# Patient Record
Sex: MALE | Race: WHITE | NOT HISPANIC OR LATINO | Employment: UNEMPLOYED | URBAN - METROPOLITAN AREA
[De-identification: names, ages, dates, MRNs, and addresses within clinical notes are randomized per-mention and may not be internally consistent; named-entity substitution may affect disease eponyms.]

---

## 2017-01-03 ENCOUNTER — DOCUMENTATION ONLY (OUTPATIENT)
Dept: FAMILY MEDICINE | Facility: CLINIC | Age: 52
End: 2017-01-03

## 2017-01-03 NOTE — PROGRESS NOTES
Pre-Visit Chart Review  For Appointment Scheduled on 1-4-17    Health Maintenance Due   Topic Date Due    TETANUS VACCINE  10/06/1982    Influenza Vaccine  08/01/2016

## 2017-01-05 ENCOUNTER — HOSPITAL ENCOUNTER (OUTPATIENT)
Dept: RADIOLOGY | Facility: HOSPITAL | Age: 52
Discharge: HOME | End: 2017-01-05
Attending: ORTHOPAEDIC SURGERY
Payer: MEDICAID

## 2017-01-05 DIAGNOSIS — M79.641 RIGHT HAND PAIN: ICD-10-CM

## 2017-01-05 DIAGNOSIS — M79.641 RIGHT HAND PAIN: Primary | ICD-10-CM

## 2017-01-05 PROCEDURE — 73130 X-RAY EXAM OF HAND: CPT | Mod: TC,RT

## 2017-01-05 PROCEDURE — 73130 X-RAY EXAM OF HAND: CPT | Mod: 26,RT,, | Performed by: RADIOLOGY

## 2017-01-06 ENCOUNTER — DOCUMENTATION ONLY (OUTPATIENT)
Dept: FAMILY MEDICINE | Facility: CLINIC | Age: 52
End: 2017-01-06

## 2017-01-06 NOTE — PROGRESS NOTES
Pre-Visit Chart Review  For Appointment Scheduled on 01/09/2017    Health Maintenance Due   Topic Date Due    TETANUS VACCINE  10/06/1982    Influenza Vaccine  08/01/2016

## 2017-01-10 ENCOUNTER — DOCUMENTATION ONLY (OUTPATIENT)
Dept: FAMILY MEDICINE | Facility: CLINIC | Age: 52
End: 2017-01-10

## 2017-01-10 NOTE — PROGRESS NOTES
Pre-Visit Chart Review  For Appointment Scheduled on 01/11/2017    Health Maintenance Due   Topic Date Due    TETANUS VACCINE  10/06/1982    Influenza Vaccine  08/01/2016

## 2017-01-11 ENCOUNTER — LAB VISIT (OUTPATIENT)
Dept: LAB | Facility: HOSPITAL | Age: 52
End: 2017-01-11
Attending: FAMILY MEDICINE
Payer: MEDICAID

## 2017-01-11 ENCOUNTER — OFFICE VISIT (OUTPATIENT)
Dept: FAMILY MEDICINE | Facility: CLINIC | Age: 52
End: 2017-01-11
Payer: MEDICAID

## 2017-01-11 ENCOUNTER — HOSPITAL ENCOUNTER (OUTPATIENT)
Dept: RADIOLOGY | Facility: HOSPITAL | Age: 52
Discharge: HOME | End: 2017-01-11
Attending: FAMILY MEDICINE
Payer: MEDICAID

## 2017-01-11 ENCOUNTER — TELEPHONE (OUTPATIENT)
Dept: FAMILY MEDICINE | Facility: CLINIC | Age: 52
End: 2017-01-11

## 2017-01-11 VITALS
TEMPERATURE: 98 F | WEIGHT: 212.06 LBS | BODY MASS INDEX: 33.28 KG/M2 | SYSTOLIC BLOOD PRESSURE: 150 MMHG | DIASTOLIC BLOOD PRESSURE: 96 MMHG | HEART RATE: 74 BPM | HEIGHT: 67 IN

## 2017-01-11 DIAGNOSIS — M10.9 GOUT, ARTHRITIS: Primary | ICD-10-CM

## 2017-01-11 DIAGNOSIS — M54.50 BILATERAL LOW BACK PAIN WITHOUT SCIATICA, UNSPECIFIED CHRONICITY: ICD-10-CM

## 2017-01-11 DIAGNOSIS — M25.649 STIFFNESS OF HAND JOINT, UNSPECIFIED LATERALITY: ICD-10-CM

## 2017-01-11 DIAGNOSIS — M10.9 GOUT, ARTHRITIS: ICD-10-CM

## 2017-01-11 DIAGNOSIS — I10 ESSENTIAL HYPERTENSION: ICD-10-CM

## 2017-01-11 DIAGNOSIS — R41.840 LACK OF CONCENTRATION: ICD-10-CM

## 2017-01-11 LAB
ALBUMIN SERPL BCP-MCNC: 4.3 G/DL
ALP SERPL-CCNC: 108 U/L
ALT SERPL W/O P-5'-P-CCNC: 23 U/L
ANION GAP SERPL CALC-SCNC: 7 MMOL/L
AST SERPL-CCNC: 21 U/L
BILIRUB SERPL-MCNC: 1.3 MG/DL
BUN SERPL-MCNC: 15 MG/DL
CALCIUM SERPL-MCNC: 9.5 MG/DL
CHLORIDE SERPL-SCNC: 104 MMOL/L
CO2 SERPL-SCNC: 28 MMOL/L
CREAT SERPL-MCNC: 1.1 MG/DL
CRP SERPL-MCNC: 4.6 MG/L
EST. GFR  (AFRICAN AMERICAN): >60 ML/MIN/1.73 M^2
EST. GFR  (NON AFRICAN AMERICAN): >60 ML/MIN/1.73 M^2
GLUCOSE SERPL-MCNC: 85 MG/DL
POTASSIUM SERPL-SCNC: 4.1 MMOL/L
PROT SERPL-MCNC: 8.1 G/DL
RHEUMATOID FACT SERPL-ACNC: <10 IU/ML
SODIUM SERPL-SCNC: 139 MMOL/L
URATE SERPL-MCNC: 10.1 MG/DL

## 2017-01-11 PROCEDURE — 77077 JOINT SURVEY SINGLE VIEW: CPT | Mod: 26,,, | Performed by: RADIOLOGY

## 2017-01-11 PROCEDURE — 99999 PR PBB SHADOW E&M-EST. PATIENT-LVL IV: CPT | Mod: PBBFAC,,, | Performed by: PHYSICIAN ASSISTANT

## 2017-01-11 PROCEDURE — 99213 OFFICE O/P EST LOW 20 MIN: CPT | Mod: S$PBB,,, | Performed by: PHYSICIAN ASSISTANT

## 2017-01-11 PROCEDURE — 36415 COLL VENOUS BLD VENIPUNCTURE: CPT | Mod: PO

## 2017-01-11 PROCEDURE — 84550 ASSAY OF BLOOD/URIC ACID: CPT

## 2017-01-11 PROCEDURE — 77077 JOINT SURVEY SINGLE VIEW: CPT | Mod: TC

## 2017-01-11 PROCEDURE — 99214 OFFICE O/P EST MOD 30 MIN: CPT | Mod: PBBFAC,PO | Performed by: PHYSICIAN ASSISTANT

## 2017-01-11 PROCEDURE — 86431 RHEUMATOID FACTOR QUANT: CPT

## 2017-01-11 PROCEDURE — 86140 C-REACTIVE PROTEIN: CPT

## 2017-01-11 PROCEDURE — 80053 COMPREHEN METABOLIC PANEL: CPT

## 2017-01-11 RX ORDER — IBUPROFEN 800 MG/1
800 TABLET ORAL 3 TIMES DAILY PRN
Qty: 90 TABLET | Refills: 0 | Status: SHIPPED | OUTPATIENT
Start: 2017-01-11 | End: 2017-09-08 | Stop reason: SDUPTHER

## 2017-01-11 RX ORDER — AMLODIPINE BESYLATE 5 MG/1
5 TABLET ORAL DAILY
COMMUNITY
End: 2017-06-02

## 2017-01-11 RX ORDER — TRIAMCINOLONE ACETONIDE 40 MG/ML
60 INJECTION, SUSPENSION INTRA-ARTICULAR; INTRAMUSCULAR ONCE
Status: DISCONTINUED | OUTPATIENT
Start: 2017-01-11 | End: 2017-02-27

## 2017-01-11 NOTE — PATIENT INSTRUCTIONS
Weight Management: Getting Started  Healthy bodies come in all shapes and sizes. Not all bodies are made to be thin. For some people, a healthy weight is higher than the average weight listed on weight charts. Your healthcare provider can help you decide on a healthy weight for you.    Reasons to lose weight  Losing weight can help with some health problems, such as high blood pressure, heart disease, diabetes, sleep apnea, and arthritis. You may also feel more energy.  Set your long-term goal  Your goal doesn't even have to be a specific weight. You may decide on a fitness goal (such as being able to walk 10 miles a week), or a health goal (such as lowering your blood pressure). Choose a goal that is measurable and reasonable, so you know when you've reached it. A goal of reaching a BMI of less than 25 is not always reasonable (or possible).   Make an action plan  Habits dont change overnight. Setting your goals too high can leave you feeling discouraged if you cant reach them. Be realistic. Choose one or two small changes you can make now. Set an action plan for how you are going to make these changes. When you can stick to this plan, keep making a few more small changes. Taking small steps will help you stay on the path to success.  Track your progress  Write down your goals. Then, keep a daily record of your progress. Write down what you eat and how active you are. This record lets you look back on how much youve done. It may also help when youre feeling frustrated. Reward yourself for success. Even if you dont reach every goal, give yourself credit for what you do get done.  Get support  Encouragement from others can help make losing weight easier. Ask your family members and friends for support. They may even want to join you. Also look to your healthcare provider, registered dietitian, and  for help. Your local hospital can give you more information about nutrition, exercise, and  weight loss.  © 5817-6995 [employer]. [address] [place] [address], [place] [address]. All rights reserved. This information is not intended as a substitute for professional medical care. Always follow your healthcare professional's instructions.        Weight Management: Getting Started  Healthy bodies come in all shapes and sizes. Not all bodies are made to be thin. For some people, a healthy weight is higher than the average weight listed on weight charts. Your healthcare provider can help you decide on a healthy weight for you.    Reasons to lose weight  Losing weight can help with some health problems, such as high blood pressure, heart disease, diabetes, sleep apnea, and arthritis. You may also feel more energy.  Set your long-term goal  Your goal doesn't even have to be a specific weight. You may decide on a fitness goal (such as being able to walk 10 miles a week), or a health goal (such as lowering your blood pressure). Choose a goal that is measurable and reasonable, so you know when you've reached it. A goal of reaching a BMI of less than 25 is not always reasonable (or possible).   Make an action plan  Habits dont change overnight. Setting your goals too high can leave you feeling discouraged if you cant reach them. Be realistic. Choose one or two small changes you can make now. Set an action plan for how you are going to make these changes. When you can stick to this plan, keep making a few more small changes. Taking small steps will help you stay on the path to success.  Track your progress  Write down your goals. Then, keep a daily record of your progress. Write down what you eat and how active you are. This record lets you look back on how much youve done. It may also help when youre feeling frustrated. Reward yourself for success. Even if you dont reach every goal, give yourself credit for what you do get done.  Get support  Encouragement from others can help make losing weight easier.  Ask your family members and friends for support. They may even want to join you. Also look to your healthcare provider, registered dietitian, and  for help. Your local hospital can give you more information about nutrition, exercise, and weight loss.  © 3509-2740 [employer]. [address] [place] [address], [place] [address]. All rights reserved. This information is not intended as a substitute for professional medical care. Always follow your healthcare professional's instructions.        Walking for Fitness  Fitness walking has something for everyone, even people who are already fit. Walking is one of the safest ways to condition your body aerobically. It can boost energy, help you lose weight, and reduce stress.    Physical benefits  · Walking strengthens your heart and lungs, and tones your muscles.  · When walking, your feet land with less impact than in other sports. This reduces chances of muscle, bone, and joint injury.  · Regular walking improves your cholesterol levels and lowers your risk of heart disease. And it helps you control your blood sugar if you have diabetes.  · Walking is a weight-bearing activity, which helps maintain bone density. This can help prevent osteoporosis.  Personal rewards  · Taking walks can help you relax and manage stress. And fitness walking may make you feel better about yourself.  · Walking can help you sleep better at night and make you less likely to be depressed.  · Regular walking may help maintain your memory as you get older.  · Walking is a great way to spend extra time with friends and family members. Be sure to invite your dog along!  Q&A about fitness walking  Q: Will walking keep me fit?  A: Yes. Regular walking at the right pace gives you all the benefits of other aerobic activities, such as jogging and swimming.  Q: Will walking help me lose weight and keep it off?  A: Yes. Per mile, walking can burn as many calories as jogging. Your  health care provider can help work walking into your weight-loss plan.  Q: Is walking safe for my health?  A: Yes. Walking is safe if you have high blood pressure, diabetes, heart disease, or other conditions. Talk to your health care provider before you start.  © 9149-0035 [employer]. [address] [place] [address], [place] [address]. All rights reserved. This information is not intended as a substitute for professional medical care. Always follow your healthcare professional's instructions.

## 2017-01-11 NOTE — TELEPHONE ENCOUNTER
----- Message from LEEANNA Ospina sent at 1/11/2017  2:17 PM CST -----  Findings in the hand xrays suggest rheumatoid arthritis  I want him to see the rheumatologist as discussed

## 2017-01-11 NOTE — MR AVS SNAPSHOT
[facility]  [address]  [place] [address]  Phone: [phone]  Fax: [phone]                  Jarod Pierce   2017 10:40 AM   Office Visit    Description:  Male : 1965   Provider:  LEEANNA Ospina   Department:  [place] - Family Medicine           Reason for Visit     Gout     Hypertension           Diagnoses this Visit        Comments    Gout, arthritis    -  Primary     Stiffness of hand joint, unspecified laterality         Bilateral low back pain without sciatica, unspecified chronicity         Essential hypertension         BMI 33.0-33.9,adult         Lack of concentration                To Do List           Future Appointments        Provider Department Dept Phone    2017 12:15 PM [facility] XR3 [facility] [phone]    2017 2:15 PM [facility] [phone]    2017 4:20 PM Idris Fuller MD [facility] [phone]    2017 2:40 PM Yvan Patel MD [facility] [phone]    2017 10:00 AM Isaura Dickinson MD [place] - Rheumatology [phone]      Goals (5 Years of Data)     None      Follow-Up and Disposition     Return for Follow-Up with PCP, do lab & xray today .       These Medications        Disp Refills Start End    ibuprofen (ADVIL,MOTRIN) 800 MG tablet 90 tablet 0 2017     Take 1 tablet (800 mg total) by mouth 3 (three) times daily as needed for Pain. - Oral    Pharmacy: [facility] [place] - [address] [facility] Ph #: [phone]         [facility]     [facility] -  Assistance  Registered nurses in the [facility] provide clinical advisement, health education, appointment booking, and other advisory services.  Call for this free service at [phone].             Medications           Message regarding Medications     Verify the changes and/or additions to your medication  "regime listed below are the same as discussed with your clinician today.  If any of these changes or additions are incorrect, please notify your healthcare provider.        START taking these NEW medications        Refills    ibuprofen (ADVIL,MOTRIN) 800 MG tablet 0    Sig: Take 1 tablet (800 mg total) by mouth 3 (three) times daily as needed for Pain.    Class: Normal    Route: Oral      These medications were administered today        Dose Freq    triamcinolone acetonide injection 60 mg 60 mg Once    Sig: Inject 1.5 mLs (60 mg total) into the muscle once.    Class: Normal    Route: Intramuscular      STOP taking these medications     indomethacin (INDOCIN) 50 MG capsule Take 1 capsule (50 mg total) by mouth 3 (three) times daily with meals.    atenolol (TENORMIN) 50 MG tablet Take 1 tablet (50 mg total) by mouth once daily.    colchicine 0.6 mg tablet Take 1 tablet (0.6 mg total) by mouth once daily.    allopurinol (ZYLOPRIM) 100 MG tablet Take 1 tablet (100 mg total) by mouth 3 (three) times daily.           Verify that the below list of medications is an accurate representation of the medications you are currently taking.  If none reported, the list may be blank. If incorrect, please contact your healthcare provider. Carry this list with you in case of emergency.           Current Medications     amlodipine (NORVASC) 5 MG tablet Take 5 mg by mouth once daily.    ibuprofen (ADVIL,MOTRIN) 800 MG tablet Take 1 tablet (800 mg total) by mouth 3 (three) times daily as needed for Pain.           Clinical Reference Information           Vital Signs - Last Recorded  Most recent update: 1/11/2017 10:49 AM by Shreya Bañuelos MA    BP Pulse Temp Ht Wt BMI    (!) 150/96 (BP Location: Left arm, Patient Position: Sitting, BP Method: Automatic) 74 98.2 °F (36.8 °C) (Oral) 5' 7" (1.702 m) 96.2 kg (212 lb 1.3 oz) 33.22 kg/m2      Blood Pressure          Most Recent Value    BP  (!)  150/96      Allergies as of 1/11/2017     " Allopurinol Analogues    Codeine    Phenytoin Sodium Extended      Immunizations Administered on Date of Encounter - 1/11/2017     None      Orders Placed During Today's Visit      Normal Orders This Visit    Ambulatory referral to Psychiatry     Ambulatory referral to Rheumatology     Future Labs/Procedures Expected by Expires    C-reactive protein  1/11/2017 3/12/2018    Comprehensive metabolic panel  1/11/2017 3/12/2018    Rheumatoid factor  1/11/2017 3/12/2018    Uric acid  1/11/2017 3/12/2018    X-ray Arthritis Survey  1/11/2017 1/11/2018      Instructions      Weight Management: Getting Started  Healthy bodies come in all shapes and sizes. Not all bodies are made to be thin. For some people, a healthy weight is higher than the average weight listed on weight charts. Your healthcare provider can help you decide on a healthy weight for you.    Reasons to lose weight  Losing weight can help with some health problems, such as high blood pressure, heart disease, diabetes, sleep apnea, and arthritis. You may also feel more energy.  Set your long-term goal  Your goal doesn't even have to be a specific weight. You may decide on a fitness goal (such as being able to walk 10 miles a week), or a health goal (such as lowering your blood pressure). Choose a goal that is measurable and reasonable, so you know when you've reached it. A goal of reaching a BMI of less than 25 is not always reasonable (or possible).   Make an action plan  Habits dont change overnight. Setting your goals too high can leave you feeling discouraged if you cant reach them. Be realistic. Choose one or two small changes you can make now. Set an action plan for how you are going to make these changes. When you can stick to this plan, keep making a few more small changes. Taking small steps will help you stay on the path to success.  Track your progress  Write down your goals. Then, keep a daily record of your progress. Write down what you eat and  how active you are. This record lets you look back on how much youve done. It may also help when youre feeling frustrated. Reward yourself for success. Even if you dont reach every goal, give yourself credit for what you do get done.  Get support  Encouragement from others can help make losing weight easier. Ask your family members and friends for support. They may even want to join you. Also look to your healthcare provider, registered dietitian, and  for help. Your local hospital can give you more information about nutrition, exercise, and weight loss.  © 0843-3196 [employer]. [address] [place] [address], [place] [address]. All rights reserved. This information is not intended as a substitute for professional medical care. Always follow your healthcare professional's instructions.        Weight Management: Getting Started  Healthy bodies come in all shapes and sizes. Not all bodies are made to be thin. For some people, a healthy weight is higher than the average weight listed on weight charts. Your healthcare provider can help you decide on a healthy weight for you.    Reasons to lose weight  Losing weight can help with some health problems, such as high blood pressure, heart disease, diabetes, sleep apnea, and arthritis. You may also feel more energy.  Set your long-term goal  Your goal doesn't even have to be a specific weight. You may decide on a fitness goal (such as being able to walk 10 miles a week), or a health goal (such as lowering your blood pressure). Choose a goal that is measurable and reasonable, so you know when you've reached it. A goal of reaching a BMI of less than 25 is not always reasonable (or possible).   Make an action plan  Habits dont change overnight. Setting your goals too high can leave you feeling discouraged if you cant reach them. Be realistic. Choose one or two small changes you can make now. Set an action plan for how you are going to make these  changes. When you can stick to this plan, keep making a few more small changes. Taking small steps will help you stay on the path to success.  Track your progress  Write down your goals. Then, keep a daily record of your progress. Write down what you eat and how active you are. This record lets you look back on how much youve done. It may also help when youre feeling frustrated. Reward yourself for success. Even if you dont reach every goal, give yourself credit for what you do get done.  Get support  Encouragement from others can help make losing weight easier. Ask your family members and friends for support. They may even want to join you. Also look to your healthcare provider, registered dietitian, and  for help. Your local hospital can give you more information about nutrition, exercise, and weight loss.  © 0790-2196 [employer]. [address] [place] [address], [place] [address]. All rights reserved. This information is not intended as a substitute for professional medical care. Always follow your healthcare professional's instructions.        Walking for Fitness  Fitness walking has something for everyone, even people who are already fit. Walking is one of the safest ways to condition your body aerobically. It can boost energy, help you lose weight, and reduce stress.    Physical benefits  · Walking strengthens your heart and lungs, and tones your muscles.  · When walking, your feet land with less impact than in other sports. This reduces chances of muscle, bone, and joint injury.  · Regular walking improves your cholesterol levels and lowers your risk of heart disease. And it helps you control your blood sugar if you have diabetes.  · Walking is a weight-bearing activity, which helps maintain bone density. This can help prevent osteoporosis.  Personal rewards  · Taking walks can help you relax and manage stress. And fitness walking may make you feel better about  yourself.  · Walking can help you sleep better at night and make you less likely to be depressed.  · Regular walking may help maintain your memory as you get older.  · Walking is a great way to spend extra time with friends and family members. Be sure to invite your dog along!  Q&A about fitness walking  Q: Will walking keep me fit?  A: Yes. Regular walking at the right pace gives you all the benefits of other aerobic activities, such as jogging and swimming.  Q: Will walking help me lose weight and keep it off?  A: Yes. Per mile, walking can burn as many calories as jogging. Your health care provider can help work walking into your weight-loss plan.  Q: Is walking safe for my health?  A: Yes. Walking is safe if you have high blood pressure, diabetes, heart disease, or other conditions. Talk to your health care provider before you start.  © 4615-0317 [employer]. [address] [place] [address], [place] [address]. All rights reserved. This information is not intended as a substitute for professional medical care. Always follow your healthcare professional's instructions.

## 2017-01-12 ENCOUNTER — DOCUMENTATION ONLY (OUTPATIENT)
Dept: FAMILY MEDICINE | Facility: CLINIC | Age: 52
End: 2017-01-12

## 2017-01-12 NOTE — TELEPHONE ENCOUNTER
----- Message from LEEANNA Ospina sent at 1/12/2017 10:37 AM CST -----  Please let patient know that his labs show elevated uric acid (gout)  The other labs are in acceptable range  I urge him to see rheumatology   We should also start a daily medication to lower the uric acid  Is he willing to take a daily medication?

## 2017-01-12 NOTE — PROGRESS NOTES
Subjective:       Patient ID: Jarod Pierce is a 51 y.o. male.    Chief Complaint: Gout (bilateral hand) and Hypertension    HPI Comments: Patient with hypertension and gout presents for follow up.  He reports that he has not taken his Norvasc in 2 days due to forgetting.  He complains of swelling, stiffness and pain in the hands.  He has several medications listed for gout that he uses off and on as he sees fit.  He is complains of bilateral lower back pain.  He is concerned about his kidneys.  He denies dysuria or changes in urination.  He in inquiring about being tested of ADHD.  He states that he was treated for this as a child.      Hypertension   This is a chronic problem. The problem is uncontrolled. Pertinent negatives include no anxiety, blurred vision, chest pain, headaches, malaise/fatigue, palpitations, peripheral edema or shortness of breath. Past treatments include calcium channel blockers. Compliance problems: forgets medication.      Review of Systems   Constitutional: Negative for appetite change, fatigue, malaise/fatigue and unexpected weight change.   Eyes: Negative for blurred vision and visual disturbance.   Respiratory: Negative for shortness of breath.    Cardiovascular: Negative for chest pain and palpitations.   Musculoskeletal: Positive for arthralgias and joint swelling.   Skin: Negative for color change.   Neurological: Negative for dizziness, weakness and headaches.       Objective:      Physical Exam   Constitutional: He appears well-developed and well-nourished. He is cooperative. He does not appear ill. No distress.   HENT:   Head: Normocephalic and atraumatic.   Cardiovascular: Normal rate, regular rhythm and normal heart sounds.    Pulmonary/Chest: Effort normal and breath sounds normal.   Musculoskeletal:        Left wrist: He exhibits normal range of motion and no tenderness.        Right forearm: He exhibits swelling. He exhibits no tenderness.        Right hand: He exhibits  decreased range of motion, tenderness and swelling. Normal sensation noted. Normal strength noted.        Left hand: He exhibits normal range of motion and no tenderness. Normal sensation noted. Normal strength noted.   Neurological: He is alert.   Skin: Skin is warm and dry. No erythema.   Psychiatric: His mood appears anxious. His speech is rapid and/or pressured and tangential. He is hyperactive. Cognition and memory are normal. He expresses impulsivity. He is inattentive.   Vitals reviewed.      Assessment:       1. Gout, arthritis    2. Stiffness of hand joint, unspecified laterality    3. Bilateral low back pain without sciatica, unspecified chronicity    4. Essential hypertension    5. BMI 33.0-33.9,adult    6. Lack of concentration        Plan:       Jarod was seen today for gout and hypertension.    Diagnoses and all orders for this visit:    Gout, arthritis  -     Comprehensive metabolic panel; Future  -     Uric acid; Future  -     X-ray Arthritis Survey; Future  -     Ambulatory referral to Rheumatology    Stiffness of hand joint, unspecified laterality  -     Comprehensive metabolic panel; Future  -     C-reactive protein; Future  -     Rheumatoid factor; Future  -     X-ray Arthritis Survey; Future  -     Ambulatory referral to Rheumatology    Bilateral low back pain without sciatica, unspecified chronicity  -     X-ray Arthritis Survey; Future  -     Ambulatory referral to Rheumatology    Essential hypertension  Patient admits to not taking his medication   i encourage compliance   BP log and nurse visit in 1-2 weeks   BMI 33.0-33.9,adult    Lack of concentration  -     Ambulatory referral to Psychiatry    Other orders  -     triamcinolone acetonide injection 60 mg; Inject 1.5 mLs (60 mg total) into the muscle once.  -     ibuprofen (ADVIL,MOTRIN) 800 MG tablet; Take 1 tablet (800 mg total) by mouth 3 (three) times daily as needed for Pain.    Patient readiness: acceptance and barriers:readiness,  social stressors and environmental    During the course of the visit the patient was educated and counseled about the following:     Hypertension:   Medication: encouraged compliance .  Obesity:   General weight loss/lifestyle modification strategies discussed (elicit support from others; identify saboteurs; non-food rewards, etc).    Goals: Hypertension: Reduce Blood Pressure and Obesity: Reduce calorie intake and BMI    Did patient meet goals/outcomes: No    The following self management tools provided: blood pressure log    Patient Instructions (the written plan) was given to the patient/family.     Time spent with patient: 45 minutes

## 2017-01-12 NOTE — PROGRESS NOTES
Pre-Visit Chart Review  For Appointment Scheduled on 01/13/2017    Health Maintenance Due   Topic Date Due    TETANUS VACCINE  10[birth year]06/1982    Influenza Vaccine  08/01/2016

## 2017-01-13 RX ORDER — COLCHICINE 0.6 MG/1
0.6 TABLET ORAL DAILY
Qty: 30 TABLET | Refills: 2 | Status: SHIPPED | OUTPATIENT
Start: 2017-01-13 | End: 2017-09-15 | Stop reason: ALTCHOICE

## 2017-01-13 NOTE — TELEPHONE ENCOUNTER
----- Message from Stephany Connor sent at 1/13/2017  2:23 PM CST -----  Contact: self [phone]  Please call him with the results of the xrays.  Thank you!

## 2017-01-13 NOTE — TELEPHONE ENCOUNTER
Patient willing to try gout medication, but asks for lowest dose that would be effective due to gastro issues. Already scheduled with Rheumatology. Mailed confirmation of this appointment and  appointment.

## 2017-01-30 ENCOUNTER — DOCUMENTATION ONLY (OUTPATIENT)
Dept: FAMILY MEDICINE | Facility: CLINIC | Age: 52
End: 2017-01-30

## 2017-01-30 NOTE — PROGRESS NOTES
Pre-Visit Chart Review  For Appointment Scheduled on 2-1-17    Health Maintenance Due   Topic Date Due    TETANUS VACCINE  10/06/1982    Influenza Vaccine  08/01/2016

## 2017-02-21 ENCOUNTER — DOCUMENTATION ONLY (OUTPATIENT)
Dept: FAMILY MEDICINE | Facility: CLINIC | Age: 52
End: 2017-02-21

## 2017-02-21 NOTE — PROGRESS NOTES
Pre-Visit Chart Review  For Appointment Scheduled on 2/22/2017.    Health Maintenance Due   Topic Date Due    TETANUS VACCINE  10/06/1982    Colonoscopy  10/06/2014    Influenza Vaccine  08/01/2016

## 2017-02-27 ENCOUNTER — INITIAL CONSULT (OUTPATIENT)
Dept: RHEUMATOLOGY | Facility: CLINIC | Age: 52
End: 2017-02-27
Payer: MEDICAID

## 2017-02-27 VITALS
HEART RATE: 85 BPM | BODY MASS INDEX: 33.74 KG/M2 | HEIGHT: 67 IN | SYSTOLIC BLOOD PRESSURE: 145 MMHG | WEIGHT: 214.94 LBS | DIASTOLIC BLOOD PRESSURE: 92 MMHG

## 2017-02-27 DIAGNOSIS — M15.9 PRIMARY OSTEOARTHRITIS INVOLVING MULTIPLE JOINTS: ICD-10-CM

## 2017-02-27 DIAGNOSIS — M10.031 ACUTE IDIOPATHIC GOUT OF RIGHT WRIST: Primary | ICD-10-CM

## 2017-02-27 DIAGNOSIS — E80.6 HYPERBILIRUBINEMIA: ICD-10-CM

## 2017-02-27 PROCEDURE — 99999 PR PBB SHADOW E&M-EST. PATIENT-LVL III: CPT | Mod: PBBFAC,,, | Performed by: INTERNAL MEDICINE

## 2017-02-27 PROCEDURE — 99205 OFFICE O/P NEW HI 60 MIN: CPT | Mod: S$PBB,,, | Performed by: INTERNAL MEDICINE

## 2017-02-27 PROCEDURE — 96372 THER/PROPH/DIAG INJ SC/IM: CPT | Mod: PBBFAC,PO

## 2017-02-27 PROCEDURE — 99213 OFFICE O/P EST LOW 20 MIN: CPT | Mod: PBBFAC,PO,25 | Performed by: INTERNAL MEDICINE

## 2017-02-27 RX ORDER — HYDROCODONE BITARTRATE AND ACETAMINOPHEN 5; 325 MG/1; MG/1
TABLET ORAL
COMMUNITY
Start: 2017-02-06 | End: 2017-09-15 | Stop reason: ALTCHOICE

## 2017-02-27 RX ORDER — METHYLPREDNISOLONE ACETATE 40 MG/ML
40 INJECTION, SUSPENSION INTRA-ARTICULAR; INTRALESIONAL; INTRAMUSCULAR; SOFT TISSUE
Status: COMPLETED | OUTPATIENT
Start: 2017-02-27 | End: 2017-02-27

## 2017-02-27 RX ORDER — PREDNISONE 20 MG/1
30 TABLET ORAL DAILY
Qty: 6 TABLET | Refills: 2 | Status: SHIPPED | OUTPATIENT
Start: 2017-02-27 | End: 2017-03-09

## 2017-02-27 RX ORDER — GENTAMICIN SULFATE 3 MG/G
OINTMENT OPHTHALMIC
COMMUNITY
Start: 2017-02-06 | End: 2017-04-12

## 2017-02-27 RX ADMIN — METHYLPREDNISOLONE ACETATE 40 MG: 40 INJECTION, SUSPENSION INTRALESIONAL; INTRAMUSCULAR; INTRASYNOVIAL; SOFT TISSUE at 10:02

## 2017-02-27 ASSESSMENT — ROUTINE ASSESSMENT OF PATIENT INDEX DATA (RAPID3)
PSYCHOLOGICAL DISTRESS SCORE: 5.5
FATIGUE SCORE: 0
MDHAQ FUNCTION SCORE: 1
PATIENT GLOBAL ASSESSMENT SCORE: 6.5
TOTAL RAPID3 SCORE: 5.61
AM STIFFNESS SCORE: 0, NO
PAIN SCORE: 7

## 2017-02-27 NOTE — LETTER
March 1, 2017      Yvan Patel MD  [address]  [place] [address]           [place] [facility]  [address]  [place] [address]  Phone: [phone]  Fax: [phone]          Patient: Jarod Pierce   MR Number: [id number]   YOB: 1965   Date of Visit: 2/27/2017       Dear Dr. Yvan Patel:    Thank you for referring Jarod Pierce to me for evaluation. Attached you will find relevant portions of my assessment and plan of care.    If you have questions, please do not hesitate to call me. I look forward to following Jarod Pierce along with you.    Sincerely,    Isaura Dickinson MD    Enclosure  CC:  No Recipients    If you would like to receive this communication electronically, please contact [email or web address] or [phone] to request more information on [employer] access.    For providers and/or their staff who would like to refer a patient to [facility], please contact us through our one-stop-shop provider referral line, [facility], at [phone].    If you feel you have received this communication in error or would no longer like to receive these types of communications, please e-mail [email or web address]

## 2017-02-27 NOTE — PROGRESS NOTES
Administered 40mg of 80/ml of Depo Medrol and 1cc 1% Licocaine to left upper outer quadrant of the gluteus junior. Patient tolerated well. No acute reaction noted. Instructed patient to report S/S. Patient verbalized understanding.

## 2017-02-27 NOTE — PROGRESS NOTES
"Subjective:       Patient ID: Jarod Pierce is a 51 y.o. male.    Chief Complaint: Gout     HPI 50 yo male with HTN is seen on consultation for joint pain. He reports that he was diagnosed in 2004. Last arthrocentesis 1 yr ago  By Dr Lopez- revealed MSU crystals per patient. Usually gets 2-3 flare up in a year. Usually involves feet/podagra, knees, recently right wrist. Each episode lasts for 1 month- usually resolves with steroids- he has been to [facility] multiple times. Took Colchicine intermittently. Last took colchicine 2 months ago. Also tool allopurinol intermittently- caused stomach problems. Risk factors include -positive FH- father had gout, shelled fish, carbonated drinks, denies alcohol use, no h/o kidney stones.   Currently, pain is located right wrist, 6/10, aching, worse with activity, better with rest. Accompanied by swelling. Early morning stiffness lasts for 30 minutes.   He  denies fever, weight loss, dry eyes or mouth, photosensitivity, rash, ulcer, raynaud's phenomenon, alopecia, dysphagia, diarrhea or blood in the stools    Review of Systems   Constitutional: Negative for fatigue and fever.   HENT: Negative for ear discharge and ear pain.    Eyes: Negative for pain and redness.   Respiratory: Negative for cough and shortness of breath.    Cardiovascular: Negative for chest pain and palpitations.   Gastrointestinal: Negative for abdominal distention and abdominal pain.   Genitourinary: Negative for genital sores and hematuria.   Musculoskeletal: Positive for arthralgias.   Neurological: Negative for tremors and seizures.   Psychiatric/Behavioral: Negative for agitation and hallucinations.         Objective:   BP (!) 145/92  Pulse 85  Ht 5' 7" (1.702 m)  Wt 97.5 kg (214 lb 15.2 oz)  BMI 33.67 kg/m2     Physical Exam   Constitutional: He is oriented to person, place, and time and well-developed, well-nourished, and in no distress.   HENT:   Head: Normocephalic and atraumatic.   Eyes: " Conjunctivae and EOM are normal. Pupils are equal, round, and reactive to light.   Neck: Normal range of motion. Neck supple. No thyromegaly present.   Cardiovascular: Normal rate and regular rhythm.    Pulmonary/Chest: Effort normal and breath sounds normal.   Abdominal: Soft. Bowel sounds are normal. There is no hepatomegaly.   Neurological: He is alert and oriented to person, place, and time. He has normal sensation.   Skin: Skin is warm and dry.     Psychiatric: Memory and affect normal.   Musculoskeletal: He exhibits no edema.   Neck with intact range of motion in all directions   Bilateral shoulders with intact ROM   Left elbow tophi  Dorsum of right hand and wrist is swollen, warm, tender to palpate  Bilateral hips with external rotation 25° and  internal rotation 15°   Bilateral knee crepitus  Both ankles and feet nontender, without synovitis             Lab Results   Component Value Date    WBC 12.85 (H) 01/09/2015    HGB 16.1 01/09/2015    HCT 46.3 01/09/2015    MCV 94 01/09/2015     01/09/2015     CMP  Sodium   Date Value Ref Range Status   01/11/2017 139 136 - 145 mmol/L Final     Potassium   Date Value Ref Range Status   01/11/2017 4.1 3.5 - 5.1 mmol/L Final     Chloride   Date Value Ref Range Status   01/11/2017 104 95 - 110 mmol/L Final     CO2   Date Value Ref Range Status   01/11/2017 28 23 - 29 mmol/L Final     Glucose   Date Value Ref Range Status   01/11/2017 85 70 - 110 mg/dL Final     BUN, Bld   Date Value Ref Range Status   01/11/2017 15 6 - 20 mg/dL Final     Creatinine   Date Value Ref Range Status   01/11/2017 1.1 0.5 - 1.4 mg/dL Final     Calcium   Date Value Ref Range Status   01/11/2017 9.5 8.7 - 10.5 mg/dL Final     Total Protein   Date Value Ref Range Status   01/11/2017 8.1 6.0 - 8.4 g/dL Final     Albumin   Date Value Ref Range Status   01/11/2017 4.3 3.5 - 5.2 g/dL Final     Total Bilirubin   Date Value Ref Range Status   01/11/2017 1.3 (H) 0.1 - 1.0 mg/dL Final     Comment:      For infants and newborns, interpretation of results should be based  on gestational age, weight and in agreement with clinical  observations.  Premature Infant recommended reference ranges:  Up to 24 hours.............<8.0 mg/dL  Up to 48 hours............<12.0 mg/dL  3-5 days..................<15.0 mg/dL  6-29 days.................<15.0 mg/dL       Alkaline Phosphatase   Date Value Ref Range Status   01/11/2017 108 55 - 135 U/L Final     AST   Date Value Ref Range Status   01/11/2017 21 10 - 40 U/L Final     ALT   Date Value Ref Range Status   01/11/2017 23 10 - 44 U/L Final     Anion Gap   Date Value Ref Range Status   01/11/2017 7 (L) 8 - 16 mmol/L Final     eGFR if    Date Value Ref Range Status   01/11/2017 >60.0 >60 mL/min/1.73 m^2 Final     eGFR if non    Date Value Ref Range Status   01/11/2017 >60.0 >60 mL/min/1.73 m^2 Final     Comment:     Calculation used to obtain the estimated glomerular filtration  rate (eGFR) is the CKD-EPI equation. Since race is unknown   in our information system, the eGFR values for   -American and Non--American patients are given   for each creatinine result.       Lab Results   Component Value Date    SEDRATE 20 (H) 03/12/2014       Lab Results   Component Value Date    CRP 4.6 01/11/2017     Lab Results   Component Value Date    URICACID 10.1 (H) 01/11/2017     Radiology: I personally reviewed imaging  X-rays reveals erosion of proximal phalanx of the right middle finger consistent with gout   Assessment:   Acute gout flare-tophaceous gout   Hyperuricemia  Hyperbilirubinemia-we'll continue to monitor  Allopurinol ALLERGY  Long-term use of steroids  Generalized osteoarthritis  Plan:   Monitor LFTs   Give Depo-Medrol 40 mg IM ×1  Start prednisone 30 mg a day from 2/28/17 for  4 days  Start colchicine 0.6 mg a day   Discussed side effects of colchicine   Plan is to start on uloric, discussed side effects  Patient educated about  gout in length, handout provided  Discussed life style modification including dietary changes for gout  Return to clinic in 2 weeks    -Patient seen face to face for 60 minutes and greater than 50% spent in counseling regarding gout and chart review

## 2017-03-06 ENCOUNTER — TELEPHONE (OUTPATIENT)
Dept: FAMILY MEDICINE | Facility: CLINIC | Age: 52
End: 2017-03-06

## 2017-03-06 ENCOUNTER — DOCUMENTATION ONLY (OUTPATIENT)
Dept: RHEUMATOLOGY | Facility: CLINIC | Age: 52
End: 2017-03-06

## 2017-03-06 NOTE — TELEPHONE ENCOUNTER
I would like to see what Rheumatology recommends since colchicine is not covered   Dr Dickinson please advise   Patients pharmacy states rx colchicine is not covered by new insurance

## 2017-03-06 NOTE — TELEPHONE ENCOUNTER
Please let pharmacy and patient know that No replacement for colchicine  is needed at this time (per his rheumatologist)   Follow up with Dr Dickinson as scheduled

## 2017-03-06 NOTE — PROGRESS NOTES
Reviewed outside records from   On 1/14/15, cyanosis or fluid examination was negative for mono sodium urate crystals  Uric acid was 7.9  On 1/19/15 uric acid was 7.5

## 2017-03-15 ENCOUNTER — OFFICE VISIT (OUTPATIENT)
Dept: RHEUMATOLOGY | Facility: CLINIC | Age: 52
End: 2017-03-15
Payer: MEDICAID

## 2017-03-15 VITALS
SYSTOLIC BLOOD PRESSURE: 156 MMHG | HEART RATE: 76 BPM | DIASTOLIC BLOOD PRESSURE: 93 MMHG | HEIGHT: 67 IN | WEIGHT: 215.63 LBS | BODY MASS INDEX: 33.84 KG/M2

## 2017-03-15 DIAGNOSIS — M10.9 GOUT, ARTHRITIS: Primary | ICD-10-CM

## 2017-03-15 DIAGNOSIS — E80.6 HYPERBILIRUBINEMIA: ICD-10-CM

## 2017-03-15 DIAGNOSIS — M1A.0311 IDIOPATHIC CHRONIC GOUT OF RIGHT WRIST WITH TOPHUS: ICD-10-CM

## 2017-03-15 PROCEDURE — 99214 OFFICE O/P EST MOD 30 MIN: CPT | Mod: S$PBB,,, | Performed by: INTERNAL MEDICINE

## 2017-03-15 PROCEDURE — 99213 OFFICE O/P EST LOW 20 MIN: CPT | Mod: PBBFAC,PO | Performed by: INTERNAL MEDICINE

## 2017-03-15 PROCEDURE — 99999 PR PBB SHADOW E&M-EST. PATIENT-LVL III: CPT | Mod: PBBFAC,,, | Performed by: INTERNAL MEDICINE

## 2017-03-15 RX ORDER — FEBUXOSTAT 40 MG/1
40 TABLET, FILM COATED ORAL DAILY
Qty: 30 TABLET | Refills: 1 | Status: SHIPPED | OUTPATIENT
Start: 2017-03-15 | End: 2017-09-15 | Stop reason: SDUPTHER

## 2017-03-15 RX ORDER — PREDNISONE 5 MG/1
5 TABLET ORAL DAILY
Qty: 30 TABLET | Refills: 1 | Status: SHIPPED | OUTPATIENT
Start: 2017-03-15 | End: 2017-03-25

## 2017-03-15 ASSESSMENT — ROUTINE ASSESSMENT OF PATIENT INDEX DATA (RAPID3)
PATIENT GLOBAL ASSESSMENT SCORE: 6
PSYCHOLOGICAL DISTRESS SCORE: 3.3
MDHAQ FUNCTION SCORE: .3
AM STIFFNESS SCORE: 1, YES
PAIN SCORE: 6
FATIGUE SCORE: 6
TOTAL RAPID3 SCORE: 4.33

## 2017-03-15 NOTE — PROGRESS NOTES
"Subjective:       Patient ID: Jarod Pierce is a 51 y.o. male.    Chief Complaint: Chronic Gout     HPI 50 yo male with HTN is here for follow-up for chronic tophaceous gout.  Gout was diagnosed in 2004. Usually gets 2-3 flare up in a year. Usually involves feet/podagra, knees, recently right wrist. Each episode lasts for 1 month- usually resolves with steroids- he has been to [facility] multiple times. . Also took allopurinol intermittently- caused stomach problems. Risk factors include -positive FH- father had gout, shelled fish, carbonated drinks, denies alcohol use, no h/o kidney stones.   During last visit, he was in acute gout flare which resolved with prednisone.  Colchicine 0.6 mg a day was prescribed for prophylaxis and he could not afford it.   Still has pain in  right wrist, 6/10, aching, worse with activity, better with rest.  No swelling today Early morning stiffness lasts for 30 minutes.   He  denies fever, weight loss, dry eyes or mouth, photosensitivity, rash, ulcer, raynaud's phenomenon, alopecia, dysphagia, diarrhea or blood in the stools    Review of Systems   Constitutional: Negative for fatigue and fever.   HENT: Negative for ear discharge and ear pain.    Eyes: Negative for pain and redness.   Respiratory: Negative for cough and shortness of breath.    Cardiovascular: Negative for chest pain and palpitations.   Gastrointestinal: Negative for abdominal distention and abdominal pain.   Genitourinary: Negative for genital sores and hematuria.   Musculoskeletal: Positive for arthralgias.         Objective:   BP (!) 156/93  Pulse 76  Ht 5' 7" (1.702 m)  Wt 97.8 kg (215 lb 9.8 oz)  BMI 33.77 kg/m2     Physical Exam   Constitutional: He is oriented to person, place, and time and well-developed, well-nourished, and in no distress.   HENT:   Head: Normocephalic and atraumatic.   Eyes: Conjunctivae and EOM are normal. Pupils are equal, round, and reactive to light.   Neck: Normal range of motion. Neck " supple. No thyromegaly present.   Cardiovascular: Normal rate and regular rhythm.    Pulmonary/Chest: Effort normal and breath sounds normal.   Abdominal: Soft. Bowel sounds are normal.   Neurological: He is alert and oriented to person, place, and time.   Skin: Skin is warm and dry.     Psychiatric: Memory and affect normal.   Musculoskeletal: He exhibits no edema.     Left elbow tophi  Dorsum of right hand and wrist with improvement in swelling but  to palpate             Lab Results   Component Value Date    WBC 12.85 (H) 01/09/2015    HGB 16.1 01/09/2015    HCT 46.3 01/09/2015    MCV 94 01/09/2015     01/09/2015     CMP  Sodium   Date Value Ref Range Status   01/11/2017 139 136 - 145 mmol/L Final     Potassium   Date Value Ref Range Status   01/11/2017 4.1 3.5 - 5.1 mmol/L Final     Chloride   Date Value Ref Range Status   01/11/2017 104 95 - 110 mmol/L Final     CO2   Date Value Ref Range Status   01/11/2017 28 23 - 29 mmol/L Final     Glucose   Date Value Ref Range Status   01/11/2017 85 70 - 110 mg/dL Final     BUN, Bld   Date Value Ref Range Status   01/11/2017 15 6 - 20 mg/dL Final     Creatinine   Date Value Ref Range Status   01/11/2017 1.1 0.5 - 1.4 mg/dL Final     Calcium   Date Value Ref Range Status   01/11/2017 9.5 8.7 - 10.5 mg/dL Final     Total Protein   Date Value Ref Range Status   01/11/2017 8.1 6.0 - 8.4 g/dL Final     Albumin   Date Value Ref Range Status   01/11/2017 4.3 3.5 - 5.2 g/dL Final     Total Bilirubin   Date Value Ref Range Status   01/11/2017 1.3 (H) 0.1 - 1.0 mg/dL Final     Comment:     For infants and newborns, interpretation of results should be based  on gestational age, weight and in agreement with clinical  observations.  Premature Infant recommended reference ranges:  Up to 24 hours.............<8.0 mg/dL  Up to 48 hours............<12.0 mg/dL  3-5 days..................<15.0 mg/dL  6-29 days.................<15.0 mg/dL       Alkaline Phosphatase   Date  Value Ref Range Status   01/11/2017 108 55 - 135 U/L Final     AST   Date Value Ref Range Status   01/11/2017 21 10 - 40 U/L Final     ALT   Date Value Ref Range Status   01/11/2017 23 10 - 44 U/L Final     Anion Gap   Date Value Ref Range Status   01/11/2017 7 (L) 8 - 16 mmol/L Final     eGFR if    Date Value Ref Range Status   01/11/2017 >60.0 >60 mL/min/1.73 m^2 Final     eGFR if non    Date Value Ref Range Status   01/11/2017 >60.0 >60 mL/min/1.73 m^2 Final     Comment:     Calculation used to obtain the estimated glomerular filtration  rate (eGFR) is the CKD-EPI equation. Since race is unknown   in our information system, the eGFR values for   -American and Non--American patients are given   for each creatinine result.       Lab Results   Component Value Date    SEDRATE 20 (H) 03/12/2014       Lab Results   Component Value Date    CRP 4.6 01/11/2017     Lab Results   Component Value Date    URICACID 10.1 (H) 01/11/2017     Reviewed outside records from   On 1/14/15, cyanosis or fluid examination was negative for mono sodium urate crystals  Uric acid was 7.9  On 1/19/15 uric acid was 7.5  X-rays reveals erosion of proximal phalanx of the right middle finger consistent with gout   Assessment:   Chronic gout with tophi  Hyperuricemia  Hyperbilirubinemia-we'll continue to monitor  Allopurinol caused abdominal pain  Long-term use of steroids  Generalized osteoarthritis  Plan:   Start your Uloric 40 mg a day, discussed side effects  We will monitor LFTs while on your Uloric  Take prednisone 5 mg a day for prophylaxis as he cannot afford colchicine   Discussed life style modification including dietary changes for gout  Return to clinic in 1 month with CBC CMP and uric acid

## 2017-03-15 NOTE — MR AVS SNAPSHOT
[facility]  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 - Oral    Pharmacy: [facility] [address] [place], [place] [address] [facility] Ph #: [phone]       predniSONE (DELTASONE) 5 MG tablet 30 tablet 1 3/15/2017 3/25/2017    Take 1 tablet (5 mg total) by mouth once daily. - Oral    Pharmacy: [facility] [address] [place], [place] [address] [facility] Ph #: [phone]         [facility]     [facility] Nurse Care Line -  Assistance  Registered nurses in the [facility] Center provide clinical advisement, health education, appointment booking, and other advisory services.  Call for this free service at [phone].             Medications           Message regarding Medications     Verify the changes and/or additions to your medication regime listed below are the same as discussed with your clinician today.  If any of these changes or additions are incorrect, please notify your healthcare  "provider.        START taking these NEW medications        Refills    febuxostat (ULORIC) 40 mg Tab 1    Sig: Take 1 tablet (40 mg total) by mouth once daily.    Class: Normal    Route: Oral    predniSONE (DELTASONE) 5 MG tablet 1    Sig: Take 1 tablet (5 mg total) by mouth once daily.    Class: Normal    Route: Oral           Verify that the below list of medications is an accurate representation of the medications you are currently taking.  If none reported, the list may be blank. If incorrect, please contact your healthcare provider. Carry this list with you in case of emergency.           Current Medications     amlodipine (NORVASC) 5 MG tablet Take 5 mg by mouth once daily.    colchicine 0.6 mg tablet Take 1 tablet (0.6 mg total) by mouth once daily.    GENTAK 0.3 % (3 mg/gram) ophthalmic ointment     hydrocodone-acetaminophen 5-325mg (NORCO) 5-325 mg per tablet     ibuprofen (ADVIL,MOTRIN) 800 MG tablet Take 1 tablet (800 mg total) by mouth 3 (three) times daily as needed for Pain.    febuxostat (ULORIC) 40 mg Tab Take 1 tablet (40 mg total) by mouth once daily.    predniSONE (DELTASONE) 5 MG tablet Take 1 tablet (5 mg total) by mouth once daily.           Clinical Reference Information           Your Vitals Were     BP Pulse Height Weight BMI    156/93 76 5' 7" (1.702 m) 97.8 kg (215 lb 9.8 oz) 33.77 kg/m2      Blood Pressure          Most Recent Value    BP  (!)  156/93      Allergies as of 3/15/2017     Allopurinol Analogues    Codeine    Indocin [Indomethacin Sodium]    Phenytoin Sodium Extended      Immunizations Administered on Date of Encounter - 3/15/2017     None      Orders Placed During Today's Visit     Future Labs/Procedures Expected by Expires    CBC auto differential  3/15/2017 5/14/2018    Comprehensive metabolic panel  3/15/2017 5/14/2018    Uric acid  3/15/2017 5/14/2018      Language Assistance Services     ATTENTION: Language assistance services are available, free of charge. Please call " [phone].      ATENCIÓN: Si habla español, tiene a zhong disposición servicios gratuitos de asistencia lingüística. Llame al [phone].     CHÚ Ý: N?u b?n nói Ti?ng Vi?t, có các d?ch v? h? tr? ngôn ng? mi?n phí dành cho b?n. G?i s? [phone].         [facility] complies with applicable Federal civil rights laws and does not discriminate on the basis of race, color, national origin, age, disability, or sex.

## 2017-03-15 NOTE — PROGRESS NOTES
03/15/17 0815   OTHER   MDHAQ Score 0.3   Psychologic Score 3.3   Pain Score 6   Morning Stiffness Score Yes   Number of minutes or hours until limber about 45 minutes after hot shower   Fatigue Score 6   Global Health Score 6   RAPID3 Score 4.33

## 2017-03-23 ENCOUNTER — TELEPHONE (OUTPATIENT)
Dept: RHEUMATOLOGY | Facility: CLINIC | Age: 52
End: 2017-03-23

## 2017-03-23 NOTE — TELEPHONE ENCOUNTER
----- Message from Chanda Buchanan RT sent at 3/23/2017  9:43 AM CDT -----  Contact: pt    pt , requesting a call back to discuss his gout flaring up again, he is in pain, thanks.

## 2017-03-28 ENCOUNTER — TELEPHONE (OUTPATIENT)
Dept: RHEUMATOLOGY | Facility: CLINIC | Age: 52
End: 2017-03-28

## 2017-03-28 NOTE — TELEPHONE ENCOUNTER
Called patient.  He reports that he discontinued his prednisone.  Consequently, had gout attack for which she took ibuprofen and the attack resolved.  No complaints today.  Continues to be on your Uloric 40 mg a day

## 2017-03-28 NOTE — TELEPHONE ENCOUNTER
----- Message from Fuentes Koch sent at 3/23/2017  2:47 PM CDT -----  Contact: pt  Pt is returning call, call placed to pod no answer  Call Back#[phone]  Thanks

## 2017-03-30 ENCOUNTER — DOCUMENTATION ONLY (OUTPATIENT)
Dept: FAMILY MEDICINE | Facility: CLINIC | Age: 52
End: 2017-03-30

## 2017-03-30 NOTE — PROGRESS NOTES
Pre-Visit Chart Review  For Appointment Scheduled on 4/7/17.    Health Maintenance Due   Topic Date Due    TETANUS VACCINE  10/06/1982    Colonoscopy  10/06/2014    Influenza Vaccine  08/01/2016

## 2017-04-12 ENCOUNTER — OFFICE VISIT (OUTPATIENT)
Dept: FAMILY MEDICINE | Facility: CLINIC | Age: 52
End: 2017-04-12
Payer: MEDICAID

## 2017-04-12 ENCOUNTER — LAB VISIT (OUTPATIENT)
Dept: LAB | Facility: HOSPITAL | Age: 52
End: 2017-04-12
Attending: INTERNAL MEDICINE
Payer: MEDICAID

## 2017-04-12 VITALS
BODY MASS INDEX: 33.57 KG/M2 | SYSTOLIC BLOOD PRESSURE: 152 MMHG | WEIGHT: 213.88 LBS | TEMPERATURE: 98 F | DIASTOLIC BLOOD PRESSURE: 86 MMHG | OXYGEN SATURATION: 96 % | RESPIRATION RATE: 18 BRPM | HEART RATE: 78 BPM | HEIGHT: 67 IN

## 2017-04-12 DIAGNOSIS — R05.3 PERSISTENT COUGH: ICD-10-CM

## 2017-04-12 DIAGNOSIS — M1A.0311 IDIOPATHIC CHRONIC GOUT OF RIGHT WRIST WITH TOPHUS: ICD-10-CM

## 2017-04-12 DIAGNOSIS — J01.40 SUBACUTE PANSINUSITIS: Primary | ICD-10-CM

## 2017-04-12 LAB
ALBUMIN SERPL BCP-MCNC: 3.9 G/DL
ALP SERPL-CCNC: 103 U/L
ALT SERPL W/O P-5'-P-CCNC: 17 U/L
ANION GAP SERPL CALC-SCNC: 11 MMOL/L
AST SERPL-CCNC: 15 U/L
BASOPHILS # BLD AUTO: 0.02 K/UL
BASOPHILS NFR BLD: 0.3 %
BILIRUB SERPL-MCNC: 1.2 MG/DL
BUN SERPL-MCNC: 12 MG/DL
CALCIUM SERPL-MCNC: 9.2 MG/DL
CHLORIDE SERPL-SCNC: 104 MMOL/L
CO2 SERPL-SCNC: 25 MMOL/L
CREAT SERPL-MCNC: 1.1 MG/DL
DIFFERENTIAL METHOD: ABNORMAL
EOSINOPHIL # BLD AUTO: 0.1 K/UL
EOSINOPHIL NFR BLD: 1.2 %
ERYTHROCYTE [DISTWIDTH] IN BLOOD BY AUTOMATED COUNT: 13.3 %
EST. GFR  (AFRICAN AMERICAN): >60 ML/MIN/1.73 M^2
EST. GFR  (NON AFRICAN AMERICAN): >60 ML/MIN/1.73 M^2
GLUCOSE SERPL-MCNC: 96 MG/DL
HCT VFR BLD AUTO: 42.8 %
HGB BLD-MCNC: 15.5 G/DL
LYMPHOCYTES # BLD AUTO: 1.5 K/UL
LYMPHOCYTES NFR BLD: 20.2 %
MCH RBC QN AUTO: 32.7 PG
MCHC RBC AUTO-ENTMCNC: 36.2 %
MCV RBC AUTO: 90 FL
MONOCYTES # BLD AUTO: 0.7 K/UL
MONOCYTES NFR BLD: 9.8 %
NEUTROPHILS # BLD AUTO: 5 K/UL
NEUTROPHILS NFR BLD: 67.7 %
PLATELET # BLD AUTO: 191 K/UL
PMV BLD AUTO: 9.4 FL
POTASSIUM SERPL-SCNC: 3.6 MMOL/L
PROT SERPL-MCNC: 7.4 G/DL
RBC # BLD AUTO: 4.74 M/UL
SODIUM SERPL-SCNC: 140 MMOL/L
URATE SERPL-MCNC: 8.5 MG/DL
WBC # BLD AUTO: 7.44 K/UL

## 2017-04-12 PROCEDURE — 99999 PR PBB SHADOW E&M-EST. PATIENT-LVL III: CPT | Mod: PBBFAC,,, | Performed by: FAMILY MEDICINE

## 2017-04-12 PROCEDURE — 80053 COMPREHEN METABOLIC PANEL: CPT

## 2017-04-12 PROCEDURE — 36415 COLL VENOUS BLD VENIPUNCTURE: CPT | Mod: PO

## 2017-04-12 PROCEDURE — 99214 OFFICE O/P EST MOD 30 MIN: CPT | Mod: S$PBB,,, | Performed by: FAMILY MEDICINE

## 2017-04-12 PROCEDURE — 85025 COMPLETE CBC W/AUTO DIFF WBC: CPT

## 2017-04-12 PROCEDURE — 84550 ASSAY OF BLOOD/URIC ACID: CPT

## 2017-04-12 RX ORDER — AMOXICILLIN AND CLAVULANATE POTASSIUM 875; 125 MG/1; MG/1
1 TABLET, FILM COATED ORAL 2 TIMES DAILY
COMMUNITY
Start: 2017-04-05 | End: 2017-04-28

## 2017-04-12 RX ORDER — FLUTICASONE PROPIONATE 50 MCG
2 SPRAY, SUSPENSION (ML) NASAL DAILY
Qty: 1 BOTTLE | Refills: 3 | Status: SHIPPED | OUTPATIENT
Start: 2017-04-12 | End: 2017-06-02

## 2017-04-12 RX ORDER — ONDANSETRON 4 MG/1
TABLET, FILM COATED ORAL
COMMUNITY
Start: 2017-04-05 | End: 2017-10-19

## 2017-04-12 RX ORDER — DOXYCYCLINE 100 MG/1
100 CAPSULE ORAL 2 TIMES DAILY
Qty: 20 CAPSULE | Refills: 0 | Status: SHIPPED | OUTPATIENT
Start: 2017-04-12 | End: 2017-04-28

## 2017-04-12 RX ORDER — ALBUTEROL SULFATE 90 UG/1
2 AEROSOL, METERED RESPIRATORY (INHALATION) 4 TIMES DAILY
Qty: 1 INHALER | Refills: 1 | Status: SHIPPED | OUTPATIENT
Start: 2017-04-12 | End: 2017-06-02 | Stop reason: SDUPTHER

## 2017-04-12 NOTE — MR AVS SNAPSHOT
[facility]  [address]  [place] [address]  Phone: [phone]  Fax: [phone]                  Jarod Pierce   2017 1:00 PM   Office Visit    Description:  Male : 1965   Provider:  Idris Varghese MD   Department:  [facility] Medicine           Reason for Visit     URI           Diagnoses this Visit        Comments    Subacute pansinusitis    -  Primary     Persistent cough                To Do List           Future Appointments        Provider Department Dept Phone    2017 1:45 PM LAB, [facility] [facility] - Lab [phone]    2017 8:30 AM Isaura Dickinson MD [facility] Rheumatology [phone]    2017 3:00 PM LEEANNA Ospina [facility] Medicine [phone]    2017 2:00 PM Kumar Manriquez MD [facility] - Cardiology [phone]      Goals (5 Years of Data)     None       These Medications        Disp Refills Start End    albuterol 90 mcg/actuation inhaler 1 Inhaler 1 2017     Inhale 2 puffs into the lungs 4 (four) times daily. - Inhalation    Pharmacy: [facility] [place] - [id number] [address] #: [phone]       doxycycline (MONODOX) 100 MG capsule 20 capsule 0 2017     Take 1 capsule (100 mg total) by mouth 2 (two) times daily. - Oral    Pharmacy: [facility] [place] - [id number] [address] #: [phone]       fluticasone (FLONASE) 50 mcg/actuation nasal spray 1 Bottle 3 2017     2 sprays by Each Nare route once daily. - Each Nare    Pharmacy: [facility] [place] - [id number] [address] #: [phone]         [facility]     [facility] Nurse Care Line - 24/ Assistance  Unless otherwise directed by your provider, please contact [facility], our nurse care line that is available for / assistance.     Registered nurses in the [facility] provide: appointment scheduling, clinical advisement, health education, and  other advisory services.  Call: [phone] (toll free)               Medications           Message regarding Medications     Verify the changes and/or additions to your medication regime listed below are the same as discussed with your clinician today.  If any of these changes or additions are incorrect, please notify your healthcare provider.        START taking these NEW medications        Refills    albuterol 90 mcg/actuation inhaler 1    Sig: Inhale 2 puffs into the lungs 4 (four) times daily.    Class: Normal    Route: Inhalation    doxycycline (MONODOX) 100 MG capsule 0    Sig: Take 1 capsule (100 mg total) by mouth 2 (two) times daily.    Class: Normal    Route: Oral    fluticasone (FLONASE) 50 mcg/actuation nasal spray 3    Si sprays by Each Nare route once daily.    Class: Normal    Route: Each Nare      STOP taking these medications     GENTAK 0.3 % (3 mg/gram) ophthalmic ointment            Verify that the below list of medications is an accurate representation of the medications you are currently taking.  If none reported, the list may be blank. If incorrect, please contact your healthcare provider. Carry this list with you in case of emergency.           Current Medications     amlodipine (NORVASC) 5 MG tablet Take 5 mg by mouth once daily.    amoxicillin-clavulanate 875-125mg (AUGMENTIN) 875-125 mg per tablet Take 1 tablet by mouth 2 (two) times daily.    colchicine 0.6 mg tablet Take 1 tablet (0.6 mg total) by mouth once daily.    febuxostat (ULORIC) 40 mg Tab Take 1 tablet (40 mg total) by mouth once daily.    albuterol 90 mcg/actuation inhaler Inhale 2 puffs into the lungs 4 (four) times daily.    doxycycline (MONODOX) 100 MG capsule Take 1 capsule (100 mg total) by mouth 2 (two) times daily.    fluticasone (FLONASE) 50 mcg/actuation nasal spray 2 sprays by Each Nare route once daily.    hydrocodone-acetaminophen 5-325mg (NORCO) 5-325 mg per tablet     ibuprofen (ADVIL,MOTRIN) 800 MG tablet  "Take 1 tablet (800 mg total) by mouth 3 (three) times daily as needed for Pain.    ondansetron (ZOFRAN) 4 MG tablet            Clinical Reference Information           Your Vitals Were     BP Pulse Temp Resp Height Weight    152/86 (BP Location: Right arm, Patient Position: Sitting, BP Method: Manual) 78 98.3 °F (36.8 °C) (Oral) 18 5' 7" (1.702 m) 97 kg (213 lb 13.5 oz)    SpO2 BMI             96% 33.49 kg/m2         Blood Pressure          Most Recent Value    BP  (!)  152/86      Allergies as of 4/12/2017     Allopurinol Analogues    Codeine    Indocin [Indomethacin Sodium]    Phenytoin Sodium Extended      Immunizations Administered on Date of Encounter - 4/12/2017     None      Language Assistance Services     ATTENTION: Language assistance services are available, free of charge. Please call [phone].      ATENCIÓN: Si pema rodriguez, tiene a zhong disposición servicios gratuitos de asistencia lingüística. Llame al [phone].     IRWIN Ý: N?u b?n nói Ti?ng Vi?t, có các d?ch v? h? tr? ngôn ng? mi?n phí dành cho b?n. G?i s? [phone].         [facility] complies with applicable Federal civil rights laws and does not discriminate on the basis of race, color, national origin, age, disability, or sex.        "

## 2017-04-12 NOTE — PROGRESS NOTES
Subjective:       Patient ID: Jarod Pierce is a 51 y.o. male.    Chief Complaint: URI (X 2 weeks, head/chest congestion, headache, cough with yellow and green sputum, sob, wheezing, chest tightness)    URI    Associated symptoms include coughing and wheezing. Pertinent negatives include no abdominal pain, chest pain, nausea or rash.   Cough   This is a new problem. The current episode started 1 to 4 weeks ago. The problem has been unchanged. The cough is productive of purulent sputum. Associated symptoms include shortness of breath and wheezing. Pertinent negatives include no chest pain, fever or rash. Treatments tried: Augmentin x 5 days. The treatment provided no relief.     Review of Systems   Constitutional: Negative for fever.   Respiratory: Positive for cough, shortness of breath and wheezing.    Cardiovascular: Negative for chest pain.   Gastrointestinal: Negative for abdominal pain and nausea.   Skin: Negative for rash.   Neurological: Negative for numbness.   All other systems reviewed and are negative.      Objective:      Physical Exam   Constitutional: He appears well-developed. No distress.   HENT:   Mouth/Throat: Oropharynx is clear and moist.   Hoarse   Neck: Neck supple.   Cardiovascular: Normal rate and regular rhythm.    No murmur heard.  Pulmonary/Chest: Effort normal and breath sounds normal. He has no wheezes. He has no rales.   Lymphadenopathy:     He has no cervical adenopathy.   Skin: Skin is warm and dry.       Assessment:       1. Subacute pansinusitis    2. Persistent cough        Plan:         Jarod was seen today for uri.    Diagnoses and all orders for this visit:    Subacute pansinusitis  -     doxycycline (MONODOX) 100 MG capsule; Take 1 capsule (100 mg total) by mouth 2 (two) times daily.    Persistent cough  -     albuterol 90 mcg/actuation inhaler; Inhale 2 puffs into the lungs 4 (four) times daily.    Other orders  -     fluticasone (FLONASE) 50 mcg/actuation nasal spray; 2  sprays by Each Nare route once daily.

## 2017-04-19 ENCOUNTER — TELEPHONE (OUTPATIENT)
Dept: RHEUMATOLOGY | Facility: CLINIC | Age: 52
End: 2017-04-19

## 2017-04-19 NOTE — TELEPHONE ENCOUNTER
I left a message for the patient to give the office a call in regards to his appointment that was scheduled for today 4/19/17 at 8:30 am.

## 2017-04-20 ENCOUNTER — DOCUMENTATION ONLY (OUTPATIENT)
Dept: FAMILY MEDICINE | Facility: CLINIC | Age: 52
End: 2017-04-20

## 2017-04-20 NOTE — PROGRESS NOTES
Pre-Visit Chart Review  For Appointment Scheduled on 04/21/2017    Health Maintenance Due   Topic Date Due    TETANUS VACCINE  10/06/1982    Colonoscopy  10/06/2014    Influenza Vaccine  08/01/2016

## 2017-04-21 ENCOUNTER — TELEPHONE (OUTPATIENT)
Dept: CARDIOLOGY | Facility: CLINIC | Age: 52
End: 2017-04-21

## 2017-04-21 NOTE — TELEPHONE ENCOUNTER
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  Please call [phone].  Thank you

## 2017-04-28 ENCOUNTER — OFFICE VISIT (OUTPATIENT)
Dept: CARDIOLOGY | Facility: CLINIC | Age: 52
End: 2017-04-28
Payer: MEDICAID

## 2017-04-28 VITALS
SYSTOLIC BLOOD PRESSURE: 155 MMHG | HEART RATE: 80 BPM | DIASTOLIC BLOOD PRESSURE: 98 MMHG | HEIGHT: 67 IN | OXYGEN SATURATION: 97 % | BODY MASS INDEX: 33.32 KG/M2 | RESPIRATION RATE: 18 BRPM | WEIGHT: 212.31 LBS

## 2017-04-28 DIAGNOSIS — I51.7 LVH (LEFT VENTRICULAR HYPERTROPHY): ICD-10-CM

## 2017-04-28 DIAGNOSIS — Z91.89 CARDIOVASCULAR RISK FACTOR: ICD-10-CM

## 2017-04-28 DIAGNOSIS — I44.7 LEFT BUNDLE BRANCH BLOCK: ICD-10-CM

## 2017-04-28 DIAGNOSIS — I10 ESSENTIAL HYPERTENSION: Primary | ICD-10-CM

## 2017-04-28 DIAGNOSIS — E66.09 OBESITY DUE TO EXCESS CALORIES, UNSPECIFIED OBESITY SEVERITY: ICD-10-CM

## 2017-04-28 DIAGNOSIS — J30.89 PERENNIAL SINUSITIS: ICD-10-CM

## 2017-04-28 DIAGNOSIS — R94.39 ABNORMAL CARDIOVASCULAR STRESS TEST: ICD-10-CM

## 2017-04-28 DIAGNOSIS — I42.8 NONISCHEMIC CARDIOMYOPATHY: ICD-10-CM

## 2017-04-28 DIAGNOSIS — E65 ABDOMINAL OBESITY: ICD-10-CM

## 2017-04-28 PROCEDURE — 99999 PR PBB SHADOW E&M-EST. PATIENT-LVL III: CPT | Mod: PBBFAC,,, | Performed by: INTERNAL MEDICINE

## 2017-04-28 PROCEDURE — 99213 OFFICE O/P EST LOW 20 MIN: CPT | Mod: PBBFAC,PO | Performed by: INTERNAL MEDICINE

## 2017-04-28 PROCEDURE — 99215 OFFICE O/P EST HI 40 MIN: CPT | Mod: S$PBB,,, | Performed by: INTERNAL MEDICINE

## 2017-04-28 RX ORDER — LOSARTAN POTASSIUM 50 MG/1
50 TABLET ORAL DAILY
Qty: 30 TABLET | Refills: 11 | Status: SHIPPED | OUTPATIENT
Start: 2017-04-28 | End: 2017-06-02

## 2017-04-28 RX ORDER — LORATADINE 10 MG/1
10 TABLET ORAL DAILY
Qty: 30 TABLET | Refills: 3 | Status: SHIPPED | OUTPATIENT
Start: 2017-04-28 | End: 2017-09-15 | Stop reason: SDUPTHER

## 2017-04-28 NOTE — PROGRESS NOTES
"Subjective:    Patient ID:  Jarod Pierce is a 51 y.o. male who presents for evaluation of Hypertension  For cardiomyopathy, fatigue, medication problem  PCP: Dr. Fuller. Not seen for some time  Seen by: LEEANNA Beth  Lives alone, no pet, sister, Soheila, in area  Full time as  now part time , long weekend shift work    Difficult historian with rambling thought processes  Last seen in 7/2014    HPI Comments: White male with recent noted cardiomyopathy with LBBB, here to establish cardiac care. History of stress testing in the past 2-3 years ago with Dr. Fuller, results negative. Noted some fatigue over the past year and a half, progressive. Difficult to do regular work at time. Only able to work for an hour and half recently over the summer. Hot temperature is more difficult. Also occasion lightheaded spell with bending over. After recent evaluation was placed on metoprolol tartrate at 50 mg BID and felt real dizzy and feel like "looking out of a box". Tried cutting dose in half and still feels poorly. Recent EKG on 8/28 showed sinus arrhythmia with LBBB, rate of 83.   Echo done, 8/28/2013 - CONCLUSIONS     1 - Concentric remodeling. Wall thickness is increased, with the septum and the posterior wall each measuring 1.1 cm across.    2 - Moderately depressed left ventricular systolic function (EF 30-35%).     3 - Diastolic dysfunction.     4 - Normal right ventricular systolic function .    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 Recent blood work reviewed - Uric acid 8.7, normal RF, ESR, CBC, BMP, and A1C. Lipid showed LDL of 157, [place] Cardiovascular Risk Score is 10%, which puts him in the intermediate risk category.    since visit of 9/13, no new problem. Feel better off metoprolol except for slight HA. Mild SOB, no CP.  Work up: Lexiscan 9/18/2013.  Nuclear Quantitative Functional Analysis: " "  LVEF: 31 % (normal is 47 - 59)  LVED Volume: 152 ml (normal is 91 - 155)  LVES Volume: 105 ml (normal is 40 - 78)    Impression: ABNORMAL MYOCARDIAL PERFUSION  1. There is evidence for moderate myocardial ischemia in the inferoseptal wall of the left ventricle with underlying injury present.   2. There is abnormal wall motion at rest showing moderate global hypokinesis of the left ventricle. severe hypokinesis of the septal wall of the left ventricle.   3. There is resting LV dysfunction with a reduced ejection fraction of 31 %. (normal is 47 - 59)  4. The ventricular volumes are normal at rest and stress.   5. The extracardiac distribution of radioactivity is normal.   6. [facility] SSS =3 = SDS, suggest that 3.8% of myocardium may be ischemic.    EKG shows LBBB with QRS duration of 156 msec. Discussed possibilities of AICD with CRT     Since visit of 9/26, had problem with increase dose of Lisinopril causing dizziness, lower back to 20 mg daily with improvement but then noticed sinus problem with recurrence of the dizziness. Also stopped the spironolactone/HCT due to "dryness" with cramps in the leg. Blood work showed normal BNP of 25, and BMP. Now also with gout flair up in the right arm. Last Uric acid was elevated, see above, could not tolerate Allopurinol due to stomach upset. Weight creeping upward. Sits alot at work.    Since visit of 10/24/2013. 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 Evaluated at Occupational Medicine clinic in [place] and told of need for cardiac clearance. Recent symptoms includes GARNETT. Anticipated work will involve tow ropes, making and breaking tows, and ratchets, have to lift 10 -20 lbs for maximum 10 feet. Recent ECG LBBB, rate 74,  msec. Never had coronary angiogram for CM. Have continued on 40 mg of Lisinopril without further problem.     Since visit of 7/2014, underwent [facility]  HEMODYNAMIC RESULTS:    LVEDP (Pre): " 9 mmHg  LVEDP (Post): 11 mmHg  Ejection Fraction: 40%  Global LV Function: mildly depressed    C. ANGIOGRAPHIC RESULTS:    DIAGNOSTIC:       Patient has a right dominant coronary artery.        The coronary vessels have luminal irregularities.        - Left Main Coronary Artery:             The LM has luminal irregularities. There is ALLYN 3 flow.       - Left Anterior Descending Artery:             The LAD has luminal irregularities. There is ALLYN 3 flow.       - Left Circumflex Artery:             The LCX has luminal irregularities. There is ALLYN 3 flow.       - Right Coronary Artery:             The RCA has luminal irregularities. There is ALLYN 3 flow.       - Aortic Root:             The Aortic Root has luminal irregularities. There is ALLYN 3 flow.      D. SUMMARY:    1. Non-obstructive CAD.  2. - Improve LV function from non-invasive evaluation.    E. RECOMMENDATIONS:    1. Maximize medical management.  2. Follow up with Dr. Kumar Manriquez.  3. Clear for new employment without restriction.    BP at home today 147/95 to 121/94, acute gouty attack yesterday. Indocin causes HTN and upset stomach. Urin acid in 3/2014 was 9.4. Discuss options, will refer back to Dr. Fuller. Consideration for referral to . Also discuss possible ICD with CRT Rx for the CM with LBBB, QRS duration 150 msec. Complain of some fluttering with dizziness. Cough potato recently, just got lazy. Suggest consideration for Cardiac Rehab, but history of hospitalization for CHF.     In 4/2017, returned for check up now have insurance. Had hospitalization for gout and taken off Lisinopril and changed to Norvasc due to coughing, 4-5 months ago. Here with concern of BP, no home check and still with occasional CP, associated with lack of sleep. Long term problem. Angio results as above. ECG shows chronic LBBB. BP elevated in office.    Holter 8/2014 - PREDOMINANT RHYTHM  1. Sinus rhythm with heart rates varying between 49 and 131 bpm with an  average of 90 bpm.     VENTRICULAR ARRHYTHMIAS  1. There were very rare PVCs recorded totalling 2 and averaging less than 1 per hour.     2. There were no episodes of ventricular tachycardia.    SUPRA VENTRICULAR ARRHYTHMIAS  1. There were very rare PACs recorded totalling 8 and averaging less than 1 per hour.     2. There were no episodes of sustained supraventricular tachycardia.    SINUS NODE FUNCTION  1. There was no evidence of high grade SA ru block.     AV CONDUCTION  1. There was no evidence of high grade AV block.     DIARY  1. The diary was not returned    MISCELLANEOUS  1. This was a tape of adequate length (23 hrs).       Review of Systems   Constitution: Positive for weakness, malaise/fatigue and weight up and down with loss of appetite during gouty attacks. Negative for diaphoresis, fever, night sweats and weight gain.   HENT: Negative for nosebleeds. Have congestion, ear discharge with ringing, headaches , sore throat, pain on swallowing  Eyes: Negative for visual disturbance, have left eye pain.   Cardiovascular: recurrent chest pain, mild dyspnea on exertion and rare near-syncope. Negative for claudication, cyanosis, irregular heartbeat, leg swelling, orthopnea, palpitations and paroxysmal nocturnal dyspnia.   Respiratory: Positive for cough, shortness of breath and no further sleep disturbances due to breathing. Negative for snoring and wheezing. 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 Negative for polydipsia and polyuria.   Hematologic/Lymphatic: Does not bruise/bleed easily.   Skin: Negative for nail changes, color change, flushing, poor wound healing and suspicious lesions.   Musculoskeletal: Positive for arthritis, back pain, falls, gout, joint pain, joint swelling, muscle cramps, muscle weakness, myalgias, neck pain and stiffness.   Gastrointestinal: occasional for nausea and heartburn, Negative for hematemesis,  "hematochezia and melena.   Genitourinary:        Recent UTI after sex.   Neurological: Positive for difficulty with concentration, excessive daytime sleepiness, dizziness, focal weakness, light-headedness and vertigo. Negative for disturbances in coordination, loss of balance and numbness.   Psychiatric/Behavioral: Positive for memory loss. Negative for depression and substance abuse. The patient has insomnia and is nervous/anxious.         Objective:    Physical Exam   Constitutional: He is oriented to person, place, and time. He appears well-developed and well-nourished.   HENT:   Head: Normocephalic.   Eyes: Conjunctivae normal and EOM are normal. Pupils are equal, round, and reactive to light.   Neck: Normal range of motion. Neck supple. No JVD present. No thyromegaly present.   Cardiovascular: Normal rate, regular rhythm and intact distal pulses.  Exam reveals no gallop and no friction rub.    Murmur heard.  High-pitched blowing decrescendo early diastolic murmur is present  at the upper right sternal border  Pulmonary/Chest: Effort normal and breath sounds normal. He has no rales. He exhibits no tenderness.   Abdominal: Soft. Bowel sounds are normal. There is no tenderness.        Waist 42" up to 42.5", hip 42"   Musculoskeletal: Normal range of motion. He exhibits trace pitting edema around the sock line.   Lymphadenopathy:     He has no cervical adenopathy.   Neurological: He is alert and oriented to person, place, and time.   Skin: Skin is warm and dry. No rash noted.         Assessment:       1. Essential hypertension    2. LVH (left ventricular hypertrophy)    3. Nonischemic cardiomyopathy, , EF 30% to 35%    4. Left bundle branch block,  msec    5. Cardiovascular risk factor, FCVRS 10%, 2013    6. Abdominal obesity    7. Obesity due to excess calories, unspecified obesity severity    8. Abnormal cardiovascular stress test    9. Perennial sinusitis         Plan:     Jarod was seen today for " follow-up.    Diagnoses and associated orders for this visit:    Essential hypertension  -     EKG 12-lead  -     losartan (COZAAR) 50 MG tablet; Take 1 tablet (50 mg total) by mouth once daily.  Dispense: 30 tablet; Refill: 11  -     2D echo with color flow doppler; Future    LVH (left ventricular hypertrophy)  -     losartan (COZAAR) 50 MG tablet; Take 1 tablet (50 mg total) by mouth once daily.  Dispense: 30 tablet; Refill: 11  -     2D echo with color flow doppler; Future    Nonischemic cardiomyopathy, , EF 30% to 35%  -     losartan (COZAAR) 50 MG tablet; Take 1 tablet (50 mg total) by mouth once daily.  Dispense: 30 tablet; Refill: 11  -     2D echo with color flow doppler; Future    Left bundle branch block,  msec    Cardiovascular risk factor, FCVRS 10%, 2013    Abdominal obesity    Obesity due to excess calories, unspecified obesity severity    Abnormal cardiovascular stress test    Perennial sinusitis  -     loratadine (CLARITIN) 10 mg tablet; Take 1 tablet (10 mg total) by mouth once daily.  Dispense: 30 tablet; Refill: 3      Gout, arthritis    GARNETT (dyspnea on exertion) - improved    Fluttering heart - improved    Dizziness    - Instruction for Mediterranean diet and heart healthy exercise given.  - Weigh twice weekly, try to lose 1-2 lbs per week  - Check home blood pressure, 2 days weekly, do 2 readings within 5 minutes in AM and PM, keep log for review. Can do at drug stores for free  - Will follow up in 4 weeks to check efficacy    Patient Active Problem List   Diagnosis    Arthritis    Allergic rhinitis due to allergen    Left bundle branch block,  msec    Gout, arthritis    Nonischemic cardiomyopathy, , EF 30% to 35%    HBP (high blood pressure), 2000    Fatigue    Back pain    ROSITA (generalized anxiety disorder)    Obesity, BMI 34.1 increased to 35.2 back down to 33.2    Abdominal obesity    LVH (left ventricular hypertrophy)    Dizziness    Cardiovascular risk factor,  FCVRS 10%, 2013    Dyslipidemia, goal LDL below 130    Medication intolerance    Abnormal cardiovascular stress test    Perennial sinusitis    Sedentary lifestyle    Fluttering heart    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 The above assessment and plan have been discussed at length. Labs and procedure over the last 6 months reviewed. Problem List updated. Asked to bring in all active medications / pills bottles with next visit.

## 2017-04-28 NOTE — MR AVS SNAPSHOT
[facility] Cardiology  [address], [address]  [facility] [address]  Phone: [phone]                  Jarod Pierce   2017 2:00 PM   Office Visit    Description:  Male : 1965   Provider:  Kumar Manriquez MD   Department:  [facility] - Cardiology           Reason for Visit     Hypertension           Diagnoses this Visit        Comments    Essential hypertension    -  Primary     LVH (left ventricular hypertrophy)         Nonischemic cardiomyopathy         Left bundle branch block         Cardiovascular risk factor         Abdominal obesity         Obesity due to excess calories, unspecified obesity severity         Abnormal cardiovascular stress test         Perennial sinusitis                To Do List           Future Appointments        Provider Department Dept Phone    2017 3:15 PM ECHO, DONELL [facility] Cardiology [phone]    2017 1:40 PM Kumar Manriquez MD [facility] [phone]      Goals (5 Years of Data)     None      Follow-Up and Disposition     Return in about 4 weeks (around 2017) for results.    Follow-up and Disposition History       These Medications        Disp Refills Start End    losartan (COZAAR) 50 MG tablet 30 tablet 11 2017    Take 1 tablet (50 mg total) by mouth once daily. - Oral    Pharmacy: [facility] [address] [place], [place] [id number] [facility] Ph #: [phone]       loratadine (CLARITIN) 10 mg tablet 30 tablet 3 2017    Take 1 tablet (10 mg total) by mouth once daily. - Oral    Pharmacy: [facility] [address] [place] [id number] [facility] Ph #: [phone]         [facility]     [facility] -  Assistance  Unless otherwise directed by your provider, please contact [facility], our nurse care line that is available for  assistance.     Registered nurses in the [facility] provide: appointment scheduling, clinical advisement,  health education, and other advisory services.  Call: [phone] (toll free)               Medications           Message regarding Medications     Verify the changes and/or additions to your medication regime listed below are the same as discussed with your clinician today.  If any of these changes or additions are incorrect, please notify your healthcare provider.        START taking these NEW medications        Refills    losartan (COZAAR) 50 MG tablet 11    Sig: Take 1 tablet (50 mg total) by mouth once daily.    Class: Normal    Route: Oral    loratadine (CLARITIN) 10 mg tablet 3    Sig: Take 1 tablet (10 mg total) by mouth once daily.    Class: Print    Route: Oral      STOP taking these medications     doxycycline (MONODOX) 100 MG capsule Take 1 capsule (100 mg total) by mouth 2 (two) times daily.    amoxicillin-clavulanate 875-125mg (AUGMENTIN) 875-125 mg per tablet Take 1 tablet by mouth 2 (two) times daily.           Verify that the below list of medications is an accurate representation of the medications you are currently taking.  If none reported, the list may be blank. If incorrect, please contact your healthcare provider. Carry this list with you in case of emergency.           Current Medications     albuterol 90 mcg/actuation inhaler Inhale 2 puffs into the lungs 4 (four) times daily.    amlodipine (NORVASC) 5 MG tablet Take 5 mg by mouth once daily.    colchicine 0.6 mg tablet Take 1 tablet (0.6 mg total) by mouth once daily.    fluticasone (FLONASE) 50 mcg/actuation nasal spray 2 sprays by Each Nare route once daily.    ibuprofen (ADVIL,MOTRIN) 800 MG tablet Take 1 tablet (800 mg total) by mouth 3 (three) times daily as needed for Pain.    ondansetron (ZOFRAN) 4 MG tablet as needed.     febuxostat (ULORIC) 40 mg Tab Take 1 tablet (40 mg total) by mouth once daily.    hydrocodone-acetaminophen 5-325mg (NORCO) 5-325 mg per tablet     loratadine (CLARITIN) 10 mg tablet Take 1 tablet (10 mg total)  "by mouth once daily.    losartan (COZAAR) 50 MG tablet Take 1 tablet (50 mg total) by mouth once daily.           Clinical Reference Information           Your Vitals Were     BP Pulse Resp Height Weight SpO2    155/98 (BP Location: Right arm, Patient Position: Sitting) 80 18 5' 7" (1.702 m) 96.3 kg (212 lb 4.9 oz) 97%    BMI                33.25 kg/m2          Blood Pressure          Most Recent Value    BP  (!)  155/98      Allergies as of 4/28/2017     Allopurinol Analogues    Codeine    Indocin [Indomethacin Sodium]    Lisinopril    Phenytoin Sodium Extended      Immunizations Administered on Date of Encounter - 4/28/2017     None      Orders Placed During Today's Visit      Normal Orders This Visit    EKG 12-lead     Future Labs/Procedures Expected by Expires    2D echo with color flow doppler  As directed 4/28/2018      Instructions    Recommended Mediterranean dietEating Heart-Healthy Food: Using the DASH Plan  Eating for your heart doesnt have to be hard or boring. You just need to know how to make healthier choices. The DASH eating plan has been developed to help you do just that. DASH stands for Dietary Approaches to Stop Hypertension. It is a plan that has been proven to be healthier for your heart and to lower your risk for high blood pressure. It can also help lower your risk for cancer, heart disease, osteoporosis, and diabetes.  Choosing from Each Food Group  Choose foods from each of the food groups below each day. Try to get the recommended number of servings for each food group. The serving numbers are based on a diet of 2,000 calories a day. Talk to your doctor if youre unsure about your calorie needs.  Grains   Servings: 7-8 a day  A serving is:  · 1 slice bread  · 1 ounce dry cereal  · half a cup cooked rice or pasta  Best choices: Whole grains and any grains high in fiber.  Vegetables   Servings: 4-5 a day  A serving is:  · 1 cup raw leafy vegetable  · Half a cup cooked " vegetable  · Three-quarter cup vegetable juice  Best choices: Fresh or frozen vegetable prepared without too much added salt or fat.    Fruits   Servings: 4-5 a day  A serving is:  · Three-quarter cup fruit juice  · 1 medium fruit  · One-quarter cup dried fruit  · One-half cup fresh, frozen, or canned fruit  Best choices: A variety of fresh fruits of different colors. Whole fruits are a much better choice than fruit juices.  Low-fat or Fat Free Dairy   Servings: 2-3 a day  A serving is:  · 8 ounces milk  · 1 cup yogurt  · One and a half ounces cheese  Best choices: Skim or 1% milk, low-fat or fat free yogurt or buttermilk, and low-fat cheeses.       Meat, Poultry, Fish   Servings: 2 or fewer a day  A serving is:  · 3 ounces cooked meat, poultry, or fish  Best choices: Lean meats and fish. Trim away visible fat. Broil, roast, or boil instead of frying. Remove skin from poultry before eating.  Nuts, Seeds, Beans   Servings: 4-5 a week  A serving is:  · One third cup nuts (or one and a half ounces)  · 2 tablespoons sunflower seeds  · Half a cup cooked beans  Best choices: Dry roasted nuts with no salt added, lentils, kidney beans, garbanzo beans, and whole chao beans.    Fats and Oils   Servings: 2 a day  A serving is:  · 1 teaspoon vegetable oil  · 1 teaspoon soft margarine  · 1 tablespoon low-fat mayonnaise  · 1 teaspoon regular mayonnaise  · 2 tablespoons light salad dressing  · 1 tablespoon regular salad dressing  Best choices: Monounsaturated and polyunsaturated fats such as olive, canola, or safflower oil.  Sweets   Servings: 5 a week or fewer  A serving is:  · 1 tablespoon sugar, maple syrup, or honey  · 1 tablespoon jam or jelly  · 1 half-ounce jelly beans (about 15)  · 8 ounces lemonade  Best choices: Dried fruit can be a satisfying sweet. Choose low-fat sweets when possible. And watch your serving sizes!       Losing Weight (Cardiovascular)  Excess weight is a major risk factor for heart disease. Losing  weight may help keep your arteries open so that your heart can get the oxygen-rich blood it needs. Weight loss can also help lower your blood pressure and reduce your risk for diabetes. All in all, losing weight makes you healthier.          Exercise with a friend. When activity is fun, you're more likely to stick with it.        Calories and Weight Loss  Calories are the fuel your body burns for energy. You get the calories you need from the food you eat. For healthy weight loss, women should eat at least 1,200 calories a day, men at least 1,500.    When you eat more calories than you need, your body stores the extra calories as fat. One pound of fat equals 3,500 calories.    To lose weight, try to burn 500 calories a day more than you eat. To do this, eat 250 calories less each day. Add activity to burn the other 250 calories. Walking 21/2 miles burns about 250 calories.    Eat a variety of healthy foods. Its the best way to make calories count.     Tips for losing weight:  Drink 8 to 10 glasses of water a day.    Dont skip meals. Instead, eat smaller portions.       Brisk Activity Is Best  Brisk activity gets your heart pumping faster. It makes your heart healthier. Its also the best way to burn calories. In fact, your body may keep burning calories for hours after you stop a brisk activity.    Begin by walking 10 minutes most days.    Add more time and speed to your walk. Build up as you feel able.    Try to walk briskly at least 30 minutes most days. If needed, you can break this into 2 shorter sessions.     Check off the ideas below that you could try to make your day more active:    Take the stairs instead of the elevator.    Park your car farther away and walk.    Ride a bike to work or to the store.    Walk laps around the mall.       Language Assistance Services     ATTENTION: Language assistance services are available, free of charge. Please call [phone].      ATENCIÓN: Paxton mcadams  español, tiene a zhong disposición servicios gratuitos de asistencia lingüística. Juan Pablo al [phone].     IRWIN Ý: N?u b?n nói Ti?ng Vi?t, có các d?ch v? h? tr? ngôn ng? mi?n phí dành cho b?n. G?i s? [phone].         [facility] complies with applicable Federal civil rights laws and does not discriminate on the basis of race, color, national origin, age, disability, or sex.

## 2017-04-28 NOTE — PATIENT INSTRUCTIONS
Recommended Mediterranean dietEating Heart-Healthy Food: Using the DASH Plan  Eating for your heart doesnt have to be hard or boring. You just need to know how to make healthier choices. The DASH eating plan has been developed to help you do just that. DASH stands for Dietary Approaches to Stop Hypertension. It is a plan that has been proven to be healthier for your heart and to lower your risk for high blood pressure. It can also help lower your risk for cancer, heart disease, osteoporosis, and diabetes.  Choosing from Each Food Group  Choose foods from each of the food groups below each day. Try to get the recommended number of servings for each food group. The serving numbers are based on a diet of 2,000 calories a day. Talk to your doctor if youre unsure about your calorie needs.  Grains   Servings: 7-8 a day  A serving is:  · 1 slice bread  · 1 ounce dry cereal  · half a cup cooked rice or pasta  Best choices: Whole grains and any grains high in fiber.  Vegetables   Servings: 4-5 a day  A serving is:  · 1 cup raw leafy vegetable  · Half a cup cooked vegetable  · Three-quarter cup vegetable juice  Best choices: Fresh or frozen vegetable prepared without too much added salt or fat.    Fruits   Servings: 4-5 a day  A serving is:  · Three-quarter cup fruit juice  · 1 medium fruit  · One-quarter cup dried fruit  · One-half cup fresh, frozen, or canned fruit  Best choices: A variety of fresh fruits of different colors. Whole fruits are a much better choice than fruit juices.  Low-fat or Fat Free Dairy   Servings: 2-3 a day  A serving is:  · 8 ounces milk  · 1 cup yogurt  · One and a half ounces cheese  Best choices: Skim or 1% milk, low-fat or fat free yogurt or buttermilk, and low-fat cheeses.       Meat, Poultry, Fish   Servings: 2 or fewer a day  A serving is:  · 3 ounces cooked meat, poultry, or fish  Best choices: Lean meats and fish. Trim away visible fat. Broil, roast, or boil instead of frying. Remove skin  from poultry before eating.  Nuts, Seeds, Beans   Servings: 4-5 a week  A serving is:  · One third cup nuts (or one and a half ounces)  · 2 tablespoons sunflower seeds  · Half a cup cooked beans  Best choices: Dry roasted nuts with no salt added, lentils, kidney beans, garbanzo beans, and whole chao beans.    Fats and Oils   Servings: 2 a day  A serving is:  · 1 teaspoon vegetable oil  · 1 teaspoon soft margarine  · 1 tablespoon low-fat mayonnaise  · 1 teaspoon regular mayonnaise  · 2 tablespoons light salad dressing  · 1 tablespoon regular salad dressing  Best choices: Monounsaturated and polyunsaturated fats such as olive, canola, or safflower oil.  Sweets   Servings: 5 a week or fewer  A serving is:  · 1 tablespoon sugar, maple syrup, or honey  · 1 tablespoon jam or jelly  · 1 half-ounce jelly beans (about 15)  · 8 ounces lemonade  Best choices: Dried fruit can be a satisfying sweet. Choose low-fat sweets when possible. And watch your serving sizes!       Losing Weight (Cardiovascular)  Excess weight is a major risk factor for heart disease. Losing weight may help keep your arteries open so that your heart can get the oxygen-rich blood it needs. Weight loss can also help lower your blood pressure and reduce your risk for diabetes. All in all, losing weight makes you healthier.          Exercise with a friend. When activity is fun, you're more likely to stick with it.        Calories and Weight Loss  Calories are the fuel your body burns for energy. You get the calories you need from the food you eat. For healthy weight loss, women should eat at least 1,200 calories a day, men at least 1,500.    When you eat more calories than you need, your body stores the extra calories as fat. One pound of fat equals 3,500 calories.    To lose weight, try to burn 500 calories a day more than you eat. To do this, eat 250 calories less each day. Add activity to burn the other 250 calories. Walking 21/2 miles burns about 250  calories.    Eat a variety of healthy foods. Its the best way to make calories count.     Tips for losing weight:  Drink 8 to 10 glasses of water a day.    Dont skip meals. Instead, eat smaller portions.       Brisk Activity Is Best  Brisk activity gets your heart pumping faster. It makes your heart healthier. Its also the best way to burn calories. In fact, your body may keep burning calories for hours after you stop a brisk activity.    Begin by walking 10 minutes most days.    Add more time and speed to your walk. Build up as you feel able.    Try to walk briskly at least 30 minutes most days. If needed, you can break this into 2 shorter sessions.     Check off the ideas below that you could try to make your day more active:    Take the stairs instead of the elevator.    Park your car farther away and walk.    Ride a bike to work or to the store.    Walk laps around the mall.

## 2017-05-10 ENCOUNTER — CLINICAL SUPPORT (OUTPATIENT)
Dept: CARDIOLOGY | Facility: CLINIC | Age: 52
End: 2017-05-10
Payer: MEDICAID

## 2017-05-10 DIAGNOSIS — I42.8 NONISCHEMIC CARDIOMYOPATHY: ICD-10-CM

## 2017-05-10 DIAGNOSIS — I51.7 LVH (LEFT VENTRICULAR HYPERTROPHY): ICD-10-CM

## 2017-05-10 DIAGNOSIS — I10 ESSENTIAL HYPERTENSION: ICD-10-CM

## 2017-05-10 LAB — RETIRED EF AND QEF - SEE NOTES: 59 (ref 55–65)

## 2017-05-10 PROCEDURE — 93306 TTE W/DOPPLER COMPLETE: CPT | Mod: PBBFAC,PO | Performed by: INTERNAL MEDICINE

## 2017-05-30 ENCOUNTER — TELEPHONE (OUTPATIENT)
Dept: FAMILY MEDICINE | Facility: CLINIC | Age: 52
End: 2017-05-30

## 2017-05-30 NOTE — TELEPHONE ENCOUNTER
Patient was scheduled for 5/31/17 with Dr Varghese. Patient states he has ringing in his ear he experienced dizziness.  He states he thinks it's from his blood pressure due to patient stopped his medication.  appt made 6/1/17

## 2017-06-01 ENCOUNTER — DOCUMENTATION ONLY (OUTPATIENT)
Dept: FAMILY MEDICINE | Facility: CLINIC | Age: 52
End: 2017-06-01

## 2017-06-01 NOTE — PROGRESS NOTES
Pre-Visit Chart Review  For Appointment Scheduled on 06/02/2017    Health Maintenance Due   Topic Date Due    TETANUS VACCINE  10/06/1982    Colonoscopy  10/06/2014

## 2017-06-02 ENCOUNTER — OFFICE VISIT (OUTPATIENT)
Dept: FAMILY MEDICINE | Facility: CLINIC | Age: 52
End: 2017-06-02
Payer: MEDICAID

## 2017-06-02 VITALS
SYSTOLIC BLOOD PRESSURE: 138 MMHG | TEMPERATURE: 98 F | WEIGHT: 213.88 LBS | DIASTOLIC BLOOD PRESSURE: 90 MMHG | BODY MASS INDEX: 33.57 KG/M2 | HEIGHT: 67 IN | HEART RATE: 101 BPM

## 2017-06-02 DIAGNOSIS — I10 ESSENTIAL HYPERTENSION: ICD-10-CM

## 2017-06-02 DIAGNOSIS — J30.89 PERENNIAL SINUSITIS: ICD-10-CM

## 2017-06-02 DIAGNOSIS — R05.3 PERSISTENT COUGH: Primary | ICD-10-CM

## 2017-06-02 PROCEDURE — 99999 PR PBB SHADOW E&M-EST. PATIENT-LVL IV: CPT | Mod: PBBFAC,,, | Performed by: PHYSICIAN ASSISTANT

## 2017-06-02 PROCEDURE — 99213 OFFICE O/P EST LOW 20 MIN: CPT | Mod: S$PBB,,, | Performed by: PHYSICIAN ASSISTANT

## 2017-06-02 PROCEDURE — 99214 OFFICE O/P EST MOD 30 MIN: CPT | Mod: PBBFAC,PO | Performed by: PHYSICIAN ASSISTANT

## 2017-06-02 RX ORDER — ALBUTEROL SULFATE 90 UG/1
2 AEROSOL, METERED RESPIRATORY (INHALATION) 4 TIMES DAILY
Qty: 1 INHALER | Refills: 2 | Status: SHIPPED | OUTPATIENT
Start: 2017-06-02 | End: 2018-09-21 | Stop reason: SDUPTHER

## 2017-06-02 RX ORDER — LOSARTAN POTASSIUM 100 MG/1
100 TABLET ORAL DAILY
Qty: 90 TABLET | Refills: 3 | Status: SHIPPED | OUTPATIENT
Start: 2017-06-02 | End: 2017-10-19

## 2017-06-02 NOTE — PATIENT INSTRUCTIONS
Weight Management: Getting Started  Healthy bodies come in all shapes and sizes. Not all bodies are made to be thin. For some people, a healthy weight is higher than the average weight listed on weight charts. Your healthcare provider can help you decide on a healthy weight for you.    Reasons to lose weight  Losing weight can help with some health problems, such as high blood pressure, heart disease, diabetes, sleep apnea, and arthritis. You may also feel more energy.  Set your long-term goal  Your goal doesn't even have to be a specific weight. You may decide on a fitness goal (such as being able to walk 10 miles a week), or a health goal (such as lowering your blood pressure). Choose a goal that is measurable and reasonable, so you know when you've reached it. A goal of reaching a BMI of less than 25 is not always reasonable (or possible).   Make an action plan  Habits dont change overnight. Setting your goals too high can leave you feeling discouraged if you cant reach them. Be realistic. Choose one or two small changes you can make now. Set an action plan for how you are going to make these changes. When you can stick to this plan, keep making a few more small changes. Taking small steps will help you stay on the path to success.  Track your progress  Write down your goals. Then, keep a daily record of your progress. Write down what you eat and how active you are. This record lets you look back on how much youve done. It may also help when youre feeling frustrated. Reward yourself for success. Even if you dont reach every goal, give yourself credit for what you do get done.  Get support  Encouragement from others can help make losing weight easier. Ask your family members and friends for support. They may even want to join you. Also look to your healthcare provider, registered dietitian, and  for help. Your local hospital can give you more information about nutrition, exercise, and  weight loss.  Date Last Reviewed: 1/31/2016 © 2000-2016 [employer]. [address] [place] [address], [place] [address]. All rights reserved. This information is not intended as a substitute for professional medical care. Always follow your healthcare professional's instructions.        Walking for Fitness  Fitness walking has something for everyone, even people who are already fit. Walking is one of the safest ways to condition your body aerobically. It can boost energy, help you lose weight, and reduce stress.    Physical benefits  · Walking strengthens your heart and lungs, and tones your muscles.  · When walking, your feet land with less impact than in other sports. This reduces chances of muscle, bone, and joint injury.  · Regular walking improves your cholesterol levels and lowers your risk of heart disease. And it helps you control your blood sugar if you have diabetes.  · Walking is a weight-bearing activity, which helps maintain bone density. This can help prevent osteoporosis.  Personal rewards  · Taking walks can help you relax and manage stress. And fitness walking may make you feel better about yourself.  · Walking can help you sleep better at night and make you less likely to be depressed.  · Regular walking may help maintain your memory as you get older.  · Walking is a great way to spend extra time with friends and family members. Be sure to invite your dog along!  Q&A about fitness walking  Q: Will walking keep me fit?  A: Yes. Regular walking at the right pace gives you all the benefits of other aerobic activities, such as jogging and swimming.  Q: Will walking help me lose weight and keep it off?  A: Yes. Per mile, walking can burn as many calories as jogging. Your health care provider can help work walking into your weight-loss plan.  Q: Is walking safe for my health?  A: Yes. Walking is safe if you have high blood pressure, diabetes, heart disease, or other conditions. Talk to your health  care provider before you start.  Date Last Reviewed: 5/9/2015  © 4462-9660 [employer]. [address] [place] [address], [place] [address]. All rights reserved. This information is not intended as a substitute for professional medical care. Always follow your healthcare professional's instructions.

## 2017-06-04 NOTE — PROGRESS NOTES
Subjective:       Patient ID: Jarod Pierce is a 51 y.o. male.    Chief Complaint: Sinusitis; Tinnitus; and Dizziness    Patient presents for evaluation of nasal congestion and chest congestion.  He is also complaining of ringing noise in his ears.  He has tried Flonase without relief.  He is also seen today for uncontrolled hypertension.  He reports compliance with his blood pressure medications.  He does not monitor his blood pressure at home.      Sinusitis   Associated symptoms include congestion, coughing and sinus pressure. Pertinent negatives include no ear pain, headaches or shortness of breath.   Ringing in Ears:    Associated symptoms: tinnitus and rhinorrhea.  No dizziness, no ear pain and no headaches.No dizziness.  Dizziness:    Associated symptoms: tinnitus.no ear pain, no headaches, no light-headedness, no palpitations and no chest pain.    Review of Systems   Constitutional: Negative for appetite change, fatigue and unexpected weight change.   HENT: Positive for congestion, rhinorrhea, sinus pressure and tinnitus. Negative for ear discharge and ear pain.    Eyes: Negative for visual disturbance.   Respiratory: Positive for cough. Negative for chest tightness, shortness of breath and wheezing.    Cardiovascular: Negative for chest pain, palpitations and leg swelling.   Endocrine: Negative for polydipsia and polyuria.   Neurological: Negative for dizziness, light-headedness and headaches.       Objective:      Physical Exam   Constitutional: He appears well-developed and well-nourished. He is cooperative. No distress.   Eyes: Conjunctivae and EOM are normal. No scleral icterus.   Neck: Carotid bruit is not present.   Cardiovascular: Normal rate, regular rhythm and normal heart sounds.    Pulmonary/Chest: Effort normal and breath sounds normal.   Abdominal: Soft. Bowel sounds are normal. There is no tenderness.   Musculoskeletal:        Right lower leg: He exhibits no edema.        Left lower leg:  He exhibits no edema.   Neurological: He is alert.   Vitals reviewed.      Assessment:       1. Persistent cough    2. Perennial sinusitis    3. Essential hypertension    4. BMI 33.0-33.9,adult        Plan:       Jarod was seen today for sinusitis, tinnitus and dizziness.    Diagnoses and all orders for this visit:    Persistent cough  -     albuterol 90 mcg/actuation inhaler; Inhale 2 puffs into the lungs 4 (four) times daily. Prn     Perennial sinusitis  Nasal saline spray   flonase  Essential hypertension    bmi 33  -     losartan (COZAAR) 100 MG tablet; Take 1 tablet (100 mg total) by mouth once daily.  Patient readiness: acceptance and barriers:readiness, social stressors and environmental    During the course of the visit the patient was educated and counseled about the following:     Hypertension:   Medication: increase to 100 mg cozaar.  Obesity:   Diet interventions: qualitative changes (increase low-fat,  high-fiber foods).    Goals: Hypertension: Reduce Blood Pressure and Obesity: Reduce calorie intake and BMI    Did patient meet goals/outcomes: No    The following self management tools provided: declined    Patient Instructions (the written plan) was given to the patient/family.     Time spent with patient: 30 minutes

## 2017-08-23 ENCOUNTER — TELEPHONE (OUTPATIENT)
Dept: FAMILY MEDICINE | Facility: CLINIC | Age: 52
End: 2017-08-23

## 2017-08-23 NOTE — TELEPHONE ENCOUNTER
----- Message from Heena Rodriguez sent at 8/23/2017  8:47 AM CDT -----  Contact: patient  Patient calling in regards to scheduling an appt by Friday, 8/25/17. He has gout in his ankles, very painful. No avail appt. Please advise.  Call back   Thanks!

## 2017-08-29 ENCOUNTER — TELEPHONE (OUTPATIENT)
Dept: FAMILY MEDICINE | Facility: CLINIC | Age: 52
End: 2017-08-29

## 2017-09-07 ENCOUNTER — DOCUMENTATION ONLY (OUTPATIENT)
Dept: FAMILY MEDICINE | Facility: CLINIC | Age: 52
End: 2017-09-07

## 2017-09-07 NOTE — PROGRESS NOTES
Pre-Visit Chart Review  For Appointment Scheduled on 9/8/17    Health Maintenance Due   Topic Date Due    TETANUS VACCINE  10/06/1982    Colonoscopy  10/06/2014    Influenza Vaccine  08/01/2017

## 2017-09-08 ENCOUNTER — HOSPITAL ENCOUNTER (OUTPATIENT)
Dept: RADIOLOGY | Facility: CLINIC | Age: 52
Discharge: HOME | End: 2017-09-08
Attending: NURSE PRACTITIONER
Payer: MEDICAID

## 2017-09-08 ENCOUNTER — OFFICE VISIT (OUTPATIENT)
Dept: FAMILY MEDICINE | Facility: CLINIC | Age: 52
End: 2017-09-08
Payer: MEDICAID

## 2017-09-08 VITALS
DIASTOLIC BLOOD PRESSURE: 90 MMHG | WEIGHT: 209.69 LBS | TEMPERATURE: 98 F | SYSTOLIC BLOOD PRESSURE: 148 MMHG | BODY MASS INDEX: 32.91 KG/M2 | HEART RATE: 91 BPM | HEIGHT: 67 IN

## 2017-09-08 DIAGNOSIS — H93.12 TINNITUS, LEFT: ICD-10-CM

## 2017-09-08 DIAGNOSIS — I10 ESSENTIAL HYPERTENSION: ICD-10-CM

## 2017-09-08 DIAGNOSIS — L03.115 CELLULITIS OF RIGHT LOWER EXTREMITY: Primary | ICD-10-CM

## 2017-09-08 DIAGNOSIS — L03.115 CELLULITIS OF RIGHT LOWER EXTREMITY: ICD-10-CM

## 2017-09-08 PROCEDURE — 73630 X-RAY EXAM OF FOOT: CPT | Mod: 26,RT,S$GLB, | Performed by: RADIOLOGY

## 2017-09-08 PROCEDURE — 73630 X-RAY EXAM OF FOOT: CPT | Mod: TC,PO,RT

## 2017-09-08 PROCEDURE — 99214 OFFICE O/P EST MOD 30 MIN: CPT | Mod: PBBFAC,25,PO | Performed by: NURSE PRACTITIONER

## 2017-09-08 PROCEDURE — 3008F BODY MASS INDEX DOCD: CPT | Mod: ,,, | Performed by: NURSE PRACTITIONER

## 2017-09-08 PROCEDURE — 99214 OFFICE O/P EST MOD 30 MIN: CPT | Mod: S$PBB,,, | Performed by: NURSE PRACTITIONER

## 2017-09-08 PROCEDURE — 99999 PR PBB SHADOW E&M-EST. PATIENT-LVL IV: CPT | Mod: PBBFAC,,, | Performed by: NURSE PRACTITIONER

## 2017-09-08 PROCEDURE — 3078F DIAST BP <80 MM HG: CPT | Mod: ,,, | Performed by: NURSE PRACTITIONER

## 2017-09-08 PROCEDURE — 3077F SYST BP >= 140 MM HG: CPT | Mod: ,,, | Performed by: NURSE PRACTITIONER

## 2017-09-08 RX ORDER — IBUPROFEN 800 MG/1
800 TABLET ORAL 3 TIMES DAILY PRN
Qty: 90 TABLET | Refills: 0 | Status: SHIPPED | OUTPATIENT
Start: 2017-09-08 | End: 2017-09-15 | Stop reason: ALTCHOICE

## 2017-09-08 RX ORDER — CLINDAMYCIN HYDROCHLORIDE 300 MG/1
300 CAPSULE ORAL EVERY 6 HOURS
Qty: 28 CAPSULE | Refills: 0 | Status: SHIPPED | OUTPATIENT
Start: 2017-09-08 | End: 2017-09-15 | Stop reason: ALTCHOICE

## 2017-09-08 NOTE — PROGRESS NOTES
Subjective:       Patient ID: Jarod Pierce is a 51 y.o. male.    Chief Complaint: Foot Pain (bilateral) and Hypertension    Mr. Pierce presents to the clinic today for ringing in the left ear and right foot pain.  The tinnitus has been present for months.  He has not seen ENT.  He has right foot pain which began 2-4 weeks ago.  The pain is located on the plantar aspect of the right 3rd-5th toes.  Patient was seen at [facility] ED five days ago and was diagnosed with tinea pedis and cellulitis of the right foot. He is taking Bactrim DS and clotrimazole topically.  He does have history of gout.  He also has hypertension which is uncontrolled.  He does not check blood pressure at home.        Review of Systems   Constitutional: Negative for chills and fever.   HENT: Negative for congestion, ear pain and sinus pressure.    Respiratory: Negative for cough, shortness of breath and wheezing.    Cardiovascular: Negative for chest pain, palpitations and leg swelling.   Gastrointestinal: Negative for abdominal pain, constipation and diarrhea.   Musculoskeletal:        Right foot pain       Objective:      Physical Exam   Constitutional: He is oriented to person, place, and time. He appears well-developed and well-nourished. No distress.   HENT:   Head: Normocephalic and atraumatic.   Right Ear: External ear normal.   Left Ear: External ear normal.   Mouth/Throat: Oropharynx is clear and moist. No oropharyngeal exudate.   Eyes: Pupils are equal, round, and reactive to light. Right eye exhibits no discharge. Left eye exhibits no discharge.   Neck: Neck supple. No thyromegaly present.   Cardiovascular: Normal rate and regular rhythm.  Exam reveals no gallop and no friction rub.    No murmur heard.  Pulmonary/Chest: Effort normal and breath sounds normal. No respiratory distress. He has no wheezes. He has no rales.   Lymphadenopathy:     He has no cervical adenopathy.   Neurological: He is alert and oriented to person, place, and  time. Coordination normal.   Skin: Skin is warm and dry. There is erythema.        Redness, warmth to dorsal aspect of foot with mild swelling.  No purulence.     Psychiatric: He has a normal mood and affect. His behavior is normal. Thought content normal.   Vitals reviewed.          Current Outpatient Prescriptions:     albuterol 90 mcg/actuation inhaler, Inhale 2 puffs into the lungs 4 (four) times daily., Disp: 1 Inhaler, Rfl: 2    colchicine 0.6 mg tablet, Take 1 tablet (0.6 mg total) by mouth once daily., Disp: 30 tablet, Rfl: 2    ibuprofen (ADVIL,MOTRIN) 800 MG tablet, Take 1 tablet (800 mg total) by mouth 3 (three) times daily as needed for Pain., Disp: 90 tablet, Rfl: 0    clindamycin (CLEOCIN) 300 MG capsule, Take 1 capsule (300 mg total) by mouth every 6 (six) hours., Disp: 28 capsule, Rfl: 0    febuxostat (ULORIC) 40 mg Tab, Take 1 tablet (40 mg total) by mouth once daily., Disp: 30 tablet, Rfl: 1    hydrocodone-acetaminophen 5-325mg (NORCO) 5-325 mg per tablet, , Disp: , Rfl:     loratadine (CLARITIN) 10 mg tablet, Take 1 tablet (10 mg total) by mouth once daily., Disp: 30 tablet, Rfl: 3    losartan (COZAAR) 100 MG tablet, Take 1 tablet (100 mg total) by mouth once daily., Disp: 90 tablet, Rfl: 3    ondansetron (ZOFRAN) 4 MG tablet, as needed. , Disp: , Rfl:   Assessment:       1. Cellulitis of right lower extremity    2. Tinnitus, left    3. Essential hypertension        Plan:       Cellulitis of right lower extremity  RTC one week.  -     X-Ray Foot Complete Right; Future; Expected date: 09/08/2017  -     CBC auto differential; Future; Expected date: 09/08/2017  -     clindamycin (CLEOCIN) 300 MG capsule; Take 1 capsule (300 mg total) by mouth every 6 (six) hours.  Dispense: 28 capsule; Refill: 0  -     Hemoglobin A1c; Future; Expected date: 09/08/2017  -     Basic metabolic panel; Future; Expected date: 09/08/2017    Tinnitus, left  -     Ambulatory referral to ENT    Other orders  -      ibuprofen (ADVIL,MOTRIN) 800 MG tablet; Take 1 tablet (800 mg total) by mouth 3 (three) times daily as needed for Pain.  Dispense: 90 tablet; Refill: 0    Essential hypertension  Elevated today.  Patient in pain due to right foot cellulitis therefore will not adjust medication today.    Follow up one week.    Patient readiness: acceptance and barriers:none    During the course of the visit the patient was educated and counseled about the following:     Hypertension:   Medication: no change.    Goals: Hypertension: Reduce Blood Pressure    Did patient meet goals/outcomes: No    The following self management tools provided: declined    Patient Instructions (the written plan) was given to the patient/family.     Time spent with patient: 15 minutes

## 2017-09-11 DIAGNOSIS — D64.9 ANEMIA, UNSPECIFIED TYPE: Primary | ICD-10-CM

## 2017-09-11 PROBLEM — I10 ESSENTIAL HYPERTENSION: Status: ACTIVE | Noted: 2017-09-11

## 2017-09-14 ENCOUNTER — DOCUMENTATION ONLY (OUTPATIENT)
Dept: FAMILY MEDICINE | Facility: CLINIC | Age: 52
End: 2017-09-14

## 2017-09-14 NOTE — PROGRESS NOTES
Pre-Visit Chart Review  For Appointment Scheduled on 9/15/2017    Health Maintenance Due   Topic Date Due    TETANUS VACCINE  10/06/1982    Colonoscopy  10/06/2014    Influenza Vaccine  08/01/2017

## 2017-09-15 ENCOUNTER — LAB VISIT (OUTPATIENT)
Dept: LAB | Facility: HOSPITAL | Age: 52
End: 2017-09-15
Attending: NURSE PRACTITIONER
Payer: MEDICAID

## 2017-09-15 ENCOUNTER — OFFICE VISIT (OUTPATIENT)
Dept: FAMILY MEDICINE | Facility: CLINIC | Age: 52
End: 2017-09-15
Payer: MEDICAID

## 2017-09-15 VITALS
SYSTOLIC BLOOD PRESSURE: 172 MMHG | TEMPERATURE: 98 F | HEIGHT: 67 IN | DIASTOLIC BLOOD PRESSURE: 100 MMHG | HEART RATE: 74 BPM | BODY MASS INDEX: 33.12 KG/M2 | WEIGHT: 211 LBS

## 2017-09-15 DIAGNOSIS — M10.9 GOUT, ARTHRITIS: Primary | ICD-10-CM

## 2017-09-15 DIAGNOSIS — J30.89 PERENNIAL SINUSITIS: ICD-10-CM

## 2017-09-15 DIAGNOSIS — D64.9 ANEMIA, UNSPECIFIED TYPE: ICD-10-CM

## 2017-09-15 DIAGNOSIS — R05.9 COUGH: ICD-10-CM

## 2017-09-15 DIAGNOSIS — I10 HYPERTENSION, POOR CONTROL: ICD-10-CM

## 2017-09-15 DIAGNOSIS — K29.50 CHRONIC GASTRITIS WITHOUT BLEEDING, UNSPECIFIED GASTRITIS TYPE: ICD-10-CM

## 2017-09-15 LAB
BASOPHILS # BLD AUTO: 0.02 K/UL
BASOPHILS NFR BLD: 0.2 %
DIFFERENTIAL METHOD: ABNORMAL
EOSINOPHIL # BLD AUTO: 0 K/UL
EOSINOPHIL NFR BLD: 0.2 %
ERYTHROCYTE [DISTWIDTH] IN BLOOD BY AUTOMATED COUNT: 13 %
FERRITIN SERPL-MCNC: 364 NG/ML
FOLATE SERPL-MCNC: 5.5 NG/ML
HCT VFR BLD AUTO: 35.9 %
HGB BLD-MCNC: 12.4 G/DL
IRON SERPL-MCNC: 69 UG/DL
LYMPHOCYTES # BLD AUTO: 2.2 K/UL
LYMPHOCYTES NFR BLD: 27.2 %
MCH RBC QN AUTO: 30.8 PG
MCHC RBC AUTO-ENTMCNC: 34.5 G/DL
MCV RBC AUTO: 89 FL
MONOCYTES # BLD AUTO: 0.7 K/UL
MONOCYTES NFR BLD: 8.5 %
NEUTROPHILS # BLD AUTO: 5.1 K/UL
NEUTROPHILS NFR BLD: 63.5 %
PLATELET # BLD AUTO: 277 K/UL
PMV BLD AUTO: 9.3 FL
RBC # BLD AUTO: 4.03 M/UL
SATURATED IRON: 22 %
TOTAL IRON BINDING CAPACITY: 318 UG/DL
TRANSFERRIN SERPL-MCNC: 215 MG/DL
VIT B12 SERPL-MCNC: 211 PG/ML
WBC # BLD AUTO: 8.1 K/UL

## 2017-09-15 PROCEDURE — 36415 COLL VENOUS BLD VENIPUNCTURE: CPT | Mod: PO

## 2017-09-15 PROCEDURE — 99999 PR PBB SHADOW E&M-EST. PATIENT-LVL IV: CPT | Mod: PBBFAC,,, | Performed by: NURSE PRACTITIONER

## 2017-09-15 PROCEDURE — 3077F SYST BP >= 140 MM HG: CPT | Mod: ,,, | Performed by: NURSE PRACTITIONER

## 2017-09-15 PROCEDURE — 82728 ASSAY OF FERRITIN: CPT

## 2017-09-15 PROCEDURE — 83540 ASSAY OF IRON: CPT

## 2017-09-15 PROCEDURE — 3080F DIAST BP >= 90 MM HG: CPT | Mod: ,,, | Performed by: NURSE PRACTITIONER

## 2017-09-15 PROCEDURE — 85025 COMPLETE CBC W/AUTO DIFF WBC: CPT

## 2017-09-15 PROCEDURE — 99214 OFFICE O/P EST MOD 30 MIN: CPT | Mod: PBBFAC,PO | Performed by: NURSE PRACTITIONER

## 2017-09-15 PROCEDURE — 3008F BODY MASS INDEX DOCD: CPT | Mod: ,,, | Performed by: NURSE PRACTITIONER

## 2017-09-15 PROCEDURE — 82607 VITAMIN B-12: CPT

## 2017-09-15 PROCEDURE — 99213 OFFICE O/P EST LOW 20 MIN: CPT | Mod: S$PBB,,, | Performed by: NURSE PRACTITIONER

## 2017-09-15 PROCEDURE — 82746 ASSAY OF FOLIC ACID SERUM: CPT

## 2017-09-15 RX ORDER — NEBULIZER AND COMPRESSOR
1 EACH MISCELLANEOUS DAILY
Qty: 1 EACH | Refills: 0 | Status: SHIPPED | OUTPATIENT
Start: 2017-09-15 | End: 2017-10-19

## 2017-09-15 RX ORDER — LORATADINE 10 MG/1
10 TABLET ORAL DAILY
Qty: 30 TABLET | Refills: 3 | Status: SHIPPED | OUTPATIENT
Start: 2017-09-15 | End: 2017-11-28 | Stop reason: SDUPTHER

## 2017-09-15 RX ORDER — FEBUXOSTAT 40 MG/1
40 TABLET, FILM COATED ORAL DAILY
Qty: 30 TABLET | Refills: 2 | Status: SHIPPED | OUTPATIENT
Start: 2017-09-15 | End: 2017-10-19

## 2017-09-15 NOTE — PROGRESS NOTES
Subjective:       Patient ID: Jarod Pierce is a 51 y.o. male.    Chief Complaint: Follow-up (Cellulitis )    HPI   Chief Complaint  Chief Complaint   Patient presents with    Follow-up     Cellulitis        HPI  Jarod Pierce is a 51 y.o. male with medical diagnoses as listed in the medical history and problem list that presents for follow up of visit from visit last week for cellulitis to bilateral feet, has not yet finished his antibiotics, ordered clindamycin that was prescribed QID for 7 days.  Patient also states went to [facility] after being seen in the office and was prescribed a steroid or given an injection per patient.  States feet are looking and feeling a lot better.  However patient is asking for refill of norco, denied.  Patient complaining of dry cough also and of some sinus congestion and is not taking claritin. Patient has hypertension and stopped taking his blood pressure medication about a week ago, blood pressure is elevated today.  BMI 33.05 with 2 pound weight gain since last visit. Established patient with last clinic appointment 9/8/2017.        PAST MEDICAL HISTORY:  Past Medical History:   Diagnosis Date    Allergic rhinitis due to allergen     Arthritis     Cardiomyopathy     Gout     Hypertension     Left bundle branch block        PAST SURGICAL HISTORY:  Past Surgical History:   Procedure Laterality Date    WISDOM TOOTH EXTRACTION         SOCIAL HISTORY:  Social History     Social History    Marital status: Single     Spouse name: N/A    Number of children: N/A    Years of education: N/A     Occupational History     [employer]     Social History Main Topics    Smoking status: Never Smoker    Smokeless tobacco: Never Used    Alcohol use No    Drug use: No    Sexual activity: Not on file     Other Topics Concern    Not on file     Social History Narrative    No narrative on file       FAMILY HISTORY:  Family History   Problem Relation Age of Onset     Heart disease Father     Hypertension Father     Gout Father     Cancer Sister      lung    Heart attack Neg Hx        ALLERGIES AND MEDICATIONS: updated and reviewed.  Review of patient's allergies indicates:   Allergen Reactions    Allopurinol analogues      Upset stomach    Codeine      Other reaction(s): Hives    Indocin [indomethacin sodium]      headache    Lisinopril      Dry coughing    Phenytoin sodium extended      Other reaction(s): leg cramps     Current Outpatient Prescriptions   Medication Sig Dispense Refill    albuterol 90 mcg/actuation inhaler Inhale 2 puffs into the lungs 4 (four) times daily. 1 Inhaler 2    febuxostat (ULORIC) 40 mg Tab Take 1 tablet (40 mg total) by mouth once daily. To prevent gout 30 tablet 2    loratadine (CLARITIN) 10 mg tablet Take 1 tablet (10 mg total) by mouth once daily. For sinus allergies 30 tablet 3    ondansetron (ZOFRAN) 4 MG tablet as needed.       BLOOD PRESSURE CUFF Misc 1 each by Misc.(Non-Drug; Combo Route) route once daily. 1 each 0    losartan (COZAAR) 100 MG tablet Take 1 tablet (100 mg total) by mouth once daily. 90 tablet 3    ranitidine (ZANTAC) 150 MG tablet Take 1 tablet (150 mg total) by mouth 2 (two) times daily. 60 tablet 2     No current facility-administered medications for this visit.            Review of Systems   Constitutional: Negative for activity change, appetite change, chills, fatigue, fever and unexpected weight change.   HENT: Positive for congestion and postnasal drip. Negative for ear pain, hearing loss, rhinorrhea, sore throat and trouble swallowing.    Eyes: Negative for visual disturbance.   Respiratory: Positive for cough. Negative for shortness of breath and wheezing.    Cardiovascular: Positive for leg swelling. Negative for chest pain and palpitations.        Mild edema to feet   Gastrointestinal: Positive for abdominal pain. Negative for constipation, diarrhea, nausea and vomiting.        Complaining of upset  "stomach due to taking ibuprofen for pain in feet   Genitourinary: Negative for difficulty urinating, dysuria and urgency.   Musculoskeletal: Positive for arthralgias. Negative for myalgias.        Pain to bilateral feet, mostly localized to proximal joints of great toes   Skin: Negative for rash and wound.        Complains of dry skin, itching to legs.  States had a bed bug infestation recently. No rash at this time.   Neurological: Negative for dizziness, numbness and headaches.   Hematological: Negative for adenopathy.   Psychiatric/Behavioral: Negative for dysphoric mood, sleep disturbance and suicidal ideas. The patient is not nervous/anxious.        Objective:       Vitals:    09/15/17 1347 09/15/17 1350   BP: (!) 164/93 (!) 172/100   BP Location: Right arm Left arm   Patient Position: Sitting Sitting   BP Method: Small (Automatic) Small (Manual)   Pulse: 74    Temp: 97.6 °F (36.4 °C)    TempSrc: Oral    Weight: 95.7 kg (211 lb)    Height: 5' 7" (1.702 m)      Physical Exam   Constitutional: He is oriented to person, place, and time. He appears well-developed. No distress.   HENT:   Head: Normocephalic and atraumatic.   Right Ear: Tympanic membrane, external ear and ear canal normal.   Left Ear: Tympanic membrane, external ear and ear canal normal.   Nose: Mucosal edema present. Right sinus exhibits no maxillary sinus tenderness and no frontal sinus tenderness. Left sinus exhibits no maxillary sinus tenderness and no frontal sinus tenderness.   Mouth/Throat: Oropharynx is clear and moist and mucous membranes are normal. No oropharyngeal exudate.   Eyes: Conjunctivae are normal. Right eye exhibits no discharge. Left eye exhibits no discharge. Right conjunctiva is not injected. Left conjunctiva is not injected.   Neck: Normal range of motion. Neck supple. Normal carotid pulses present. Carotid bruit is not present.   Cardiovascular: Normal rate, regular rhythm, S1 normal, S2 normal, normal heart sounds and intact " distal pulses.  Exam reveals no gallop and no friction rub.    No murmur heard.  Pulses:       Carotid pulses are 2+ on the right side, and 2+ on the left side.       Radial pulses are 2+ on the right side, and 2+ on the left side.        Posterior tibial pulses are 2+ on the right side, and 2+ on the left side.   Pulmonary/Chest: Effort normal and breath sounds normal. No respiratory distress. He has no wheezes. He has no rales.   Abdominal: Soft. Bowel sounds are normal. He exhibits no abdominal bruit, no pulsatile midline mass and no mass. There is no hepatosplenomegaly. There is no tenderness.   Musculoskeletal: He exhibits edema and tenderness.        Right foot: There is tenderness and swelling.        Left foot: There is tenderness and swelling.        Feet:    Trace edema to bilateral feet pedal area, tender to palpation over proximal great toe joints bilaterally. No redness or warmth noted.    Lymphadenopathy:     He has no cervical adenopathy.   Neurological: He is alert and oriented to person, place, and time. He has normal strength.   Skin: Skin is warm, dry and intact. Capillary refill takes less than 2 seconds. No rash noted. He is not diaphoretic.   Psychiatric: Judgment and thought content normal. His mood appears not anxious. Cognition and memory are normal. He does not exhibit a depressed mood.   Bizarre affect.  Asked if he could hug provider. Provider declined.   Nursing note and vitals reviewed.      Assessment:       1. Gout, arthritis    2. Perennial sinusitis    3. Hypertension, poor control    4. Cough    5. Chronic gastritis without bleeding, unspecified gastritis type    6. BMI 33.0-33.9,adult        Plan:   Jarod was seen today for follow-up.  Discussed at length importance of taking medication as prescribed for hypertension and gout prevention.  Patient with poor focus, concerned with his agenda which included pain medication prescription.      Question cellulitis vs gout, no cellulitis  appreciated at this visit, resolved.    Diagnoses and all orders for this visit:    Gout, arthritis  -     febuxostat (ULORIC) 40 mg Tab; Take 1 tablet (40 mg total) by mouth once daily. To prevent gout    Perennial sinusitis  -     loratadine (CLARITIN) 10 mg tablet; Take 1 tablet (10 mg total) by mouth once daily. For sinus allergies    Hypertension, poor control  -     BLOOD PRESSURE CUFF Misc; 1 each by Misc.(Non-Drug; Combo Route) route once daily.    Cough  loratadine (CLARITIN) 10 mg tablet; Take 1 tablet (10 mg total) by mouth once daily. For sinus allergies    Chronic gastritis without bleeding, unspecified gastritis type  -     ranitidine (ZANTAC) 150 MG tablet; Take 1 tablet (150 mg total) by mouth 2 (two) times daily.    BMI 33.0-33.9,adult   Recommended weight loss with portion control and exercise.     Patient readiness: nonacceptance and barriers:readiness, social stressors, economic and household issues    During the course of the visit the patient was educated and counseled about the following:     Hypertension:   Medication: no change.  Obesity:   General weight loss/lifestyle modification strategies discussed (elicit support from others; identify saboteurs; non-food rewards, etc).    Goals: Hypertension: Reduce Blood Pressure and Obesity: Reduce calorie intake and BMI    Did patient meet goals/outcomes: No    The following self management tools provided: blood pressure log  excercise log    Patient Instructions (the written plan) was given to the patient/family.     Time spent with patient: 15 minutes

## 2017-09-15 NOTE — PATIENT INSTRUCTIONS
Patient instructed to restart losartan for blood pressure and take it daily.  Plan to order blood pressure meter for patient to use at home.  Goal blood pressure <140/<90.    Restart uloric 40 mg daily for gout prevention.       Restart claritin 10 mg every day for allergies.    Return visit in 4 weeks for follow up.

## 2017-09-18 ENCOUNTER — TELEPHONE (OUTPATIENT)
Dept: FAMILY MEDICINE | Facility: CLINIC | Age: 52
End: 2017-09-18

## 2017-09-18 NOTE — TELEPHONE ENCOUNTER
Spoke with patient, he voiced understanding about labs. But requested a refill on Indocin for pain (gout). He stated it was prescribed for him once before and it worked. Pharmacy verified. If any issue with this will relay message to patient. Thank you.

## 2017-09-18 NOTE — TELEPHONE ENCOUNTER
----- Message from LATHA Aparicio sent at 9/17/2017  5:17 PM CDT -----  There is still anemia but no iron or B vitamin deficiency.  Please return stool cards to look for blood in the stool.

## 2017-09-19 ENCOUNTER — TELEPHONE (OUTPATIENT)
Dept: FAMILY MEDICINE | Facility: CLINIC | Age: 52
End: 2017-09-19

## 2017-09-19 DIAGNOSIS — M10.9 GOUT OF MULTIPLE SITES, UNSPECIFIED CAUSE, UNSPECIFIED CHRONICITY: Primary | ICD-10-CM

## 2017-09-19 RX ORDER — INDOMETHACIN 25 MG/1
25 CAPSULE ORAL 2 TIMES DAILY WITH MEALS
Qty: 30 CAPSULE | Refills: 0 | Status: SHIPPED | OUTPATIENT
Start: 2017-09-19 | End: 2017-10-19

## 2017-09-19 NOTE — TELEPHONE ENCOUNTER
Indocin is listed on his allergy list as causing headache.  Is this correct information???    Thanks

## 2017-09-19 NOTE — TELEPHONE ENCOUNTER
----- Message from Angelica Child sent at 9/19/2017  1:42 PM CDT -----  Contact: self  Patient called asking for advice about his gout getting worst.  Please call patient at [phone]. Thanks!

## 2017-09-19 NOTE — TELEPHONE ENCOUNTER
Patient says his gout in his feet is no better - he tried the Uloric and was not helping- he went back to Indocin and is almost out of it.

## 2017-09-20 NOTE — TELEPHONE ENCOUNTER
Patient notified Indocin sent in. He says  told him about a cream for gout once and would like to get it.

## 2017-10-12 ENCOUNTER — TELEPHONE (OUTPATIENT)
Dept: RHEUMATOLOGY | Facility: CLINIC | Age: 52
End: 2017-10-12

## 2017-10-12 NOTE — TELEPHONE ENCOUNTER
----- Message from Delores Hough sent at 10/12/2017  2:55 PM CDT -----  Contact: self  Patient [phone] is calling to leave a message for the nurse to please call him back today/he had spoken with nurse earlier today    Patient has been to ER  several times and has been treated for colitis and cellulitis and gout. Recommended PCP or [facility]. Patient is going to follow up with someone before his next rheum visit. CG

## 2017-10-12 NOTE — TELEPHONE ENCOUNTER
Spoke with pt, states he is having a lot of pain.  Went to [facility] ER because he can't stand the pain anymore.  Says he was told his kidneys are good but he shouldn't take the indocin.  Given Uloric but it's not working.  Informed Doctor not back in clinic till end of November but will send message for advisement.

## 2017-10-13 NOTE — TELEPHONE ENCOUNTER
----- Message from RT Dave sent at 10/13/2017  9:55 AM CDT -----  Contact: pt    pt , requesting a call back soon to discuss a medication combination that seems to work well for him, thanks.    Patient says he is getting some relief from Allopurinol and prednisone. Patient is again encouraged to see PCP for oversight of his medications before seeing Dr. Dickinson in November. Patient voices understanding. CG

## 2017-10-19 ENCOUNTER — HOSPITAL ENCOUNTER (EMERGENCY)
Facility: HOSPITAL | Age: 52
Discharge: HOME | End: 2017-10-19
Attending: EMERGENCY MEDICINE
Payer: MEDICAID

## 2017-10-19 VITALS
SYSTOLIC BLOOD PRESSURE: 148 MMHG | WEIGHT: 211 LBS | HEART RATE: 72 BPM | TEMPERATURE: 98 F | RESPIRATION RATE: 16 BRPM | OXYGEN SATURATION: 100 % | BODY MASS INDEX: 33.12 KG/M2 | DIASTOLIC BLOOD PRESSURE: 82 MMHG | HEIGHT: 67 IN

## 2017-10-19 DIAGNOSIS — M1A.0790 CHRONIC GOUT OF FOOT, UNSPECIFIED CAUSE, UNSPECIFIED LATERALITY: Primary | ICD-10-CM

## 2017-10-19 DIAGNOSIS — R42 DIZZINESS: ICD-10-CM

## 2017-10-19 LAB
ALBUMIN SERPL BCP-MCNC: 3.3 G/DL
ALP SERPL-CCNC: 73 U/L
ALT SERPL W/O P-5'-P-CCNC: 22 U/L
ANION GAP SERPL CALC-SCNC: 10 MMOL/L
AST SERPL-CCNC: 16 U/L
BASOPHILS # BLD AUTO: 0.1 K/UL
BASOPHILS NFR BLD: 0.6 %
BILIRUB SERPL-MCNC: 0.7 MG/DL
BUN SERPL-MCNC: 16 MG/DL
CALCIUM SERPL-MCNC: 9.6 MG/DL
CHLORIDE SERPL-SCNC: 100 MMOL/L
CO2 SERPL-SCNC: 29 MMOL/L
CREAT SERPL-MCNC: 1 MG/DL
DIFFERENTIAL METHOD: ABNORMAL
EOSINOPHIL # BLD AUTO: 0.1 K/UL
EOSINOPHIL NFR BLD: 0.9 %
ERYTHROCYTE [DISTWIDTH] IN BLOOD BY AUTOMATED COUNT: 14.9 %
EST. GFR  (AFRICAN AMERICAN): >60 ML/MIN/1.73 M^2
EST. GFR  (NON AFRICAN AMERICAN): >60 ML/MIN/1.73 M^2
GLUCOSE SERPL-MCNC: 82 MG/DL
HCT VFR BLD AUTO: 40.3 %
HGB BLD-MCNC: 13.5 G/DL
LYMPHOCYTES # BLD AUTO: 1.6 K/UL
LYMPHOCYTES NFR BLD: 16.4 %
MCH RBC QN AUTO: 30.4 PG
MCHC RBC AUTO-ENTMCNC: 33.5 G/DL
MCV RBC AUTO: 90 FL
MONOCYTES # BLD AUTO: 1.2 K/UL
MONOCYTES NFR BLD: 11.8 %
NEUTROPHILS # BLD AUTO: 7 K/UL
NEUTROPHILS NFR BLD: 70.3 %
PLATELET # BLD AUTO: 318 K/UL
PMV BLD AUTO: 6.6 FL
POTASSIUM SERPL-SCNC: 3.9 MMOL/L
PROT SERPL-MCNC: 7.8 G/DL
RBC # BLD AUTO: 4.45 M/UL
SODIUM SERPL-SCNC: 139 MMOL/L
WBC # BLD AUTO: 9.9 K/UL

## 2017-10-19 PROCEDURE — 99284 EMERGENCY DEPT VISIT MOD MDM: CPT | Mod: 25

## 2017-10-19 PROCEDURE — 25000003 PHARM REV CODE 250: Performed by: EMERGENCY MEDICINE

## 2017-10-19 PROCEDURE — 80053 COMPREHEN METABOLIC PANEL: CPT

## 2017-10-19 PROCEDURE — 85025 COMPLETE CBC W/AUTO DIFF WBC: CPT

## 2017-10-19 PROCEDURE — 36415 COLL VENOUS BLD VENIPUNCTURE: CPT

## 2017-10-19 PROCEDURE — 96360 HYDRATION IV INFUSION INIT: CPT

## 2017-10-19 PROCEDURE — 93005 ELECTROCARDIOGRAM TRACING: CPT

## 2017-10-19 RX ORDER — PREDNISONE 5 MG/1
5 TABLET ORAL DAILY
COMMUNITY
End: 2017-10-20

## 2017-10-19 RX ORDER — HYDRALAZINE HYDROCHLORIDE 25 MG/1
25 TABLET, FILM COATED ORAL 2 TIMES DAILY
COMMUNITY
End: 2017-10-27 | Stop reason: DRUGHIGH

## 2017-10-19 RX ORDER — ALLOPURINOL 100 MG/1
100 TABLET ORAL DAILY
COMMUNITY
End: 2017-10-20 | Stop reason: SDUPTHER

## 2017-10-19 RX ORDER — METRONIDAZOLE 500 MG/1
500 TABLET ORAL EVERY 6 HOURS
COMMUNITY
Start: 2017-10-14 | End: 2017-10-24

## 2017-10-19 RX ORDER — HYDROCODONE BITARTRATE AND ACETAMINOPHEN 10; 325 MG/1; MG/1
1 TABLET ORAL
Status: COMPLETED | OUTPATIENT
Start: 2017-10-19 | End: 2017-10-19

## 2017-10-19 RX ORDER — CIPROFLOXACIN 500 MG/1
500 TABLET ORAL
COMMUNITY
Start: 2017-10-14 | End: 2017-10-24

## 2017-10-19 RX ADMIN — SODIUM CHLORIDE 1000 ML: 0.9 INJECTION, SOLUTION INTRAVENOUS at 05:10

## 2017-10-19 RX ADMIN — HYDROCODONE BITARTRATE AND ACETAMINOPHEN 1 TABLET: 10; 325 TABLET ORAL at 06:10

## 2017-10-19 NOTE — ED NOTES
Report given to Maria Teresa GAN. Pt walking with tech per ERP instructions. ERP also wants to speak with patient before he leaves.

## 2017-10-19 NOTE — ED PROVIDER NOTES
"Encounter Date: 10/19/2017    SCRIBE #1 NOTE: I, Saba Landis, am scribing for, and in the presence of, Dr. Brunner .       History     Chief Complaint   Patient presents with    Dizziness     "i think im dehydrated"       10/19/2017 5:25 PM     Chief complaint: Dizziness       Jarod Pierce is a 52 y.o. male with a hx of Gout, LDD, HTN, Arthritis, and Cardiomyopathy who presents to the ED with complaints of intermittent BLE pain and swelling for sometime. Pt was seen by rheumatologist a few months ago and was perscribed Allopurinol and steroids which relieved the foot pain. He states the rheumatologist dx the foot pains as a gout flare not arthritis. He has not taken any steroids in approximately a week. Pt reports a recent admit to [facility] for cellulitis in the foot. He notes dark urine today. Pt also reports neck spasms while in the ED. He denies fever.Pt is followed by PCP, Dr. Fuller and has an appointment tomorrow at 1:40 pm. Allergens include Allopurinol, Codeine, Lisinopril, and Phenytoin.         The history is provided by the patient.     Review of patient's allergies indicates:   Allergen Reactions    Allopurinol analogues      Upset stomach    Codeine      Other reaction(s): Hives    Lisinopril      Dry coughing    Phenytoin sodium extended      Other reaction(s): leg cramps     Past Medical History:   Diagnosis Date    Allergic rhinitis due to allergen     Arthritis     Cardiomyopathy     Gout     Hypertension     Left bundle branch block      Past Surgical History:   Procedure Laterality Date    WISDOM TOOTH EXTRACTION       Family History   Problem Relation Age of Onset    Heart disease Father     Hypertension Father     Gout Father     Cancer Sister      lung    Heart attack Neg Hx      Social History   Substance Use Topics    Smoking status: Never Smoker    Smokeless tobacco: Never Used    Alcohol use No     Review of Systems   Constitutional: Negative for fever. "   HENT: Negative for sore throat.    Respiratory: Negative for shortness of breath.    Cardiovascular: Negative for chest pain.   Gastrointestinal: Negative for nausea.   Genitourinary: Negative for dysuria.   Musculoskeletal: Positive for joint swelling and myalgias. Negative for back pain.   Skin: Negative for rash.   Neurological: Negative for weakness.   Hematological: Does not bruise/bleed easily.       Physical Exam     Initial Vitals [10/19/17 1541]   BP Pulse Resp Temp SpO2   130/81 (!) 124 16 97.9 °F (36.6 °C) 97 %      MAP       97.33         Physical Exam    Nursing note and vitals reviewed.  Constitutional: He appears well-developed and well-nourished. No distress.   HENT:   Head: Normocephalic and atraumatic.   Eyes: Conjunctivae and EOM are normal. Pupils are equal, round, and reactive to light.   Neck: Neck supple.   Cardiovascular: Normal rate, regular rhythm and normal heart sounds. Exam reveals no gallop and no friction rub.    No murmur heard.  Pulmonary/Chest: Breath sounds normal. No respiratory distress. He has no wheezes. He has no rhonchi. He has no rales.   Abdominal: Soft. Bowel sounds are normal. He exhibits no distension. There is no tenderness.   Musculoskeletal: Normal range of motion. He exhibits no edema or tenderness.   Typhus nodule to left posterior elbow. No erythema, swelling or warmth of the MTP.    Neurological: He is alert and oriented to person, place, and time.   Skin: Skin is warm and dry.   Psychiatric: He has a normal mood and affect.         ED Course   Procedures  Labs Reviewed   CBC W/ AUTO DIFFERENTIAL   COMPREHENSIVE METABOLIC PANEL                        Scribe Attestation:   Scribe #1: I performed the above scribed service and the documentation accurately describes the services I performed. I attest to the accuracy of the note.    I, Dr. Boo Brunner personally performed the services described in this documentation. All medical record entries made by the scribe  were at my direction and in my presence.  I have reviewed the chart and agree that the record reflects my personal performance and is accurate and complete. Boo Brunner MD.  8:49 PM 10/19/2017         ED Course      Clinical Impression:     1. Chronic gout of foot, unspecified cause, unspecified laterality    2. Dizziness                               Boo Brunner MD  10/19/17 2045

## 2017-10-20 ENCOUNTER — OFFICE VISIT (OUTPATIENT)
Dept: FAMILY MEDICINE | Facility: CLINIC | Age: 52
End: 2017-10-20
Payer: MEDICAID

## 2017-10-20 ENCOUNTER — LAB VISIT (OUTPATIENT)
Dept: LAB | Facility: HOSPITAL | Age: 52
End: 2017-10-20
Attending: NURSE PRACTITIONER
Payer: MEDICAID

## 2017-10-20 DIAGNOSIS — M10.9 GOUT, ARTHRITIS: ICD-10-CM

## 2017-10-20 DIAGNOSIS — Z76.5 DRUG-SEEKING BEHAVIOR: ICD-10-CM

## 2017-10-20 DIAGNOSIS — R11.0 NAUSEA: ICD-10-CM

## 2017-10-20 DIAGNOSIS — M10.9 GOUT, ARTHRITIS: Primary | ICD-10-CM

## 2017-10-20 DIAGNOSIS — I51.7 LVH (LEFT VENTRICULAR HYPERTROPHY): ICD-10-CM

## 2017-10-20 DIAGNOSIS — E66.09 CLASS 1 OBESITY DUE TO EXCESS CALORIES WITH SERIOUS COMORBIDITY AND BODY MASS INDEX (BMI) OF 31.0 TO 31.9 IN ADULT: ICD-10-CM

## 2017-10-20 DIAGNOSIS — I10 ESSENTIAL HYPERTENSION: ICD-10-CM

## 2017-10-20 LAB — URATE SERPL-MCNC: 5.8 MG/DL

## 2017-10-20 PROCEDURE — 99213 OFFICE O/P EST LOW 20 MIN: CPT | Mod: S$PBB,,, | Performed by: NURSE PRACTITIONER

## 2017-10-20 PROCEDURE — 99213 OFFICE O/P EST LOW 20 MIN: CPT | Mod: PBBFAC,PO | Performed by: NURSE PRACTITIONER

## 2017-10-20 PROCEDURE — 84550 ASSAY OF BLOOD/URIC ACID: CPT

## 2017-10-20 PROCEDURE — 36415 COLL VENOUS BLD VENIPUNCTURE: CPT | Mod: PO

## 2017-10-20 PROCEDURE — 99999 PR PBB SHADOW E&M-EST. PATIENT-LVL III: CPT | Mod: PBBFAC,,, | Performed by: NURSE PRACTITIONER

## 2017-10-20 RX ORDER — HYDROCODONE BITARTRATE AND ACETAMINOPHEN 5; 325 MG/1; MG/1
TABLET ORAL
COMMUNITY
Start: 2017-09-29 | End: 2017-10-27

## 2017-10-20 RX ORDER — ONDANSETRON 4 MG/1
4 TABLET, FILM COATED ORAL EVERY 8 HOURS PRN
Qty: 30 TABLET | Refills: 0 | Status: SHIPPED | OUTPATIENT
Start: 2017-10-20 | End: 2017-11-28 | Stop reason: ALTCHOICE

## 2017-10-20 RX ORDER — ALLOPURINOL 100 MG/1
100 TABLET ORAL 2 TIMES DAILY
Qty: 60 TABLET | Refills: 2 | Status: SHIPPED | OUTPATIENT
Start: 2017-10-20 | End: 2017-11-28

## 2017-10-20 RX ORDER — TRAMADOL HYDROCHLORIDE 50 MG/1
TABLET ORAL
COMMUNITY
Start: 2017-10-14 | End: 2017-11-28 | Stop reason: ALTCHOICE

## 2017-10-20 NOTE — PATIENT INSTRUCTIONS
Gout    Gout is an inflammation of a joint due to a build-up of gout crystals in the joint fluid. This occurs when there is an excess of uric acid (a normal waste product) in the body. Uric acid builds up in the body when the kidneys are unable to filter enough of it from the blood. This may occur with age. It is also associated with kidney disease. Gout occurs more often in people with obesity, diabetes, high blood pressure, or high levels of fats in the blood. It may run in families. Gout tends to come and go. A flare up of gout is called an attack. Drinking alcohol or eating certain foods (such as shellfish or foods with additives such as high-fructose corn syrup) may increase uric acid levels in the blood and cause a gout attack.  During a gout attack, the affected joint may become a hot, red, swollen and painful. If you have had one attack of gout, you are likely to have another. An attack of gout can be treated with medicine. If these attacks become frequent, a daily medicine may be prescribed to help the kidneys remove uric acid from the body.  Home care  During a gout attack:  · Rest painful joints. If gout affects the joints of your foot or leg, you may want to use crutches for the first few days to keep from bearing weight on the affected joint.  · When sitting or lying down, raise the painful joint to a level higher than your heart.  · Apply an ice pack (ice cubes in a plastic bag wrapped in a thin towel) over the injured area for 20 minutes every 1 to 2 hours the first day for pain relief. Continue this 3 to 4 times a day for swelling and pain.  · Avoid alcohol and foods listed below (see Preventing attacks) during a gout attack. Drink extra fluid to help flush the uric acid through your kidneys.  · If you were prescribed a medicine to treat gout, take it as your healthcare provider has instructed. Don't skip doses.  · Take anti-inflammatory medicine as directed.   · If pain medicines have been  prescribed, take them exactly as directed.    Preventing attacks  · Minimize or avoid alcohol use. Excess alcohol intake can cause a gout attack.  · Limit these foods and beverages:  ¨ Organ meats, such as kidneys and liver  ¨ Certain seafoods (anchovies, sardines, shrimp, scallops, herring, mackerel)  ¨ Wild game, meat extracts and meat gravies  ¨ Foods and beverages sweetened with high-fructose corn syrup, such as sodas  · Eat a healthy diet including low-fat and nonfat dairy, whole grains, and vegetables.  · If you are overweight, talk to your healthcare provider about a weight reduction plan. Avoid fasting or extreme low calorie diets (less than 900 calories per day). This will increase uric acid levels in the body.  · If you have diabetes or high blood pressure, work with your doctor to manage these conditions.  · Protect the joint from injury. Trauma can trigger a gout attack.  Follow-up care  Follow up with your healthcare provider, or as advised.   When to seek medical advice  Call your healthcare provider if you have any of the following:  · Fever over 100.4°F (38.ºC) with worsening joint pain  · Increasing redness around the joint  · Pain developing in another joint  · Repeated vomiting, abdominal pain, or blood in the vomit or stool (black or red color)  Date Last Reviewed: 3/1/2017  © 9677-8020 [employer]. [address] [place] [address], [place] [id number]. All rights reserved. This information is not intended as a substitute for professional medical care. Always follow your healthcare professional's instructions.        Gout    Gout is an inflammation of a joint due to a build-up of gout crystals in the joint fluid. This occurs when there is an excess of uric acid (a normal waste product) in the body. Uric acid builds up in the body when the kidneys are unable to filter enough of it from the blood. This may occur with age. It is also associated with kidney disease. Gout occurs more often in people  with obesity, diabetes, high blood pressure, or high levels of fats in the blood. It may run in families. Gout tends to come and go. A flare up of gout is called an attack. Drinking alcohol or eating certain foods (such as shellfish or foods with additives such as high-fructose corn syrup) may increase uric acid levels in the blood and cause a gout attack.  During a gout attack, the affected joint may become a hot, red, swollen and painful. If you have had one attack of gout, you are likely to have another. An attack of gout can be treated with medicine. If these attacks become frequent, a daily medicine may be prescribed to help the kidneys remove uric acid from the body.  Home care  During a gout attack:  · Rest painful joints. If gout affects the joints of your foot or leg, you may want to use crutches for the first few days to keep from bearing weight on the affected joint.  · When sitting or lying down, raise the painful joint to a level higher than your heart.  · Apply an ice pack (ice cubes in a plastic bag wrapped in a thin towel) over the injured area for 20 minutes every 1 to 2 hours the first day for pain relief. Continue this 3 to 4 times a day for swelling and pain.  · Avoid alcohol and foods listed below (see Preventing attacks) during a gout attack. Drink extra fluid to help flush the uric acid through your kidneys.  · If you were prescribed a medicine to treat gout, take it as your healthcare provider has instructed. Don't skip doses.  · Take anti-inflammatory medicine as directed.   · If pain medicines have been prescribed, take them exactly as directed.    Preventing attacks  · Minimize or avoid alcohol use. Excess alcohol intake can cause a gout attack.  · Limit these foods and beverages:  ¨ Organ meats, such as kidneys and liver  ¨ Certain seafoods (anchovies, sardines, shrimp, scallops, herring, mackerel)  ¨ Wild game, meat extracts and meat gravies  ¨ Foods and beverages sweetened with  high-fructose corn syrup, such as sodas  · Eat a healthy diet including low-fat and nonfat dairy, whole grains, and vegetables.  · If you are overweight, talk to your healthcare provider about a weight reduction plan. Avoid fasting or extreme low calorie diets (less than 900 calories per day). This will increase uric acid levels in the body.  · If you have diabetes or high blood pressure, work with your doctor to manage these conditions.  · Protect the joint from injury. Trauma can trigger a gout attack.  Follow-up care  Follow up with your healthcare provider, or as advised.   When to seek medical advice  Call your healthcare provider if you have any of the following:  · Fever over 100.4°F (38.ºC) with worsening joint pain  · Increasing redness around the joint  · Pain developing in another joint  · Repeated vomiting, abdominal pain, or blood in the vomit or stool (black or red color)  Date Last Reviewed: 3/1/2017  © 0975-1696 [employer]. [address] [place] [address]. All rights reserved. This information is not intended as a substitute for professional medical care. Always follow your healthcare professional's instructions.        Treating Gout Attacks     Raising the joint above the level of your heart can help reduce gout symptoms.     Gout is a disease that affects the joints. It is caused by excess uric acid in your blood stream that may lead to crystals forming in your joints. Left untreated, it can lead to painful foot and joint deformities and even kidney problems. But, by treating gout early, you can relieve pain and help prevent future problems. Gout can usually be treated with medication and proper diet. In severe cases, surgery may be needed.  Gout attacks are painful and often happen more than once. Taking medications may reduce pain and prevent attacks in the future. There are also some things you can do at home to relieve symptoms.  Medications for gout  Your healthcare  provider may prescribe a daily medication to reduce levels of uric acid. Reducing your uric acid levels may help prevent gout attacks. Allopurinol is one commonly used medication taken daily to reduce uric acid levels. Other medications can help relieve pain and swelling during an acute attack. Medicines such as NSAIDs (nonsteroidal anti-inflammatory medicines), steroids, and colchicine may be prescribed for intermittent use to relieve an acute gout attack. Be sure to take your medication as directed.  What you can do  Below are some things you can do at home to relieve gout symptoms. Your healthcare provider may have other tips.  · Rest the painful joint as much as you can.  · Raise the painful joint so it is at a level higher than your heart.  · Use ice for 10 minutes every 1-2 hours as possible.  How can I prevent gout?  With a little effort, you may be able to prevent gout attacks in the future. Here are some things you can do:  · Avoid foods high in purines  ¨ Certain meats (red meat, processed meat, turkey)  ¨ Organ meats (kidney, liver, sweetbread)  ¨ Shellfish (lobster, crab, shrimp, scallop, mussel)  ¨ Certain fish (anchovy, sardine, herring, mackerel)  · Take any medications prescribed by your healthcare provider.  · Lose weight if you need to.  · Reduce high fructose corn syrup in meals and drinks.  · Reduce or eliminate consumption of alcohol, particularly beer, but also red wine and spirits.  · Control blood pressure, diabetes, and cholesterol.  · Drink plenty of water to help flush uric acid from your body.  Date Last Reviewed: 2/1/2016 © 2000-2017 [employer]. [address] [place] [address], [place] [address]. All rights reserved. This information is not intended as a substitute for professional medical care. Always follow your healthcare professional's instructions.        Gout Diet  Gout is a painful condition caused by an excess of uric acid, a waste product made by the body. Uric acid forms  crystals that collect in the joints. The immune response to these crystals brings on symptoms of joint pain and swelling. This is called a gout attack. Often, medications and diet changes are combined to manage gout. Below are some guidelines for changing your diet to help you manage gout and prevent attacks. Your health care provider will help you determine the best eating plan for you.     Eating to manage gout  Weight loss for those who are overweight may help reduce gout attacks.  Eat less of these foods  Eating too many foods containing purines may raise the levels of uric acid in your body. This raises your risk for a gout attack. Try to limit these foods and drinks:  · Alcohol, such as beer and red wine. You may be told to avoid alcohol completely.  · Soft drinks that contain sugar or high fructose corn syrup  · Certain fish, including anchovies, sardines, fish eggs, and herring  · Shellfish  · Certain meats, such as red meat, hot dogs, luncheon meats, and turkey  · Organ meats, such as liver, kidneys, and sweetbreads  · Legumes, such as dried beans and peas  · Other high fat foods such as gravy, whole milk, and high fat cheeses  · Vegetables such as asparagus, cauliflower, spinach, and mushrooms used to be thought to contribute to an increased risk for a gout attack, but recent studies show that high purine vegetables don't increase the risk for a gout attack.  Eat more of these foods  Other foods may be helpful for people with gout. Add some of these foods to your diet:  · Cherries contain chemicals that may lower uric acid.  · Omega fatty acids. These are found in some fatty fish such as salmon, certain oils (flax, olive, or nut), and nuts themselves. Omega fatty acids may help prevent inflammation due to gout.  · Dairy products that are low-fat or fat-free, such as cheese and yogurt  · Complex carbohydrate foods, including whole grains, brown rice, oats, and beans  · Coffee, in moderation  · Water,  approximately 64 ounces per day  Follow-up care  Follow up with your healthcare provider as advised.  When to seek medical advice  Call your healthcare provider right away if any of these occur:  · Return of gout symptoms, usually at night:  · Severe pain, swelling, and heat in a joint, especially the base of the big toe  · Affected joint is hard to move  · Skin of the affected joint is purple or red  · Fever of 100.4°F (38°C) or higher  · Pain that doesn't get better even with prescribed medicine   Date Last Reviewed: 1/12/2016 © 2000-2017 [employer]. [address] [place] [id number]. All rights reserved. This information is not intended as a substitute for professional medical care. Always follow your healthcare professional's instructions.        Eating to Prevent Gout  Gout is a painful form of arthritis caused by an excess of uric acid. This is a waste product made by the body. It builds up in the body and forms crystals that collect in the joints, bringing on a gout attack. Alcohol and certain foods can trigger a gout attack. Below are some guidelines for changing your diet to help you manage gout. Your healthcare provider can work with you to determine the best eating plan for you. Know that diet is only one part of managing gout. Take your medicines as prescribed and follow the other guidelines your healthcare provider has given you.  Foods to limit  Eating too many foods containing purines may increase the levels of uric acid in your body and increase your risk for a gout attack. It may be best to limit these high-purine foods:  · Alcohol (beer, red wine). You may be told to avoid alcohol completely.  · Certain fish (anchovies, sardines, fish roes, herring, tuna, mussels, codfish, scallops, trout, and michael)  · Certain meats (red meat, processed meat, forde, turkey, wild game, and goose)  · Sauces and gravies made with meat  · Organ meats (such as liver, kidneys, sweetbreads, and  tripe)  · Legumes (such as dried beans, peas)  · Mushrooms, spinach, asparagus, and cauliflower  · Yeast and yeast extract supplements  Foods to try  Some foods may be helpful for people with gout. You may want to try adding some of the following foods to your diet:  · Dark berries: These include blueberries, blackberries, and cherries. These berries contain chemicals that may lower uric acid.  · Tofu: Tofu, which is made from soy, is a good source of protein. Studies have shown that it may be a better choice than meat for people with gout.  · Omega fatty acids: These acids are found in fatty fish (such as salmon), certain oils (such as flax, olive, or nut oils), or nuts. They may help prevent inflammation due to gout.  The following guidelines are recommended by the American Medical Association for people with gout. Your diet should be:  · High in fiber, whole grains, fruits, and vegetables.  · Low in protein (15% of calories should come from protein. Choose lean sources such as soy, lean meats, and poultry).  · Low in fat (no more than 30% of calories should come from fat, with only 10% coming from animal fat).   Date Last Reviewed: 6/17/2015  © 4296-1995 [employer]. [address] [place] [address]. All rights reserved. This information is not intended as a substitute for professional medical care. Always follow your healthcare professional's instructions.        What Is Gout?  Gout is a disease that affects the joints. Left untreated, it can lead to painful foot and joint deformities and even kidney problems. But, by treating gout early, you can relieve pain and help prevent future problems. Gout can usually be treated with medication and proper diet. In severe cases, surgery may be needed.  What causes gout?  Gout is caused by an excess of uric acid (a waste product made by the body). Uric acid is excreted by the kidneys. If the uric acid level in your blood rises too high, the uric acid  "may form crystals that collect in the joints, bringing on a gout attack. If you have many gout attacks, crystals may form large deposits called tophi. Tophi can damage joints and cause deformity.  Who is at risk for gout?  Men are more likely to have gout than women. But women can also be affected, mostly after menopause. Some health problems, such as obesity and high cholesterol, make gout more likely. And some medications, such as diuretics (water pills), can trigger a gout attack. People who drink a lot of alcohol are at high risk for gout. Certain foods can also trigger a gout attack.  Substances that may trigger a gout attack  To help prevent a gout attack, avoid these foods:  · Alcohol (particularly beer, but also red wine and spirits)  · Certain meats (red meat, processed meat, turkey)  · Organ meats (kidney, liver, sweetbread)  · Shellfish (lobster, crab, shrimp, scallop, mussel)  · Certain fish (anchovy, sardine, herring, mackerel)   Treatment  · Lifestyle changes, including weight loss, exercise, and quitting tobacco use  · Reducing consumption of the food groups above as well as high fructose corn syrup, found in many foods including sodas and energy drinks  · Changing non-essential medications that may contribute to gout (such as thiazide diuretics--"water pills")  · Medications to reduce the amount of uric acid in the blood, such as allopurinol or others  · Medications to treat acute gout attacks, including NSAIDs (nonsteroidal anti-inflammatory medicines), steroids, and colchicine  Date Last Reviewed: 2/1/2016  © 2640-9656 [employer]. [address] [place] [address], [place] [address]. All rights reserved. This information is not intended as a substitute for professional medical care. Always follow your healthcare professional's instructions.        "

## 2017-10-20 NOTE — PROGRESS NOTES
Subjective:       Patient ID: Jarod Pierce is a 52 y.o. male.    Chief Complaint: Follow-up (Gout )    HPI   Chief Complaint  Chief Complaint   Patient presents with    Follow-up     Gout        HPI  Jarod Pierce is a 52 y.o. male with medical diagnoses as listed in the medical history and problem list that presents for gout follow up.  Patient was in [facility] ER yesterday with c/o dizziness and feeling like he was dehydrated. CMP was normal on admission.  No elevated WBC.  Patient was given IV fluids and discharged. Also seems that patient was at [facility] on 10/14/17 and was prescribed cipro and flagyl for apparent GI infection.  Patient was also in [facility] overnight at the end of September with gout pain.  At some point he went to see his nephrologist who stopped his losartan in case it was leading to gout and started patient on hydralazine 25 mg BID. Blood pressure controlled today 133/81. Patient is talking in circles about multiple visits to ER and specialists and it is difficult to make sense of what he is saying or the course of treatment for his gout. Records requested from [facility] but not received.  queried with evidence of multiple prescriptions for norco and tramadol from ER and prescribers in [place] and [facility].   Established patient with last clinic appointment 9/15/2017.        PAST MEDICAL HISTORY:  Past Medical History:   Diagnosis Date    Allergic rhinitis due to allergen     Arthritis     Cardiomyopathy     Gout     Hypertension     Left bundle branch block        PAST SURGICAL HISTORY:  Past Surgical History:   Procedure Laterality Date    WISDOM TOOTH EXTRACTION         SOCIAL HISTORY:  Social History     Social History    Marital status: Single     Spouse name: N/A    Number of children: N/A    Years of education: N/A     Occupational History     [employer]     Social History Main Topics    Smoking status: Never Smoker    Smokeless tobacco: Never  Used    Alcohol use No    Drug use: No    Sexual activity: Not on file     Other Topics Concern    Not on file     Social History Narrative    No narrative on file       FAMILY HISTORY:  Family History   Problem Relation Age of Onset    Heart disease Father     Hypertension Father     Gout Father     Cancer Sister      lung    Heart attack Neg Hx        ALLERGIES AND MEDICATIONS: updated and reviewed.  Review of patient's allergies indicates:   Allergen Reactions    Allopurinol analogues      Upset stomach    Codeine      Other reaction(s): Hives    Lisinopril      Dry coughing    Phenytoin sodium extended      Other reaction(s): leg cramps     Current Outpatient Prescriptions   Medication Sig Dispense Refill    albuterol 90 mcg/actuation inhaler Inhale 2 puffs into the lungs 4 (four) times daily. 1 Inhaler 2    allopurinol (ZYLOPRIM) 100 MG tablet Take 1 tablet (100 mg total) by mouth 2 (two) times daily. To lower uric acid 60 tablet 2    ciprofloxacin HCl (CIPRO) 500 MG tablet Take 500 mg by mouth every 12 (twelve) hours.      hydrALAZINE (APRESOLINE) 25 MG tablet Take 25 mg by mouth 2 (two) times daily.       hydrocodone-acetaminophen 5-325mg (NORCO) 5-325 mg per tablet       loratadine (CLARITIN) 10 mg tablet Take 1 tablet (10 mg total) by mouth once daily. For sinus allergies (Patient taking differently: Take 10 mg by mouth daily as needed for Allergies. For sinus allergies) 30 tablet 3    metronidazole (FLAGYL) 500 MG tablet Take 500 mg by mouth every 6 (six) hours.       ranitidine (ZANTAC) 150 MG tablet Take 1 tablet (150 mg total) by mouth 2 (two) times daily. 60 tablet 2    tramadol (ULTRAM) 50 mg tablet       ondansetron (ZOFRAN) 4 MG tablet Take 1 tablet (4 mg total) by mouth every 8 (eight) hours as needed for Nausea. 30 tablet 0     No current facility-administered medications for this visit.      Review of Systems   Constitutional: Positive for appetite change. Negative for  "activity change, chills, fatigue, fever and unexpected weight change.        Patient with decreased appetite due to stomach bug with weight loss of 13 pounds since last visit.    HENT: Negative.    Eyes: Negative for visual disturbance.        Wears glasses all the time   Respiratory: Negative for cough and shortness of breath.    Cardiovascular: Negative for chest pain, palpitations and leg swelling.   Gastrointestinal: Negative for abdominal pain, constipation, diarrhea, nausea and vomiting.        Stomach improved with antibiotics flagyl and cipro   Musculoskeletal: Positive for arthralgias.        C/o pain to knees, ankles, neck, questionably related to gout, although his Uric acid levels have been slightly elevated       Objective:       Vitals:    10/20/17 1404 10/20/17 1407   BP: (!) 150/95 133/81   BP Location: Right arm Left arm   Patient Position: Sitting Sitting   BP Method: Large (Automatic) Large (Manual)   Pulse: 86    Temp: 98.5 °F (36.9 °C)    TempSrc: Oral    Weight: 89.8 kg (198 lb)    Height: 5' 7" (1.702 m)      Physical Exam   Constitutional: He is oriented to person, place, and time. He appears well-developed. No distress.   HENT:   Head: Normocephalic and atraumatic.   Neck: Normal carotid pulses present. Carotid bruit is not present.   Cardiovascular: Normal rate, regular rhythm, S1 normal, S2 normal, normal heart sounds and intact distal pulses.  Exam reveals no gallop and no friction rub.    No murmur heard.  Pulses:       Carotid pulses are 2+ on the right side, and 2+ on the left side.       Radial pulses are 2+ on the right side, and 2+ on the left side.        Dorsalis pedis pulses are 2+ on the right side, and 2+ on the left side.   No edema   Pulmonary/Chest: Effort normal and breath sounds normal. No respiratory distress. He has no decreased breath sounds. He has no wheezes. He has no rhonchi. He has no rales.   Musculoskeletal: Normal range of motion. He exhibits no edema or " deformity.        Right knee: He exhibits no swelling and no erythema.        Left knee: He exhibits no swelling and no erythema.        Right ankle: He exhibits no swelling.        Left ankle: He exhibits no swelling.        Right foot: There is no swelling.        Left foot: There is no swelling.   Bilateral knees and feet without edema or redness noted, no limited ROM noted despite patient complaints to contrary   Neurological: He is alert and oriented to person, place, and time. He has normal strength.   Skin: Skin is warm, dry and intact. Capillary refill takes less than 2 seconds. No ecchymosis noted. He is not diaphoretic. No erythema. No pallor.   Psychiatric: He has a normal mood and affect. His behavior is normal. His speech is rapid and/or pressured and tangential. He expresses impulsivity.   Patient conversation difficult to follow due to constant topic change, inability to accurately report recent history of hospital ER visits and treatment received.   Nursing note and vitals reviewed.      Assessment:       1. Gout, arthritis    2. Essential hypertension    3. Nausea    4. LVH (left ventricular hypertrophy)    5. Class 1 obesity due to excess calories with serious comorbidity and body mass index (BMI) of 31.0 to 31.9 in adult    6. Drug-seeking behavior        Plan:   Jarod was seen today for follow-up.  Advised that it is difficult to follow his medical care and treatment due to his frequent ER use in facilities outside the [facility] system. Also discussed importance of following treatment recommendations and taking allopurinol as prescribed to prevent gout flares and lower uric acid level.  Difficult to keep patient on topic.  Conversation fragmented and wandering.  Patient manipulative alternately complementing provider and requesting a variety of services such as [employer] Physical, Disability status, medication for pain.  Diagnoses and all orders for this visit:    Gout, arthritis  -      allopurinol (ZYLOPRIM) 100 MG tablet; Take 1 tablet (100 mg total) by mouth 2 (two) times daily. To lower uric acid  -     Uric acid; Future  - Keep appointment with Dr. Dickinson rheumatology in November 2017  - Educational handouts provided. Gout, Gout Treatment, Gout Diet    Essential hypertension   Controlled on current medication, patient states that nephrologist changed losartan to hydralazine for blood pressure due to chronic gout, not sure if this is the best decision related to cardiac history, Left ventricular hypertrophy, patient has appointment with Dr. Manriquez 12/19/17 and should discuss medication change with him.  Nausea  -     ondansetron (ZOFRAN) 4 MG tablet; Take 1 tablet (4 mg total) by mouth every 8 (eight) hours as needed for Nausea.    LVH (left ventricular hypertrophy  Stable  Echocardiogram results 5/2017    1 - Concentric hypertrophy.     2 - No wall motion abnormalities.     3 - Normal left ventricular systolic function (EF 55-60%).     4 - Indeterminate LV diastolic function.     5 - Normal right ventricular systolic function .     6 - Difficult windows, recommend contrast use for future studies.     7 - Suggest improved function from Echo in 8/2013.  Class 1 obesity due to excess calories with serious comorbidity and body mass index (BMI) of 31.0 to 31.9 in adult   Weight loss recommended with well balanced diet and portion controlled meals and increased activity. Patient has lost 13  pounds since last visit, however did have an apparent infectious gastroenteritis and was treated at visit to [facility], records requested  Drug-seeking behavior     shows multiple prescriptions for norco and tramadol from multiple prescribers.    Return if symptoms worsen or fail to improve, for Keep appointments with cardiology and rheumatology.     Patient readiness: acceptance and barriers:readiness, social stressors, household issues and occupational issues    During the course of the visit the patient was  educated and counseled about the following:     Hypertension:   Medication: no change.  Obesity:   General weight loss/lifestyle modification strategies discussed (elicit support from others; identify saboteurs; non-food rewards, etc).    Goals: Hypertension: Reduce Blood Pressure and Obesity: Reduce calorie intake and BMI    Did patient meet goals/outcomes: Yes    The following self management tools provided: declined    Patient Instructions (the written plan) was given to the patient/family.     Time spent with patient: 30 minutes

## 2017-10-21 VITALS
DIASTOLIC BLOOD PRESSURE: 81 MMHG | HEIGHT: 67 IN | WEIGHT: 198 LBS | TEMPERATURE: 99 F | HEART RATE: 86 BPM | BODY MASS INDEX: 31.08 KG/M2 | SYSTOLIC BLOOD PRESSURE: 133 MMHG

## 2017-10-26 ENCOUNTER — DOCUMENTATION ONLY (OUTPATIENT)
Dept: FAMILY MEDICINE | Facility: CLINIC | Age: 52
End: 2017-10-26

## 2017-10-26 NOTE — PROGRESS NOTES
Pre-Visit Chart Review  For Appointment Scheduled on 10/27/2017    Health Maintenance Due   Topic Date Due    TETANUS VACCINE  10/06/1982    Colonoscopy  10/06/2014    Influenza Vaccine  08/01/2017

## 2017-10-27 ENCOUNTER — OFFICE VISIT (OUTPATIENT)
Dept: FAMILY MEDICINE | Facility: CLINIC | Age: 52
End: 2017-10-27
Payer: MEDICAID

## 2017-10-27 ENCOUNTER — LAB VISIT (OUTPATIENT)
Dept: LAB | Facility: HOSPITAL | Age: 52
End: 2017-10-27
Attending: NURSE PRACTITIONER
Payer: MEDICAID

## 2017-10-27 DIAGNOSIS — M10.9 GOUT, ARTHRITIS: ICD-10-CM

## 2017-10-27 DIAGNOSIS — M25.60 JOINT STIFFNESS: ICD-10-CM

## 2017-10-27 DIAGNOSIS — Z00.00 ANNUAL PHYSICAL EXAM: Primary | ICD-10-CM

## 2017-10-27 DIAGNOSIS — K30 INDIGESTION: ICD-10-CM

## 2017-10-27 DIAGNOSIS — R93.429 ABNORMAL CT SCAN, KIDNEY: ICD-10-CM

## 2017-10-27 DIAGNOSIS — R31.9 HEMATURIA, UNSPECIFIED TYPE: ICD-10-CM

## 2017-10-27 DIAGNOSIS — J30.9 ALLERGIC SINUSITIS: ICD-10-CM

## 2017-10-27 DIAGNOSIS — I10 ESSENTIAL HYPERTENSION: ICD-10-CM

## 2017-10-27 LAB
BILIRUB UR QL STRIP: NEGATIVE
CLARITY UR REFRACT.AUTO: CLEAR
COLOR UR AUTO: YELLOW
CREAT UR-MCNC: 116 MG/DL
GLUCOSE UR QL STRIP: NEGATIVE
HGB UR QL STRIP: NEGATIVE
KETONES UR QL STRIP: NEGATIVE
LEUKOCYTE ESTERASE UR QL STRIP: NEGATIVE
MICROALBUMIN UR DL<=1MG/L-MCNC: 4 UG/ML
MICROALBUMIN/CREATININE RATIO: 3.4 UG/MG
MICROSCOPIC COMMENT: NORMAL
NITRITE UR QL STRIP: NEGATIVE
PH UR STRIP: 7 [PH] (ref 5–8)
PROT UR QL STRIP: NEGATIVE
RBC #/AREA URNS AUTO: 4 /HPF (ref 0–4)
SP GR UR STRIP: 1.01 (ref 1–1.03)
URN SPEC COLLECT METH UR: NORMAL
UROBILINOGEN UR STRIP-ACNC: NEGATIVE EU/DL
WBC #/AREA URNS AUTO: 0 /HPF (ref 0–5)

## 2017-10-27 PROCEDURE — 81001 URINALYSIS AUTO W/SCOPE: CPT

## 2017-10-27 PROCEDURE — 82570 ASSAY OF URINE CREATININE: CPT

## 2017-10-27 PROCEDURE — 99999 PR PBB SHADOW E&M-EST. PATIENT-LVL IV: CPT | Mod: PBBFAC,,, | Performed by: NURSE PRACTITIONER

## 2017-10-27 PROCEDURE — 99214 OFFICE O/P EST MOD 30 MIN: CPT | Mod: PBBFAC,PO | Performed by: NURSE PRACTITIONER

## 2017-10-27 PROCEDURE — 99396 PREV VISIT EST AGE 40-64: CPT | Mod: S$PBB,,, | Performed by: NURSE PRACTITIONER

## 2017-10-27 RX ORDER — METHOCARBAMOL 750 MG/1
750 TABLET, FILM COATED ORAL 3 TIMES DAILY
Qty: 90 TABLET | Refills: 1 | Status: SHIPPED | OUTPATIENT
Start: 2017-10-27 | End: 2017-11-28 | Stop reason: SINTOL

## 2017-10-27 RX ORDER — HYDRALAZINE HYDROCHLORIDE 50 MG/1
50 TABLET, FILM COATED ORAL EVERY 12 HOURS
Qty: 60 TABLET | Refills: 2 | Status: SHIPPED | OUTPATIENT
Start: 2017-10-27 | End: 2018-02-23

## 2017-10-27 NOTE — PROGRESS NOTES
Subjective:       Patient ID: Jarod Pierce is a 52 y.o. male.    Chief Complaint: Annual Exam    HPI   Chief Complaint  Chief Complaint   Patient presents with    Annual Exam       HPI  Jarod Pierce is a 52 y.o. male with medical diagnoses as listed in the medical history and problem list that presents for annual exam for [employer]. Advised patient that today blood pressure is elevated including with retake at end of visit and he is to 'stiff' to perform range of motion to joints required to pass the [employer] physical.  Also, when in hospital at [facility] had CT abdomen and pelvis that showed macrolobulated bilateral kidneys with symmetric perinephric stranding.  In addition did have hematuria.  This requires additional follow up that will be ordered today.  Patient continues to c/o wide spread pain feet, legs, right flank area.  History of gout taking allopurinol and Uric Acid level on 10/20/17 was 5.8.  Patient c/o tinnitus, buzzing in ears. States vision is ok with glasses.  Patient is sleepy today due to not sleeping last night.   Established patient with last clinic appointment 10/20/2017.        PAST MEDICAL HISTORY  Past Medical History:   Diagnosis Date    Allergic rhinitis due to allergen     Arthritis     Cardiomyopathy     Gout     Hypertension     Left bundle branch block        PAST SURGICAL HISTORY:  Past Surgical History:   Procedure Laterality Date    WISDOM TOOTH EXTRACTION         SOCIAL HISTORY:  Social History     Social History    Marital status: Single     Spouse name: N/A    Number of children: N/A    Years of education: N/A     Occupational History     [employer]     Social History Main Topics    Smoking status: Never Smoker    Smokeless tobacco: Never Used    Alcohol use No    Drug use: No    Sexual activity: Not on file     Other Topics Concern    Not on file     Social History Narrative    No narrative on file       FAMILY  HISTORY:  Family History   Problem Relation Age of Onset    Heart disease Father     Hypertension Father     Gout Father     Cancer Sister      lung    Heart attack Neg Hx        ALLERGIES AND MEDICATIONS: updated and reviewed.  Review of patient's allergies indicates:   Allergen Reactions    Codeine      Other reaction(s): Hives    Lisinopril      Dry coughing    Phenytoin sodium extended      Other reaction(s): leg cramps     Current Outpatient Prescriptions   Medication Sig Dispense Refill    albuterol 90 mcg/actuation inhaler Inhale 2 puffs into the lungs 4 (four) times daily. 1 Inhaler 2    allopurinol (ZYLOPRIM) 100 MG tablet Take 1 tablet (100 mg total) by mouth 2 (two) times daily. To lower uric acid 60 tablet 2    loratadine (CLARITIN) 10 mg tablet Take 1 tablet (10 mg total) by mouth once daily. For sinus allergies (Patient taking differently: Take 10 mg by mouth daily as needed for Allergies. For sinus allergies) 30 tablet 3    ondansetron (ZOFRAN) 4 MG tablet Take 1 tablet (4 mg total) by mouth every 8 (eight) hours as needed for Nausea. 30 tablet 0    ranitidine (ZANTAC) 150 MG tablet Take 1 tablet (150 mg total) by mouth 2 (two) times daily. 60 tablet 2    tramadol (ULTRAM) 50 mg tablet       hydrALAZINE (APRESOLINE) 50 MG tablet Take 1 tablet (50 mg total) by mouth every 12 (twelve) hours. 60 tablet 2    methocarbamol (ROBAXIN) 750 MG Tab Take 1 tablet (750 mg total) by mouth 3 (three) times daily. As needed for joint stiffness 90 tablet 1     No current facility-administered medications for this visit.            Review of Systems   Constitutional: Positive for fatigue. Negative for activity change, appetite change, chills, fever and unexpected weight change.   HENT: Negative for congestion, ear pain, nosebleeds, rhinorrhea and sore throat.    Eyes: Negative for visual disturbance.        Wears glasses   Respiratory: Negative for cough and shortness of breath.    Cardiovascular:  "Positive for leg swelling. Negative for chest pain and palpitations.        C/O edema to bilateral feet, intermittent   Gastrointestinal: Negative for abdominal pain, constipation, diarrhea, nausea and vomiting.   Genitourinary: Positive for flank pain. Negative for difficulty urinating, dysuria, frequency, hematuria and urgency.        Left flank pain   Musculoskeletal: Positive for arthralgias and myalgias.        C/O joint stiffness and pain multiple areas   Skin: Negative for pallor, rash and wound.   Neurological: Negative for dizziness, weakness and headaches.   Hematological: Negative for adenopathy.   Psychiatric/Behavioral: Negative for dysphoric mood and suicidal ideas. The patient is not nervous/anxious.        Objective:       Vitals:    10/27/17 0916 10/27/17 0921 10/28/17 1619   BP: (!) 156/92 (!) 152/98 (!) 150/90   BP Location: Left arm Right arm Right arm   Patient Position: Sitting Sitting Sitting   BP Method: Medium (Automatic) Medium (Manual) Medium (Manual)   Pulse: 81     Temp: 98.5 °F (36.9 °C)     TempSrc: Oral     Weight: 89.8 kg (198 lb)     Height: 5' 7" (1.702 m)       Physical Exam   Constitutional: He is oriented to person, place, and time. He appears well-developed. No distress.   Sleepy today   HENT:   Head: Normocephalic and atraumatic.   Right Ear: Hearing, tympanic membrane, external ear and ear canal normal.   Left Ear: Hearing, tympanic membrane, external ear and ear canal normal.   Nose: Nose normal. No mucosal edema. Right sinus exhibits no maxillary sinus tenderness and no frontal sinus tenderness. Left sinus exhibits no maxillary sinus tenderness and no frontal sinus tenderness.   Mouth/Throat: Oropharynx is clear and moist and mucous membranes are normal. Abnormal dentition. No oropharyngeal exudate.   10 foot whisper test intact bilaterally, 2 broken teeth   Eyes: Conjunctivae are normal. Pupils are equal, round, and reactive to light. Right eye exhibits no discharge. Left " eye exhibits no discharge.   Snellen vision test 20/20 bilateral with corrective lenses, peripheral vision normal, color vision normal.   Neck: Normal range of motion. Normal carotid pulses present. Carotid bruit is not present.   Cardiovascular: Normal rate, regular rhythm, S1 normal, S2 normal, intact distal pulses and normal pulses.  Exam reveals no gallop.    No murmur heard.  Pulses:       Carotid pulses are 2+ on the right side, and 2+ on the left side.       Radial pulses are 2+ on the right side, and 2+ on the left side.        Posterior tibial pulses are 2+ on the right side, and 2+ on the left side.   No edema   Pulmonary/Chest: Effort normal and breath sounds normal. No respiratory distress. He has no decreased breath sounds. He has no wheezes. He has no rhonchi. He has no rales.   Abdominal: Soft. Bowel sounds are normal. He exhibits no distension and no mass. There is no hepatosplenomegaly. There is no tenderness. No hernia.   Obese, positive CVA tenderness on left side only   Musculoskeletal:   Abnormal ROM, c/o stiffness, unable to squat due to leg pain, limited ROM lumbar spine due to stiffness flexion, extension and rotation.     Lymphadenopathy:     He has no cervical adenopathy.   Neurological: He is alert and oriented to person, place, and time. He has normal strength.   Skin: Skin is warm, dry and intact. Capillary refill takes less than 2 seconds. He is not diaphoretic. There is pallor.   Psychiatric: His speech is normal and behavior is normal. Judgment and thought content normal. Cognition and memory are normal.   Patient conversation constantly changes topics He is inattentive.   Nursing note and vitals reviewed.      Assessment:       1. Annual physical exam    2. Joint stiffness    3. Essential hypertension    4. Gout, arthritis    5. Allergic sinusitis    6. Abnormal CT scan, kidney    7. Hematuria, unspecified type    8. BMI 31.0-31.9,adult    9. Indigestion        Plan:   Jarod was seen  today for annual exam.  [employer] physical form to be completed with exception of musculoskeletal and blood pressure, patient will return in one week and see if conditions have improved enough to pass physical.  Diagnoses and all orders for this visit:    Annual physical exam    Joint stiffness  -     methocarbamol (ROBAXIN) 750 MG Tab; Take 1 tablet (750 mg total) by mouth 3 (three) times daily. As needed for joint stiffness  - Encouraged regular exercise, walking 20 minutes daily    Essential hypertension  -     hydrALAZINE (APRESOLINE) 50 MG tablet; Take 1 tablet (50 mg total) by mouth every 12 (twelve) hours, increased dose  - Follow up one week    Gout, arthritis   Stable, continue allopurinol, recent uric acid level 5.8  Allergic sinusitis   Continue OTC antihistamine as needed  Abnormal CT scan, kidney  -     Urinalysis; Future  -     Microalbumin/creatinine urine ratio; Future  - If urine positive for blood or protein, will need nephrology referral  - Spoke with Dr. Jacobs about patient and CT results.  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   Indigestion    Stable on zantac per patient    Return in about 1 week (around 11/3/2017) for follow up.     Patient readiness: acceptance and barriers:readiness    During the course of the visit the patient was educated and counseled about the following:     Hypertension:   Medication: increase to hydralazine 50 mg BID.  Obesity:   General weight loss/lifestyle modification strategies discussed (elicit support from others; identify saboteurs; non-food rewards, etc).  Informal exercise measures discussed, e.g. taking stairs instead of elevator.  Regular aerobic exercise program discussed.    Goals: Hypertension: Reduce Blood Pressure and  Obesity: Reduce calorie intake and BMI    Did patient meet goals/outcomes: No    The following self management tools provided: declined    Patient Instructions (the written plan) was given to the patient/family.     Time spent with patient: 45 minutes

## 2017-10-28 VITALS
BODY MASS INDEX: 31.08 KG/M2 | DIASTOLIC BLOOD PRESSURE: 90 MMHG | SYSTOLIC BLOOD PRESSURE: 150 MMHG | WEIGHT: 198 LBS | TEMPERATURE: 99 F | HEART RATE: 81 BPM | HEIGHT: 67 IN

## 2017-10-30 ENCOUNTER — TELEPHONE (OUTPATIENT)
Dept: FAMILY MEDICINE | Facility: CLINIC | Age: 52
End: 2017-10-30

## 2017-10-30 NOTE — TELEPHONE ENCOUNTER
Returned patient call and notified him of urine results.       ----- Message from Daxa Remy sent at 10/30/2017  9:13 AM CDT -----  Contact: self  Patient called back stating that he received a call back from office. Please contact him at [phone].    Thanks!

## 2017-10-31 ENCOUNTER — DOCUMENTATION ONLY (OUTPATIENT)
Dept: FAMILY MEDICINE | Facility: CLINIC | Age: 52
End: 2017-10-31

## 2017-10-31 NOTE — PROGRESS NOTES
Pre-Visit Chart Review  For Appointment Scheduled on 11/2/2017    Health Maintenance Due   Topic Date Due    TETANUS VACCINE  10/06/1982    Colonoscopy  10/06/2014    Influenza Vaccine  08/01/2017

## 2017-11-02 ENCOUNTER — TELEPHONE (OUTPATIENT)
Dept: CARDIOLOGY | Facility: CLINIC | Age: 52
End: 2017-11-02

## 2017-11-02 ENCOUNTER — TELEPHONE (OUTPATIENT)
Dept: RHEUMATOLOGY | Facility: CLINIC | Age: 52
End: 2017-11-02

## 2017-11-02 NOTE — TELEPHONE ENCOUNTER
Called patient. He received robaxin for joint stiffness. Not in acute gout attack. Will evaluate further during his upcoming visit.

## 2017-11-02 NOTE — TELEPHONE ENCOUNTER
----- Message from Heena Rodriguez sent at 11/2/2017  3:59 PM CDT -----  Contact: patient  Patient calling in regards to speaking with the Nurse about stiffness in legs and ankles.  Please advise.  Call back   Thanks!

## 2017-11-02 NOTE — TELEPHONE ENCOUNTER
----- Message from Liam Durham sent at 11/2/2017  8:21 AM CDT -----  Contact: self   Patient want to speak with a nurse regarding blood pressure and heart beating fast (not active) need some medical advice please call back at [phone]

## 2017-11-09 ENCOUNTER — TELEPHONE (OUTPATIENT)
Dept: CARDIOLOGY | Facility: CLINIC | Age: 52
End: 2017-11-09

## 2017-11-09 NOTE — TELEPHONE ENCOUNTER
----- Message from Trina Muñoz sent at 11/9/2017  8:00 AM CST -----  Contact: Patient   Patient trfederico broke down in [place], he is requesting to he requesting to reschedule as soon as possible, he will be home tomorrow, contact patient at [phone], first available is April 2018.    Thank you

## 2017-11-09 NOTE — TELEPHONE ENCOUNTER
Returned pt's phone call. Pt said he had to cancel his appointment due to being broke down on side of the road in [place] and won't be able to make his appointment today. Pt said he has an appointment on 12/19/2017 and that he will just keep that appointment. No further issues discussed.

## 2017-11-22 ENCOUNTER — TELEPHONE (OUTPATIENT)
Dept: FAMILY MEDICINE | Facility: CLINIC | Age: 52
End: 2017-11-22

## 2017-11-22 NOTE — TELEPHONE ENCOUNTER
----- Message from Delores Fuller sent at 11/22/2017  9:09 AM CST -----  Contact: Jarod  Stacey is calling and asking to be scheduled Friday with Ariadne to have follow up. Please call [phone]. Thanks!

## 2017-11-24 ENCOUNTER — TELEPHONE (OUTPATIENT)
Dept: FAMILY MEDICINE | Facility: CLINIC | Age: 52
End: 2017-11-24

## 2017-11-24 NOTE — TELEPHONE ENCOUNTER
----- Message from Angelica Child sent at 11/24/2017  7:18 AM CST -----  Contact: self  Patient needs same day appointment due to finish up physical for [employer]. Patient was told to call today. Please call patient at [phone]. Thanks!

## 2017-11-24 NOTE — TELEPHONE ENCOUNTER
Appointment scheduled for Tuesday 11/28/2017 for 10:40.     ----- Message from Angelica Child sent at 11/24/2017  7:18 AM CST -----  Contact: self  Patient needs same day appointment due to finish up physical for [employer]. Patient was told to call today. Please call patient at [phone]. Thanks!

## 2017-11-27 ENCOUNTER — DOCUMENTATION ONLY (OUTPATIENT)
Dept: FAMILY MEDICINE | Facility: CLINIC | Age: 52
End: 2017-11-27

## 2017-11-27 NOTE — PROGRESS NOTES
Pre-Visit Chart Review  For Appointment Scheduled on 11/28/2017    Health Maintenance Due   Topic Date Due    TETANUS VACCINE  10/06/1982    Colonoscopy  10/06/2014    Influenza Vaccine  08/01/2017

## 2017-11-28 ENCOUNTER — OFFICE VISIT (OUTPATIENT)
Dept: FAMILY MEDICINE | Facility: CLINIC | Age: 52
End: 2017-11-28
Payer: MEDICAID

## 2017-11-28 ENCOUNTER — OFFICE VISIT (OUTPATIENT)
Dept: RHEUMATOLOGY | Facility: CLINIC | Age: 52
End: 2017-11-28
Payer: MEDICAID

## 2017-11-28 ENCOUNTER — TELEPHONE (OUTPATIENT)
Dept: FAMILY MEDICINE | Facility: CLINIC | Age: 52
End: 2017-11-28

## 2017-11-28 VITALS
BODY MASS INDEX: 31.71 KG/M2 | SYSTOLIC BLOOD PRESSURE: 140 MMHG | TEMPERATURE: 101 F | WEIGHT: 202 LBS | DIASTOLIC BLOOD PRESSURE: 92 MMHG | HEIGHT: 67 IN | HEART RATE: 109 BPM

## 2017-11-28 VITALS
HEART RATE: 127 BPM | SYSTOLIC BLOOD PRESSURE: 129 MMHG | DIASTOLIC BLOOD PRESSURE: 82 MMHG | BODY MASS INDEX: 31.8 KG/M2 | WEIGHT: 203.06 LBS

## 2017-11-28 DIAGNOSIS — M10.9 GOUT, ARTHRITIS: ICD-10-CM

## 2017-11-28 DIAGNOSIS — I10 POORLY-CONTROLLED HYPERTENSION: ICD-10-CM

## 2017-11-28 DIAGNOSIS — J30.89 PERENNIAL SINUSITIS: ICD-10-CM

## 2017-11-28 DIAGNOSIS — J01.00 ACUTE NON-RECURRENT MAXILLARY SINUSITIS: Primary | ICD-10-CM

## 2017-11-28 DIAGNOSIS — K29.50 CHRONIC GASTRITIS WITHOUT BLEEDING, UNSPECIFIED GASTRITIS TYPE: ICD-10-CM

## 2017-11-28 DIAGNOSIS — R59.1 LYMPHADENOPATHY: ICD-10-CM

## 2017-11-28 DIAGNOSIS — M79.672 FOOT PAIN, BILATERAL: Primary | ICD-10-CM

## 2017-11-28 DIAGNOSIS — M79.671 FOOT PAIN, BILATERAL: Primary | ICD-10-CM

## 2017-11-28 DIAGNOSIS — R50.9 FEVER AND CHILLS: ICD-10-CM

## 2017-11-28 PROCEDURE — 99214 OFFICE O/P EST MOD 30 MIN: CPT | Mod: S$PBB,,, | Performed by: NURSE PRACTITIONER

## 2017-11-28 PROCEDURE — 99999 PR PBB SHADOW E&M-EST. PATIENT-LVL III: CPT | Mod: PBBFAC,,, | Performed by: INTERNAL MEDICINE

## 2017-11-28 PROCEDURE — 99213 OFFICE O/P EST LOW 20 MIN: CPT | Mod: PBBFAC,PO | Performed by: INTERNAL MEDICINE

## 2017-11-28 PROCEDURE — 99999 PR PBB SHADOW E&M-EST. PATIENT-LVL IV: CPT | Mod: PBBFAC,,, | Performed by: NURSE PRACTITIONER

## 2017-11-28 PROCEDURE — 99214 OFFICE O/P EST MOD 30 MIN: CPT | Mod: PBBFAC,27,PO | Performed by: NURSE PRACTITIONER

## 2017-11-28 PROCEDURE — 99214 OFFICE O/P EST MOD 30 MIN: CPT | Mod: S$PBB,,, | Performed by: INTERNAL MEDICINE

## 2017-11-28 RX ORDER — LORATADINE 10 MG/1
10 TABLET ORAL DAILY
Qty: 30 TABLET | Refills: 3 | Status: SHIPPED | OUTPATIENT
Start: 2017-11-28 | End: 2018-06-26

## 2017-11-28 RX ORDER — ALLOPURINOL 300 MG/1
300 TABLET ORAL DAILY
Qty: 30 TABLET | Refills: 2 | Status: SHIPPED | OUTPATIENT
Start: 2017-11-28 | End: 2018-02-27 | Stop reason: SDUPTHER

## 2017-11-28 RX ORDER — DOXYCYCLINE 100 MG/1
100 CAPSULE ORAL 2 TIMES DAILY
Qty: 14 CAPSULE | Refills: 0 | Status: SHIPPED | OUTPATIENT
Start: 2017-11-28 | End: 2018-02-23 | Stop reason: ALTCHOICE

## 2017-11-28 RX ORDER — DEXAMETHASONE SODIUM PHOSPHATE 4 MG/ML
8 INJECTION, SOLUTION INTRA-ARTICULAR; INTRALESIONAL; INTRAMUSCULAR; INTRAVENOUS; SOFT TISSUE
Status: COMPLETED | OUTPATIENT
Start: 2017-11-28 | End: 2017-11-28

## 2017-11-28 RX ORDER — CARVEDILOL 12.5 MG/1
12.5 TABLET ORAL 2 TIMES DAILY WITH MEALS
Qty: 60 TABLET | Refills: 11 | Status: SHIPPED | OUTPATIENT
Start: 2017-11-28 | End: 2018-02-23 | Stop reason: SDUPTHER

## 2017-11-28 RX ADMIN — DEXAMETHASONE SODIUM PHOSPHATE 8 MG: 4 INJECTION, SOLUTION INTRA-ARTICULAR; INTRALESIONAL; INTRAMUSCULAR; INTRAVENOUS; SOFT TISSUE at 12:11

## 2017-11-28 ASSESSMENT — ROUTINE ASSESSMENT OF PATIENT INDEX DATA (RAPID3)
PATIENT GLOBAL ASSESSMENT SCORE: 9.5
TOTAL RAPID3 SCORE: 7
MDHAQ FUNCTION SCORE: .6
PAIN SCORE: 9.5
PSYCHOLOGICAL DISTRESS SCORE: 2.2

## 2017-11-28 NOTE — TELEPHONE ENCOUNTER
Attempted to contact patient, he doesn't need a new PT referral , current referral is valid until Dec 7,2017       ----- Message from Heena Rodriguez sent at 11/28/2017  1:29 PM CST -----  Contact: patient  Patient calling to speak with the Nurse about requesting a new referral for Physical Therapy. Please advise.   Call back   Thanks!

## 2017-11-28 NOTE — PROGRESS NOTES
Subjective:       Patient ID: Jarod Pierce is a 52 y.o. male.    Chief Complaint: Chronic Gout     HPI 53 yo male with HTN is here for follow-up for chronic tophaceous gout.  Gout was diagnosed in 2004. Usually gets 2-3 flare up in a year. Usually involves feet/podagra, knees, recently right wrist. Each episode lasts for 1 month- usually resolves with steroids- he has been to [facility] multiple times. . Also took allopurinol intermittently- caused stomach problems. Risk factors include -positive FH- father had gout, shelled fish, carbonated drinks, denies alcohol use, no h/o kidney stones.    Currently on allopurinol 200 mg daily.  Uric acid is down to 5.8.  He complains of pain in bilateral feet and ankles.  Pain is 9.5 x 10 with activity, better with rest, aching type, aggravated with walking, accompanied by chronic ankle swelling  He  denies fever, weight loss, dry eyes or mouth, photosensitivity, rash, ulcer, raynaud's phenomenon, alopecia, dysphagia, diarrhea or blood in the stools    Review of Systems   Constitutional: Negative for fatigue and fever.   HENT: Negative for ear discharge and ear pain.    Eyes: Negative for pain and redness.   Respiratory: Negative for cough and shortness of breath.    Cardiovascular: Negative for chest pain and palpitations.   Gastrointestinal: Negative for abdominal distention and abdominal pain.   Genitourinary: Negative for genital sores and hematuria.   Musculoskeletal: Positive for arthralgias.         Objective:   /82 (BP Location: Right arm, Patient Position: Sitting, BP Method: Large (Automatic))   Pulse (!) 127   Wt 92.1 kg (203 lb 0.7 oz)   BMI 31.80 kg/m²      Physical Exam   Constitutional: He is oriented to person, place, and time and well-developed, well-nourished, and in no distress.   HENT:   Head: Normocephalic and atraumatic.   Eyes: Conjunctivae and EOM are normal. Pupils are equal, round, and reactive to light.   Neck: Normal range of motion. Neck  supple. No thyromegaly present.   Cardiovascular: Normal rate and regular rhythm.    Pulmonary/Chest: Effort normal and breath sounds normal.   Abdominal: Soft. Bowel sounds are normal.   Neurological: He is alert and oriented to person, place, and time.   Skin: Skin is warm and dry.     Psychiatric: Memory and affect normal.   Musculoskeletal: He exhibits no edema.     Left elbow tophi  Bilateral ankle enlargement  Dorsal surface of left mid  to palpate             Lab Results   Component Value Date    WBC 9.90 10/19/2017    HGB 13.5 (L) 10/19/2017    HCT 40.3 10/19/2017    MCV 90 10/19/2017     10/19/2017     CMP  Sodium   Date Value Ref Range Status   10/19/2017 139 136 - 145 mmol/L Final     Potassium   Date Value Ref Range Status   10/19/2017 3.9 3.5 - 5.1 mmol/L Final     Chloride   Date Value Ref Range Status   10/19/2017 100 95 - 110 mmol/L Final     CO2   Date Value Ref Range Status   10/19/2017 29 23 - 29 mmol/L Final     Glucose   Date Value Ref Range Status   10/19/2017 82 70 - 110 mg/dL Final     BUN, Bld   Date Value Ref Range Status   10/19/2017 16 6 - 20 mg/dL Final     Creatinine   Date Value Ref Range Status   10/19/2017 1.0 0.5 - 1.4 mg/dL Final     Calcium   Date Value Ref Range Status   10/19/2017 9.6 8.7 - 10.5 mg/dL Final     Total Protein   Date Value Ref Range Status   10/19/2017 7.8 6.0 - 8.4 g/dL Final     Albumin   Date Value Ref Range Status   10/19/2017 3.3 (L) 3.5 - 5.2 g/dL Final     Total Bilirubin   Date Value Ref Range Status   10/19/2017 0.7 0.1 - 1.0 mg/dL Final     Comment:     For infants and newborns, interpretation of results should be based  on gestational age, weight and in agreement with clinical  observations.  Premature Infant recommended reference ranges:  Up to 24 hours.............<8.0 mg/dL  Up to 48 hours............<12.0 mg/dL  3-5 days..................<15.0 mg/dL  6-29 days.................<15.0 mg/dL       Alkaline Phosphatase   Date Value Ref  Range Status   10/19/2017 73 55 - 135 U/L Final     AST   Date Value Ref Range Status   10/19/2017 16 10 - 40 U/L Final     ALT   Date Value Ref Range Status   10/19/2017 22 10 - 44 U/L Final     Anion Gap   Date Value Ref Range Status   10/19/2017 10 8 - 16 mmol/L Final     eGFR if    Date Value Ref Range Status   10/19/2017 >60 >60 mL/min/1.73 m^2 Final     eGFR if non    Date Value Ref Range Status   10/19/2017 >60 >60 mL/min/1.73 m^2 Final     Comment:     Calculation used to obtain the estimated glomerular filtration  rate (eGFR) is the CKD-EPI equation. Since race is unknown   in our information system, the eGFR values for   -American and Non--American patients are given   for each creatinine result.       Lab Results   Component Value Date    SEDRATE 20 (H) 03/12/2014       Lab Results   Component Value Date    CRP 4.6 01/11/2017     Lab Results   Component Value Date    URICACID 5.8 10/20/2017     Reviewed outside records from   On 1/14/15, cyanosis or fluid examination was negative for mono sodium urate crystals  Uric acid was 7.9  On 1/19/15 uric acid was 7.5  X-rays reveals erosion of proximal phalanx of the right middle finger consistent with gout   Assessment:   Chronic gout with tophi-uric acid 5.8. Goal <5  Bilateral foot pain-podiatry referral.    Generalized osteoarthritis  Plan:   Increase Allopurinol to 300mg a day   Goal uric acid <5  Start physical therapy  Podiatry referral  Return to clinic in 3 months  with CBC CMP and uric acid

## 2017-11-28 NOTE — PATIENT INSTRUCTIONS
Controlling High Blood Pressure  High blood pressure (hypertension) is often called the silent killer. This is because many people who have it dont know it. High blood pressure is defined as 140/90 mm Hg or higher. Know your blood pressure and remember to check it regularly. Doing so can save your life. Here are some things you can do to help control your blood pressure.    Choose heart-healthy foods  · Select low-salt, low-fat foods. Limit sodium intake to 2,400 mg per day or the amount suggested by your healthcare provider.  · Limit canned, dried, cured, packaged, and fast foods. These can contain a lot of salt.  · Eat 8 to 10 servings of fruits and vegetables every day.  · Choose lean meats, fish, or chicken.  · Eat whole-grain pasta, brown rice, and beans.  · Eat 2 to 3 servings of low-fat or fat-free dairy products.  · Ask your doctor about the DASH eating plan. This plan helps reduce blood pressure.  · When you go to a restaurant, ask that your meal be prepared with no added salt.  Maintain a healthy weight  · Ask your healthcare provider how many calories to eat a day. Then stick to that number.  · Ask your healthcare provider what weight range is healthiest for you. If you are overweight, a weight loss of only 3% to 5% of your body weight can help lower blood pressure. Generally, a good weight loss goal is to lose 10% of your body weight in a year.  · Limit snacks and sweets.  · Get regular exercise.  Get up and get active  · Choose activities you enjoy. Find ones you can do with friends or family. This includes bicycling, dancing, walking, and jogging.  · Park farther away from building entrances.  · Use stairs instead of the elevator.  · When you can, walk or bike instead of driving.  · [place] leaves, garden, or do household repairs.  · Be active at a moderate to vigorous level of physical activity for at least 40 minutes for a minimum of 3 to 4 days a week.   Manage stress  · Make time to relax and enjoy  life. Find time to laugh.  · Communicate your concerns with your loved ones and your healthcare provider.  · Visit with family and friends, and keep up with hobbies.  Limit alcohol and quit smoking  · Men should have no more than 2 drinks per day.  · Women should have no more than 1 drink per day.  · Talk with your healthcare provider about quitting smoking. Smoking significantly increases your risk for heart disease and stroke. Ask your healthcare provider about community smoking cessation programs and other options.  Medicines  If lifestyle changes arent enough, your healthcare provider may prescribe high blood pressure medicine. Take all medicines as prescribed. If you have any questions about your medicines, ask your healthcare provider before stopping or changing them.   Date Last Reviewed: 4/27/2016  © 3299-0332 [employer], [employer]. [address] [place] [address], [place] [address]. All rights reserved. This information is not intended as a substitute for professional medical care. Always follow your healthcare professional's instructions.

## 2017-11-28 NOTE — PROGRESS NOTES
Subjective:       Patient ID: Jarod Pierce is a 52 y.o. male.    Chief Complaint: Follow-up ([employer] )    HPI   Chief Complaint  Chief Complaint   Patient presents with    Follow-up     [employer]        HPI  Jarod Pierce is a 52 y.o. male with medical diagnoses as listed in the medical history and problem list that presents to have [employer] Exam form completed.  However, has history of hypertension and patient's blood pressure is elevated.  He did not keep his appointments with cardiology as directed.  He also has fever today 100.6 with URI symptoms.  Patient is unable to perform physical ROM exercises required to pass physical due to joint pain, bilateral foot pain and 'gout' symptoms.  Patient was scheduled to go to physical therapy and did not keep that appointment either. Patient is a very poor historian and gives multiple excuses for not completing recommended care.  Patient advised today that I am unable to complete his physical form and form returned to patient. 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 Dickinson today for gout follow up.  Patient has appointment with Dr. Manriquez with echo 12/19/17. Established patient with last clinic appointment 10/27/2017.        PAST MEDICAL HISTORY:  Past Medical History:   Diagnosis Date    Allergic rhinitis due to allergen     Arthritis     Cardiomyopathy     Gout     Hypertension     Left bundle branch block        PAST SURGICAL HISTORY:  Past Surgical History:   Procedure Laterality Date    WISDOM TOOTH EXTRACTION         SOCIAL HISTORY:  Social History     Social History    Marital status: Single     Spouse name: N/A    Number of children: N/A    Years of education: N/A     Occupational  History     [employer]     Social History Main Topics    Smoking status: Never Smoker    Smokeless tobacco: Never Used    Alcohol use No    Drug use: No    Sexual activity: Not on file     Other Topics Concern    Not on file     Social History Narrative    No narrative on file       FAMILY HISTORY:  Family History   Problem Relation Age of Onset    Heart disease Father     Hypertension Father     Gout Father     Cancer Sister      lung    Heart attack Neg Hx        ALLERGIES AND MEDICATIONS: updated and reviewed.  Review of patient's allergies indicates:   Allergen Reactions    Codeine      Other reaction(s): Hives    Lisinopril      Dry coughing    Phenytoin sodium extended      Other reaction(s): leg cramps     Current Outpatient Prescriptions   Medication Sig Dispense Refill    albuterol 90 mcg/actuation inhaler Inhale 2 puffs into the lungs 4 (four) times daily. 1 Inhaler 2    hydrALAZINE (APRESOLINE) 50 MG tablet Take 1 tablet (50 mg total) by mouth every 12 (twelve) hours. 60 tablet 2    loratadine (CLARITIN) 10 mg tablet Take 1 tablet (10 mg total) by mouth once daily. For sinus allergies 30 tablet 3    ranitidine (ZANTAC) 150 MG tablet Take 1 tablet (150 mg total) by mouth 2 (two) times daily. 60 tablet 2    allopurinol (ZYLOPRIM) 300 MG tablet Take 1 tablet (300 mg total) by mouth once daily. 30 tablet 2    carvedilol (COREG) 12.5 MG tablet Take 1 tablet (12.5 mg total) by mouth 2 (two) times daily with meals. 60 tablet 11    doxycycline (MONODOX) 100 MG capsule Take 1 capsule (100 mg total) by mouth 2 (two) times daily. 14 capsule 0     No current facility-administered medications for this visit.        Review of Systems   Constitutional: Positive for chills and fever.        Chills with fever, temperature 100.6 in office   HENT: Positive for congestion, rhinorrhea, sinus pain, sinus pressure and sore throat.         Nasal congestion, itching ears, runny nose, sinus  "pain, sore throat   Eyes: Negative for visual disturbance.   Respiratory: Positive for cough. Negative for shortness of breath.    Cardiovascular: Positive for chest pain and palpitations.        Gets some chest pain off and on to mid-axillary area left side, some fluttering of heart that he relates to hydralazine   Gastrointestinal: Positive for nausea. Negative for abdominal pain, constipation, diarrhea and vomiting.        Slight nausea   Musculoskeletal: Positive for arthralgias.        C/o bilateral foot pain, stiffness, L>R.    Skin: Negative for rash and wound.       Objective:       Vitals:    11/28/17 1143 11/28/17 1147   BP: (!) 168/100 (!) 140/92   BP Location: Right arm Left arm   Patient Position: Sitting Sitting   BP Method: Large (Automatic)    Pulse: 109    Temp: (!) 100.6 °F (38.1 °C)    TempSrc: Oral    Weight: 91.6 kg (202 lb)    Height: 5' 7" (1.702 m)      Physical Exam   Constitutional: He appears well-developed. He appears ill.   HENT:   Head: Normocephalic and atraumatic.   Right Ear: External ear and ear canal normal. Tympanic membrane is bulging.   Left Ear: External ear and ear canal normal. Tympanic membrane is bulging.   Nose: Mucosal edema and rhinorrhea present. Right sinus exhibits maxillary sinus tenderness. Right sinus exhibits no frontal sinus tenderness. Left sinus exhibits maxillary sinus tenderness and frontal sinus tenderness.   Mouth/Throat: Mucous membranes are normal. Posterior oropharyngeal edema and posterior oropharyngeal erythema present.   Eyes: Conjunctivae and lids are normal. Right conjunctiva is not injected. Left conjunctiva is not injected.   Neck: Normal carotid pulses present. Carotid bruit is not present.   Cardiovascular: Regular rhythm, S1 normal, S2 normal, intact distal pulses and normal pulses.  Tachycardia present.  Exam reveals no gallop.    No murmur heard.  Pulses:       Carotid pulses are 2+ on the right side, and 2+ on the left side.       Radial " pulses are 2+ on the right side, and 2+ on the left side.   No edema noted   Pulmonary/Chest: Effort normal and breath sounds normal. No respiratory distress. He has no decreased breath sounds. He has no wheezes. He has no rhonchi. He has no rales.   Musculoskeletal:   Limping gait   Lymphadenopathy:        Head (right side): Submandibular and tonsillar adenopathy present.        Head (left side): Submandibular and tonsillar adenopathy present.   Skin: Skin is warm, dry and intact. Capillary refill takes less than 2 seconds. He is not diaphoretic.   Psychiatric: He has a normal mood and affect. His speech is normal and behavior is normal. Judgment and thought content normal. Cognition and memory are normal.       Assessment:       1. Acute non-recurrent maxillary sinusitis    2. Fever and chills    3. Chronic gastritis without bleeding, unspecified gastritis type    4. Perennial sinusitis    5. Poorly-controlled hypertension    6. Lymphadenopathy    7. Gout, arthritis    8. BMI 31.0-31.9,adult        Plan:   Jarod was seen today for follow-up.  He has multiple complaints today related to gout, bilateral foot pain, atypical chest pain that he believes is related to the hydralazine prescribed for his blood pressure. In addition he is acutely ill with maxillary sinusitis and fever.  Diagnoses and all orders for this visit:    Acute non-recurrent maxillary sinusitis  -     doxycycline (MONODOX) 100 MG capsule; Take 1 capsule (100 mg total) by mouth 2 (two) times daily.  -     dexamethasone injection 8 mg; Inject 2 mLs (8 mg total) into the muscle one time.    Fever and chills  -     doxycycline (MONODOX) 100 MG capsule; Take 1 capsule (100 mg total) by mouth 2 (two) times daily.  - Advised to take tylenol 500-650 mg q 6 hours prn fever >100.4    Chronic gastritis without bleeding, unspecified gastritis type  -     ranitidine (ZANTAC) 150 MG tablet; Take 1 tablet (150 mg total) by mouth 2 (two) times daily.  - Abdominal  symptoms controlled with current medication    Perennial sinusitis  -     loratadine (CLARITIN) 10 mg tablet; Take 1 tablet (10 mg total) by mouth once daily. For sinus allergies    Poorly-controlled hypertension  -     carvedilol (COREG) 12.5 MG tablet; Take 1 tablet (12.5 mg total) by mouth 2 (two) times daily with meals.  - Continue hydralazine as prescribed, coreg is in addition to hydralazine  - Educational handouts provided. Controlling your blood pressure  - Follow up 2 weeks nurse visit for blood pressure check  - 5/10/17 Echocardiogram results:     1 - Concentric hypertrophy.     2 - No wall motion abnormalities.     3 - Normal left ventricular systolic function (EF 55-60%).     4 - Indeterminate LV diastolic function.     5 - Normal right ventricular systolic function .     6 - Difficult windows, recommend contrast use for future studies.     7 - Suggest improved function from Echo in 8/2013.   - Patient to keep scheduled appointment with Dr. Manriquez 12/19/17 and will have repeat echocardiogram at that visit    Lymphadenopathy    doxycycline (MONODOX) 100 MG capsule; Take 1 capsule (100 mg total) by mouth 2 (two) times daily.    Gout, arthritis   Continue allopurinol as prescribed  ` Uric acid level 5.8 on 10/20/17   Keep appointment with Dr. Dickinson today for follow up   Encouraged to follow through with physical therapy, patient already referred and scheduled but cancelled appointment  BMI 31.0-31.9,adult   Weight loss recommended with well balanced diet and portion controlled meals and increased activity.    Patient gained 4 pounds since last visit    Return in about 2 weeks (around 12/12/2017), or if symptoms worsen or fail to improve, for nurse blood pressure check.    Patient readiness: acceptance and barriers:readiness and social stressors    During the course of the visit the patient was educated and counseled about the following:     Hypertension:   Medication: continue hydralazine 50 mg BID and begin  Coreg 12.5 mg BID.  Obesity:   General weight loss/lifestyle modification strategies discussed (elicit support from others; identify saboteurs; non-food rewards, etc).  Informal exercise measures discussed, e.g. taking stairs instead of elevator.  Regular aerobic exercise program discussed.    Goals: Hypertension: Reduce Blood Pressure and Obesity: Reduce calorie intake and BMI    Did patient meet goals/outcomes: No    The following self management tools provided: declined    Patient Instructions (the written plan) was given to the patient/family.     Time spent with patient: 30 minutes

## 2017-11-28 NOTE — TELEPHONE ENCOUNTER
----- Message from Elyse Albert sent at 11/28/2017  2:11 PM CST -----  Pt returned call // call [phone] (home)

## 2017-11-28 NOTE — PROGRESS NOTES
Subjective:       Patient ID: Jarod Pierce is a 52 y.o. male.    Chief Complaint: Chronic Gout     HPI 32 yo male with HTN is here for follow-up for chronic tophaceous gout.  Gout was diagnosed in 2004. Usually gets 2-3 flare up in a year. Usually involves feet/podagra, knees, recently right wrist. Each episode lasts for 1 month- usually resolves with steroids- he has been to [facility] multiple times. . Also took allopurinol intermittently- caused stomach problems. Risk factors include -positive FH- father had gout, shelled fish, carbonated drinks, denies alcohol use, no h/o kidney stones.    Currently on allopurinol 200 mg daily.  Uric acid is down to 5.8.  He complains of pain in bilateral feet and ankles.  Pain is 9.5 x 10 with activity, better with rest, aching type, aggravated with walking, accompanied by chronic ankle swelling  He  denies fever, weight loss, dry eyes or mouth, photosensitivity, rash, ulcer, raynaud's phenomenon, alopecia, dysphagia, diarrhea or blood in the stools    Review of Systems   Constitutional: Negative for fatigue and fever.   HENT: Negative for ear discharge and ear pain.    Eyes: Negative for pain and redness.   Respiratory: Negative for cough and shortness of breath.    Cardiovascular: Negative for chest pain and palpitations.   Gastrointestinal: Negative for abdominal distention and abdominal pain.   Genitourinary: Negative for genital sores and hematuria.   Musculoskeletal: Positive for arthralgias.         Objective:   /82 (BP Location: Right arm, Patient Position: Sitting, BP Method: Large (Automatic))   Pulse (!) 127   Wt 92.1 kg (203 lb 0.7 oz)   BMI 31.80 kg/m²      Physical Exam   Constitutional: He is oriented to person, place, and time and well-developed, well-nourished, and in no distress.   HENT:   Head: Normocephalic and atraumatic.   Eyes: Conjunctivae and EOM are normal. Pupils are equal, round, and reactive to light.   Neck: Normal range of motion. Neck  supple. No thyromegaly present.   Cardiovascular: Normal rate and regular rhythm.    Pulmonary/Chest: Effort normal and breath sounds normal.   Abdominal: Soft. Bowel sounds are normal.   Neurological: He is alert and oriented to person, place, and time.   Skin: Skin is warm and dry.     Psychiatric: Memory and affect normal.   Musculoskeletal: He exhibits no edema.     Left elbow tophi  B/L ankle enlargement  Dorsum of let mid  to palpate             Lab Results   Component Value Date    WBC 9.90 10/19/2017    HGB 13.5 (L) 10/19/2017    HCT 40.3 10/19/2017    MCV 90 10/19/2017     10/19/2017     CMP  Sodium   Date Value Ref Range Status   10/19/2017 139 136 - 145 mmol/L Final     Potassium   Date Value Ref Range Status   10/19/2017 3.9 3.5 - 5.1 mmol/L Final     Chloride   Date Value Ref Range Status   10/19/2017 100 95 - 110 mmol/L Final     CO2   Date Value Ref Range Status   10/19/2017 29 23 - 29 mmol/L Final     Glucose   Date Value Ref Range Status   10/19/2017 82 70 - 110 mg/dL Final     BUN, Bld   Date Value Ref Range Status   10/19/2017 16 6 - 20 mg/dL Final     Creatinine   Date Value Ref Range Status   10/19/2017 1.0 0.5 - 1.4 mg/dL Final     Calcium   Date Value Ref Range Status   10/19/2017 9.6 8.7 - 10.5 mg/dL Final     Total Protein   Date Value Ref Range Status   10/19/2017 7.8 6.0 - 8.4 g/dL Final     Albumin   Date Value Ref Range Status   10/19/2017 3.3 (L) 3.5 - 5.2 g/dL Final     Total Bilirubin   Date Value Ref Range Status   10/19/2017 0.7 0.1 - 1.0 mg/dL Final     Comment:     For infants and newborns, interpretation of results should be based  on gestational age, weight and in agreement with clinical  observations.  Premature Infant recommended reference ranges:  Up to 24 hours.............<8.0 mg/dL  Up to 48 hours............<12.0 mg/dL  3-5 days..................<15.0 mg/dL  6-29 days.................<15.0 mg/dL       Alkaline Phosphatase   Date Value Ref Range Status    10/19/2017 73 55 - 135 U/L Final     AST   Date Value Ref Range Status   10/19/2017 16 10 - 40 U/L Final     ALT   Date Value Ref Range Status   10/19/2017 22 10 - 44 U/L Final     Anion Gap   Date Value Ref Range Status   10/19/2017 10 8 - 16 mmol/L Final     eGFR if    Date Value Ref Range Status   10/19/2017 >60 >60 mL/min/1.73 m^2 Final     eGFR if non    Date Value Ref Range Status   10/19/2017 >60 >60 mL/min/1.73 m^2 Final     Comment:     Calculation used to obtain the estimated glomerular filtration  rate (eGFR) is the CKD-EPI equation. Since race is unknown   in our information system, the eGFR values for   -American and Non--American patients are given   for each creatinine result.       Lab Results   Component Value Date    SEDRATE 20 (H) 03/12/2014       Lab Results   Component Value Date    CRP 4.6 01/11/2017     Lab Results   Component Value Date    URICACID 5.8 10/20/2017     Outside records from   On 1/14/15, Synovial fluid examination was negative for mono sodium urate crystals  Uric acid was 7.9  On 1/19/15 uric acid was 7.5  X-rays reveals erosion of proximal phalanx of the right middle finger consistent with gout   Assessment:   Chronic gout with tophi  Generalized osteoarthritis  Plan:     Discussed life style modification including dietary changes for gout  Return to clinic in 1 month with CBC CMP and uric acid

## 2017-12-11 ENCOUNTER — CLINICAL SUPPORT (OUTPATIENT)
Dept: REHABILITATION | Facility: HOSPITAL | Age: 52
End: 2017-12-11
Attending: EMERGENCY MEDICINE
Payer: MEDICAID

## 2017-12-11 DIAGNOSIS — M1A.0790 CHRONIC GOUT OF FOOT, UNSPECIFIED CAUSE, UNSPECIFIED LATERALITY: Primary | ICD-10-CM

## 2017-12-11 DIAGNOSIS — M79.672 BILATERAL FOOT PAIN: ICD-10-CM

## 2017-12-11 DIAGNOSIS — M79.671 BILATERAL FOOT PAIN: ICD-10-CM

## 2017-12-11 PROCEDURE — 97161 PT EVAL LOW COMPLEX 20 MIN: CPT | Mod: PN

## 2017-12-11 NOTE — PLAN OF CARE
TIME RECORD    Date: 12/11/2017    Start Time:  1300  Stop Time:  1330    PROCEDURES:    TIMED  Procedure Time Min.    Start:  Stop:     Start:  Stop:     Start:  Stop:     Start:  Stop:          UNTIMED  Procedure Time Min.   eval Start:1300  Stop:1330     Start:  Stop:      Total Timed Minutes:  0  Total Timed Units:  0  Total Untimed Units:  1  Charges Billed/# of units:  1    OUTPATIENT PHYSICAL THERAPY   PATIENT EVALUATION  Onset Date: 10/19/17  Primary Diagnosis:   1. Chronic gout of foot, unspecified cause, unspecified laterality     2. Bilateral foot pain       Treatment Diagnosis: debility due to akbar. foot pain  Past Medical History:   Diagnosis Date    Allergic rhinitis due to allergen     Arthritis     Cardiomyopathy     Gout     Hypertension     Left bundle branch block      Precautions: hx of chronic gout  Prior Therapy: none  Medications: Jarod Pierce has a current medication list which includes the following prescription(s): albuterol, allopurinol, carvedilol, doxycycline, hydralazine, loratadine, and ranitidine.  Nutrition:  Normal  History of Present Illness: reports long hx of gout pain in akbar. feet and ankles (since before 2005); limited relief from gout meds and reports experiences negative side effects; seen by rheumatologist recently (no relief) and scheduled to see podiatrist on 01/04/18; also reports episode of cellutis; cannot wear shoes due to akbar. foot discomfort - wears boots that are untied  Prior Level of Function: Independent  Social History: disabled  Place of Residence (Steps/Adaptations): lives alone  Functional Deficits Leading to Referral/Nature of Injury: reports his akbar. foot pain limits his ability to amb. for long distances  Patient Therapy Goals: decrease pain    Subjective     Jarod Pierce states that his akbar. Foot pain limits his ability to mobilize.    Pain:  Location: akbar. feet  Description: Aching and Dull  Activities Which Increase Pain: Standing and  Walking  Activities Which Decrease Pain: rest  Pain Scale: 9/10 at best 9/10 now  10/10 at worst    Objective     Posture: guarded due to pain  Palpation: pain w/ palpation to affected areas  Sensation: intact  DTRs:  Range of Motion/Strength: MMT = grossly 4/5 throughout akbar. LE's; AROM = WFL throughout    Flexibility: limited due to pain  Gait: Without AD  Analysis: antalgic gait  Bed Mobility:Independent  Transfers: Independent  Special Tests: n/a  Other: n/a  Treatment: n/a    Assessment       Initial Assessment (Pertinent finding, problem list and factors affecting outcome): given the inflammatory nature of gout and the chronic high levels of pain patient is experiencing, any therapeutic intervention would likely increase his symptoms; will defer PT at this time, pending any recommendations from podiatrist (appt on 01/04/18)  Rehab Potiential: n/a    Short Term Goals (n/a Weeks):   Long Term Goals (n/a Weeks):     Plan     Certification Period: 12/11/17 to 12/11/17  Recommended Treatment Plan: 1 times per week for 1 weeks: Other eval only  Other Recommendations: n/a      Therapist: Tree Laureano, PT    I CERTIFY THE NEED FOR THESE SERVICES FURNISHED UNDER THIS PLAN OF TREATMENT AND WHILE UNDER MY CARE    Physician's comments: ________________________________________________________________________________________________________________________________________________      Physician's Name: ___________________________________

## 2017-12-19 ENCOUNTER — OFFICE VISIT (OUTPATIENT)
Dept: CARDIOLOGY | Facility: CLINIC | Age: 52
End: 2017-12-19
Payer: MEDICAID

## 2017-12-19 VITALS
SYSTOLIC BLOOD PRESSURE: 156 MMHG | HEIGHT: 67 IN | OXYGEN SATURATION: 97 % | HEART RATE: 73 BPM | WEIGHT: 207 LBS | DIASTOLIC BLOOD PRESSURE: 94 MMHG | BODY MASS INDEX: 32.49 KG/M2

## 2017-12-19 DIAGNOSIS — R07.9 CHEST PAIN, UNSPECIFIED TYPE: Primary | ICD-10-CM

## 2017-12-19 DIAGNOSIS — E78.5 DYSLIPIDEMIA, GOAL LDL BELOW 130: ICD-10-CM

## 2017-12-19 DIAGNOSIS — I10 ESSENTIAL HYPERTENSION: ICD-10-CM

## 2017-12-19 DIAGNOSIS — E65 ABDOMINAL OBESITY: ICD-10-CM

## 2017-12-19 DIAGNOSIS — R94.39 ABNORMAL CARDIOVASCULAR STRESS TEST: ICD-10-CM

## 2017-12-19 DIAGNOSIS — M10.9 GOUT, ARTHRITIS: ICD-10-CM

## 2017-12-19 DIAGNOSIS — Z91.89 CARDIOVASCULAR RISK FACTOR: ICD-10-CM

## 2017-12-19 DIAGNOSIS — I51.7 LVH (LEFT VENTRICULAR HYPERTROPHY): ICD-10-CM

## 2017-12-19 DIAGNOSIS — E66.09 CLASS 1 OBESITY DUE TO EXCESS CALORIES WITH SERIOUS COMORBIDITY AND BODY MASS INDEX (BMI) OF 32.0 TO 32.9 IN ADULT: ICD-10-CM

## 2017-12-19 PROCEDURE — 99215 OFFICE O/P EST HI 40 MIN: CPT | Mod: S$PBB,,, | Performed by: INTERNAL MEDICINE

## 2017-12-19 PROCEDURE — 93005 ELECTROCARDIOGRAM TRACING: CPT | Mod: PBBFAC,PO | Performed by: INTERNAL MEDICINE

## 2017-12-19 PROCEDURE — 99213 OFFICE O/P EST LOW 20 MIN: CPT | Mod: PBBFAC,PO | Performed by: INTERNAL MEDICINE

## 2017-12-19 PROCEDURE — 99999 PR PBB SHADOW E&M-EST. PATIENT-LVL III: CPT | Mod: PBBFAC,,, | Performed by: INTERNAL MEDICINE

## 2017-12-19 PROCEDURE — 93010 ELECTROCARDIOGRAM REPORT: CPT | Mod: S$PBB,,, | Performed by: INTERNAL MEDICINE

## 2017-12-19 RX ORDER — NAPROXEN SODIUM 220 MG/1
81 TABLET, FILM COATED ORAL DAILY
Qty: 50 TABLET | Refills: 3 | Status: SHIPPED | OUTPATIENT
Start: 2017-12-19 | End: 2018-01-10 | Stop reason: SDUPTHER

## 2017-12-19 RX ORDER — AMILORIDE HYDROCHLORIDE 5 MG/1
5 TABLET ORAL DAILY
Qty: 30 TABLET | Refills: 11 | Status: SHIPPED | OUTPATIENT
Start: 2017-12-19 | End: 2018-06-26

## 2017-12-19 NOTE — PROGRESS NOTES
"Subjective:    Patient ID:  Jarod Pierce is a 52 y.o. male who presents for evaluation of Follow-up  For cardiomyopathy, fatigue, gout problem  PCP: Dr. Fuller. Not seen for some time  Seen by: LEEANNA Beth  Rheumatologist: Dr. Contreras  Podiatrist: Dr. Sarkar  Lives alone, no pet, sister, Soheila, in area  Disabled as  due to gouty arthritis now part time , long weekend shift work    Difficult historian with rambling thought processes  Last seen in 4/2017    HPI Comments: White male with recent noted cardiomyopathy with LBBB, here to establish cardiac care. History of stress testing in the past 2-3 years ago with Dr. Fuller, results negative. Noted some fatigue over the past year and a half, progressive. Difficult to do regular work at time. Only able to work for an hour and half recently over the summer. Hot temperature is more difficult. Also occasion lightheaded spell with bending over. After recent evaluation was placed on metoprolol tartrate at 50 mg BID and felt real dizzy and feel like "looking out of a box". Tried cutting dose in half and still feels poorly. Recent EKG on 8/28 showed sinus arrhythmia with LBBB, rate of 83.   Echo done, 8/28/2013 - CONCLUSIONS     1 - Concentric remodeling. Wall thickness is increased, with the septum and the posterior wall each measuring 1.1 cm across.    2 - Moderately depressed left ventricular systolic function (EF 30-35%).     3 - Diastolic dysfunction.     4 - Normal right ventricular systolic function .    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 Recent blood work reviewed - Uric acid 8.7, normal RF, ESR, CBC, BMP, and A1C. Lipid showed LDL of 157, [place] Cardiovascular Risk Score is 10%, which puts him in the intermediate risk category.    since visit of 9/13, no new problem. Feel better off metoprolol except for slight HA. Mild SOB, no CP.  Work " "up: Lexiscan 9/18/2013.  Nuclear Quantitative Functional Analysis:   LVEF: 31 % (normal is 47 - 59)  LVED Volume: 152 ml (normal is 91 - 155)  LVES Volume: 105 ml (normal is 40 - 78)    Impression: ABNORMAL MYOCARDIAL PERFUSION  1. There is evidence for moderate myocardial ischemia in the inferoseptal wall of the left ventricle with underlying injury present.   2. There is abnormal wall motion at rest showing moderate global hypokinesis of the left ventricle. severe hypokinesis of the septal wall of the left ventricle.   3. There is resting LV dysfunction with a reduced ejection fraction of 31 %. (normal is 47 - 59)  4. The ventricular volumes are normal at rest and stress.   5. The extracardiac distribution of radioactivity is normal.   6. [facility] SSS =3 = SDS, suggest that 3.8% of myocardium may be ischemic.    EKG shows LBBB with QRS duration of 156 msec. Discussed possibilities of AICD with CRT     Since visit of 9/26, had problem with increase dose of Lisinopril causing dizziness, lower back to 20 mg daily with improvement but then noticed sinus problem with recurrence of the dizziness. Also stopped the spironolactone/HCT due to "dryness" with cramps in the leg. Blood work showed normal BNP of 25, and BMP. Now also with gout flair up in the right arm. Last Uric acid was elevated, see above, could not tolerate Allopurinol due to stomach upset. Weight creeping upward. Sits alot at work.    Since visit of 10/24/2013. 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 Evaluated at Occupational Medicine clinic in [place] and told of need for cardiac clearance. Recent symptoms includes GARNETT. Anticipated work will involve tow ropes, making and breaking tows, and ratchets, have to lift 10 -20 lbs for maximum 10 feet. Recent ECG LBBB, rate 74,  msec. Never had coronary angiogram for CM. Have continued on 40 mg of Lisinopril without further problem.     Since " visit of 7/2014, underwent [facility]  HEMODYNAMIC RESULTS:    LVEDP (Pre): 9 mmHg  LVEDP (Post): 11 mmHg  Ejection Fraction: 40%  Global LV Function: mildly depressed    C. ANGIOGRAPHIC RESULTS:    DIAGNOSTIC:       Patient has a right dominant coronary artery.        The coronary vessels have luminal irregularities.        - Left Main Coronary Artery:             The LM has luminal irregularities. There is ALLYN 3 flow.       - Left Anterior Descending Artery:             The LAD has luminal irregularities. There is ALLYN 3 flow.       - Left Circumflex Artery:             The LCX has luminal irregularities. There is ALLYN 3 flow.       - Right Coronary Artery:             The RCA has luminal irregularities. There is ALLYN 3 flow.       - Aortic Root:             The Aortic Root has luminal irregularities. There is ALLYN 3 flow.      D. SUMMARY:    1. Non-obstructive CAD.  2. - Improve LV function from non-invasive evaluation.    E. RECOMMENDATIONS:    1. Maximize medical management.  2. Follow up with Dr. Kumar Manriquez.  3. Clear for new employment without restriction.    BP at home today 147/95 to 121/94, acute gouty attack yesterday. Indocin causes HTN and upset stomach. Urin acid in 3/2014 was 9.4. Discuss options, will refer back to Dr. Fuller. Consideration for referral to . Also discuss possible ICD with CRT Rx for the CM with LBBB, QRS duration 150 msec. Complain of some fluttering with dizziness. Cough potato recently, just got lazy. Suggest consideration for Cardiac Rehab, but history of hospitalization for CHF.     In 4/2017, returned for check up now have insurance. Had hospitalization for gout and taken off Lisinopril and changed to Norvasc due to coughing, 4-5 months ago. Here with concern of BP, no home check and still with occasional CP, associated with lack of sleep. Long term problem. Angio results as above. ECG shows chronic LBBB. BP elevated in office.    Holter 8/2014 - PREDOMINANT RHYTHM  1. Sinus  "rhythm with heart rates varying between 49 and 131 bpm with an average of 90 bpm.     VENTRICULAR ARRHYTHMIAS  1. There were very rare PVCs recorded totalling 2 and averaging less than 1 per hour.     2. There were no episodes of ventricular tachycardia.    SUPRA VENTRICULAR ARRHYTHMIAS  1. There were very rare PACs recorded totalling 8 and averaging less than 1 per hour.     2. There were no episodes of sustained supraventricular tachycardia.    SINUS NODE FUNCTION  1. There was no evidence of high grade SA ru block.     AV CONDUCTION  1. There was no evidence of high grade AV block.     DIARY  1. The diary was not returned    MISCELLANEOUS  1. This was a tape of adequate length (23 hrs).     In 12/2017, lots of concern about the gouty arthritis, several medication intolerance; Colchicine caused diarrhea, Uloric not helpful, NSAID cause LEONIE. Only helpful Rx was steroid injection, now refer to Podiatrist to complete the Rx. Last uric acid in 10/2017 was at target 5.8, peak was 10.1. K+ 3.9. Cardiac wise doing well, some worry of occasional "indigestion". ECG shows NSR, LBBB.      Review of Systems   Constitution: Positive for weakness, malaise/fatigue and weight up and down with loss of appetite during gouty attacks. Negative for diaphoresis, fever, night sweats and weight gain.   HENT: Negative for nosebleeds. Have congestion, ear discharge with ringing, headaches , sore throat, pain on swallowing  Eyes: Negative for visual disturbance, have left eye pain.   Cardiovascular: recurrent chest pain, mild dyspnea on exertion and rare near-syncope. Negative for claudication, cyanosis, irregular heartbeat, leg swelling, orthopnea, palpitations and paroxysmal nocturnal dyspnia.   Respiratory: Positive for cough, wheezing, shortness of breath and no further sleep disturbances due to breathing. Negative for snoring. 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 Negative for polydipsia and polyuria.   Hematologic/Lymphatic: Does not bruise/bleed easily.   Skin: Negative for nail changes, color change, flushing, poor wound healing and suspicious lesions.   Musculoskeletal: Positive for arthritis, back pain, falls, gout, joint pain, joint swelling, muscle cramps, muscle weakness, myalgias, neck pain and stiffness.   Gastrointestinal: occasional for nausea and heartburn, Negative for hematemesis, hematochezia and melena.   Genitourinary:        Recent UTI after sex.   Neurological: Positive for difficulty with concentration, excessive daytime sleepiness, dizziness, focal weakness, light-headedness and vertigo. Negative for disturbances in coordination, loss of balance and numbness.   Psychiatric/Behavioral: Positive for memory loss. Negative for depression and substance abuse. The patient has insomnia and is nervous/anxious.         Objective:    Physical Exam   Constitutional: He is oriented to person, place, and time. He appears well-developed and well-nourished.   HENT:   Head: Normocephalic.   Eyes: Conjunctivae normal and EOM are normal. Pupils are equal, round, and reactive to light.   Neck: Normal range of motion. Neck supple. No JVD present. No thyromegaly present.   Cardiovascular: Normal rate, regular rhythm and intact distal pulses.  Exam reveals no gallop and no friction rub.    No Murmur heard.  Pulmonary/Chest: Effort normal and breath sounds normal. He has no rales. He exhibits no tenderness.   Abdominal: Soft. Bowel sounds are normal. There is no tenderness.        Waist 42" down to 41.5", hip 42"   Musculoskeletal: Normal range of motion. He exhibits trace pitting edema around the sock line.   Lymphadenopathy:     He has no cervical adenopathy.   Neurological: He is alert and oriented to person, place, and time.   Skin: Skin is warm and dry. No rash noted.         Assessment:       1. Chest pain, unspecified type    2. Gout, arthritis    3. Class " 1 obesity due to excess calories with serious comorbidity and body mass index (BMI) of 32.0 to 32.9 in adult    4. Abdominal obesity    5. LVH (left ventricular hypertrophy)    6. Cardiovascular risk factor, FCVRS 10%, 2013    7. Abnormal cardiovascular stress test    8. Essential hypertension    9. Dyslipidemia, baseline          Plan:     Jarod was seen today for follow-up.    Diagnoses and associated orders for this visit:    Chest pain, unspecified type  -     EKG 12-lead    Gout, arthritis  -     aMILoride (MIDAMOR) 5 MG Tab; Take 1 tablet (5 mg total) by mouth once daily.  Dispense: 30 tablet; Refill: 11    Class 1 obesity due to excess calories with serious comorbidity and body mass index (BMI) of 32.0 to 32.9 in adult    Abdominal obesity    LVH (left ventricular hypertrophy)  -     aMILoride (MIDAMOR) 5 MG Tab; Take 1 tablet (5 mg total) by mouth once daily.  Dispense: 30 tablet; Refill: 11    Cardiovascular risk factor, FCVRS 10%, 2013  -     Lipid panel; Future; Expected date: 12/19/2017  -     aspirin 81 MG Chew; Take 1 tablet (81 mg total) by mouth once daily.  Dispense: 50 tablet; Refill: 3    Abnormal cardiovascular stress test    Essential hypertension  -     aMILoride (MIDAMOR) 5 MG Tab; Take 1 tablet (5 mg total) by mouth once daily.  Dispense: 30 tablet; Refill: 11    Dyslipidemia, baseline   -     Lipid panel; Future; Expected date: 12/19/2017    GARNETT (dyspnea on exertion) - improved    Fluttering heart - improved    Dizziness - improved    - Instruction for Mediterranean diet and heart healthy exercise given.  - Weigh twice weekly, try to lose 1-2 lbs per week  - Highly recommend 30 minutes of exercise daily, can have Sunday off, with 2-3 sessions of muscle strengthening weekly. A  would be very helpful.  - Check home blood pressure, 2 days weekly, do 2 readings within 5 minutes in AM and PM, keep log for review. Can do at drug stores for free  - Recommend at least  annual cardiovascular evaluation in view of his significant risk factors.  - Phone review / encourage use of Rormixsteph    Patient Active Problem List   Diagnosis    Arthritis    Allergic rhinitis due to allergen    Left bundle branch block,  msec    Gout, arthritis    Fatigue    Back pain    ROSITA (generalized anxiety disorder)    Obesity, BMI 34.1 increased to 35.2 back down to 32.4    Abdominal obesity    LVH (left ventricular hypertrophy)    Dizziness    Cardiovascular risk factor, FCVRS 10%, 2013    Dyslipidemia, baseline     Medication intolerance    Abnormal cardiovascular stress test    Perennial sinusitis    Sedentary lifestyle    Fluttering heart    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 The above assessment and plan have been discussed at length. Labs and procedure over the last 6 months reviewed. Problem List updated. Asked to bring in all active medications / pills bottles with next visit.

## 2018-01-04 ENCOUNTER — OFFICE VISIT (OUTPATIENT)
Dept: PODIATRY | Facility: CLINIC | Age: 53
End: 2018-01-04
Payer: MEDICAID

## 2018-01-04 ENCOUNTER — TELEPHONE (OUTPATIENT)
Dept: PODIATRY | Facility: CLINIC | Age: 53
End: 2018-01-04

## 2018-01-04 VITALS — HEIGHT: 67 IN | BODY MASS INDEX: 32.7 KG/M2 | WEIGHT: 208.31 LBS

## 2018-01-04 DIAGNOSIS — M79.672 FOOT PAIN, BILATERAL: Primary | ICD-10-CM

## 2018-01-04 DIAGNOSIS — M19.079 ARTHRITIS OF FOOT: ICD-10-CM

## 2018-01-04 DIAGNOSIS — M21.42 PES PLANUS OF BOTH FEET: ICD-10-CM

## 2018-01-04 DIAGNOSIS — M21.41 PES PLANUS OF BOTH FEET: ICD-10-CM

## 2018-01-04 DIAGNOSIS — M79.671 FOOT PAIN, BILATERAL: Primary | ICD-10-CM

## 2018-01-04 PROCEDURE — 99203 OFFICE O/P NEW LOW 30 MIN: CPT | Mod: S$PBB,,, | Performed by: PODIATRIST

## 2018-01-04 PROCEDURE — 99212 OFFICE O/P EST SF 10 MIN: CPT | Mod: PBBFAC,PN | Performed by: PODIATRIST

## 2018-01-04 PROCEDURE — 99999 PR PBB SHADOW E&M-EST. PATIENT-LVL II: CPT | Mod: PBBFAC,,, | Performed by: PODIATRIST

## 2018-01-04 RX ORDER — DICLOFENAC SODIUM 10 MG/G
2 GEL TOPICAL 4 TIMES DAILY
Qty: 1 TUBE | Refills: 3 | Status: SHIPPED | OUTPATIENT
Start: 2018-01-04 | End: 2018-06-26 | Stop reason: SDUPTHER

## 2018-01-04 NOTE — LETTER
January 4, 2018      Isaura Dickinson MD  [address] [facility] [address]           [facility]  [address] [facility] [address] [place] [address]  Phone: [phone]          Patient: Jarod Pierce   MR Number: [id number]   YOB: 1965   Date of Visit: 1/4/2018       Dear Dr. Isaura Dickinson:    Thank you for referring Jarod Pierce to me for evaluation. Attached you will find relevant portions of my assessment and plan of care.    If you have questions, please do not hesitate to call me. I look forward to following Jarod Pierce along with you.    Sincerely,    Michael Sarkar, MAXWELL    Enclosure  CC:  No Recipients    If you would like to receive this communication electronically, please contact [email or web address] or [phone] to request more information on [email or web address] Link access.    For providers and/or their staff who would like to refer a patient to [facility], please contact us through our one-stop-shop provider referral line, [facility], at [phone].    If you feel you have received this communication in error or would no longer like to receive these types of communications, please e-mail [email or web address]

## 2018-01-04 NOTE — TELEPHONE ENCOUNTER
----- Message from Colleen Ramirez sent at 1/4/2018  4:00 PM CST -----  Contact: [phone]  Patient requesting a call from nurse, he was seen today, and a medication was to be called in and the pharmacy haven't received any prescription yet.      Patient will be using   [facility] [address] [place] [address] [address]  [address] [facility] [address]  Phone: [phone] Fax: [phone]    Please call patient at [phone]. Thanks!

## 2018-01-05 ENCOUNTER — TELEPHONE (OUTPATIENT)
Dept: PODIATRY | Facility: CLINIC | Age: 53
End: 2018-01-05

## 2018-01-05 ENCOUNTER — LAB VISIT (OUTPATIENT)
Dept: LAB | Facility: HOSPITAL | Age: 53
End: 2018-01-05
Attending: INTERNAL MEDICINE
Payer: MEDICAID

## 2018-01-05 DIAGNOSIS — Z91.89 CARDIOVASCULAR RISK FACTOR: ICD-10-CM

## 2018-01-05 DIAGNOSIS — E78.5 DYSLIPIDEMIA, GOAL LDL BELOW 130: ICD-10-CM

## 2018-01-05 LAB
CHOLEST SERPL-MCNC: 215 MG/DL
CHOLEST/HDLC SERPL: 5.5 {RATIO}
HDLC SERPL-MCNC: 39 MG/DL
HDLC SERPL: 18.1 %
LDLC SERPL CALC-MCNC: 147.6 MG/DL
NONHDLC SERPL-MCNC: 176 MG/DL
TRIGL SERPL-MCNC: 142 MG/DL

## 2018-01-05 PROCEDURE — 36415 COLL VENOUS BLD VENIPUNCTURE: CPT | Mod: PO

## 2018-01-05 PROCEDURE — 80061 LIPID PANEL: CPT

## 2018-01-05 NOTE — TELEPHONE ENCOUNTER
----- Message from Beth Elizabeth sent at 1/4/2018  4:37 PM CST -----  Contact: self   Placed call to pod, patient missed a call from your office. Please call back at [phone] (yeru)

## 2018-01-05 NOTE — TELEPHONE ENCOUNTER
----- Message from Delores Fuller sent at 1/5/2018  2:31 PM CST -----  Contact: Jarod Ibarra is calling regarding Rx diclofenac sodium 1 % Gel as insurance does not cover. Asking to please send new/another Rx to     [facility] [address] [place] [id number] [facility]  [address] [facility] [address]  Phone: [phone] Fax: [phone]    Any questions please call [phone]. Thanks!

## 2018-01-05 NOTE — TELEPHONE ENCOUNTER
----- Message from Marily Deng sent at 1/5/2018  8:27 AM CST -----  Contact: Patient  Patient is returning a call from yesterday.  He stated that a prescription was supposed to be ordered for some cream for his feet and he checked with his pharmacy and there was nothing sent in.  Please return patient's phone call to discuss.  Call Back#[phone]  Thanks
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
## 2018-01-08 NOTE — TELEPHONE ENCOUNTER
Attempted to call patient, left message that prescription was called in to [facility].

## 2018-01-09 ENCOUNTER — TELEPHONE (OUTPATIENT)
Dept: CARDIOLOGY | Facility: CLINIC | Age: 53
End: 2018-01-09

## 2018-01-09 NOTE — TELEPHONE ENCOUNTER
----- Message from Za Jesus sent at 1/9/2018  9:16 AM CST -----  Contact: self  Would like lab results. Please call back at [phone] (egbt)

## 2018-01-10 DIAGNOSIS — Z91.89 CARDIOVASCULAR RISK FACTOR: ICD-10-CM

## 2018-01-10 RX ORDER — ATORVASTATIN CALCIUM 40 MG/1
20 TABLET, FILM COATED ORAL DAILY
Qty: 90 TABLET | Refills: 3 | Status: SHIPPED | OUTPATIENT
Start: 2018-01-10 | End: 2018-06-26

## 2018-01-10 RX ORDER — NAPROXEN SODIUM 220 MG/1
81 TABLET, FILM COATED ORAL DAILY
Qty: 50 TABLET | Refills: 3 | Status: SHIPPED | OUTPATIENT
Start: 2018-01-10 | End: 2018-06-26

## 2018-01-10 NOTE — PROGRESS NOTES
Lipid panel reviewed, ASCVD 10-year event risk score is now 10%, recommendation is low dose ASA 81 mg chewable daily and consider use of statin. May also consider use of Calcium score to further define the risk level. Please notify patient.  Dr. Manriquez

## 2018-01-29 RX ORDER — IBUPROFEN 800 MG/1
TABLET ORAL
Qty: 90 TABLET | Refills: 0 | OUTPATIENT
Start: 2018-01-29

## 2018-02-16 ENCOUNTER — DOCUMENTATION ONLY (OUTPATIENT)
Dept: FAMILY MEDICINE | Facility: CLINIC | Age: 53
End: 2018-02-16

## 2018-02-16 NOTE — PROGRESS NOTES
Pre-Visit Chart Review  For Appointment Scheduled on 2-23-18    Health Maintenance Due   Topic Date Due    TETANUS VACCINE  10/06/1982    Colonoscopy  10/06/2014    Influenza Vaccine  08/01/2017

## 2018-02-20 DIAGNOSIS — Z12.11 COLON CANCER SCREENING: Primary | ICD-10-CM

## 2018-02-23 ENCOUNTER — LAB VISIT (OUTPATIENT)
Dept: LAB | Facility: HOSPITAL | Age: 53
End: 2018-02-23
Attending: FAMILY MEDICINE
Payer: MEDICAID

## 2018-02-23 ENCOUNTER — DOCUMENTATION ONLY (OUTPATIENT)
Dept: FAMILY MEDICINE | Facility: CLINIC | Age: 53
End: 2018-02-23

## 2018-02-23 ENCOUNTER — OFFICE VISIT (OUTPATIENT)
Dept: FAMILY MEDICINE | Facility: CLINIC | Age: 53
End: 2018-02-23
Attending: FAMILY MEDICINE
Payer: MEDICAID

## 2018-02-23 VITALS
WEIGHT: 209.44 LBS | TEMPERATURE: 98 F | BODY MASS INDEX: 32.87 KG/M2 | SYSTOLIC BLOOD PRESSURE: 160 MMHG | OXYGEN SATURATION: 96 % | RESPIRATION RATE: 12 BRPM | HEART RATE: 77 BPM | DIASTOLIC BLOOD PRESSURE: 102 MMHG | HEIGHT: 67 IN

## 2018-02-23 DIAGNOSIS — M10.9 GOUT, UNSPECIFIED CAUSE, UNSPECIFIED CHRONICITY, UNSPECIFIED SITE: ICD-10-CM

## 2018-02-23 DIAGNOSIS — M79.673 CHRONIC FOOT PAIN, UNSPECIFIED LATERALITY: ICD-10-CM

## 2018-02-23 DIAGNOSIS — G89.29 CHRONIC FOOT PAIN, UNSPECIFIED LATERALITY: ICD-10-CM

## 2018-02-23 DIAGNOSIS — I10 POORLY-CONTROLLED HYPERTENSION: Primary | ICD-10-CM

## 2018-02-23 DIAGNOSIS — I10 HYPERTENSION, ESSENTIAL: ICD-10-CM

## 2018-02-23 LAB
ANION GAP SERPL CALC-SCNC: 8 MMOL/L
BUN SERPL-MCNC: 17 MG/DL
CALCIUM SERPL-MCNC: 9.4 MG/DL
CHLORIDE SERPL-SCNC: 105 MMOL/L
CO2 SERPL-SCNC: 27 MMOL/L
CREAT SERPL-MCNC: 1.1 MG/DL
EST. GFR  (AFRICAN AMERICAN): >60 ML/MIN/1.73 M^2
EST. GFR  (NON AFRICAN AMERICAN): >60 ML/MIN/1.73 M^2
GLUCOSE SERPL-MCNC: 83 MG/DL
POTASSIUM SERPL-SCNC: 4.4 MMOL/L
SODIUM SERPL-SCNC: 140 MMOL/L
URATE SERPL-MCNC: 7.7 MG/DL

## 2018-02-23 PROCEDURE — 3008F BODY MASS INDEX DOCD: CPT | Mod: ,,, | Performed by: FAMILY MEDICINE

## 2018-02-23 PROCEDURE — 99999 PR PBB SHADOW E&M-EST. PATIENT-LVL IV: CPT | Mod: PBBFAC,,, | Performed by: FAMILY MEDICINE

## 2018-02-23 PROCEDURE — 99214 OFFICE O/P EST MOD 30 MIN: CPT | Mod: S$PBB,,, | Performed by: FAMILY MEDICINE

## 2018-02-23 PROCEDURE — 80048 BASIC METABOLIC PNL TOTAL CA: CPT

## 2018-02-23 PROCEDURE — 99214 OFFICE O/P EST MOD 30 MIN: CPT | Mod: PBBFAC,PO | Performed by: FAMILY MEDICINE

## 2018-02-23 PROCEDURE — 36415 COLL VENOUS BLD VENIPUNCTURE: CPT | Mod: PO

## 2018-02-23 PROCEDURE — 84550 ASSAY OF BLOOD/URIC ACID: CPT

## 2018-02-23 RX ORDER — CARVEDILOL 25 MG/1
25 TABLET ORAL 2 TIMES DAILY WITH MEALS
Qty: 60 TABLET | Refills: 5 | Status: SHIPPED | OUTPATIENT
Start: 2018-02-23 | End: 2018-06-08 | Stop reason: SDUPTHER

## 2018-02-23 RX ORDER — PREDNISONE 5 MG/1
TABLET ORAL
COMMUNITY
Start: 2018-01-04 | End: 2018-02-23 | Stop reason: ALTCHOICE

## 2018-02-23 RX ORDER — LIDOCAINE AND PRILOCAINE 25; 25 MG/G; MG/G
CREAM TOPICAL 3 TIMES DAILY PRN
COMMUNITY
Start: 2018-01-09 | End: 2018-06-26

## 2018-02-23 RX ORDER — METHOCARBAMOL 750 MG/1
TABLET, FILM COATED ORAL
COMMUNITY
Start: 2018-02-15 | End: 2018-06-26

## 2018-02-23 RX ORDER — VALSARTAN 320 MG/1
320 TABLET ORAL DAILY
Qty: 30 TABLET | Refills: 5 | Status: SHIPPED | OUTPATIENT
Start: 2018-02-23 | End: 2018-06-08 | Stop reason: SDUPTHER

## 2018-02-23 RX ORDER — ECONAZOLE NITRATE 10 MG/G
CREAM TOPICAL 2 TIMES DAILY
Qty: 15 G | Refills: 2 | Status: SHIPPED | OUTPATIENT
Start: 2018-02-23 | End: 2018-06-26

## 2018-02-23 NOTE — PATIENT INSTRUCTIONS
Weight Management: Getting Started  Healthy bodies come in all shapes and sizes. Not all bodies are made to be thin. For some people, a healthy weight is higher than the average weight listed on weight charts. Your healthcare provider can help you decide on a healthy weight for you.    Reasons to lose weight  Losing weight can help with some health problems, such as high blood pressure, heart disease, diabetes, sleep apnea, and arthritis. You may also feel more energy.  Set your long-term goal  Your goal doesn't even have to be a specific weight. You may decide on a fitness goal (such as being able to walk 10 miles a week), or a health goal (such as lowering your blood pressure). Choose a goal that is measurable and reasonable, so you know when you've reached it. A goal of reaching a BMI of less than 25 is not always reasonable (or possible).   Make an action plan  Habits dont change overnight. Setting your goals too high can leave you feeling discouraged if you cant reach them. Be realistic. Choose one or two small changes you can make now. Set an action plan for how you are going to make these changes. When you can stick to this plan, keep making a few more small changes. Taking small steps will help you stay on the path to success.  Track your progress  Write down your goals. Then, keep a daily record of your progress. Write down what you eat and how active you are. This record lets you look back on how much youve done. It may also help when youre feeling frustrated. Reward yourself for success. Even if you dont reach every goal, give yourself credit for what you do get done.  Get support  Encouragement from others can help make losing weight easier. Ask your family members and friends for support. They may even want to join you. Also look to your healthcare provider, registered dietitian, and  for help. Your local hospital can give you more information about nutrition, exercise, and  weight loss.  Date Last Reviewed: 1/31/2016  © 3343-6232 [employer]. [address] [place] [address], [place] [address]. All rights reserved. This information is not intended as a substitute for professional medical care. Always follow your healthcare professional's instructions.        Controlling High Blood Pressure  High blood pressure (hypertension) is often called the silent killer. This is because many people who have it dont know it. High blood pressure is defined as 140/90 mm Hg or higher. Know your blood pressure and remember to check it regularly. Doing so can save your life. Here are some things you can do to help control your blood pressure.    Choose heart-healthy foods  · Select low-salt, low-fat foods. Limit sodium intake to 2,400 mg per day or the amount suggested by your healthcare provider.  · Limit canned, dried, cured, packaged, and fast foods. These can contain a lot of salt.  · Eat 8 to 10 servings of fruits and vegetables every day.  · Choose lean meats, fish, or chicken.  · Eat whole-grain pasta, brown rice, and beans.  · Eat 2 to 3 servings of low-fat or fat-free dairy products.  · Ask your doctor about the DASH eating plan. This plan helps reduce blood pressure.  · When you go to a restaurant, ask that your meal be prepared with no added salt.  Maintain a healthy weight  · Ask your healthcare provider how many calories to eat a day. Then stick to that number.  · Ask your healthcare provider what weight range is healthiest for you. If you are overweight, a weight loss of only 3% to 5% of your body weight can help lower blood pressure. Generally, a good weight loss goal is to lose 10% of your body weight in a year.  · Limit snacks and sweets.  · Get regular exercise.  Get up and get active  · Choose activities you enjoy. Find ones you can do with friends or family. This includes bicycling, dancing, walking, and jogging.  · Park farther away from building entrances.  · Use stairs  instead of the elevator.  · When you can, walk or bike instead of driving.  · [place] leaves, garden, or do household repairs.  · Be active at a moderate to vigorous level of physical activity for at least 40 minutes for a minimum of 3 to 4 days a week.   Manage stress  · Make time to relax and enjoy life. Find time to laugh.  · Communicate your concerns with your loved ones and your healthcare provider.  · Visit with family and friends, and keep up with hobbies.  Limit alcohol and quit smoking  · Men should have no more than 2 drinks per day.  · Women should have no more than 1 drink per day.  · Talk with your healthcare provider about quitting smoking. Smoking significantly increases your risk for heart disease and stroke. Ask your healthcare provider about community smoking cessation programs and other options.  Medicines  If lifestyle changes arent enough, your healthcare provider may prescribe high blood pressure medicine. Take all medicines as prescribed. If you have any questions about your medicines, ask your healthcare provider before stopping or changing them.   Date Last Reviewed: 4/27/2016 © 2000-2017 [employer]. [address] [place] [address], [place] [address]. All rights reserved. This information is not intended as a substitute for professional medical care. Always follow your healthcare professional's instructions.
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
## 2018-02-27 ENCOUNTER — TELEPHONE (OUTPATIENT)
Dept: FAMILY MEDICINE | Facility: CLINIC | Age: 53
End: 2018-02-27

## 2018-02-27 RX ORDER — ALLOPURINOL 300 MG/1
300 TABLET ORAL 2 TIMES DAILY
Qty: 60 TABLET | Refills: 5 | Status: SHIPPED | OUTPATIENT
Start: 2018-02-27 | End: 2018-09-27

## 2018-02-27 NOTE — TELEPHONE ENCOUNTER
Patient was informed. He will take 2 of medication. He stated he is not missing any doses. Please send new rx to [facility] on [address].

## 2018-02-27 NOTE — TELEPHONE ENCOUNTER
----- Message from Idris Fuller MD sent at 2/26/2018  7:04 PM CST -----  His chemistry panel looks good with no problems but the uric acid is a little bit high.  He supposed to be taking allopurinol 300 mg daily.  If he is not missing it frequently, we need to increase it to 2 a day.    Results sent by email

## 2018-03-08 ENCOUNTER — DOCUMENTATION ONLY (OUTPATIENT)
Dept: FAMILY MEDICINE | Facility: CLINIC | Age: 53
End: 2018-03-08

## 2018-03-08 NOTE — PROGRESS NOTES
Pre-Visit Chart Review  For Appointment Scheduled on 3-23-18    Health Maintenance Due   Topic Date Due    Colonoscopy  10/06/2014    Influenza Vaccine  08/01/2017

## 2018-03-14 ENCOUNTER — TELEPHONE (OUTPATIENT)
Dept: FAMILY MEDICINE | Facility: CLINIC | Age: 53
End: 2018-03-14

## 2018-06-08 DIAGNOSIS — I10 HYPERTENSION, ESSENTIAL: ICD-10-CM

## 2018-06-08 RX ORDER — VALSARTAN 320 MG/1
320 TABLET ORAL DAILY
Qty: 30 TABLET | Refills: 0 | Status: SHIPPED | OUTPATIENT
Start: 2018-06-08 | End: 2018-06-26 | Stop reason: SDUPTHER

## 2018-06-08 RX ORDER — VALSARTAN 320 MG/1
320 TABLET ORAL DAILY
Qty: 30 TABLET | Refills: 5 | Status: CANCELLED | OUTPATIENT
Start: 2018-06-08 | End: 2019-06-08

## 2018-06-08 RX ORDER — CARVEDILOL 25 MG/1
25 TABLET ORAL 2 TIMES DAILY WITH MEALS
Qty: 60 TABLET | Refills: 0 | Status: SHIPPED | OUTPATIENT
Start: 2018-06-08 | End: 2018-06-26

## 2018-06-08 NOTE — TELEPHONE ENCOUNTER
Patient advised he is not on Losartan- he is supposed to be taking Valsartan and Carvedilol- patient at work and does not have bottles on him- is almost out-rescheduled appt to 6/22/18-patient advised he needs to keep appt for bp follow up.

## 2018-06-08 NOTE — TELEPHONE ENCOUNTER
----- Message from Inder Murry sent at 6/8/2018  2:26 PM CDT -----  Contact: Patient  Type:  RX Refill Request    Who Called:  patient  Refill or New Rx:  refill  RX Name and Strength:  Losartan and  valsartan (DIOVAN) 320 MG tablet.  How is the patient currently taking it? (ex. 1XDay):    Is this a 30 day or 90 day RX:    Preferred Pharmacy with phone number:  [facility]  Local or Mail Order:  local  Ordering Provider:  Dr.Taylor Jose Call Back Number:  [phone]  Additional Information:  Requesting a call back when script has been sent,need medication until visit on 06/22, patient couldn't make today's appointment

## 2018-06-26 ENCOUNTER — DOCUMENTATION ONLY (OUTPATIENT)
Dept: FAMILY MEDICINE | Facility: CLINIC | Age: 53
End: 2018-06-26

## 2018-06-26 ENCOUNTER — HOSPITAL ENCOUNTER (OUTPATIENT)
Dept: RADIOLOGY | Facility: CLINIC | Age: 53
Discharge: HOME | End: 2018-06-26
Attending: PHYSICIAN ASSISTANT
Payer: MEDICAID

## 2018-06-26 ENCOUNTER — TELEPHONE (OUTPATIENT)
Dept: FAMILY MEDICINE | Facility: CLINIC | Age: 53
End: 2018-06-26

## 2018-06-26 ENCOUNTER — OFFICE VISIT (OUTPATIENT)
Dept: FAMILY MEDICINE | Facility: CLINIC | Age: 53
End: 2018-06-26
Attending: FAMILY MEDICINE
Payer: MEDICAID

## 2018-06-26 VITALS
BODY MASS INDEX: 33.94 KG/M2 | SYSTOLIC BLOOD PRESSURE: 156 MMHG | DIASTOLIC BLOOD PRESSURE: 84 MMHG | WEIGHT: 216.25 LBS | HEART RATE: 92 BPM | TEMPERATURE: 98 F | HEIGHT: 67 IN

## 2018-06-26 DIAGNOSIS — I10 HYPERTENSION, ESSENTIAL: Primary | ICD-10-CM

## 2018-06-26 DIAGNOSIS — M54.2 NECK PAIN: ICD-10-CM

## 2018-06-26 DIAGNOSIS — M19.079 ARTHRITIS OF FOOT: ICD-10-CM

## 2018-06-26 DIAGNOSIS — F41.1 GAD (GENERALIZED ANXIETY DISORDER): ICD-10-CM

## 2018-06-26 DIAGNOSIS — E66.9 OBESITY (BMI 30.0-34.9): ICD-10-CM

## 2018-06-26 PROCEDURE — 99214 OFFICE O/P EST MOD 30 MIN: CPT | Mod: S$PBB,,, | Performed by: PHYSICIAN ASSISTANT

## 2018-06-26 PROCEDURE — 99213 OFFICE O/P EST LOW 20 MIN: CPT | Mod: PBBFAC,25,PO | Performed by: PHYSICIAN ASSISTANT

## 2018-06-26 PROCEDURE — 72040 X-RAY EXAM NECK SPINE 2-3 VW: CPT | Mod: TC,FY,PO

## 2018-06-26 PROCEDURE — 99999 PR PBB SHADOW E&M-EST. PATIENT-LVL III: CPT | Mod: PBBFAC,,, | Performed by: PHYSICIAN ASSISTANT

## 2018-06-26 PROCEDURE — 72040 X-RAY EXAM NECK SPINE 2-3 VW: CPT | Mod: 26,,, | Performed by: RADIOLOGY

## 2018-06-26 RX ORDER — DICLOFENAC SODIUM 10 MG/G
2 GEL TOPICAL 4 TIMES DAILY
Qty: 1 TUBE | Refills: 3 | Status: ON HOLD | OUTPATIENT
Start: 2018-06-26 | End: 2020-05-09 | Stop reason: HOSPADM

## 2018-06-26 RX ORDER — CARVEDILOL 25 MG/1
25 TABLET ORAL 2 TIMES DAILY WITH MEALS
Qty: 60 TABLET | Refills: 5 | Status: SHIPPED | OUTPATIENT
Start: 2018-06-26 | End: 2018-09-27 | Stop reason: ALTCHOICE

## 2018-06-26 RX ORDER — VALSARTAN 320 MG/1
320 TABLET ORAL DAILY
Qty: 30 TABLET | Refills: 5 | Status: SHIPPED | OUTPATIENT
Start: 2018-06-26 | End: 2018-09-27 | Stop reason: ALTCHOICE

## 2018-06-26 RX ORDER — CITALOPRAM 10 MG/1
10 TABLET ORAL DAILY
Qty: 30 TABLET | Refills: 0 | Status: SHIPPED | OUTPATIENT
Start: 2018-06-26 | End: 2018-09-27 | Stop reason: ALTCHOICE

## 2018-06-26 RX ORDER — CLOTRIMAZOLE 1 %
CREAM (GRAM) TOPICAL
COMMUNITY
Start: 2018-05-30 | End: 2018-06-26

## 2018-06-26 NOTE — TELEPHONE ENCOUNTER
----- Message from Onelia Dover sent at 6/26/2018  3:50 PM CDT -----  Contact: self  Type:  Patient Returning Call    Who Called: patient  Who Left Message for Patient:  nurse  Does the patient know what this is regarding?:no  Best Call Back Number:  [phone]  Additional Information:  Returning call

## 2018-06-26 NOTE — TELEPHONE ENCOUNTER
----- Message from LEEANNA Yuen sent at 6/26/2018  2:40 PM CDT -----  Please inform patient that xray does show mild-moderate arthritis in his neck. He can try some OTC anti-inflammatories like ibupofen or aleve along with food to see if this helps with his neck pain. However, if neck pain persists, he may need further evaluation.

## 2018-06-26 NOTE — PROGRESS NOTES
"  Subjective:       Patient ID: Jarod Pierce is a 52 y.o. male.    Chief Complaint: Anxiety and Cough    HPI   Patient is a 52 year old  male presenting to the clinic with a multitude of issues. Patient is very tangential and fleeting in conversations.    1) HTN- this is a chronic condition. He last saw Dr. Fuller 3/2018 for which he was supposed to be taking amiloride 5mg daily. Dr. Fuller added carvedilol 25mg BID & valsartan 320mg daily to his regimen. He reports he has only been taking valsartan. He felt as though his blood pressure medications were causing muscle spasms.  2) Anxiety- patient reports he feels like a "ball of stress." He admits to getting easily agitated, does not get along with everyone at work.  3) neck pain- reports stiffness, spasms of left side of neck; he reports being a  and has to look down a lot. He has pain radiating into hands especially at night time.   Review of Systems   Constitutional: Negative for activity change, appetite change, chills, fatigue and fever.   HENT: Negative for congestion, ear pain, postnasal drip, rhinorrhea and sinus pressure.    Respiratory: Negative for cough, shortness of breath and wheezing.    Cardiovascular: Negative for chest pain and palpitations.   Gastrointestinal: Negative for abdominal pain, constipation, diarrhea, nausea and vomiting.   Genitourinary: Negative for frequency, hematuria and urgency.   Musculoskeletal: Positive for myalgias, neck pain and neck stiffness. Negative for back pain and gait problem.   Skin: Negative for rash.   Neurological: Negative for syncope, weakness and headaches.   Psychiatric/Behavioral: Negative for agitation and confusion.       Objective:      Physical Exam   Constitutional: Vital signs are normal. He appears well-developed and well-nourished. No distress.   Neck: Muscular tenderness present. No spinous process tenderness present. No neck rigidity. No edema and no erythema present. "   Cardiovascular: Normal rate, regular rhythm, S1 normal, S2 normal and normal heart sounds.  Exam reveals no gallop.    No murmur heard.  Pulses:       Radial pulses are 2+ on the right side, and 2+ on the left side.   Pulmonary/Chest: Effort normal and breath sounds normal. No respiratory distress. He has no wheezes. He has no rhonchi.   Skin: Skin is warm and dry. He is not diaphoretic.   Appropriate skin turgor   Psychiatric: He has a normal mood and affect. His speech is normal and behavior is normal. Judgment and thought content normal. Cognition and memory are normal.       Assessment:       1. Hypertension, essential    2. ROSITA (generalized anxiety disorder)    3. Neck pain    4. Arthritis of foot    5. Obesity (BMI 30.0-34.9)        Plan:       Jarod was seen today for anxiety and cough.    Diagnoses and all orders for this visit:    Hypertension, essential  Restart carvediolol as directed  -     carvedilol (COREG) 25 MG tablet; Take 1 tablet (25 mg total) by mouth 2 (two) times daily with meals. For blood pressure  -     valsartan (DIOVAN) 320 MG tablet; Take 1 tablet (320 mg total) by mouth once daily. For blood pressure  1 month follow-up with PCP team- may need to add diuretic back on board as he has had some swelling in hands    ROSITA (generalized anxiety disorder)  Trial of celexa; discussed need to take this daily   -     citalopram (CELEXA) 10 MG tablet; Take 1 tablet (10 mg total) by mouth once daily.  I've explained to him that drugs of the SSRI class can have side effects such as weight gain, sexual dysfunction, insomnia, headache, nausea. These medications are generally effective at alleviating symptoms of anxiety and/or depression. Let me know if significant side effects do occur.    4 week f/u scheduled with PCP team    Neck pain  -     X-Ray Cervical Spine AP And Lateral; Future  Will f/u with neck xray and discuss further recommendations    Arthritis of foot  -     diclofenac sodium 1 % Gel;  Apply 2 g topically 4 (four) times daily. for 10 days    Obesity (BMI 30.0-34.9)    Patient readiness: acceptance and barriers:none    During the course of the visit the patient was educated and counseled about the following:     Hypertension:   Medication: no change.  Obesity:   Regular aerobic exercise program discussed.    Goals: Hypertension: Reduce Blood Pressure and Obesity: Reduce calorie intake and BMI    Did patient meet goals/outcomes: No    The following self management tools provided: declined    Patient Instructions (the written plan) was given to the patient/family.     Time spent with patient: 20 minutes    Barriers to medications present (no )    Adverse reactions to current medications (no)    Over the counter medications reviewed (Yes)

## 2018-06-26 NOTE — PROGRESS NOTES
Pre-Visit Chart Review  For Appointment Scheduled on 06/26/18    Health Maintenance Due   Topic Date Due    Colonoscopy  10/06/2014

## 2018-06-27 NOTE — TELEPHONE ENCOUNTER
Attempted to call patient x2.  Unable to leave message.   Left wireless customer you are calling is not available

## 2018-09-20 ENCOUNTER — TELEPHONE (OUTPATIENT)
Dept: FAMILY MEDICINE | Facility: CLINIC | Age: 53
End: 2018-09-20

## 2018-09-20 NOTE — TELEPHONE ENCOUNTER
----- Message from Rosalba Diaz sent at 9/20/2018  3:51 PM CDT -----  Contact: patient  Patient requesting to be seen tomorrow for sinus flare up. Please call [phone]

## 2018-09-21 DIAGNOSIS — R05.3 PERSISTENT COUGH: ICD-10-CM

## 2018-09-25 RX ORDER — ALBUTEROL SULFATE 90 UG/1
AEROSOL, METERED RESPIRATORY (INHALATION)
Qty: 1 EACH | Refills: 0 | Status: SHIPPED | OUTPATIENT
Start: 2018-09-25 | End: 2019-02-01 | Stop reason: SDUPTHER

## 2018-09-25 NOTE — TELEPHONE ENCOUNTER
I received refill request for albuterol inhaler   Does he continue to use this?    Recommend eval with PCP/team to determine if controlled medication would be indicated.  May need updated pft?   I will refill X 1 and please schedule for follow up with team

## 2018-09-27 ENCOUNTER — OFFICE VISIT (OUTPATIENT)
Dept: FAMILY MEDICINE | Facility: CLINIC | Age: 53
End: 2018-09-27
Payer: MEDICAID

## 2018-09-27 VITALS
TEMPERATURE: 98 F | WEIGHT: 218 LBS | HEART RATE: 73 BPM | BODY MASS INDEX: 34.21 KG/M2 | SYSTOLIC BLOOD PRESSURE: 170 MMHG | HEIGHT: 67 IN | DIASTOLIC BLOOD PRESSURE: 88 MMHG

## 2018-09-27 DIAGNOSIS — Z91.148 NON COMPLIANCE W MEDICATION REGIMEN: ICD-10-CM

## 2018-09-27 DIAGNOSIS — F41.1 GAD (GENERALIZED ANXIETY DISORDER): ICD-10-CM

## 2018-09-27 DIAGNOSIS — L30.9 DERMATITIS: ICD-10-CM

## 2018-09-27 DIAGNOSIS — M10.9 GOUT, ARTHRITIS: ICD-10-CM

## 2018-09-27 DIAGNOSIS — F32.89 OTHER DEPRESSION: ICD-10-CM

## 2018-09-27 DIAGNOSIS — Z12.11 COLON CANCER SCREENING: ICD-10-CM

## 2018-09-27 DIAGNOSIS — I10 UNCONTROLLED HYPERTENSION: Primary | ICD-10-CM

## 2018-09-27 PROCEDURE — 99214 OFFICE O/P EST MOD 30 MIN: CPT | Mod: S$PBB,,, | Performed by: NURSE PRACTITIONER

## 2018-09-27 PROCEDURE — 99999 PR PBB SHADOW E&M-EST. PATIENT-LVL IV: CPT | Mod: PBBFAC,,, | Performed by: NURSE PRACTITIONER

## 2018-09-27 PROCEDURE — 99214 OFFICE O/P EST MOD 30 MIN: CPT | Mod: PBBFAC,PO | Performed by: NURSE PRACTITIONER

## 2018-09-27 RX ORDER — CARVEDILOL 25 MG/1
25 TABLET ORAL 2 TIMES DAILY WITH MEALS
Qty: 60 TABLET | Refills: 3 | Status: SHIPPED | OUTPATIENT
Start: 2018-09-27 | End: 2019-02-01 | Stop reason: ALTCHOICE

## 2018-09-27 RX ORDER — CLOTRIMAZOLE 1 %
CREAM (GRAM) TOPICAL 2 TIMES DAILY
Refills: 0 | Status: CANCELLED
Start: 2018-09-27

## 2018-09-27 RX ORDER — CLOTRIMAZOLE AND BETAMETHASONE DIPROPIONATE 10; .64 MG/G; MG/G
CREAM TOPICAL 2 TIMES DAILY
Qty: 15 G | Refills: 0 | Status: SHIPPED | OUTPATIENT
Start: 2018-09-27 | End: 2018-11-17 | Stop reason: SDUPTHER

## 2018-09-27 RX ORDER — AMLODIPINE BESYLATE 10 MG/1
10 TABLET ORAL DAILY
COMMUNITY
Start: 2018-09-23 | End: 2018-09-27 | Stop reason: SDUPTHER

## 2018-09-27 RX ORDER — VALSARTAN 320 MG/1
320 TABLET ORAL DAILY
Qty: 30 TABLET | Refills: 3 | Status: SHIPPED | OUTPATIENT
Start: 2018-09-27 | End: 2019-02-01

## 2018-09-27 RX ORDER — AMLODIPINE BESYLATE 10 MG/1
10 TABLET ORAL DAILY
Qty: 30 TABLET | Refills: 3 | Status: SHIPPED | OUTPATIENT
Start: 2018-09-27 | End: 2019-02-01 | Stop reason: ALTCHOICE

## 2018-09-27 NOTE — PROGRESS NOTES
Subjective:       Patient ID: Jarod Pierce is a 52 y.o. male.    Chief Complaint: Hypertension    Chief Complaint  Chief Complaint   Patient presents with    Hypertension       HPI  Jarod Pierce is a 52 y.o. male with medical diagnoses as listed in the medical history and problem list that presents for problems with blood pressure. Patient is regularly treated for hypertension and gout and anxiety/depression. Patient stopped taking his Carvedilol and Valsartan about one and a half months ago as well as his Allopurinol. Patient reports the blood pressure medicine made him feel like he was burning all over. Patient was placed on Celexa for anxiety/depression and stopped taking it about a week after he started because he felt better. Patient was seen at [facility] on 9/22/18 for sinus infection and blood pressure was elevated at 168/107. Patient was then started on Amlodipine 10 mg daily by the Emergency Room Physician. Blood pressure is elevated today at 164/96 in right arm & 170/94 in left arm, recheck at end of visit 170/88.  BMI today is 34.14 and has gained 2 pounds since last visit.  Established patient with last clinic appointment 6/26/2018.        PAST MEDICAL HISTORY:  Past Medical History:   Diagnosis Date    Allergic rhinitis due to allergen     Arthritis     Cardiomyopathy     Gout     Hypertension     Left bundle branch block        PAST SURGICAL HISTORY:  Past Surgical History:   Procedure Laterality Date    WISDOM TOOTH EXTRACTION         SOCIAL HISTORY:  Social History     Socioeconomic History    Marital status: Single     Spouse name: Not on file    Number of children: Not on file    Years of education: Not on file    Highest education level: Not on file   Social Needs    Financial resource strain: Not on file    Food insecurity - worry: Not on file    Food insecurity - inability: Not on file    Transportation needs - medical: Not on file    Transportation  needs - non-medical: Not on file   Occupational History     Employer: [employer]   Tobacco Use    Smoking status: Never Smoker    Smokeless tobacco: Never Used   Substance and Sexual Activity    Alcohol use: No    Drug use: No    Sexual activity: Not on file   Other Topics Concern    Not on file   Social History Narrative    Not on file       FAMILY HISTORY:  Family History   Problem Relation Age of Onset    Heart disease Father     Hypertension Father     Gout Father     Cancer Sister         lung    Heart attack Neg Hx        ALLERGIES AND MEDICATIONS: updated and reviewed.  Review of patient's allergies indicates:   Allergen Reactions    Codeine      Other reaction(s): Hives    Lisinopril      Dry coughing    Phenytoin sodium extended      Other reaction(s): leg cramps     Current Outpatient Medications   Medication Sig Dispense Refill    amLODIPine (NORVASC) 10 MG tablet Take 1 tablet (10 mg total) by mouth once daily. 30 tablet 3    diclofenac sodium 1 % Gel Apply 2 g topically 4 (four) times daily. for 10 days 1 Tube 3    PROAIR HFA 90 mcg/actuation inhaler INHALE TWO PUFFS INTO THE LUNGS 4 TIMES DAILY 1 each 0    carvedilol (COREG) 25 MG tablet Take 1 tablet (25 mg total) by mouth 2 (two) times daily with meals. For blood pressure 60 tablet 3    clotrimazole-betamethasone 1-0.05% (LOTRISONE) cream Apply topically 2 (two) times daily. To affected areas 15 g 0    valsartan (DIOVAN) 320 MG tablet Take 1 tablet (320 mg total) by mouth once daily. For blood pressure 30 tablet 3     No current facility-administered medications for this visit.        I have reviewed the patient's medical history in detail and updated the computerized patient record.    Review of Systems   Constitutional: Negative for activity change, appetite change, chills, diaphoresis, fever and unexpected weight change.        Reports drives a lot but tries to stay active.    HENT: Positive for sinus pressure  and tinnitus. Negative for congestion, hearing loss, postnasal drip and sinus pain.         Ringing in left ears for the past couple of months. Went to ER on 9/22/18 and was told he had sinus infection.    Eyes: Negative for discharge, itching and visual disturbance.        Patient wears prescription glasses.    Respiratory: Positive for cough. Negative for chest tightness, shortness of breath and wheezing.         Occasional productive cough with white phlegm.    Cardiovascular: Positive for leg swelling. Negative for chest pain and palpitations.        Patient reports swelling to legs intermittently.    Genitourinary: Negative for decreased urine volume, difficulty urinating, dysuria, frequency, hematuria and urgency.        Nocturia. Patient reports getting up at least 2-3 times during the night to urinate.    Musculoskeletal: Negative for arthralgias, joint swelling and myalgias.   Skin: Positive for rash. Negative for wound.        Itchy rash on bilateral legs only around sock line.    Psychiatric/Behavioral: Positive for sleep disturbance. Negative for dysphoric mood. The patient is not nervous/anxious.         Patient goes to sleep easily but wakes up to go to the restroom but is easy to fall back asleep.        ROSITA-7 Anxiety Screening Tool    Over the last 2 weeks, how often have you been bothered by the following problems?   (Not at all = 0, Several Days = 1, More than half the days = 2, Nearly Every Day = 3)     1.  Feeling nervous, anxious, or on edge. 3   2.  Not being able to sleep or control worrying. 0   3.  Worrying too much about different things. 2   4.  Trouble relaxing. 1   5.  Being so restless that it is hard to sit still. 0   6.  Becoming easily annoyed or irritable. 3   7.  Feeling afraid as if something awful might happen. 3     Total score: 12      If you checked any problems, how difficult have they made it for you to do your work, take care of things at home, or get along with other  "people?    Not difficult at all  Somewhat difficult  Very Difficult  Extremely difficult    Scoring ROSITA-7  0-4: Minimal anxiety  5-9: Mild anxiety  10-14: Moderate anxiety  15-21: Severe anxiety    Patient Health Questionnaire Depression Scale (PHQ-9):    Over the last 2 weeks, how often have you been bothered by any of the following problems?  (Not at all: 0; several days: 1, more than half the days: 2, nearly every day: 3)    1. Little interest or pleasure in doing things: 1  2. Feeling down, depressed, or hopeless: 2  3. Trouble falling or staying asleep, or sleeping too much: 0  4. Feeling tired or having little energy: 3  5. Poor appetite or overeatin  6. Feeling bad about yourself, or that you are a failure, or have let yourself or your family down: 1  7. Trouble concentrating on things, such as reading the newspaper or watching television: 3  8. Moving or speaking so slowly that other people could have noticed. Or the opposite- being so fidgety or restless that you have been moving       around a lot more than usual: 1  9. Thoughts that you would be better off dead, or of hurting yourself in some way: 0    Total Score: 11    (Score >15 - major depression; Score >20 - severe major depression)    Objective:      Vitals:    18 1049 18 1052 18 1146   BP: (!) 164/96 (!) 170/94 (!) 170/88   BP Location: Right arm Left arm Right arm   Patient Position: Sitting Sitting Sitting   BP Method: Large (Automatic)  Large (Manual)   Pulse: 73     Temp: 98 °F (36.7 °C)     TempSrc: Oral     Weight: 98.9 kg (218 lb)     Height: 5' 7" (1.702 m)       Physical Exam   Constitutional: He is oriented to person, place, and time. He appears well-developed. No distress.   Blood pressure: R-164/96 & L-170/94. Manual Blood Pressure recheck 170/88 on right arm.    HENT:   Head: Normocephalic and atraumatic.   Right Ear: Hearing, tympanic membrane, external ear and ear canal normal.   Left Ear: Hearing, tympanic " membrane, external ear and ear canal normal.   Nose: Nose normal. No mucosal edema. Right sinus exhibits no maxillary sinus tenderness and no frontal sinus tenderness. Left sinus exhibits no maxillary sinus tenderness and no frontal sinus tenderness.   Mouth/Throat: Uvula is midline, oropharynx is clear and moist and mucous membranes are normal. No oropharyngeal exudate.   Eyes: Conjunctivae, EOM and lids are normal. Pupils are equal, round, and reactive to light. Right eye exhibits no discharge. Left eye exhibits no discharge. Right conjunctiva is not injected. Left conjunctiva is not injected.   Neck: Normal range of motion. Neck supple. Normal carotid pulses present. Carotid bruit is not present.   Cardiovascular: Normal rate, regular rhythm, S1 normal, S2 normal, normal heart sounds, intact distal pulses and normal pulses.   No murmur heard.  Pulses:       Carotid pulses are 2+ on the right side, and 2+ on the left side.       Radial pulses are 2+ on the right side, and 2+ on the left side.        Posterior tibial pulses are 2+ on the right side, and 2+ on the left side.   1+ pitting edema noted to bilateral anterior lower extremities. Non-pitting edema around sock line noted to BLE.   Pulmonary/Chest: Effort normal and breath sounds normal. No respiratory distress. He has no decreased breath sounds. He has no wheezes. He has no rhonchi. He has no rales.   Musculoskeletal: Normal range of motion. He exhibits no edema, tenderness or deformity.   Feet:   Right Foot:   Skin Integrity: Positive for dry skin. Negative for ulcer, blister, skin breakdown, erythema, warmth or callus.   Left Foot:   Skin Integrity: Positive for dry skin. Negative for ulcer, blister, skin breakdown, erythema, warmth or callus.   Lymphadenopathy:        Head (right side): No submental, no submandibular, no tonsillar, no preauricular, no posterior auricular and no occipital adenopathy present.        Head (left side): No submental, no  submandibular, no tonsillar, no preauricular, no posterior auricular and no occipital adenopathy present.     He has no cervical adenopathy.        Right: No supraclavicular adenopathy present.        Left: No supraclavicular adenopathy present.   Neurological: He is alert and oriented to person, place, and time. He has normal strength.   Skin: Skin is warm, dry and intact. Rash noted. He is not diaphoretic. No erythema. No pallor.   Rash noted to bilateral legs at sock line. Dry skin to bilateral feet.    Psychiatric: He has a normal mood and affect. His speech is normal and behavior is normal. Judgment and thought content normal. His mood appears not anxious. Cognition and memory are normal. He does not exhibit a depressed mood.   Nursing note and vitals reviewed.        Assessment:       1. Uncontrolled hypertension    2. Non compliance w medication regimen    3. ROSITA (generalized anxiety disorder)    4. Other depression    5. Gout, arthritis    6. Dermatitis legs    7. BMI 34.0-34.9,adult    8. Colon cancer screening          Plan:       Jarod was seen today for hypertension.    Diagnoses and all orders for this visit:  Written instructions provided to patient:     Start taking the blood pressure medication as prescribed.     In the morning take:  Valsartan (diovan) 320 mg 1 pill  Carvedilol (coreg)25 mg 1 pill  Amlodipine (norvasc) 10 mg 1 pill    In the evening take:  Carvedilol (coreg) 25 mg 1 pill    Uncontrolled hypertension   Blood pressure today is 164/96 in R arm and 170/94 in left arm. Recheck in office 170/88 manually in R arm.  -     amLODIPine (NORVASC) 10 MG tablet; Take 1 tablet (10 mg total) by mouth once daily.  -     valsartan (DIOVAN) 320 MG tablet; Take 1 tablet (320 mg total) by mouth once daily. For blood pressure  -     carvedilol (COREG) 25 MG tablet; Take 1 tablet (25 mg total) by mouth 2 (two) times daily with meals. For blood pressure   Counseling given on the importance of taking  prescribed medications.     Non compliance w medication regimen   Counseling given on the importance of taking prescribed medications.     ROSITA (generalized anxiety disorder)   ROSITA-7 score 12 today.   Patient reports a decrease in anxiety since last visit and does not feel like he needs any medicine at this time.    Counseling given on the importance of taking prescribed medications.     Other depression   PHQ-9 score 11 today.   Patient reports the depression has decreased since last visit and does not feel like he needs any medicine at this time.     Counseling given on the importance of taking prescribed medications.     Gout, arthritis   Patient stopped taking Allopurinol 600 mg by mouth daily due to gout problems subsiding.   Counseling given on the importance of taking prescribed medications.   .  Dermatitis legs  -     clotrimazole-betamethasone 1-0.05% (LOTRISONE) cream; Apply topically 2 (two) times daily. To affected areas    BMI 34.0-34.9,adult   BMI today is 34.14 and has gained 2 pounds since last visit on 6/26/18   Weight loss recommended with well balanced diet and portion controlled meals and increased activity.     Colon cancer screening  -     Ambulatory referral to Gastroenterology      Follow-up in about 3 months (around 12/27/2018) for follow up 40 mins, 1-2 week nurse blood pressure check.      Patient readiness: acceptance and barriers:readiness    During the course of the visit the patient was educated and counseled about the following:     Hypertension:   Medication: continue Amlodipine 10 mg by mouth once daily  and resume Valsartan 320 mg by mouth once daily and Coreg 25mg by mouth twice daily.  Dietary sodium restriction.  Regular aerobic exercise.  Check blood pressures daily and record.  Follow up: 2 weeks and as needed.  Obesity:   General weight loss/lifestyle modification strategies discussed (elicit support from others; identify saboteurs; non-food rewards, etc).  Informal exercise  measures discussed, e.g. taking stairs instead of elevator.  Regular aerobic exercise program discussed.    Goals: Hypertension: Reduce Blood Pressure and Obesity: Reduce calorie intake and BMI    Did patient meet goals/outcomes: No    The following self management tools provided: blood pressure log    Patient Instructions (the written plan) was given to the patient/family.     Time spent with patient: 45 minutes    Barriers to medications present (yes )    Adverse reactions to current medications (no)    Over the counter medications reviewed (Yes)

## 2018-09-27 NOTE — PATIENT INSTRUCTIONS
Start taking the blood pressure medication as prescribed.     In the morning take:  Valsartan (diovan) 320 mg 1 pill  Carvedilol (coreg)25 mg 1 pill  Amlodipine (norvasc) 10 mg 1 pill    In the evening take:  Carvedilol (coreg) 25 mg 1 pill

## 2018-09-28 PROBLEM — I10 UNCONTROLLED HYPERTENSION: Status: ACTIVE | Noted: 2018-09-28

## 2018-09-28 PROBLEM — F32.A DEPRESSION: Status: ACTIVE | Noted: 2018-09-28

## 2018-09-28 PROBLEM — Z91.148 NON COMPLIANCE W MEDICATION REGIMEN: Status: ACTIVE | Noted: 2018-09-28

## 2018-10-04 ENCOUNTER — TELEPHONE (OUTPATIENT)
Dept: CARDIOLOGY | Facility: CLINIC | Age: 53
End: 2018-10-04

## 2018-10-04 NOTE — TELEPHONE ENCOUNTER
Called and spoke with Mr. Pierce, He stated that he feels he may be on too many BP medications. He stated that he has been feeling tired, and like he is floating on a cloud. I asked if he had any BP readings  I could give Dr. Manriquez, and he stated that he has not been able to get a BP cuff. He stated that he was recently put on amlodipine 10 mg daily, and carvedilol 25 mg BID. He wanted Dr. Manriquez to see if maybe he can come off of one of these medications or possibly go back on the lisinopril. I advised him I would send a message to Dr. Manriquez, but he is off today and tomorrow, but as soon as I hear back from him I will give him a call. He verbalized understanding. No further issues noted.

## 2018-10-04 NOTE — TELEPHONE ENCOUNTER
"----- Message from Delores Hough sent at 10/4/2018  8:01 AM CDT -----  Contact: self  Patient [phone] is calling to speak with the Nurse concerning his blood pressure medications as he is taking 3 different blood pressure medications/he feels like "I am in a cloud"/please call patient to discuss  "

## 2018-10-05 ENCOUNTER — TELEPHONE (OUTPATIENT)
Dept: CARDIOLOGY | Facility: CLINIC | Age: 53
End: 2018-10-05

## 2018-10-05 NOTE — TELEPHONE ENCOUNTER
Relayed msg per Dr Manriquez re BP check and med adjustment. Pt needs to go to drug store or come in office to have bp checked before meds can be adjusted per Dr Manriquez. Pt stated he will try to come in Monday 10/8/18

## 2018-10-11 ENCOUNTER — TELEPHONE (OUTPATIENT)
Dept: CARDIOLOGY | Facility: CLINIC | Age: 53
End: 2018-10-11

## 2018-10-11 NOTE — TELEPHONE ENCOUNTER
"Pt came in to have BP checked per Dr Manriquez. He has concerns with his medication making him feel fatigued and " in a foggy state of mind". Wants to change medication.    Right arm  167/93    Left arm 160/95    P 69    Pt also stated he did not take his BP medicine this morning before coming in to office, he forgot his medicine in his other truck. PT wants to come in tomorrow morning to have BP checked again  "

## 2018-10-12 ENCOUNTER — TELEPHONE (OUTPATIENT)
Dept: CARDIOLOGY | Facility: CLINIC | Age: 53
End: 2018-10-12

## 2018-10-12 NOTE — TELEPHONE ENCOUNTER
Called to schedule follow up appt per Dr Manriquez. Scheduled 12/20, pt stated he would like to be placed on waiting list also.

## 2018-11-17 DIAGNOSIS — L30.9 DERMATITIS: ICD-10-CM

## 2018-11-19 RX ORDER — CLOTRIMAZOLE AND BETAMETHASONE DIPROPIONATE 10; .64 MG/G; MG/G
CREAM TOPICAL
Qty: 45 G | Refills: 1 | Status: SHIPPED | OUTPATIENT
Start: 2018-11-19 | End: 2020-01-13 | Stop reason: CLARIF

## 2018-11-19 NOTE — TELEPHONE ENCOUNTER
It is the pt requesting the medication for his  feet. 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 He said his feet are broke out really bad. Please advise RX is saying 3 tubes no refills please fix it for a 90 day supply if possible.

## 2018-11-29 ENCOUNTER — TELEPHONE (OUTPATIENT)
Dept: RHEUMATOLOGY | Facility: CLINIC | Age: 53
End: 2018-11-29

## 2018-11-29 ENCOUNTER — OFFICE VISIT (OUTPATIENT)
Dept: RHEUMATOLOGY | Facility: CLINIC | Age: 53
End: 2018-11-29
Payer: MEDICAID

## 2018-11-29 ENCOUNTER — LAB VISIT (OUTPATIENT)
Dept: LAB | Facility: HOSPITAL | Age: 53
End: 2018-11-29
Attending: INTERNAL MEDICINE
Payer: MEDICAID

## 2018-11-29 VITALS
HEIGHT: 67 IN | BODY MASS INDEX: 34.53 KG/M2 | DIASTOLIC BLOOD PRESSURE: 88 MMHG | HEART RATE: 70 BPM | SYSTOLIC BLOOD PRESSURE: 168 MMHG | WEIGHT: 220 LBS

## 2018-11-29 DIAGNOSIS — Z91.199 NONCOMPLIANCE: ICD-10-CM

## 2018-11-29 DIAGNOSIS — M15.9 PRIMARY OSTEOARTHRITIS INVOLVING MULTIPLE JOINTS: ICD-10-CM

## 2018-11-29 DIAGNOSIS — M1A.0311 IDIOPATHIC CHRONIC GOUT OF RIGHT WRIST WITH TOPHUS: ICD-10-CM

## 2018-11-29 DIAGNOSIS — M1A.0311 IDIOPATHIC CHRONIC GOUT OF RIGHT WRIST WITH TOPHUS: Primary | ICD-10-CM

## 2018-11-29 LAB
ALBUMIN SERPL BCP-MCNC: 4.1 G/DL
ALP SERPL-CCNC: 102 U/L
ALT SERPL W/O P-5'-P-CCNC: 21 U/L
ANION GAP SERPL CALC-SCNC: 13 MMOL/L
AST SERPL-CCNC: 23 U/L
BASOPHILS # BLD AUTO: 0 K/UL
BASOPHILS NFR BLD: 0.9 %
BILIRUB SERPL-MCNC: 1.3 MG/DL
BUN SERPL-MCNC: 11 MG/DL
CALCIUM SERPL-MCNC: 9.4 MG/DL
CHLORIDE SERPL-SCNC: 107 MMOL/L
CO2 SERPL-SCNC: 21 MMOL/L
CREAT SERPL-MCNC: 1.1 MG/DL
DIFFERENTIAL METHOD: ABNORMAL
EOSINOPHIL # BLD AUTO: 0.1 K/UL
EOSINOPHIL NFR BLD: 2.2 %
ERYTHROCYTE [DISTWIDTH] IN BLOOD BY AUTOMATED COUNT: 14.1 %
EST. GFR  (AFRICAN AMERICAN): >60 ML/MIN/1.73 M^2
EST. GFR  (NON AFRICAN AMERICAN): >60 ML/MIN/1.73 M^2
GLUCOSE SERPL-MCNC: 88 MG/DL
HCT VFR BLD AUTO: 43.2 %
HGB BLD-MCNC: 14.9 G/DL
LYMPHOCYTES # BLD AUTO: 1.5 K/UL
LYMPHOCYTES NFR BLD: 35.9 %
MCH RBC QN AUTO: 32.3 PG
MCHC RBC AUTO-ENTMCNC: 34.5 G/DL
MCV RBC AUTO: 93 FL
MONOCYTES # BLD AUTO: 0.3 K/UL
MONOCYTES NFR BLD: 8.1 %
NEUTROPHILS # BLD AUTO: 2.3 K/UL
NEUTROPHILS NFR BLD: 52.9 %
PLATELET # BLD AUTO: 219 K/UL
PMV BLD AUTO: 6.8 FL
POTASSIUM SERPL-SCNC: 3.9 MMOL/L
PROT SERPL-MCNC: 7.4 G/DL
RBC # BLD AUTO: 4.62 M/UL
SODIUM SERPL-SCNC: 141 MMOL/L
URATE SERPL-MCNC: 10.4 MG/DL
WBC # BLD AUTO: 4.3 K/UL

## 2018-11-29 PROCEDURE — 99214 OFFICE O/P EST MOD 30 MIN: CPT | Mod: S$PBB,,, | Performed by: INTERNAL MEDICINE

## 2018-11-29 PROCEDURE — 99999 PR PBB SHADOW E&M-EST. PATIENT-LVL III: CPT | Mod: PBBFAC,,, | Performed by: INTERNAL MEDICINE

## 2018-11-29 PROCEDURE — 36415 COLL VENOUS BLD VENIPUNCTURE: CPT

## 2018-11-29 PROCEDURE — 99213 OFFICE O/P EST LOW 20 MIN: CPT | Mod: PBBFAC,PO | Performed by: INTERNAL MEDICINE

## 2018-11-29 PROCEDURE — 80053 COMPREHEN METABOLIC PANEL: CPT

## 2018-11-29 PROCEDURE — 84550 ASSAY OF BLOOD/URIC ACID: CPT

## 2018-11-29 PROCEDURE — 85025 COMPLETE CBC W/AUTO DIFF WBC: CPT

## 2018-11-29 RX ORDER — ALLOPURINOL 100 MG/1
200 TABLET ORAL DAILY
Qty: 60 TABLET | Refills: 2 | Status: SHIPPED | OUTPATIENT
Start: 2018-11-29 | End: 2019-02-01

## 2018-11-29 RX ORDER — COLCHICINE 0.6 MG/1
0.6 TABLET ORAL DAILY
Qty: 30 TABLET | Refills: 2 | Status: SHIPPED | OUTPATIENT
Start: 2018-11-29 | End: 2019-02-01 | Stop reason: ALTCHOICE

## 2018-11-29 RX ORDER — LIDOCAINE AND PRILOCAINE 25; 25 MG/G; MG/G
CREAM TOPICAL
COMMUNITY
Start: 2018-11-26 | End: 2019-02-01 | Stop reason: ALTCHOICE

## 2018-11-29 ASSESSMENT — ROUTINE ASSESSMENT OF PATIENT INDEX DATA (RAPID3)
MDHAQ FUNCTION SCORE: .4
AM STIFFNESS SCORE: 1, YES
PATIENT GLOBAL ASSESSMENT SCORE: 5.5
WHEN YOU AWAKENED IN THE MORNING OVER THE LAST WEEK, PLEASE INDICATE THE AMOUNT OF TIME IT TAKES UNTIL YOU ARE AS LIMBER AS YOU WILL BE FOR THE DAY: 1
PSYCHOLOGICAL DISTRESS SCORE: 2.2
TOTAL RAPID3 SCORE: 4.28
FATIGUE SCORE: 2
PAIN SCORE: 6

## 2018-11-29 NOTE — PROGRESS NOTES
"Subjective:       Patient ID: Jarod Pierce is a 53 y.o. male.    Chief Complaint: Gout with tophi    HPI 54 yo male with HTN is here for follow-up fortophaceous gout.  Gout was diagnosed in 2004.  Took allopurinol intermittently- caused stomach problems. Risk factors include -positive FH- father had gout, shelled fish, carbonated drinks, denies alcohol use, no h/o kidney stones.   Last visit on 11/28/2017.  Last  flare up 1 year ago.   Discontinued allopurinol 9 months ago.  Consequently, he has developed worsening of tophi.  Also has hyperuricemia and uric acid level is 10.4  Today, pain score is 6 x 10 with activity, better with rest, aching type, aggravated with walking, accompanied by bilateral wrist swelling  He  denies fever, weight loss, dry eyes or mouth, photosensitivity, rash, ulcer, raynaud's phenomenon, alopecia, dysphagia, diarrhea or blood in the stools    Review of Systems   Constitutional: Negative for fatigue and fever.   HENT: Negative for ear discharge and ear pain.    Eyes: Negative for pain and redness.   Respiratory: Negative for cough and shortness of breath.    Cardiovascular: Negative for chest pain and palpitations.   Gastrointestinal: Negative for abdominal distention and abdominal pain.   Genitourinary: Negative for genital sores and hematuria.   Musculoskeletal: Positive for arthralgias and joint swelling.   Skin: Negative for color change and wound.   Neurological: Negative for tremors and seizures.   Psychiatric/Behavioral: Negative for agitation and hallucinations.         Objective:   BP (!) 168/88   Pulse 70   Ht 5' 7" (1.702 m)   Wt 99.8 kg (220 lb 0.3 oz)   BMI 34.46 kg/m²      Physical Exam   Constitutional: He is oriented to person, place, and time and well-developed, well-nourished, and in no distress.   HENT:   Head: Normocephalic and atraumatic.   Eyes: Conjunctivae and EOM are normal. Pupils are equal, round, and reactive to light.   Neck: Normal range of motion. " Neck supple. No thyromegaly present.   Cardiovascular: Normal rate and regular rhythm.  Exam reveals no friction rub.    Pulmonary/Chest: Effort normal and breath sounds normal.   Abdominal: Soft. Bowel sounds are normal.   Neurological: He is alert and oriented to person, place, and time.   Skin: Skin is warm and dry.     Psychiatric: Memory and affect normal.   Musculoskeletal: He exhibits no edema.     Left elbow tophi  Bilateral wrist swelling           Lab Results   Component Value Date    WBC 4.30 11/29/2018    HGB 14.9 11/29/2018    HCT 43.2 11/29/2018    MCV 93 11/29/2018     11/29/2018     CMP  Sodium   Date Value Ref Range Status   11/29/2018 141 136 - 145 mmol/L Final     Potassium   Date Value Ref Range Status   11/29/2018 3.9 3.5 - 5.1 mmol/L Final     Chloride   Date Value Ref Range Status   11/29/2018 107 95 - 110 mmol/L Final     CO2   Date Value Ref Range Status   11/29/2018 21 (L) 23 - 29 mmol/L Final     Glucose   Date Value Ref Range Status   11/29/2018 88 70 - 110 mg/dL Final     BUN, Bld   Date Value Ref Range Status   11/29/2018 11 6 - 20 mg/dL Final     Creatinine   Date Value Ref Range Status   11/29/2018 1.1 0.5 - 1.4 mg/dL Final     Calcium   Date Value Ref Range Status   11/29/2018 9.4 8.7 - 10.5 mg/dL Final     Total Protein   Date Value Ref Range Status   11/29/2018 7.4 6.0 - 8.4 g/dL Final     Albumin   Date Value Ref Range Status   11/29/2018 4.1 3.5 - 5.2 g/dL Final     Total Bilirubin   Date Value Ref Range Status   11/29/2018 1.3 (H) 0.1 - 1.0 mg/dL Final     Comment:     For infants and newborns, interpretation of results should be based  on gestational age, weight and in agreement with clinical  observations.  Premature Infant recommended reference ranges:  Up to 24 hours.............<8.0 mg/dL  Up to 48 hours............<12.0 mg/dL  3-5 days..................<15.0 mg/dL  6-29 days.................<15.0 mg/dL       Alkaline Phosphatase   Date Value Ref Range Status    11/29/2018 102 55 - 135 U/L Final     AST   Date Value Ref Range Status   11/29/2018 23 10 - 40 U/L Final     ALT   Date Value Ref Range Status   11/29/2018 21 10 - 44 U/L Final     Anion Gap   Date Value Ref Range Status   11/29/2018 13 8 - 16 mmol/L Final     eGFR if    Date Value Ref Range Status   11/29/2018 >60 >60 mL/min/1.73 m^2 Final     eGFR if non    Date Value Ref Range Status   11/29/2018 >60 >60 mL/min/1.73 m^2 Final     Comment:     Calculation used to obtain the estimated glomerular filtration  rate (eGFR) is the CKD-EPI equation.        Lab Results   Component Value Date    SEDRATE 20 (H) 03/12/2014       Lab Results   Component Value Date    CRP 4.6 01/11/2017     Lab Results   Component Value Date    URICACID 10.4 (H) 11/29/2018     outside records from   On 1/14/15, cyanosis or fluid examination was negative for mono sodium urate crystals  Uric acid was 7.9  On 1/19/15 uric acid was 7.5  X-rays reveals erosion of proximal phalanx of the right middle finger consistent with gout   Assessment:   Chronic gout with tophi-  Noncompliance  Generalized osteoarthritis  Plan:   Restart allopurinol 200 mg daily  Check uric acid in 1 month.  Plan is to gradually escalate the dose  Start colchicine 0.6 mg daily  Goal uric acid <5  Again, counseled in length about the importance of ULT and consequences of untreated disease.  He voiced understanding  Discussed lifestyle modification to lower uric acid including dietary changes  Counseled to be compliant  Return to clinic in 3 months  with  labs

## 2018-12-03 ENCOUNTER — TELEPHONE (OUTPATIENT)
Dept: RHEUMATOLOGY | Facility: CLINIC | Age: 53
End: 2018-12-03

## 2018-12-03 NOTE — TELEPHONE ENCOUNTER
----- Message from Destiny Diaz sent at 12/3/2018  8:16 AM CST -----  Contact: pt  ..Type: Needs Medical Advice    Who Called: Pt  Best Call Back Number:   Additional Information:Pt would like to speak with a nurse in regards to his appt scheduled for 112-05-18.  Please call pt to advise  Thanks

## 2018-12-15 ENCOUNTER — HOSPITAL ENCOUNTER (EMERGENCY)
Facility: HOSPITAL | Age: 53
Discharge: HOME | End: 2018-12-15
Attending: EMERGENCY MEDICINE
Payer: MEDICAID

## 2018-12-15 VITALS
SYSTOLIC BLOOD PRESSURE: 208 MMHG | HEART RATE: 77 BPM | DIASTOLIC BLOOD PRESSURE: 112 MMHG | WEIGHT: 215 LBS | HEIGHT: 66 IN | OXYGEN SATURATION: 98 % | BODY MASS INDEX: 34.55 KG/M2 | RESPIRATION RATE: 16 BRPM | TEMPERATURE: 98 F

## 2018-12-15 DIAGNOSIS — T15.11XA: Primary | ICD-10-CM

## 2018-12-15 PROCEDURE — 99283 EMERGENCY DEPT VISIT LOW MDM: CPT | Mod: 25

## 2018-12-15 PROCEDURE — 65205 REMOVE FOREIGN BODY FROM EYE: CPT

## 2018-12-15 PROCEDURE — 25000003 PHARM REV CODE 250: Performed by: EMERGENCY MEDICINE

## 2018-12-15 RX ORDER — ERYTHROMYCIN 5 MG/G
OINTMENT OPHTHALMIC
Status: COMPLETED | OUTPATIENT
Start: 2018-12-15 | End: 2018-12-15

## 2018-12-15 RX ORDER — ERYTHROMYCIN 5 MG/G
OINTMENT OPHTHALMIC
Qty: 1 TUBE | Refills: 0 | Status: SHIPPED | OUTPATIENT
Start: 2018-12-15 | End: 2019-02-01 | Stop reason: ALTCHOICE

## 2018-12-15 RX ORDER — PROPARACAINE HYDROCHLORIDE 5 MG/ML
1 SOLUTION/ DROPS OPHTHALMIC
Status: COMPLETED | OUTPATIENT
Start: 2018-12-15 | End: 2018-12-15

## 2018-12-15 RX ADMIN — PROPARACAINE HYDROCHLORIDE 1 DROP: 5 SOLUTION/ DROPS OPHTHALMIC at 03:12

## 2018-12-15 RX ADMIN — ERYTHROMYCIN 1 INCH: 5 OINTMENT OPHTHALMIC at 04:12

## 2018-12-15 NOTE — ED PROVIDER NOTES
Encounter Date: 12/15/2018       History     Chief Complaint   Patient presents with    Foreign Body in Eye     pt grinding steel - feels that he got some metal in the both eyes     Patient is a 53-year-old male with a past medical history of hypertension gout cardiomyopathy who is a  and presents the emergency room for evaluation of possible foreign body in his right eye and left eye.  Thursday feels like he got something in his left eye on Friday feels like he got something in his right eye.  He also feels like he had a stat burned his left ear.  He denies any visual changes.  He is unable to visualize a metal.  He states he is up-to-date on his tetanus shot.          Review of patient's allergies indicates:   Allergen Reactions    Codeine      Other reaction(s): Hives    Lisinopril      Dry coughing    Phenytoin sodium extended      Other reaction(s): leg cramps     Past Medical History:   Diagnosis Date    Allergic rhinitis due to allergen     Arthritis     Cardiomyopathy     Gout     Hypertension     Left bundle branch block      Past Surgical History:   Procedure Laterality Date    WISDOM TOOTH EXTRACTION       Family History   Problem Relation Age of Onset    Heart disease Father     Hypertension Father     Gout Father     Cancer Sister         lung    Heart attack Neg Hx      Social History     Tobacco Use    Smoking status: Never Smoker    Smokeless tobacco: Never Used   Substance Use Topics    Alcohol use: No    Drug use: No     Review of Systems   Constitutional: Negative for fatigue and fever.   HENT: Negative for congestion.    Eyes: Positive for pain and redness. Negative for photophobia, discharge, itching and visual disturbance.   Respiratory: Negative for cough and shortness of breath.    Cardiovascular: Negative for chest pain.   Gastrointestinal: Negative for abdominal pain, nausea and vomiting.   Genitourinary: Negative for dysuria and flank pain.   Musculoskeletal:  Negative for back pain.   Skin: Negative for wound.   Neurological: Negative for weakness, light-headedness and numbness.   Psychiatric/Behavioral: Negative for agitation and confusion.       Physical Exam     Initial Vitals [12/15/18 0119]   BP Pulse Resp Temp SpO2   (!) 208/112 77 16 97.9 °F (36.6 °C) 98 %      MAP       --         Physical Exam    Constitutional: He appears well-developed and well-nourished. No distress.   HENT:   Head: Normocephalic and atraumatic.   Right Ear: External ear normal.   Left Ear: External ear normal.   Mouth/Throat: Oropharynx is clear and moist.   Eyes: EOM are normal. Pupils are equal, round, and reactive to light.   Mild injected conjunctiva in the right eye     Neck: Neck supple. No thyromegaly present.   Neurological: He is alert and oriented to person, place, and time. He has normal strength. No sensory deficit.   Skin: Skin is warm and dry. Capillary refill takes less than 2 seconds.         ED Course   Foreign Body  Date/Time: 12/15/2018 4:01 AM  Performed by: Boo Brunner MD  Authorized by: Boo Brunner MD   Consent Done: Not Needed  Body area: eye  Location details: right conjunctiva    Anesthesia:  Local Anesthetic: proparacaine drops  Patient sedated: no  Patient restrained: no  Patient cooperative: yes  Localization method: eyelid eversion, magnification and visualized  Removal mechanism: ophthalmic travis  Eye examined with fluorescein.  No fluorescein uptake.  Corneal abrasion size: small  No residual rust ring present.  Dressing: antibiotic ointment  Depth: superficial  Complexity: simple  1 objects recovered.  Objects recovered: grain of dirt vs small piece of metal  Post-procedure assessment: foreign body removed  Patient tolerance: Patient tolerated the procedure well with no immediate complications      Labs Reviewed - No data to display       Imaging Results    None          Medical Decision Making:   I did not see an abrasion left eye, but I did remove a  small piece of metal from the right eye with no rust ring.  I will place the patient on erythromycin eye ointment.  There is no significant otitis externa on the left ear.  Patient does have uncontrolled chronic hypertension and feel that he is stable for discharge and follow-up to primary care.                      Clinical Impression:   The encounter diagnosis was Non-penetrating foreign body of right conjunctiva, initial encounter.                             Boo Brunner MD  12/15/18 0405

## 2018-12-20 ENCOUNTER — OFFICE VISIT (OUTPATIENT)
Dept: CARDIOLOGY | Facility: CLINIC | Age: 53
End: 2018-12-20
Payer: MEDICAID

## 2018-12-20 VITALS
BODY MASS INDEX: 35.01 KG/M2 | DIASTOLIC BLOOD PRESSURE: 95 MMHG | HEIGHT: 66 IN | WEIGHT: 217.81 LBS | OXYGEN SATURATION: 95 % | HEART RATE: 98 BPM | SYSTOLIC BLOOD PRESSURE: 144 MMHG

## 2018-12-20 DIAGNOSIS — Z91.148 NON COMPLIANCE W MEDICATION REGIMEN: ICD-10-CM

## 2018-12-20 DIAGNOSIS — H35.032 HYPERTENSIVE RETINOPATHY OF LEFT EYE: ICD-10-CM

## 2018-12-20 DIAGNOSIS — E65 ABDOMINAL OBESITY: ICD-10-CM

## 2018-12-20 DIAGNOSIS — I51.7 LVH (LEFT VENTRICULAR HYPERTROPHY): ICD-10-CM

## 2018-12-20 DIAGNOSIS — R94.39 ABNORMAL CARDIOVASCULAR STRESS TEST: ICD-10-CM

## 2018-12-20 DIAGNOSIS — E66.01 CLASS 2 SEVERE OBESITY DUE TO EXCESS CALORIES WITH SERIOUS COMORBIDITY AND BODY MASS INDEX (BMI) OF 35.0 TO 35.9 IN ADULT: ICD-10-CM

## 2018-12-20 DIAGNOSIS — M10.9 GOUT, ARTHRITIS: ICD-10-CM

## 2018-12-20 DIAGNOSIS — I44.7 LEFT BUNDLE BRANCH BLOCK: ICD-10-CM

## 2018-12-20 DIAGNOSIS — E78.5 DYSLIPIDEMIA, GOAL LDL BELOW 130: ICD-10-CM

## 2018-12-20 DIAGNOSIS — R53.82 CHRONIC FATIGUE: ICD-10-CM

## 2018-12-20 DIAGNOSIS — R07.9 CHEST PAIN, UNSPECIFIED TYPE: ICD-10-CM

## 2018-12-20 DIAGNOSIS — I10 ESSENTIAL HYPERTENSION: ICD-10-CM

## 2018-12-20 DIAGNOSIS — Z91.89 CARDIOVASCULAR RISK FACTOR: Primary | ICD-10-CM

## 2018-12-20 PROCEDURE — 99215 OFFICE O/P EST HI 40 MIN: CPT | Mod: S$PBB,25,, | Performed by: INTERNAL MEDICINE

## 2018-12-20 PROCEDURE — 99999 PR PBB SHADOW E&M-EST. PATIENT-LVL III: CPT | Mod: PBBFAC,,, | Performed by: INTERNAL MEDICINE

## 2018-12-20 PROCEDURE — 93010 ELECTROCARDIOGRAM REPORT: CPT | Mod: S$PBB,,, | Performed by: INTERNAL MEDICINE

## 2018-12-20 PROCEDURE — 93005 ELECTROCARDIOGRAM TRACING: CPT | Mod: PBBFAC,PO | Performed by: INTERNAL MEDICINE

## 2018-12-20 PROCEDURE — 99213 OFFICE O/P EST LOW 20 MIN: CPT | Mod: PBBFAC,PO,25 | Performed by: INTERNAL MEDICINE

## 2018-12-20 RX ORDER — LISINOPRIL AND HYDROCHLOROTHIAZIDE 12.5; 2 MG/1; MG/1
1 TABLET ORAL DAILY
Qty: 90 TABLET | Refills: 3 | Status: SHIPPED | OUTPATIENT
Start: 2018-12-20 | End: 2019-02-01 | Stop reason: SDDI

## 2018-12-20 NOTE — PROGRESS NOTES
"Subjective:    Patient ID:  Jarod Pierce is a 53 y.o. male who presents for evaluation of Annual Exam  For cardiomyopathy, fatigue, gout problem, HTN, off all medication except allopurinol since 10/2018  PCP: Dr. Fuller. Not seen for some time  Seen by: Ms. Edson NP  Rheumatologist: Dr. Dickinson, does labs  Podiatrist: Dr. Sarkar  Eye: [facility], had fundoscopic examination 9/2018, told of HTN changes in the left eye, had gotten all HTN meds.  Lives alone, no pet, sister, Soheila, in area  Disabled as  due to gouty arthritis now part time , long weekend shift work    Difficult historian with rambling thought processes    White male with recent noted cardiomyopathy with LBBB, here to establish cardiac care. History of stress testing in the past 2-3 years ago with Dr. Fuller, results negative. Noted some fatigue over the past year and a half, progressive. Difficult to do regular work at time. Only able to work for an hour and half recently over the summer. Hot temperature is more difficult. Also occasion lightheaded spell with bending over. After recent evaluation was placed on metoprolol tartrate at 50 mg BID and felt real dizzy and feel like "looking out of a box". Tried cutting dose in half and still feels poorly. Recent EKG on 8/28 showed sinus arrhythmia with LBBB, rate of 83.   Echo done, 8/28/2013 - CONCLUSIONS     1 - Concentric remodeling. Wall thickness is increased, with the septum and the posterior wall each measuring 1.1 cm across.    2 - Moderately depressed left ventricular systolic function (EF 30-35%).     3 - Diastolic dysfunction.     4 - Normal right ventricular systolic function .    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 Recent blood work reviewed - Uric acid 8.7, normal RF, ESR, CBC, BMP, and A1C. Lipid showed LDL of 157, [place] Cardiovascular Risk Score is 10%, which puts " "him in the intermediate risk category.    since visit of 9/13, no new problem. Feel better off metoprolol except for slight HA. Mild SOB, no CP.  Work up: Lexiscan 9/18/2013.  Nuclear Quantitative Functional Analysis:   LVEF: 31 % (normal is 47 - 59)  LVED Volume: 152 ml (normal is 91 - 155)  LVES Volume: 105 ml (normal is 40 - 78)    Impression: ABNORMAL MYOCARDIAL PERFUSION  1. There is evidence for moderate myocardial ischemia in the inferoseptal wall of the left ventricle with underlying injury present.   2. There is abnormal wall motion at rest showing moderate global hypokinesis of the left ventricle. severe hypokinesis of the septal wall of the left ventricle.   3. There is resting LV dysfunction with a reduced ejection fraction of 31 %. (normal is 47 - 59)  4. The ventricular volumes are normal at rest and stress.   5. The extracardiac distribution of radioactivity is normal.   6. [facility] SSS =3 = SDS, suggest that 3.8% of myocardium may be ischemic.    EKG shows LBBB with QRS duration of 156 msec. Discussed possibilities of AICD with CRT     Since visit of 9/26, had problem with increase dose of Lisinopril causing dizziness, lower back to 20 mg daily with improvement but then noticed sinus problem with recurrence of the dizziness. Also stopped the spironolactone/HCT due to "dryness" with cramps in the leg. Blood work showed normal BNP of 25, and BMP. Now also with gout flair up in the right arm. Last Uric acid was elevated, see above, could not tolerate Allopurinol due to stomach upset. Weight creeping upward. Sits alot at work.    Since visit of 10/24/2013. 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 Evaluated at Occupational Medicine clinic in [place] and told of need for cardiac clearance. Recent symptoms includes GARNETT. Anticipated work will involve tow ropes, making and breaking tows, and ratchets, have to lift 10 -20 lbs for maximum 10 feet. " Recent ECG LBBB, rate 74,  msec. Never had coronary angiogram for CM. Have continued on 40 mg of Lisinopril without further problem.     Since visit of 7/2014, underwent [facility]  HEMODYNAMIC RESULTS:    LVEDP (Pre): 9 mmHg  LVEDP (Post): 11 mmHg  Ejection Fraction: 40%  Global LV Function: mildly depressed    C. ANGIOGRAPHIC RESULTS:    DIAGNOSTIC:       Patient has a right dominant coronary artery.        The coronary vessels have luminal irregularities.        - Left Main Coronary Artery:             The LM has luminal irregularities. There is ALLYN 3 flow.       - Left Anterior Descending Artery:             The LAD has luminal irregularities. There is ALLYN 3 flow.       - Left Circumflex Artery:             The LCX has luminal irregularities. There is ALLYN 3 flow.       - Right Coronary Artery:             The RCA has luminal irregularities. There is ALLYN 3 flow.       - Aortic Root:             The Aortic Root has luminal irregularities. There is ALLYN 3 flow.      D. SUMMARY:    1. Non-obstructive CAD.  2. - Improve LV function from non-invasive evaluation.    E. RECOMMENDATIONS:    1. Maximize medical management.  2. Follow up with Dr. Kumar Manriquez.  3. Clear for new employment without restriction.    BP at home today 147/95 to 121/94, acute gouty attack yesterday. Indocin causes HTN and upset stomach. Urin acid in 3/2014 was 9.4. Discuss options, will refer back to Dr. Fuller. Consideration for referral to . Also discuss possible ICD with CRT Rx for the CM with LBBB, QRS duration 150 msec. Complain of some fluttering with dizziness. Cough potato recently, just got lazy. Suggest consideration for Cardiac Rehab, but history of hospitalization for CHF.     In 4/2017, returned for check up now have insurance. Had hospitalization for gout and taken off Lisinopril and changed to Norvasc due to coughing, 4-5 months ago. Here with concern of BP, no home check and still with occasional CP, associated with  "lack of sleep. Long term problem. Angio results as above. ECG shows chronic LBBB. BP elevated in office.    Holter 8/2014 - PREDOMINANT RHYTHM  1. Sinus rhythm with heart rates varying between 49 and 131 bpm with an average of 90 bpm.     VENTRICULAR ARRHYTHMIAS  1. There were very rare PVCs recorded totalling 2 and averaging less than 1 per hour.     2. There were no episodes of ventricular tachycardia.    SUPRA VENTRICULAR ARRHYTHMIAS  1. There were very rare PACs recorded totalling 8 and averaging less than 1 per hour.     2. There were no episodes of sustained supraventricular tachycardia.    SINUS NODE FUNCTION  1. There was no evidence of high grade SA ru block.     AV CONDUCTION  1. There was no evidence of high grade AV block.     DIARY  1. The diary was not returned    MISCELLANEOUS  1. This was a tape of adequate length (23 hrs).     In 12/2017, lots of concern about the gouty arthritis, several medication intolerance; Colchicine caused diarrhea, Uloric not helpful, NSAID cause LEONIE. Only helpful Rx was steroid injection, now refer to Podiatrist to complete the Rx. Last uric acid in 10/2017 was at target 5.8, peak was 10.1. K+ 3.9. Cardiac wise doing well, some worry of occasional "indigestion". ECG shows NSR, LBBB.    HPI Comments: in 12/2018, back for annual review, had taken self off all HTN medication, recall some electric discomfort in the left neck, once and decided to stop all medication. Recall that Lisinopril was working but stopped due to cough but also recall being on 40 mg Chart reviewed, was given single Rx for lisinopril HCT 20 - 12.5 for 30 days, never refilled. Do not check home BP, no machine. ECG remains with LBBB. Labs reviewed, 11/2018 uric acid 10.4, CMP K+ 3.9 with normal renal function, .  Echo 5/2017  CONCLUSIONS     1 - Concentric hypertrophy. Wall thickness is increased, with the septum measuring 1.3 cm and the posterior wall measuring 1.4 cm across.    2 - No wall motion " abnormalities.     3 - Normal left ventricular systolic function (EF 55-60%).     4 - Indeterminate LV diastolic function.     5 - Normal right ventricular systolic function .     6 - Difficult windows, recommend contrast use for future studies.     7 - Suggest improved function from Echo in 8/2013.       Review of Systems   Constitution: Positive for little weakness, malaise/fatigue and weight up and down with loss of appetite during gouty attacks. Negative for diaphoresis, fever, night sweats and weight gain.   HENT: Negative for nosebleeds. Have congestion, ear discharge with ringing, headaches , sore throat, pain on swallowing  Eyes: Negative for visual disturbance, have little left eye pain.   Cardiovascular: no further chest pain, mild dyspnea on exertion and no further near-syncope. Negative for claudication, cyanosis, irregular heartbeat, leg swelling, orthopnea, palpitations and paroxysmal nocturnal dyspnia.   Respiratory: Positive for cough, wheezing, shortness of breath and no further sleep disturbances due to breathing. Negative for snoring. 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 Negative for polydipsia and polyuria.   Hematologic/Lymphatic: Does not bruise/bleed easily.   Skin: Negative for nail changes, color change, flushing, poor wound healing and suspicious lesions.   Musculoskeletal: Positive for arthritis, back pain, falls, gout, joint pain, joint swelling, muscle cramps, muscle weakness, myalgias, neck pain and stiffness.   Gastrointestinal: occasional for nausea and heartburn, Negative for hematemesis, hematochezia and melena.   Genitourinary:        Recent UTI after sex.   Neurological: Positive for difficulty with concentration, excessive daytime sleepiness, dizziness, focal weakness, light-headedness and vertigo. Negative for disturbances in coordination, loss of balance and numbness.   Psychiatric/Behavioral:  "Positive for memory loss. Negative for depression and substance abuse. The patient has insomnia and is nervous/anxious.         Objective:    Physical Exam   Constitutional: He is oriented to person, place, and time. He appears well-developed and well-nourished.   HENT:   Head: Normocephalic.   Eyes: Conjunctivae normal and EOM are normal. Pupils are equal, round, and reactive to light.   Neck: Normal range of motion. Neck supple. No JVD present. No thyromegaly present.   Cardiovascular: Normal rate, regular rhythm and intact distal pulses.  Exam reveals no gallop and no friction rub.    No Murmur heard.  Pulmonary/Chest: Effort normal and breath sounds normal. He has no rales. He exhibits no tenderness.   Abdominal: Soft. Bowel sounds are normal. There is no tenderness.        Waist 42" up to 45", hip 42"   Musculoskeletal: Normal range of motion. He exhibits trace pitting edema around the sock line.   Lymphadenopathy:     He has no cervical adenopathy.   Neurological: He is alert and oriented to person, place, and time.   Skin: Skin is warm and dry. No rash noted.         Assessment:       1. Cardiovascular risk factor, FCVRS 10%, 2013, ASCVD 10-year event risk 10%, 2017    2. Chest pain, unspecified type    3. Left bundle branch block,  msec    4. Chronic fatigue    5. Dyslipidemia, baseline     6. Abnormal cardiovascular stress test    7. Gout, arthritis    8. Class 2 severe obesity due to excess calories with serious comorbidity and body mass index (BMI) of 35.0 to 35.9 in adult    9. Abdominal obesity    10. LVH (left ventricular hypertrophy)    11. Essential hypertension    12. Non compliance w medication regimen    13. Hypertensive retinopathy of left eye         Plan:     Jarod was seen today for follow-up.    Diagnoses and associated orders for this visit:    Cardiovascular risk factor, FCVRS 10%, 2013, ASCVD 10-year event risk 10%, 2017    Chest pain, unspecified type  -     EKG " 12-lead    Left bundle branch block,  msec    Chronic fatigue    Dyslipidemia, baseline     Abnormal cardiovascular stress test    Gout, arthritis    Class 2 severe obesity due to excess calories with serious comorbidity and body mass index (BMI) of 35.0 to 35.9 in adult    Abdominal obesity    LVH (left ventricular hypertrophy)  -     lisinopril-hydrochlorothiazide (PRINZIDE,ZESTORETIC) 20-12.5 mg per tablet; Take 1 tablet by mouth once daily.  Dispense: 90 tablet; Refill: 3    Essential hypertension    Non compliance w medication regimen    Hypertensive retinopathy of left eye    GARNETT (dyspnea on exertion) - improved    Fluttering heart - improved    Dizziness - improved    - All medical issues reviewed, continue current Rx.  - Recommend healthy living: no smoking, healthy diet (low salt / sodium) and regular exercise, and weight control  - Instruction for Mediterranean diet and heart healthy exercise given.  - Weigh twice weekly, try to lose 1-2 lbs per week  - Highly recommend 30 minutes of exercise daily, can have Sunday off, with 2-3 sessions of muscle strengthening weekly. A  would be very helpful.  - Check home blood pressure, 2 days weekly, do 2 readings within 5 minutes in AM and PM, keep log for review. Can do at drug stores for free  - Recommend at least annual cardiovascular evaluation in view of his significant risk factors.  - Consider CACS to decide about statin      Patient Active Problem List   Diagnosis    Arthritis    Allergic rhinitis due to allergen    Left bundle branch block,  msec    Gout, arthritis    Fatigue    Back pain    ROSITA (generalized anxiety disorder)    Obesity, BMI 34.1 increased to 35.2     Abdominal obesity    LVH (left ventricular hypertrophy)    Dizziness    Cardiovascular risk factor, FCVRS 10%, 2013, ASCVD 10-year event risk 10%, 2017    Dyslipidemia, baseline     Medication intolerance    Abnormal cardiovascular  stress test    Perennial sinusitis    Fluttering heart    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 The above assessment and plan have been discussed at length. Labs and procedure over the last 6 months reviewed. Problem List updated. Asked to bring in all active medications / pills bottles with next visit.

## 2019-02-01 ENCOUNTER — OFFICE VISIT (OUTPATIENT)
Dept: FAMILY MEDICINE | Facility: CLINIC | Age: 54
End: 2019-02-01
Payer: MEDICAID

## 2019-02-01 VITALS
TEMPERATURE: 98 F | WEIGHT: 218 LBS | HEIGHT: 66 IN | BODY MASS INDEX: 35.03 KG/M2 | SYSTOLIC BLOOD PRESSURE: 174 MMHG | HEART RATE: 83 BPM | DIASTOLIC BLOOD PRESSURE: 100 MMHG

## 2019-02-01 DIAGNOSIS — J01.00 ACUTE NON-RECURRENT MAXILLARY SINUSITIS: ICD-10-CM

## 2019-02-01 DIAGNOSIS — Z91.148 NON COMPLIANCE W MEDICATION REGIMEN: ICD-10-CM

## 2019-02-01 DIAGNOSIS — M79.672 BILATERAL FOOT PAIN: ICD-10-CM

## 2019-02-01 DIAGNOSIS — E66.01 SEVERE OBESITY (BMI 35.0-39.9) WITH COMORBIDITY: ICD-10-CM

## 2019-02-01 DIAGNOSIS — I10 UNCONTROLLED HYPERTENSION: Primary | ICD-10-CM

## 2019-02-01 DIAGNOSIS — R05.9 COUGH: ICD-10-CM

## 2019-02-01 DIAGNOSIS — M10.9 GOUT, ARTHRITIS: ICD-10-CM

## 2019-02-01 DIAGNOSIS — M79.671 BILATERAL FOOT PAIN: ICD-10-CM

## 2019-02-01 PROBLEM — F32.A DEPRESSION: Status: RESOLVED | Noted: 2018-09-28 | Resolved: 2019-02-01

## 2019-02-01 PROCEDURE — 99214 OFFICE O/P EST MOD 30 MIN: CPT | Mod: PBBFAC,PO | Performed by: NURSE PRACTITIONER

## 2019-02-01 PROCEDURE — 99215 OFFICE O/P EST HI 40 MIN: CPT | Mod: S$PBB,,, | Performed by: NURSE PRACTITIONER

## 2019-02-01 PROCEDURE — 99999 PR PBB SHADOW E&M-EST. PATIENT-LVL IV: CPT | Mod: PBBFAC,,, | Performed by: NURSE PRACTITIONER

## 2019-02-01 PROCEDURE — 99215 PR OFFICE/OUTPT VISIT, EST, LEVL V, 40-54 MIN: ICD-10-PCS | Mod: S$PBB,,, | Performed by: NURSE PRACTITIONER

## 2019-02-01 PROCEDURE — 99999 PR PBB SHADOW E&M-EST. PATIENT-LVL IV: ICD-10-PCS | Mod: PBBFAC,,, | Performed by: NURSE PRACTITIONER

## 2019-02-01 RX ORDER — ALBUTEROL SULFATE 90 UG/1
AEROSOL, METERED RESPIRATORY (INHALATION)
Qty: 18 G | Refills: 1 | Status: SHIPPED | OUTPATIENT
Start: 2019-02-01 | End: 2020-01-13 | Stop reason: CLARIF

## 2019-02-01 RX ORDER — INDOMETHACIN 25 MG/1
25 CAPSULE ORAL 3 TIMES DAILY PRN
Qty: 60 CAPSULE | Refills: 1 | Status: ON HOLD | OUTPATIENT
Start: 2019-02-01 | End: 2020-01-13

## 2019-02-01 RX ORDER — AMOXICILLIN 500 MG/1
500 TABLET, FILM COATED ORAL 2 TIMES DAILY WITH MEALS
Qty: 14 TABLET | Refills: 0 | Status: SHIPPED | OUTPATIENT
Start: 2019-02-01 | End: 2019-04-02 | Stop reason: ALTCHOICE

## 2019-02-01 RX ORDER — BETAMETHASONE SODIUM PHOSPHATE AND BETAMETHASONE ACETATE 3; 3 MG/ML; MG/ML
6 INJECTION, SUSPENSION INTRA-ARTICULAR; INTRALESIONAL; INTRAMUSCULAR; SOFT TISSUE
Status: COMPLETED | OUTPATIENT
Start: 2019-02-01 | End: 2019-02-01

## 2019-02-01 RX ORDER — FEBUXOSTAT 40 MG/1
40 TABLET, FILM COATED ORAL DAILY
Qty: 30 TABLET | Refills: 1 | Status: SHIPPED | OUTPATIENT
Start: 2019-02-01 | End: 2019-04-01 | Stop reason: SDUPTHER

## 2019-02-01 RX ORDER — VALSARTAN 320 MG/1
320 TABLET ORAL DAILY
Qty: 30 TABLET | Refills: 1 | Status: SHIPPED | OUTPATIENT
Start: 2019-02-01 | End: 2019-04-01 | Stop reason: SDUPTHER

## 2019-02-01 RX ADMIN — BETAMETHASONE ACETATE AND BETAMETHASONE SODIUM PHOSPHATE 6 MG: 3; 3 INJECTION, SUSPENSION INTRA-ARTICULAR; INTRALESIONAL; INTRAMUSCULAR; SOFT TISSUE at 03:02

## 2019-02-01 NOTE — PATIENT INSTRUCTIONS
Controlling High Blood Pressure  High blood pressure (hypertension) is often called the silent killer. This is because many people who have it dont know it. High blood pressure is defined as 140/90 mm Hg or higher. Know your blood pressure and remember to check it regularly. Doing so can save your life. Here are some things you can do to help control your blood pressure.    Choose heart-healthy foods  · Select low-salt, low-fat foods. Limit sodium intake to 2,400 mg per day or the amount suggested by your healthcare provider.  · Limit canned, dried, cured, packaged, and fast foods. These can contain a lot of salt.  · Eat 8 to 10 servings of fruits and vegetables every day.  · Choose lean meats, fish, or chicken.  · Eat whole-grain pasta, brown rice, and beans.  · Eat 2 to 3 servings of low-fat or fat-free dairy products.  · Ask your doctor about the DASH eating plan. This plan helps reduce blood pressure.  · When you go to a restaurant, ask that your meal be prepared with no added salt.  Maintain a healthy weight  · Ask your healthcare provider how many calories to eat a day. Then stick to that number.  · Ask your healthcare provider what weight range is healthiest for you. If you are overweight, a weight loss of only 3% to 5% of your body weight can help lower blood pressure. Generally, a good weight loss goal is to lose 10% of your body weight in a year.  · Limit snacks and sweets.  · Get regular exercise.  Get up and get active  · Choose activities you enjoy. Find ones you can do with friends or family. This includes bicycling, dancing, walking, and jogging.  · Park farther away from building entrances.  · Use stairs instead of the elevator.  · When you can, walk or bike instead of driving.  · Ducor leaves, garden, or do household repairs.  · Be active at a moderate to vigorous level of physical activity for at least 40 minutes for a minimum of 3 to 4 days a week.   Manage stress  · Make time to relax and enjoy  life. Find time to laugh.  · Communicate your concerns with your loved ones and your healthcare provider.  · Visit with family and friends, and keep up with hobbies.  Limit alcohol and quit smoking  · Men should have no more than 2 drinks per day.  · Women should have no more than 1 drink per day.  · Talk with your healthcare provider about quitting smoking. Smoking significantly increases your risk for heart disease and stroke. Ask your healthcare provider about community smoking cessation programs and other options.  Medicines  If lifestyle changes arent enough, your healthcare provider may prescribe high blood pressure medicine. Take all medicines as prescribed. If you have any questions about your medicines, ask your healthcare provider before stopping or changing them.   Date Last Reviewed: 4/27/2016  © 9545-3669 [employer]. [address] [place] [address], [place] [address]. All rights reserved. This information is not intended as a substitute for professional medical care. Always follow your healthcare professional's instructions.      THROW AWAY ALL OLD MEDICATION. ONLY TAKE WHAT IS ON YOUR LIST TODAY.

## 2019-02-01 NOTE — PROGRESS NOTES
Subjective:       Patient ID: Jarod Pierce is a 53 y.o. male.    Chief Complaint: Hypertension    Chief Complaint  Chief Complaint   Patient presents with    Hypertension       HPI  Jarod Pierce is a 53 y.o. male with medical diagnoses as listed in the medical history and problem list that presents for his regularly scheduled follow-up.  Patient is regularly treated for hypertension, chronic gout, arthritis, cardiomyopathy, and allergic rhinitis.  As usual, he presents today with very elevated blood pressure 160/96, 174/100.  When his medication list is reviewed he states that he is no longer taking his allopurinol, amlodipine, carvedilol, or valsartan as prescribed.  Patient states that he stopped all of these medications because he was not sure which one of them was making him feel bad.  He states he feels better now that he has stopped the medicine.  However he is complaining of a sinus/nasal problem.  He also is complaining of his chronic right foot pain which he rates a five on a 0-10 scale.  Patient had an appointment with the cardiologist on 12/20/2018, and at that visit his blood pressure was also elevated.  The cardiologist's note states that the patient told him he had stopped taking all of his medication except for the allopurinol.  At that visit he was started on lisinopril HCT 20/12.5 mg which he is also not taking today.  Patient states that he stopped the lisinopril HCT medication because he feels that it makes his gout attacks worse and more frequent.  He is not taking the allopurinol because it upsets his stomach.  BMI today is 35.19 and weight is unchanged at 218 lb since his last visit here.  Established patient with last clinic appointment 9/27/2018.        PAST MEDICAL HISTORY:  Past Medical History:   Diagnosis Date    Allergic rhinitis due to allergen     Arthritis     Cardiomyopathy     Gout     Hypertension     Left bundle branch block        PAST SURGICAL HISTORY:  Past  Surgical History:   Procedure Laterality Date    WISDOM TOOTH EXTRACTION         SOCIAL HISTORY:  Social History     Socioeconomic History    Marital status: Single     Spouse name: Not on file    Number of children: Not on file    Years of education: Not on file    Highest education level: Not on file   Social Needs    Financial resource strain: Not on file    Food insecurity - worry: Not on file    Food insecurity - inability: Not on file    Transportation needs - medical: Not on file    Transportation needs - non-medical: Not on file   Occupational History     Employer: [employer]   Tobacco Use    Smoking status: Never Smoker    Smokeless tobacco: Never Used   Substance and Sexual Activity    Alcohol use: No    Drug use: No    Sexual activity: Not on file   Other Topics Concern    Not on file   Social History Narrative    Not on file       FAMILY HISTORY:  Family History   Problem Relation Age of Onset    Heart disease Father     Hypertension Father     Gout Father     Cancer Sister         lung    Heart attack Neg Hx        ALLERGIES AND MEDICATIONS: updated and reviewed.  Review of patient's allergies indicates:   Allergen Reactions    Codeine      Other reaction(s): Hives    Lisinopril      Dry coughing    Phenytoin sodium extended      Other reaction(s): leg cramps     Current Outpatient Medications   Medication Sig Dispense Refill    albuterol (PROAIR HFA) 90 mcg/actuation inhaler INHALE TWO PUFFS INTO THE LUNGS 4 TIMES DAILY PRN wheezing, shortness of breath 18 g 1    clotrimazole-betamethasone 1-0.05% (LOTRISONE) cream APPLY  CREAM TOPICALLY TWICE DAILY TO AFFECTED AREA 45 g 1    diclofenac sodium 1 % Gel Apply 2 g topically 4 (four) times daily. for 10 days 1 Tube 3    amoxicillin (AMOXIL) 500 MG Tab Take 1 tablet (500 mg total) by mouth 2 (two) times daily with meals. For sinus infection, finish all medication 14 tablet 0    febuxostat (ULORIC) 40 mg Tab Take 1  tablet (40 mg total) by mouth once daily. For gout, lowering uric acid 30 tablet 1    indomethacin (INDOCIN) 25 MG capsule Take 1 capsule (25 mg total) by mouth 3 (three) times daily as needed (gout flare up). 60 capsule 1    valsartan (DIOVAN) 320 MG tablet Take 1 tablet (320 mg total) by mouth once daily. For heart disease and high blood pressure 30 tablet 1     No current facility-administered medications for this visit.        I have reviewed the patient's medical history in detail and updated the computerized patient record.    Review of Systems   Constitutional: Negative for activity change, appetite change, chills, fatigue and fever.   HENT: Positive for congestion, rhinorrhea and sinus pressure. Negative for ear pain, sinus pain and sore throat.         Stuffy and runny nose, sinus pressure.   Eyes: Negative for visual disturbance.   Respiratory: Positive for cough and wheezing. Negative for shortness of breath.         Productive of clear white phlegm. Some wheezing at night.   Cardiovascular: Negative for chest pain, palpitations and leg swelling.   Gastrointestinal: Negative for abdominal pain, constipation, diarrhea, nausea and vomiting.   Genitourinary: Negative for difficulty urinating and dysuria.        Nocturia, feels like he goes to the bathroom frequently at night.    Musculoskeletal: Positive for arthralgias and neck pain.        C/O pain to the posterior neck for 2 months, occasional feeling of shock like electric pain in the neck. Patient has chronic pain to both feet.    Skin: Positive for rash. Negative for wound.        Patient with dry skin to arms.    Neurological: Negative for dizziness, numbness and headaches.   Psychiatric/Behavioral: Positive for sleep disturbance. Negative for dysphoric mood. The patient is not nervous/anxious.         Sleeps fairly well at night, wakes frequently to urinate.          Objective:      Vitals:    02/01/19 1338 02/01/19 1341   BP: (!) 160/96 (!)  "174/100   BP Location: Right arm Left arm   Patient Position: Sitting Sitting   BP Method: Large (Automatic)    Pulse: 83    Temp: 98.3 °F (36.8 °C)    TempSrc: Oral    Weight: 98.9 kg (218 lb)    Height: 5' 6" (1.676 m)      Physical Exam   Constitutional: He is oriented to person, place, and time. He appears well-developed. No distress.   Blood pressure elevated 160/96, 174/100   HENT:   Head: Normocephalic and atraumatic.   Right Ear: Tympanic membrane, external ear and ear canal normal.   Left Ear: Tympanic membrane, external ear and ear canal normal.   Nose: Mucosal edema and rhinorrhea present. Right sinus exhibits maxillary sinus tenderness and frontal sinus tenderness. Left sinus exhibits no maxillary sinus tenderness and no frontal sinus tenderness.   Mouth/Throat: Oropharynx is clear and moist and mucous membranes are normal.   Bilateral nares with edematous and erythematous nasal mucosa with white secretions noted. Right maxillary and frontal sinus pain with palpation.   Eyes: Conjunctivae and lids are normal. Pupils are equal, round, and reactive to light. Right eye exhibits no discharge. Left eye exhibits no discharge.   Neck: Normal range of motion. Neck supple. Normal carotid pulses present. Carotid bruit is not present.   Cardiovascular: Normal rate, regular rhythm, S1 normal, S2 normal, normal heart sounds, intact distal pulses and normal pulses.   No murmur heard.  Pulses:       Carotid pulses are 2+ on the right side, and 2+ on the left side.       Radial pulses are 2+ on the right side, and 2+ on the left side.   No edema   Pulmonary/Chest: Effort normal and breath sounds normal. No respiratory distress. He has no decreased breath sounds. He has no wheezes. He has no rhonchi. He has no rales.   Musculoskeletal: Normal range of motion.   Lymphadenopathy:        Head (right side): No submental, no submandibular, no tonsillar, no preauricular, no posterior auricular and no occipital adenopathy " present.        Head (left side): No submental, no submandibular, no tonsillar, no preauricular, no posterior auricular and no occipital adenopathy present.     He has no cervical adenopathy.        Right: No supraclavicular adenopathy present.        Left: No supraclavicular adenopathy present.   Neurological: He is alert and oriented to person, place, and time. He has normal strength.   Skin: Skin is warm, dry and intact. No rash noted. He is not diaphoretic. No pallor.   Dry skin noted to arms.    Psychiatric: He has a normal mood and affect. His speech is normal and behavior is normal. Judgment and thought content normal. Cognition and memory are normal.   Nursing note and vitals reviewed.        Assessment:       1. Uncontrolled hypertension    2. Gout, arthritis    3. Bilateral foot pain    4. Acute non-recurrent maxillary sinusitis    5. Cough    6. Non compliance w medication regimen    7. Severe obesity (BMI 35.0-39.9) with comorbidity          Plan:       Jarod was seen today for hypertension.    Diagnoses and all orders for this visit:    Uncontrolled hypertension  -     valsartan (DIOVAN) 320 MG tablet; Take 1 tablet (320 mg total) by mouth once daily. For heart disease and high blood pressure  - blood pressure is elevated today 160/96 on the right arm, 174/100 on the left arm.  Patient reports that he has not been taking any of his blood pressure medications for at least two weeks.  - Educational handouts provided.  Controlling high blood pressure    Gout, arthritis  -     febuxostat (ULORIC) 40 mg Tab; Take 1 tablet (40 mg total) by mouth once daily. For gout, lowering uric acid  -     indomethacin (INDOCIN) 25 MG capsule; Take 1 capsule (25 mg total) by mouth 3 (three) times daily as needed (gout flare up).  - allopurinol is discontinued today due to patient not taking medication because it upsets stomach, will try Uloric in its place.  -   Lab Results   Component Value Date    URICACID 10.4 (H)  11/29/2018   - patient has also been to Rheumatology for his chronic gout and they have not had any impact on whether or not he takes his medication either    Bilateral foot pain  -     indomethacin (INDOCIN) 25 MG capsule; Take 1 capsule (25 mg total) by mouth 3 (three) times daily as needed (gout flare up).  - bilateral foot pain is presumably related to gout, patient states relieved with Indocin as needed.  Patient is advised that in this in taken on a frequent basis may also cause stomach upset, to use sparingly and only for severe pain    Acute non-recurrent maxillary sinusitis  -     amoxicillin (AMOXIL) 500 MG Tab; Take 1 tablet (500 mg total) by mouth 2 (two) times daily with meals. For sinus infection, finish all medication  -     betamethasone acetate-betamethasone sodium phosphate injection 6 mg    Cough  -     albuterol (PROAIR HFA) 90 mcg/actuation inhaler; INHALE TWO PUFFS INTO THE LUNGS 4 TIMES DAILY PRN wheezing, shortness of breath  -     amoxicillin (AMOXIL) 500 MG Tab; Take 1 tablet (500 mg total) by mouth 2 (two) times daily with meals. For sinus infection, finish all medication    Non compliance w medication regimen   I spent at least 20 minutes of this visit discussing with patient the need to comply with his medication regimens as prescribed to control his disease processes.  He expresses that he understands the need to take his medications, however, he interrupts frequently and tries to change the subject from the concentration on his behaviors that are having a detrimental effect on his health.  After some time, we were able to agree on a medication regimen that is much simplified from what was previously prescribed.  I have instructed him to dispose of all of the medications that he is keeping at his home and using intermittently.  He is instructed to only take the medications that are on his after visit summary from today.  He will follow up in the office in one month for a blood pressure  recheck.  Medication noncompliance is a chronic problem for this patient and I am doubtful that the time spent today is going to change his behavior.  Severe obesity (BMI 35.0-39.9) with comorbidity   BMI today is 35.19 and weight is unchanged at 218 lb since last visit on 9/27/2018   Weight loss recommended with well balanced diet and portion controlled meals and increased activity.     A total of 45 minutes was spent with patient with more than 50% of time spent on counseling and coordination of care, health maintenance reviewed with appropriate recommendations made, previous lab results discussed, current BMI discussed, appropriate diet, exercise and lifestyle modification recommendations made.  A lengthy amount of time was spent on addressing medication noncompliance, see above.    Follow-up in about 4 weeks (around 3/1/2019) for BLOOD PRESSURE FOLLOW UP.      Patient readiness: nonacceptance and barriers:readiness and household issues    During the course of the visit the patient was educated and counseled about the following:     Hypertension:   Medication: begin Valsartan 76365 mg daily and dispose of all old medications that he has in his home.  Dietary sodium restriction.  Regular aerobic exercise.  Follow up: 1 month and as needed.  Obesity:   General weight loss/lifestyle modification strategies discussed (elicit support from others; identify saboteurs; non-food rewards, etc).  Informal exercise measures discussed, e.g. taking stairs instead of elevator.  Regular aerobic exercise program discussed.    Goals: Hypertension: Reduce Blood Pressure and Obesity: Reduce calorie intake and BMI    Did patient meet goals/outcomes: No    The following self management tools provided: declined    Patient Instructions (the written plan) was given to the patient/family.     Time spent with patient: 45 minutes    Barriers to medications present (no )    Adverse reactions to current medications (no)    Over the counter  medications reviewed (Yes)

## 2019-02-18 ENCOUNTER — LAB VISIT (OUTPATIENT)
Dept: LAB | Facility: HOSPITAL | Age: 54
End: 2019-02-18
Attending: INTERNAL MEDICINE
Payer: MEDICAID

## 2019-02-18 DIAGNOSIS — M1A.0311 IDIOPATHIC CHRONIC GOUT OF RIGHT WRIST WITH TOPHUS: ICD-10-CM

## 2019-02-18 LAB
ALBUMIN SERPL BCP-MCNC: 4 G/DL
ALP SERPL-CCNC: 99 U/L
ALT SERPL W/O P-5'-P-CCNC: 17 U/L
ANION GAP SERPL CALC-SCNC: 9 MMOL/L
AST SERPL-CCNC: 16 U/L
BASOPHILS # BLD AUTO: 0 K/UL
BASOPHILS NFR BLD: 0.5 %
BILIRUB SERPL-MCNC: 1 MG/DL
BUN SERPL-MCNC: 17 MG/DL
CALCIUM SERPL-MCNC: 9.3 MG/DL
CHLORIDE SERPL-SCNC: 107 MMOL/L
CO2 SERPL-SCNC: 26 MMOL/L
CREAT SERPL-MCNC: 1.1 MG/DL
DIFFERENTIAL METHOD: ABNORMAL
EOSINOPHIL # BLD AUTO: 0.1 K/UL
EOSINOPHIL NFR BLD: 2.2 %
ERYTHROCYTE [DISTWIDTH] IN BLOOD BY AUTOMATED COUNT: 13.3 %
EST. GFR  (AFRICAN AMERICAN): >60 ML/MIN/1.73 M^2
EST. GFR  (NON AFRICAN AMERICAN): >60 ML/MIN/1.73 M^2
GLUCOSE SERPL-MCNC: 90 MG/DL
HCT VFR BLD AUTO: 45.4 %
HGB BLD-MCNC: 14.9 G/DL
LYMPHOCYTES # BLD AUTO: 1.6 K/UL
LYMPHOCYTES NFR BLD: 33.6 %
MCH RBC QN AUTO: 31.3 PG
MCHC RBC AUTO-ENTMCNC: 32.9 G/DL
MCV RBC AUTO: 95 FL
MONOCYTES # BLD AUTO: 0.5 K/UL
MONOCYTES NFR BLD: 10.3 %
NEUTROPHILS # BLD AUTO: 2.5 K/UL
NEUTROPHILS NFR BLD: 53.4 %
PLATELET # BLD AUTO: 209 K/UL
PMV BLD AUTO: 7.3 FL
POTASSIUM SERPL-SCNC: 3.8 MMOL/L
PROT SERPL-MCNC: 7 G/DL
RBC # BLD AUTO: 4.78 M/UL
SODIUM SERPL-SCNC: 142 MMOL/L
URATE SERPL-MCNC: 4.1 MG/DL
WBC # BLD AUTO: 4.7 K/UL

## 2019-02-18 PROCEDURE — 36415 COLL VENOUS BLD VENIPUNCTURE: CPT

## 2019-02-18 PROCEDURE — 80053 COMPREHEN METABOLIC PANEL: CPT

## 2019-02-18 PROCEDURE — 85025 COMPLETE CBC W/AUTO DIFF WBC: CPT

## 2019-02-18 PROCEDURE — 84550 ASSAY OF BLOOD/URIC ACID: CPT

## 2019-03-21 ENCOUNTER — HOSPITAL ENCOUNTER (EMERGENCY)
Facility: HOSPITAL | Age: 54
Discharge: HOME | End: 2019-03-21
Attending: EMERGENCY MEDICINE
Payer: MEDICAID

## 2019-03-21 VITALS
OXYGEN SATURATION: 95 % | RESPIRATION RATE: 12 BRPM | HEART RATE: 84 BPM | TEMPERATURE: 97 F | SYSTOLIC BLOOD PRESSURE: 178 MMHG | BODY MASS INDEX: 35.03 KG/M2 | DIASTOLIC BLOOD PRESSURE: 101 MMHG | HEIGHT: 66 IN | WEIGHT: 218 LBS

## 2019-03-21 DIAGNOSIS — T15.91XA EYE FOREIGN BODY, RIGHT, INITIAL ENCOUNTER: Primary | ICD-10-CM

## 2019-03-21 PROCEDURE — 99283 EMERGENCY DEPT VISIT LOW MDM: CPT

## 2019-03-21 NOTE — ED PROVIDER NOTES
"Encounter Date: 3/21/2019    SCRIBE #1 NOTE: I, Tere Perez, am scribing for, and in the presence of, Dr. Joey Izaguirre.       History     Chief Complaint   Patient presents with    Eye Problem     something in R eye x 2 days       Time seen by provider: 7:09 AM on 03/21/2019    Jarod Pierce is a 53 y.o. male with PMHx of HTN and cardiomyopathy who presents to the ED with complaints of constant bilateral eye pain that started x2 days ago. Pain is describes as a foreign body sensation and is predominately in the right eye. 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 He endorses wearing protective eye gear. He wears glasses daily. He denies eye surgeries. Denies vision changes or onset of any other new symptoms currently. Patient has no other medical concerns or complaints at this moment. No pertinent SHx on file. Codeine allergy noted.       The history is provided by the patient.     Review of patient's allergies indicates:   Allergen Reactions    Codeine      Other reaction(s): Hives    Lisinopril      Dry coughing    Phenytoin sodium extended      Other reaction(s): leg cramps     Past Medical History:   Diagnosis Date    Allergic rhinitis due to allergen     Arthritis     Cardiomyopathy     Gout     Hypertension     Left bundle branch block      Past Surgical History:   Procedure Laterality Date    WISDOM TOOTH EXTRACTION       Family History   Problem Relation Age of Onset    Heart disease Father     Hypertension Father     Gout Father     Cancer Sister         lung    Heart attack Neg Hx      Social History     Tobacco Use    Smoking status: Never Smoker    Smokeless tobacco: Never Used   Substance Use Topics    Alcohol use: No    Drug use: No     Review of Systems   Constitutional: Negative for chills.   HENT: Negative for facial swelling.    Eyes: Positive for pain (bilateral). Negative for photophobia, discharge and visual " disturbance.   Respiratory: Negative for cough.    Cardiovascular: Negative for chest pain.   Gastrointestinal: Negative for nausea.   Musculoskeletal: Negative for gait problem.   Skin: Negative for rash.   Neurological: Negative for seizures.   Hematological: Does not bruise/bleed easily.   Psychiatric/Behavioral: Negative for confusion.       Physical Exam     Initial Vitals [03/21/19 0700]   BP Pulse Resp Temp SpO2   (!) 178/101 84 12 97.4 °F (36.3 °C) 95 %      MAP       --         Physical Exam    Nursing note and vitals reviewed.  Constitutional: He appears well-developed and well-nourished. He is not diaphoretic. No distress.   HENT:   Head: Normocephalic and atraumatic.   Mouth/Throat: Oropharynx is clear and moist.   Eyes: EOM are normal. Pupils are equal, round, and reactive to light. Right eye exhibits no discharge. Foreign body present in the right eye. Left eye exhibits no discharge. No foreign body present in the left eye.   Mild scleral injection to the right eye. Small 1 cm mobile foreign body evident. No fixed foreign body in the cornea. No corneal abrasions noted. Negative Mil's test. Complete relief with Proparacaine application. Lids everted and swept. No foreign bodies to left eye.    Neck: Neck supple.   Cardiovascular: Normal rate, regular rhythm, normal heart sounds and intact distal pulses. Exam reveals no gallop and no friction rub.    No murmur heard.  Pulmonary/Chest: Breath sounds normal. He has no wheezes. He has no rhonchi. He has no rales.   Musculoskeletal: Normal range of motion.   Neurological: He is alert and oriented to person, place, and time.   Skin: No erythema.         ED Course   Procedures  Labs Reviewed - No data to display       Imaging Results    None          Medical Decision Making:   History:   Old Medical Records: I decided to obtain old medical records.            Scribe Attestation:   Scribe #1: I performed the above scribed service and the documentation  accurately describes the services I performed. I attest to the accuracy of the note.      I, Dr. Joey Izaguirre, personally performed the services described in this documentation. All medical record entries made by the scribe were at my direction and in my presence.  I have reviewed the chart and agree that the record reflects my personal performance and is accurate and complete. Joey Izaguirre MD.  4:44 PM 03/21/2019    Jarod Pierce is a 53 y.o. male presenting with small piece of foreign material in the right eye.  This was not fixed in the cornea but was subconjunctival.  This was easily cleared with saline flush.  There is no persistent foreign body on repeat visualization or corneal abrasion or ulceration.  I doubt open globe.  He is appropriate for outpatient follow-up.  Symptoms are completely resolved with measures above as well as repair cane application.  I have very low suspicion for other emergent, eye-threatening disease such as acute angle closure glaucoma or uveitis.  Return precautions reviewed.             Clinical Impression:       ICD-10-CM ICD-9-CM   1. Eye foreign body, right, initial encounter T15.91XA 930.9     [id number]         Disposition:   Disposition: Discharged  Condition: Stable                        Joey Izaguirre MD  03/21/19 [id number]

## 2019-04-01 DIAGNOSIS — M10.9 GOUT, ARTHRITIS: ICD-10-CM

## 2019-04-01 DIAGNOSIS — I10 UNCONTROLLED HYPERTENSION: ICD-10-CM

## 2019-04-02 RX ORDER — FEBUXOSTAT 40 MG/1
TABLET ORAL
Qty: 30 TABLET | Refills: 1 | Status: SHIPPED | OUTPATIENT
Start: 2019-04-02 | End: 2020-01-13 | Stop reason: CLARIF

## 2019-04-02 RX ORDER — VALSARTAN 320 MG/1
TABLET ORAL
Qty: 30 TABLET | Refills: 1 | Status: SHIPPED | OUTPATIENT
Start: 2019-04-02 | End: 2020-01-13 | Stop reason: CLARIF

## 2019-04-02 NOTE — TELEPHONE ENCOUNTER
I filled the prescriptions. However, he was supposed to follow up in one month after his last appt on 2/1/19 for poorly controlled hypertension. He didn't. He was seen in the ER on 3/21/19 and the blood pressure was very elevated. He needs to come into the office taking his medications as prescribed so I can see if his blood pressure is controlled on the current medication.   Thanks.  Ariadne

## 2019-04-11 ENCOUNTER — OFFICE VISIT (OUTPATIENT)
Dept: FAMILY MEDICINE | Facility: CLINIC | Age: 54
End: 2019-04-11
Payer: MEDICAID

## 2019-04-11 ENCOUNTER — LAB VISIT (OUTPATIENT)
Dept: LAB | Facility: HOSPITAL | Age: 54
End: 2019-04-11
Attending: INTERNAL MEDICINE
Payer: MEDICAID

## 2019-04-11 ENCOUNTER — OFFICE VISIT (OUTPATIENT)
Dept: RHEUMATOLOGY | Facility: CLINIC | Age: 54
End: 2019-04-11
Payer: MEDICAID

## 2019-04-11 VITALS
HEART RATE: 77 BPM | BODY MASS INDEX: 34.72 KG/M2 | DIASTOLIC BLOOD PRESSURE: 88 MMHG | HEIGHT: 66 IN | WEIGHT: 216 LBS | TEMPERATURE: 98 F | SYSTOLIC BLOOD PRESSURE: 150 MMHG

## 2019-04-11 VITALS
BODY MASS INDEX: 34.82 KG/M2 | DIASTOLIC BLOOD PRESSURE: 96 MMHG | HEART RATE: 85 BPM | HEIGHT: 66 IN | SYSTOLIC BLOOD PRESSURE: 154 MMHG | WEIGHT: 216.69 LBS

## 2019-04-11 DIAGNOSIS — E78.5 DYSLIPIDEMIA, GOAL LDL BELOW 130: ICD-10-CM

## 2019-04-11 DIAGNOSIS — M1A.0311 IDIOPATHIC CHRONIC GOUT OF RIGHT WRIST WITH TOPHUS: Primary | ICD-10-CM

## 2019-04-11 DIAGNOSIS — Z91.148 NON COMPLIANCE W MEDICATION REGIMEN: ICD-10-CM

## 2019-04-11 DIAGNOSIS — N17.9 AKI (ACUTE KIDNEY INJURY): ICD-10-CM

## 2019-04-11 DIAGNOSIS — J30.89 PERENNIAL SINUSITIS: ICD-10-CM

## 2019-04-11 DIAGNOSIS — M62.838 NECK MUSCLE SPASM: ICD-10-CM

## 2019-04-11 DIAGNOSIS — I10 ESSENTIAL HYPERTENSION: Primary | ICD-10-CM

## 2019-04-11 DIAGNOSIS — M10.9 GOUT, ARTHRITIS: ICD-10-CM

## 2019-04-11 DIAGNOSIS — M1A.0311 IDIOPATHIC CHRONIC GOUT OF RIGHT WRIST WITH TOPHUS: ICD-10-CM

## 2019-04-11 LAB
ALBUMIN SERPL BCP-MCNC: 4.4 G/DL (ref 3.5–5.2)
ALP SERPL-CCNC: 110 U/L (ref 55–135)
ALT SERPL W/O P-5'-P-CCNC: 18 U/L (ref 10–44)
ANION GAP SERPL CALC-SCNC: 7 MMOL/L (ref 8–16)
AST SERPL-CCNC: 15 U/L (ref 10–40)
BASOPHILS # BLD AUTO: 0 K/UL (ref 0–0.2)
BASOPHILS NFR BLD: 0.4 % (ref 0–1.9)
BILIRUB SERPL-MCNC: 1.1 MG/DL (ref 0.1–1)
BUN SERPL-MCNC: 28 MG/DL (ref 6–20)
CALCIUM SERPL-MCNC: 9.7 MG/DL (ref 8.7–10.5)
CHLORIDE SERPL-SCNC: 107 MMOL/L (ref 95–110)
CHOLEST SERPL-MCNC: 245 MG/DL (ref 120–199)
CHOLEST/HDLC SERPL: 7 {RATIO} (ref 2–5)
CO2 SERPL-SCNC: 27 MMOL/L (ref 23–29)
CREAT SERPL-MCNC: 1.6 MG/DL (ref 0.5–1.4)
CRP SERPL-MCNC: 3.1 MG/L (ref 0–8.2)
DIFFERENTIAL METHOD: ABNORMAL
EOSINOPHIL # BLD AUTO: 0.1 K/UL (ref 0–0.5)
EOSINOPHIL NFR BLD: 1.8 % (ref 0–8)
ERYTHROCYTE [DISTWIDTH] IN BLOOD BY AUTOMATED COUNT: 13.4 % (ref 11.5–14.5)
ERYTHROCYTE [SEDIMENTATION RATE] IN BLOOD BY WESTERGREN METHOD: 11 MM/HR (ref 0–10)
EST. GFR  (AFRICAN AMERICAN): 56 ML/MIN/1.73 M^2
EST. GFR  (NON AFRICAN AMERICAN): 48 ML/MIN/1.73 M^2
GLUCOSE SERPL-MCNC: 100 MG/DL (ref 70–110)
HCT VFR BLD AUTO: 45.9 % (ref 40–54)
HDLC SERPL-MCNC: 35 MG/DL (ref 40–75)
HDLC SERPL: 14.3 % (ref 20–50)
HGB BLD-MCNC: 15.4 G/DL (ref 14–18)
LDLC SERPL CALC-MCNC: 156.6 MG/DL (ref 63–159)
LYMPHOCYTES # BLD AUTO: 1.6 K/UL (ref 1–4.8)
LYMPHOCYTES NFR BLD: 29 % (ref 18–48)
MCH RBC QN AUTO: 31 PG (ref 27–31)
MCHC RBC AUTO-ENTMCNC: 33.6 G/DL (ref 32–36)
MCV RBC AUTO: 92 FL (ref 82–98)
MONOCYTES # BLD AUTO: 0.5 K/UL (ref 0.3–1)
MONOCYTES NFR BLD: 9.1 % (ref 4–15)
NEUTROPHILS # BLD AUTO: 3.4 K/UL (ref 1.8–7.7)
NEUTROPHILS NFR BLD: 59.7 % (ref 38–73)
NONHDLC SERPL-MCNC: 210 MG/DL
PLATELET # BLD AUTO: 214 K/UL (ref 150–350)
PMV BLD AUTO: 7 FL (ref 9.2–12.9)
POTASSIUM SERPL-SCNC: 4.1 MMOL/L (ref 3.5–5.1)
PROT SERPL-MCNC: 7.5 G/DL (ref 6–8.4)
RBC # BLD AUTO: 4.97 M/UL (ref 4.6–6.2)
SODIUM SERPL-SCNC: 141 MMOL/L (ref 136–145)
TRIGL SERPL-MCNC: 267 MG/DL (ref 30–150)
URATE SERPL-MCNC: 4.8 MG/DL (ref 3.4–7)
WBC # BLD AUTO: 5.6 K/UL (ref 3.9–12.7)

## 2019-04-11 PROCEDURE — 99213 OFFICE O/P EST LOW 20 MIN: CPT | Mod: PBBFAC,PO | Performed by: INTERNAL MEDICINE

## 2019-04-11 PROCEDURE — 99213 OFFICE O/P EST LOW 20 MIN: CPT | Mod: S$PBB,,, | Performed by: INTERNAL MEDICINE

## 2019-04-11 PROCEDURE — 99999 PR PBB SHADOW E&M-EST. PATIENT-LVL IV: CPT | Mod: PBBFAC,,, | Performed by: NURSE PRACTITIONER

## 2019-04-11 PROCEDURE — 85025 COMPLETE CBC W/AUTO DIFF WBC: CPT

## 2019-04-11 PROCEDURE — 99214 OFFICE O/P EST MOD 30 MIN: CPT | Mod: PBBFAC,27,PO | Performed by: NURSE PRACTITIONER

## 2019-04-11 PROCEDURE — 80053 COMPREHEN METABOLIC PANEL: CPT

## 2019-04-11 PROCEDURE — 99999 PR PBB SHADOW E&M-EST. PATIENT-LVL III: CPT | Mod: PBBFAC,,, | Performed by: INTERNAL MEDICINE

## 2019-04-11 PROCEDURE — 86140 C-REACTIVE PROTEIN: CPT

## 2019-04-11 PROCEDURE — 99213 PR OFFICE/OUTPT VISIT, EST, LEVL III, 20-29 MIN: ICD-10-PCS | Mod: S$PBB,,, | Performed by: INTERNAL MEDICINE

## 2019-04-11 PROCEDURE — 99999 PR PBB SHADOW E&M-EST. PATIENT-LVL IV: ICD-10-PCS | Mod: PBBFAC,,, | Performed by: NURSE PRACTITIONER

## 2019-04-11 PROCEDURE — 80061 LIPID PANEL: CPT

## 2019-04-11 PROCEDURE — 99214 OFFICE O/P EST MOD 30 MIN: CPT | Mod: S$PBB,,, | Performed by: NURSE PRACTITIONER

## 2019-04-11 PROCEDURE — 85651 RBC SED RATE NONAUTOMATED: CPT

## 2019-04-11 PROCEDURE — 84550 ASSAY OF BLOOD/URIC ACID: CPT

## 2019-04-11 PROCEDURE — 36415 COLL VENOUS BLD VENIPUNCTURE: CPT

## 2019-04-11 PROCEDURE — 99999 PR PBB SHADOW E&M-EST. PATIENT-LVL III: ICD-10-PCS | Mod: PBBFAC,,, | Performed by: INTERNAL MEDICINE

## 2019-04-11 PROCEDURE — 99214 PR OFFICE/OUTPT VISIT, EST, LEVL IV, 30-39 MIN: ICD-10-PCS | Mod: S$PBB,,, | Performed by: NURSE PRACTITIONER

## 2019-04-11 RX ORDER — MONTELUKAST SODIUM 10 MG/1
10 TABLET ORAL NIGHTLY
Qty: 30 TABLET | Refills: 5 | Status: SHIPPED | OUTPATIENT
Start: 2019-04-11 | End: 2020-01-13 | Stop reason: CLARIF

## 2019-04-11 RX ORDER — ACETAMINOPHEN 500 MG
500 TABLET ORAL EVERY 6 HOURS PRN
Refills: 0 | COMMUNITY
Start: 2019-04-11 | End: 2020-01-13 | Stop reason: CLARIF

## 2019-04-11 RX ORDER — METHOCARBAMOL 500 MG/1
500 TABLET, FILM COATED ORAL 3 TIMES DAILY PRN
Qty: 30 TABLET | Refills: 0 | Status: SHIPPED | OUTPATIENT
Start: 2019-04-11 | End: 2019-04-21

## 2019-04-11 RX ORDER — METOPROLOL SUCCINATE 25 MG/1
25 TABLET, EXTENDED RELEASE ORAL DAILY
Qty: 30 TABLET | Refills: 2 | Status: SHIPPED | OUTPATIENT
Start: 2019-04-11 | End: 2020-01-13 | Stop reason: CLARIF

## 2019-04-11 RX ORDER — NAPROXEN 500 MG/1
TABLET ORAL
COMMUNITY
Start: 2019-04-02 | End: 2019-04-11 | Stop reason: ALTCHOICE

## 2019-04-11 ASSESSMENT — ROUTINE ASSESSMENT OF PATIENT INDEX DATA (RAPID3)
PSYCHOLOGICAL DISTRESS SCORE: 4.4
AM STIFFNESS SCORE: 1, YES
FATIGUE SCORE: 7
PATIENT GLOBAL ASSESSMENT SCORE: 6
TOTAL RAPID3 SCORE: 5.55
PAIN SCORE: 7
WHEN YOU AWAKENED IN THE MORNING OVER THE LAST WEEK, PLEASE INDICATE THE AMOUNT OF TIME IT TAKES UNTIL YOU ARE AS LIMBER AS YOU WILL BE FOR THE DAY: ALL DAY
MDHAQ FUNCTION SCORE: 1.1

## 2019-04-11 NOTE — PATIENT INSTRUCTIONS
Your High Blood Pressure Risk Factors  Risk factors are things that make you more likely to have a disease or condition. Do you know your risk factors for high blood pressure? You cant do anything about some risk factors. But other risk factors are things that can be changed. Know what high blood pressure risk factors you have. Then find out what changes you can make to help control your risk for high blood pressure. Start with the change that you think will be easiest for you.  Risk factors you cant control  Though you cant change any of the things listed below, check off the ones that apply to you. The more boxes you check, the greater your risk for high blood pressure.  Family history  ? One or both of your parents or grandparents has had high blood pressure or heart disease.  Gender and age  ? Youre a man over age 55 or a postmenopausal woman.  Risk factors you can control  There are plenty of risk factors for high blood pressure that you can control. Learn what these risk factors are and then find out how to reduce your risk. Check the ones that apply to you.  ? What you eat  Do you eat a lot of salty, fatty, fried, or greasy foods? Do you go to restaurants or eat out frequently?  ? Alcohol consumption  Do you drink more than 1 drink a day if you are a female or more than 2 drinks a day if you male?   ? If you smoke or someone close to you smokes  Do you smoke cigarettes or cigars, chew tobacco, or dip snuff? Are you exposed to second-hand smoke on a regular basis?  ? How active you are   Are you inactive most of the time at work and at home? Do you go weeks without exercising?  ? Your weight   Has your doctor said that you are 15 or more pounds overweight?  ? Your stress level    Do you often feel anxious, nervous, and stressed? Do you feel that you don't have a supportive environment?  Date Last Reviewed: 4/27/2016  © 2959-9252 [employer] [address] [place] [address], [place] [address]. All  rights reserved. This information is not intended as a substitute for professional medical care. Always follow your healthcare professional's instructions.

## 2019-04-11 NOTE — PROGRESS NOTES
Subjective:       Patient ID: Jarod Pierce is a 53 y.o. male.    Chief Complaint: Hypertension and Swelling (Fingers)    Chief Complaint  Chief Complaint   Patient presents with    Hypertension    Swelling     Fingers       HPI  Jarod Pierce is a 53 y.o. male with medical diagnoses as listed in the medical history and problem list that presents for a follow up of hypertension.  Patient c/o muscle spasm to neck like a Don Horse to the left side of the neck. Patient is also regularly treated for gout and hyperuricemia but he follows up with Rheumatology for this.  He also has hyperlipidemia and is currently not taking any medication for treatment.  He has left ventricular hypertrophy and has been followed in the past by cardiologist, last visit Dr. Manriquez was in December.  Blood pressure today is 157/93 on the right arm and 156/90 on the left arm, repeat measure at end of office visit 150/88.  BMI is 34.86 and the patient has lost 2 lb since his last visit.  Patient is chronically noncompliant with his medications.  Established patient with last clinic appointment 2/1/2019.        PAST MEDICAL HISTORY:  Past Medical History:   Diagnosis Date    Allergic rhinitis due to allergen     Arthritis     Cardiomyopathy     Gout     Hypertension     Left bundle branch block        PAST SURGICAL HISTORY:  Past Surgical History:   Procedure Laterality Date    WISDOM TOOTH EXTRACTION         SOCIAL HISTORY:  Social History     Socioeconomic History    Marital status: Single     Spouse name: Not on file    Number of children: Not on file    Years of education: Not on file    Highest education level: Not on file   Occupational History     Employer: [employer]   Social Needs    Financial resource strain: Not on file    Food insecurity:     Worry: Not on file     Inability: Not on file    Transportation needs:     Medical: Not on file     Non-medical: Not on file   Tobacco Use    Smoking  status: Never Smoker    Smokeless tobacco: Never Used   Substance and Sexual Activity    Alcohol use: No    Drug use: No    Sexual activity: Not on file   Lifestyle    Physical activity:     Days per week: Not on file     Minutes per session: Not on file    Stress: Not on file   Relationships    Social connections:     Talks on phone: Not on file     Gets together: Not on file     Attends Congregational service: Not on file     Active member of club or organization: Not on file     Attends meetings of clubs or organizations: Not on file     Relationship status: Not on file   Other Topics Concern    Not on file   Social History Narrative    Not on file       FAMILY HISTORY:  Family History   Problem Relation Age of Onset    Heart disease Father     Hypertension Father     Gout Father     Cancer Sister         lung    Heart attack Neg Hx        ALLERGIES AND MEDICATIONS: updated and reviewed.  Review of patient's allergies indicates:   Allergen Reactions    Codeine      Other reaction(s): Hives    Lisinopril      Dry coughing    Phenytoin sodium extended      Other reaction(s): leg cramps     Current Outpatient Medications   Medication Sig Dispense Refill    albuterol (PROAIR HFA) 90 mcg/actuation inhaler INHALE TWO PUFFS INTO THE LUNGS 4 TIMES DAILY PRN wheezing, shortness of breath 18 g 1    clotrimazole-betamethasone 1-0.05% (LOTRISONE) cream APPLY  CREAM TOPICALLY TWICE DAILY TO AFFECTED AREA 45 g 1    diclofenac sodium 1 % Gel Apply 2 g topically 4 (four) times daily. for 10 days 1 Tube 3    indomethacin (INDOCIN) 25 MG capsule Take 1 capsule (25 mg total) by mouth 3 (three) times daily as needed (gout flare up). 60 capsule 1    ULORIC 40 mg Tab TAKE 1 TABLET BY MOUTH ONCE DAILY FOR  GOUT,  LOWERING  URIC  ACID 30 tablet 1    valsartan (DIOVAN) 320 MG tablet TAKE 1 TABLET BY MOUTH ONCE DAILY FOR  HEART  DISEASE  AND  HIGH  BLOOD  PRESSURE 30 tablet 1    acetaminophen (TYLENOL) 500 MG tablet  Take 1 tablet (500 mg total) by mouth every 6 (six) hours as needed for Pain.  0    benzonatate (TESSALON) 100 MG capsule Take 1 capsule (100 mg total) by mouth 3 (three) times daily as needed for Cough. 30 capsule 0    methocarbamol (ROBAXIN) 500 MG Tab Take 1 tablet (500 mg total) by mouth 3 (three) times daily as needed. Muscle relaxant for neck pain 30 tablet 0    metoprolol succinate (TOPROL-XL) 25 MG 24 hr tablet Take 1 tablet (25 mg total) by mouth once daily. For blood pressure in the evening 30 tablet 2    montelukast (SINGULAIR) 10 mg tablet Take 1 tablet (10 mg total) by mouth every evening. For sinus allergies 30 tablet 5    pravastatin (PRAVACHOL) 20 MG tablet Take 1 tablet (20 mg total) by mouth once daily. 30 tablet 5     No current facility-administered medications for this visit.        I have reviewed the patient's medical, family, and social history in detail and updated the computerized patient record.    Review of Systems   Constitutional: Negative for activity change, appetite change, chills, fatigue and fever.   HENT: Positive for congestion, postnasal drip, rhinorrhea and voice change. Negative for ear pain, sinus pressure, sinus pain and sore throat.         Patient has sinus allergies with congestion, runny nose, post nasal drip and hoarse voice.    Eyes: Negative for visual disturbance.        Vision corrected with glasses.   Respiratory: Negative for cough and shortness of breath.    Cardiovascular: Negative for chest pain and palpitations.        Swelling to hands, feet.    Gastrointestinal: Negative for abdominal pain, constipation, diarrhea, nausea and vomiting.        Feels like abdomen is firm.    Genitourinary: Negative for difficulty urinating and dysuria.   Musculoskeletal: Positive for arthralgias and neck pain.        Patient has pain to multiple joints, today is complaining of some muscle spasm to the left side of the neck.    Skin: Negative for rash and wound.  "  Neurological: Positive for headaches. Negative for dizziness and numbness.        Headache, sinus type to face and forehead.   Hematological: Does not bruise/bleed easily.   Psychiatric/Behavioral: Negative for dysphoric mood and sleep disturbance. The patient is not nervous/anxious.          Objective:      Vitals:    04/11/19 1554 04/11/19 1558 04/11/19 1655   BP: (!) 157/93 (!) 156/93 (!) 150/88   BP Location: Right arm Left arm Right arm   Patient Position: Sitting Sitting Sitting   BP Method: Large (Automatic) X-Large (Automatic) Large (Manual)   Pulse: 77     Temp: 98.2 °F (36.8 °C)     TempSrc: Oral     Weight: 98 kg (216 lb)     Height: 5' 6" (1.676 m)       Physical Exam   Constitutional: He is oriented to person, place, and time. Vital signs are normal. He appears well-developed. No distress.   Blood pressure elevated 156/90, 157/93   HENT:   Head: Normocephalic and atraumatic.   Right Ear: Hearing, tympanic membrane, external ear and ear canal normal.   Left Ear: Hearing, tympanic membrane, external ear and ear canal normal.   Nose: Mucosal edema present. Right sinus exhibits no maxillary sinus tenderness and no frontal sinus tenderness. Left sinus exhibits no maxillary sinus tenderness and no frontal sinus tenderness.   Mouth/Throat: Oropharynx is clear and moist and mucous membranes are normal. No oropharyngeal exudate.   Nasal mucosa bilateral nares edematous and erythematous.   Eyes: Pupils are equal, round, and reactive to light. Conjunctivae and lids are normal. Right eye exhibits no discharge. Left eye exhibits no discharge. Right conjunctiva is not injected. Left conjunctiva is not injected.   Neck: Normal range of motion. Neck supple. Normal carotid pulses present. Carotid bruit is not present.   Cardiovascular: Normal rate, regular rhythm, S1 normal, S2 normal, normal heart sounds, intact distal pulses and normal pulses.   No murmur heard.  Pulses:       Carotid pulses are 2+ on the right " side, and 2+ on the left side.       Radial pulses are 2+ on the right side, and 2+ on the left side.        Posterior tibial pulses are 2+ on the right side, and 2+ on the left side.   No edema to lower extremities. Fingers to bilateral hands appear minimally edematous.   Pulmonary/Chest: Effort normal and breath sounds normal. No respiratory distress. He has no decreased breath sounds. He has no wheezes. He has no rhonchi. He has no rales.   Abdominal: Soft. Normal appearance, normal aorta and bowel sounds are normal. He exhibits no distension, no abdominal bruit, no pulsatile midline mass and no mass. There is no hepatosplenomegaly. There is no tenderness. No hernia.   Rectus abdominis diastasis noted.    Musculoskeletal: He exhibits no edema or deformity.        Cervical back: He exhibits decreased range of motion, tenderness, pain and spasm.   Lymphadenopathy:        Head (right side): No submental, no submandibular and no tonsillar adenopathy present.        Head (left side): No submental, no submandibular and no tonsillar adenopathy present.     He has no cervical adenopathy.        Right: No supraclavicular adenopathy present.        Left: No supraclavicular adenopathy present.   Neurological: He is alert and oriented to person, place, and time. He has normal strength.   Skin: Skin is warm, dry and intact. No rash noted. He is not diaphoretic. No erythema. No pallor.   Psychiatric: He has a normal mood and affect. His speech is normal and behavior is normal. Judgment and thought content normal. His mood appears not anxious. Cognition and memory are normal. He does not exhibit a depressed mood.   Nursing note and vitals reviewed.        Assessment:       1. Essential hypertension    2. Dyslipidemia, baseline     3. Perennial sinusitis    4. Neck muscle spasm    5. Gout, arthritis    6. BMI 34.0-34.9,adult          Plan:       Jarod was seen today for hypertension and swelling.  Attempts are made to  keep medication regimen as simple as possible to encourage compliance. Patient's attention span is very limited and it is nearly impossible to concentrate on one health problem, such as hypertension, for more than a minute before he attempts to change the focus to another new concern.   Diagnoses and all orders for this visit:    Essential hypertension  -     metoprolol succinate (TOPROL-XL) 25 MG 24 hr tablet; Take 1 tablet (25 mg total) by mouth once daily. For blood pressure in the evening, new medication, added to valsartan 320 mg daily  - Blood pressure elevated today with 3 separate measures using both automatic and manual cuff, lowest reading 150/88  - Blood pressure improved from previous visits  - Follow up appointment scheduled in 2 months    Dyslipidemia, baseline   -     Lipid panel; Future  -   Lab Results   Component Value Date    CHOL 245 (H) 04/11/2019    CHOL 215 (H) 01/05/2018    CHOL 212 (H) 03/12/2014     Lab Results   Component Value Date    HDL 35 (L) 04/11/2019    HDL 39 (L) 01/05/2018    HDL 33 (L) 03/12/2014     Lab Results   Component Value Date    LDLCALC 156.6 04/11/2019    LDLCALC 147.6 01/05/2018    LDLCALC 139.2 03/12/2014     Lab Results   Component Value Date    TRIG 267 (H) 04/11/2019    TRIG 142 01/05/2018    TRIG 199 (H) 03/12/2014     Lab Results   Component Value Date    CHOLHDL 14.3 (L) 04/11/2019    CHOLHDL 18.1 (L) 01/05/2018    CHOLHDL 15.6 (L) 03/12/2014     The 10-year ASCVD risk score (Stellapadma VIDES Jr., et al., 2013) is: 13.6%    Values used to calculate the score:      Age: 53 years      Sex: Male      Is Non- : No      Diabetic: No      Tobacco smoker: No      Systolic Blood Pressure: 150 mmHg      Is BP treated: Yes      HDL Cholesterol: 35 mg/dL      Total Cholesterol: 245 mg/dL   Plan to start pravastatin 20 mg daily at this time for elevated ASCVD risk  Perennial sinusitis  -     montelukast (SINGULAIR) 10 mg tablet; Take 1 tablet (10 mg  total) by mouth every evening. For sinus allergies  - Patient reports that he has tried OTC anti-histamines without relief, will try singulair at this time    Neck muscle spasm  -     acetaminophen (TYLENOL) 500 MG tablet; Take 1 tablet (500 mg total) by mouth every 6 (six) hours as needed for Pain.  -     methocarbamol (ROBAXIN) 500 MG Tab; Take 1 tablet (500 mg total) by mouth 3 (three) times daily as needed. Muscle relaxant for neck pain    Gout, arthritis     Lab Results   Component Value Date    URICACID 4.8 04/11/2019    Uloric is effective in lowering uric acid, should continue current regimen at this time   Continue follow-up with Rheumatology as instructed    BMI 34.0-34.9,adult   BMI today is 34.86 and patient has lost 2 lb since his last visit on 2/1/2019   Weight loss recommended with well balanced diet and portion controlled meals and increased activity.     Follow up in about 2 months (around 6/11/2019) for F/U HTN, 40 minutes.      Patient readiness: acceptance and barriers:readiness, non-compliance, intellectual abilities    During the course of the visit the patient was educated and counseled about the following:     Hypertension:   Medication: continue valsartan 320 mg daily and begin Toprol XL 50 mg daily.  Dietary sodium restriction.  Regular aerobic exercise.  Follow up: 2 months and as needed.  Obesity:   General weight loss/lifestyle modification strategies discussed (elicit support from others; identify saboteurs; non-food rewards, etc).  Informal exercise measures discussed, e.g. taking stairs instead of elevator.  Regular aerobic exercise program discussed.    Goals: Hypertension: Reduce Blood Pressure and Obesity: Reduce calorie intake and BMI    Did patient meet goals/outcomes: No    The following self management tools provided: declined    Patient Instructions (the written plan) was given to the patient/family.     Time spent with patient: 45 minutes    Barriers to medications present  (no )    Adverse reactions to current medications (no)    Over the counter medications reviewed (Yes)

## 2019-04-11 NOTE — PROGRESS NOTES
"Subjective:       Patient ID: Jarod Pierce is a 53 y.o. male.    Chief Complaint: Gout with tophi     52 yo male with HTN is here for follow-up for tophaceous gout.  Gout was diagnosed in 2004.  Took allopurinol intermittently- caused stomach problems. Risk factors include -positive FH- father had gout, shelled fish, high-fructose  drinks, denies alcohol use, no h/o kidney stones.   Last visit on 11/29/2018.  Started on allopurinol 200mg daily- took for 2 weeks. Discontinued because he developed abdominal pain. Started uloric on 2/1/19.  Currently, on uloric 40mg a day. Admits to drinking high-fructose beverages, including sodas   Today, pain score is 7 x 10 with activity, better with rest, aching type, aggravated with walking, accompanied by chronic swelling of bilateral wrists      Review of Systems   Constitutional: Negative for fatigue and fever.   HENT: Negative for ear discharge and ear pain.    Eyes: Negative for pain and redness.   Respiratory: Negative for cough and shortness of breath.    Cardiovascular: Negative for chest pain and palpitations.   Gastrointestinal: Negative for abdominal distention and abdominal pain.   Genitourinary: Negative for genital sores and hematuria.   Musculoskeletal: Positive for arthralgias, joint swelling and myalgias.   Skin: Negative for color change and wound.   Neurological: Negative for tremors and seizures.   Psychiatric/Behavioral: Negative for agitation and hallucinations.         Objective:   BP (!) 154/96 (BP Location: Left arm, Patient Position: Sitting)   Pulse 85   Ht 5' 6" (1.676 m)   Wt 98.3 kg (216 lb 11.4 oz)   BMI 34.98 kg/m²      Physical Exam   Constitutional: He is oriented to person, place, and time and well-developed, well-nourished, and in no distress.   HENT:   Head: Normocephalic and atraumatic.   Eyes: Conjunctivae and EOM are normal. Pupils are equal, round, and reactive to light.   Neck: Normal range of motion. Neck supple. No thyromegaly " present.   Cardiovascular: Normal rate and regular rhythm.  Exam reveals no friction rub.    Pulmonary/Chest: Effort normal and breath sounds normal.   Abdominal: Soft. Bowel sounds are normal.   Neurological: He is alert and oriented to person, place, and time.   Skin: Skin is warm and dry.     Psychiatric: Memory and affect normal.   Musculoskeletal: He exhibits no edema.   + tophi  Bilateral wrists TTP            Lab Results   Component Value Date    WBC 5.60 04/11/2019    HGB 15.4 04/11/2019    HCT 45.9 04/11/2019    MCV 92 04/11/2019     04/11/2019     CMP  Sodium   Date Value Ref Range Status   04/11/2019 141 136 - 145 mmol/L Final     Potassium   Date Value Ref Range Status   04/11/2019 4.1 3.5 - 5.1 mmol/L Final     Chloride   Date Value Ref Range Status   04/11/2019 107 95 - 110 mmol/L Final     CO2   Date Value Ref Range Status   04/11/2019 27 23 - 29 mmol/L Final     Glucose   Date Value Ref Range Status   04/11/2019 100 70 - 110 mg/dL Final     BUN, Bld   Date Value Ref Range Status   04/11/2019 28 (H) 6 - 20 mg/dL Final     Creatinine   Date Value Ref Range Status   04/11/2019 1.6 (H) 0.5 - 1.4 mg/dL Final     Calcium   Date Value Ref Range Status   04/11/2019 9.7 8.7 - 10.5 mg/dL Final     Total Protein   Date Value Ref Range Status   04/11/2019 7.5 6.0 - 8.4 g/dL Final     Albumin   Date Value Ref Range Status   04/11/2019 4.4 3.5 - 5.2 g/dL Final     Total Bilirubin   Date Value Ref Range Status   04/11/2019 1.1 (H) 0.1 - 1.0 mg/dL Final     Comment:     For infants and newborns, interpretation of results should be based  on gestational age, weight and in agreement with clinical  observations.  Premature Infant recommended reference ranges:  Up to 24 hours.............<8.0 mg/dL  Up to 48 hours............<12.0 mg/dL  3-5 days..................<15.0 mg/dL  6-29 days.................<15.0 mg/dL       Alkaline Phosphatase   Date Value Ref Range Status   04/11/2019 110 55 - 135 U/L Final     AST    Date Value Ref Range Status   04/11/2019 15 10 - 40 U/L Final     ALT   Date Value Ref Range Status   04/11/2019 18 10 - 44 U/L Final     Anion Gap   Date Value Ref Range Status   04/11/2019 7 (L) 8 - 16 mmol/L Final     eGFR if    Date Value Ref Range Status   04/11/2019 56 (A) >60 mL/min/1.73 m^2 Final     eGFR if non    Date Value Ref Range Status   04/11/2019 48 (A) >60 mL/min/1.73 m^2 Final     Comment:     Calculation used to obtain the estimated glomerular filtration  rate (eGFR) is the CKD-EPI equation.        Lab Results   Component Value Date    SEDRATE 11 (H) 04/11/2019       Lab Results   Component Value Date    CRP 3.1 04/11/2019     Lab Results   Component Value Date    URICACID 4.8 04/11/2019     outside records from   On 1/14/15, cyanosis or fluid examination was negative for mono sodium urate crystals  Uric acid was 7.9  On 1/19/15 uric acid was 7.5  X-rays reveals erosion of proximal phalanx of the right middle finger consistent with gout   Assessment:   Chronic gout with tophi  LEONIE- likely sec to dehydration   Noncompliance  Generalized osteoarthritis  Plan:   Check ESR and CRP today  Agree with uloric  For acute flare, take colchicine 1.2mg, then 0.6mg 1 hour later   Advised to stay hydrated   Counseled to be compliant. Avoid high-fructose beverages, including sodas   He will follow with Dr Nolasco

## 2019-04-15 DIAGNOSIS — E78.2 MIXED HYPERLIPIDEMIA: Primary | ICD-10-CM

## 2019-04-15 DIAGNOSIS — R05.9 COUGH: ICD-10-CM

## 2019-04-15 PROBLEM — I10 UNCONTROLLED HYPERTENSION: Status: RESOLVED | Noted: 2018-09-28 | Resolved: 2019-04-15

## 2019-04-15 RX ORDER — PRAVASTATIN SODIUM 20 MG/1
20 TABLET ORAL DAILY
Qty: 30 TABLET | Refills: 5 | Status: SHIPPED | OUTPATIENT
Start: 2019-04-15 | End: 2020-01-13 | Stop reason: CLARIF

## 2019-04-15 RX ORDER — BENZONATATE 100 MG/1
100 CAPSULE ORAL 3 TIMES DAILY PRN
Qty: 30 CAPSULE | Refills: 0 | Status: SHIPPED | OUTPATIENT
Start: 2019-04-15 | End: 2019-04-25

## 2019-04-15 RX ORDER — ROSUVASTATIN CALCIUM 20 MG/1
20 TABLET, COATED ORAL DAILY
Qty: 30 TABLET | Refills: 5 | Status: SHIPPED | OUTPATIENT
Start: 2019-04-15 | End: 2019-04-15 | Stop reason: CLARIF

## 2019-04-15 NOTE — TELEPHONE ENCOUNTER
I actually changed him to pravastatin 20 mg, less muscle pain than lipitor and he already has muscle pain.   Thanks.  Ariadne

## 2019-04-15 NOTE — TELEPHONE ENCOUNTER
Patient agrees to begin statin medication. Also requesting medication for cough.     Pharmacy verified.            ----- Message from Ariadne Sandhu NP sent at 4/15/2019  1:22 PM CDT -----  ## is he willing to start a statin medication at this time?##    Dear Mr. Pierce,   I have reviewed the results of your recent lipid panel.  Total cholesterol is elevated at 245, goal is less than 200.  The triglycerides are also elevated at 267, goal is less than 150.  The HDL, or good cholesterol, is low at 35, goal in men is greater than 40.  The LDL, or bad cholesterol, is elevated at 156.6, goal with high blood pressure is less than 100.  The 10-year ASCVD risk score (Stella VIDES Jr., et al., 2013) is: 13.6% which is elevated and indicates that you have a 13.6% chance of having a heart attack or stroke in the next 10 years.  Whenever the atherosclerotic cardiovascular disease risk factor is 7.5% or greater, a statin medication to lower cholesterol and cardiovascular risk is recommended.  Are you willing to take a statin medication at this time?    Values used to calculate the score:      Age: 53 years      Sex: Male      Is Non- : No      Diabetic: No      Tobacco smoker: No      Systolic Blood Pressure: 150 mmHg      Is BP treated: Yes      HDL Cholesterol: 35 mg/dL      Total Cholesterol: 245 mg/dL  If you have any questions, please call or email the office.  Thank you.  Ariadne

## 2019-06-23 ENCOUNTER — HOSPITAL ENCOUNTER (EMERGENCY)
Facility: HOSPITAL | Age: 54
Discharge: HOME | End: 2019-06-23
Attending: EMERGENCY MEDICINE
Payer: MEDICAID

## 2019-06-23 VITALS
WEIGHT: 215 LBS | TEMPERATURE: 99 F | HEART RATE: 79 BPM | OXYGEN SATURATION: 96 % | SYSTOLIC BLOOD PRESSURE: 185 MMHG | BODY MASS INDEX: 36.7 KG/M2 | RESPIRATION RATE: 18 BRPM | DIASTOLIC BLOOD PRESSURE: 109 MMHG | HEIGHT: 64 IN

## 2019-06-23 DIAGNOSIS — R09.81 SINUS CONGESTION: Primary | ICD-10-CM

## 2019-06-23 DIAGNOSIS — I10 HYPERTENSION, UNSPECIFIED TYPE: ICD-10-CM

## 2019-06-23 DIAGNOSIS — Z91.148 HX OF MEDICATION NONCOMPLIANCE: ICD-10-CM

## 2019-06-23 DIAGNOSIS — M54.2 NECK PAIN: ICD-10-CM

## 2019-06-23 PROCEDURE — 99283 EMERGENCY DEPT VISIT LOW MDM: CPT

## 2019-06-23 RX ORDER — GUAIFENESIN 100 MG/5ML
100-200 SOLUTION ORAL 4 TIMES DAILY PRN
Qty: 236 ML | Refills: 0 | Status: SHIPPED | OUTPATIENT
Start: 2019-06-23 | End: 2019-07-03

## 2019-06-24 ENCOUNTER — TELEPHONE (OUTPATIENT)
Dept: FAMILY MEDICINE | Facility: CLINIC | Age: 54
End: 2019-06-24
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
## 2019-06-24 NOTE — TELEPHONE ENCOUNTER
Left voicemail for patient to rescheduled appointment with Ariadne Sandhu NP on 7/5/2019 she will be out of the office.

## 2019-11-07 ENCOUNTER — HOSPITAL ENCOUNTER (EMERGENCY)
Facility: HOSPITAL | Age: 54
Discharge: HOME | End: 2019-11-07
Attending: EMERGENCY MEDICINE
Payer: MEDICAID

## 2019-11-07 VITALS
DIASTOLIC BLOOD PRESSURE: 97 MMHG | WEIGHT: 217.5 LBS | TEMPERATURE: 98 F | SYSTOLIC BLOOD PRESSURE: 170 MMHG | OXYGEN SATURATION: 99 % | HEART RATE: 87 BPM | HEIGHT: 64 IN | BODY MASS INDEX: 37.13 KG/M2 | RESPIRATION RATE: 18 BRPM

## 2019-11-07 DIAGNOSIS — S05.02XA ABRASION OF LEFT CORNEA, INITIAL ENCOUNTER: ICD-10-CM

## 2019-11-07 DIAGNOSIS — S16.1XXA STRAIN OF NECK MUSCLE, INITIAL ENCOUNTER: Primary | ICD-10-CM

## 2019-11-07 DIAGNOSIS — I10 HYPERTENSION, UNSPECIFIED TYPE: ICD-10-CM

## 2019-11-07 PROCEDURE — 99284 EMERGENCY DEPT VISIT MOD MDM: CPT

## 2019-11-07 PROCEDURE — 63600175 PHARM REV CODE 636 W HCPCS: Performed by: EMERGENCY MEDICINE

## 2019-11-07 PROCEDURE — 25000003 PHARM REV CODE 250: Performed by: EMERGENCY MEDICINE

## 2019-11-07 RX ORDER — IBUPROFEN 800 MG/1
800 TABLET ORAL
Status: COMPLETED | OUTPATIENT
Start: 2019-11-07 | End: 2019-11-07

## 2019-11-07 RX ORDER — CYCLOBENZAPRINE HCL 10 MG
5 TABLET ORAL 3 TIMES DAILY PRN
Qty: 15 TABLET | Refills: 0 | Status: SHIPPED | OUTPATIENT
Start: 2019-11-07 | End: 2020-01-13 | Stop reason: CLARIF

## 2019-11-07 RX ORDER — NIFEDIPINE 60 MG/1
60 TABLET, EXTENDED RELEASE ORAL DAILY
Qty: 30 TABLET | Refills: 0 | Status: SHIPPED | OUTPATIENT
Start: 2019-11-07 | End: 2020-01-13 | Stop reason: CLARIF

## 2019-11-07 RX ORDER — CLONIDINE HYDROCHLORIDE 0.2 MG/1
0.2 TABLET ORAL
Status: COMPLETED | OUTPATIENT
Start: 2019-11-07 | End: 2019-11-07

## 2019-11-07 RX ORDER — TETRACAINE HYDROCHLORIDE 5 MG/ML
2 SOLUTION OPHTHALMIC
Status: COMPLETED | OUTPATIENT
Start: 2019-11-07 | End: 2019-11-07

## 2019-11-07 RX ORDER — CELECOXIB 100 MG/1
100 CAPSULE ORAL DAILY
Qty: 30 CAPSULE | Refills: 0 | Status: SHIPPED | OUTPATIENT
Start: 2019-11-07 | End: 2019-12-07

## 2019-11-07 RX ORDER — ERYTHROMYCIN 5 MG/G
OINTMENT OPHTHALMIC
Qty: 1 TUBE | Refills: 0 | Status: SHIPPED | OUTPATIENT
Start: 2019-11-07 | End: 2020-01-09 | Stop reason: SDUPTHER

## 2019-11-07 RX ADMIN — TETRACAINE HYDROCHLORIDE 2 DROP: 5 SOLUTION OPHTHALMIC at 07:11

## 2019-11-07 RX ADMIN — FLUORESCEIN SODIUM AND BENOXINATE HYDROCHLORIDE 1 DROP: 4; 2.5 SOLUTION OPHTHALMIC at 07:11

## 2019-11-07 RX ADMIN — IBUPROFEN 800 MG: 800 TABLET, FILM COATED ORAL at 07:11

## 2019-11-07 RX ADMIN — CLONIDINE HYDROCHLORIDE 0.2 MG: 0.2 TABLET ORAL at 07:11

## 2019-11-08 NOTE — ED NOTES
Pt reports left eye pain x2 days and states he thinks there is something in there. Eye noted to be red. Pt denies any other associated symptoms.     Patient identifiers verified and correct for Jarod Pierce.    LOC: The patient is awake, alert and aware of environment with an appropriate affect, the patient is oriented x 3 and speaking appropriately.  APPEARANCE: Patient resting comfortably and in no acute distress, patient is clean and well groomed, patient's clothing is properly fastened.  SKIN: The skin is warm and dry, color consistent with ethnicity, patient has normal skin turgor and moist mucus membranes, skin intact, no breakdown or bruising noted.  MUSCULOSKELETAL: Patient moving all extremities spontaneously.  RESPIRATORY: Airway is open and patent, respirations are spontaneous.  CARDIAC: Patient has a normal rate, no periphreal edema noted, capillary refill < 3 seconds. HTN.  ABDOMEN: Soft and non tender to palpation.

## 2019-11-08 NOTE — ED PROVIDER NOTES
"SCRIBE #1 NOTE: I, Tomeka Feldman/Fadi Wagner, am scribing for, and in the presence of, Ottoniel Louise Jr., MD. I have scribed the entire note.       History     Chief Complaint   Patient presents with    Eye Pain     pt states, "I think I have something in my L eye," x2 days    Neck Pain     Review of patient's allergies indicates:   Allergen Reactions    Codeine      Other reaction(s): Hives    Lisinopril      Dry coughing    Phenytoin sodium extended      Other reaction(s): leg cramps         History of Present Illness     HPI    11/7/2019, 7:12 PM  History obtained from the patient      History of Present Illness: Jarod Pierce is a 54 y.o. 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 Symptoms are constant and moderate in severity. No mitigating or exacerbating factors reported. Associated sxs include  foreign body sensation to left eye. Patient denies any photophobia, vision changes, eye discharge, CP, SOB, dysuria, n/v/d, hematuria, congestion, HA, constipation, abd pain, and all other sxs at this time. No prior Tx reported. No further complaints or concerns at this time.     Arrival mode: Personal vehicle    PCP: Idris Fuller MD        Past Medical History:  Past Medical History:   Diagnosis Date    Allergic rhinitis due to allergen     Arthritis     Cardiomyopathy     Gout     Hypertension     Left bundle branch block        Past Surgical History:  Past Surgical History:   Procedure Laterality Date    WISDOM TOOTH EXTRACTION           Family History:  Family History   Problem Relation Age of Onset    Heart disease Father     Hypertension Father     Gout Father     Cancer Sister         lung    Heart attack Neg Hx        Social History:  Social History     Tobacco Use    Smoking status: Never Smoker    Smokeless tobacco: Never Used   Substance and Sexual " Activity    Alcohol use: No    Drug use: No    Sexual activity: Not on file        Review of Systems     Review of Systems   Constitutional: Negative for chills and fever.   HENT: Negative for congestion and sore throat.    Eyes: Positive for pain (left). Negative for photophobia, discharge and visual disturbance.        (+) foreign body sensation to L eye   Respiratory: Negative for shortness of breath.    Cardiovascular: Negative for chest pain.   Gastrointestinal: Negative for abdominal pain, constipation, diarrhea, nausea and vomiting.   Genitourinary: Negative for dysuria and hematuria.   Musculoskeletal: Negative for back pain.   Skin: Negative for rash.   Neurological: Negative for weakness and headaches.   Hematological: Does not bruise/bleed easily.   All other systems reviewed and are negative.     Physical Exam     Initial Vitals [11/07/19 1900]   BP Pulse Resp Temp SpO2   (!) 202/113 85 20 98.3 °F (36.8 °C) 97 %      MAP       --          Physical Exam  Nursing Notes and Vital Signs Reviewed.  Constitutional: Patient is in no acute distress. Well-developed and well-nourished.  Head: Atraumatic. Normocephalic.  Eyes: PERRL. EOM intact. Conjunctivae are not pale. No scleral icterus.  There is no foreign body on full eversion of the lids.  Extraocular muscles are intact. There is a small abrasion to the left cornea inferior and lateral to the pupil.  There is no dendritic lesions.  There is no erythema to the periorbital tissues.  ENT: Mucous membranes are moist. Oropharynx is clear and symmetric.    Neck: Supple. Full ROM. No lymphadenopathy.  Cardiovascular: Regular rate. Regular rhythm. No murmurs, rubs, or gallops. Distal pulses are 2+ and symmetric.  Pulmonary/Chest: No respiratory distress. Clear to auscultation bilaterally. No wheezing or rales.  Abdominal: Soft and non-distended.  There is no tenderness.  No rebound, guarding, or rigidity. Good bowel sounds.  Genitourinary: No CVA  "tenderness  Musculoskeletal: Moves all extremities. No obvious deformities. No edema. No calf tenderness. There is no point C/T/L/S tenderness.  Normal spinal curvature.  Skin: Warm and dry. No rashes cellulitis.  Neurological:  Alert, awake, and appropriate.  Normal speech.  No acute focal neurological deficits are appreciated.  Psychiatric: Normal affect. Good eye contact. Appropriate in content.     ED Course   Procedures  ED Vital Signs:  Vitals:    11/07/19 1900   BP: (!) 202/113   Pulse: 85   Resp: 20   Temp: 98.3 °F (36.8 °C)   TempSrc: Oral   SpO2: 97%   Weight: 98.6 kg (217 lb 7.7 oz)   Height: 5' 4" (1.626 m)              The Emergency Provider reviewed the vital signs and test results, which are outlined above.     ED Discussion   7:47 PM: Reassessed pt at this time. Discussed with pt all pertinent ED information and results. Discussed pt dx and plan of tx. Gave pt all f/u and return to the ED instructions. All questions and concerns were addressed at this time. Pt expresses understanding of information and instructions, and is comfortable with plan to discharge. Pt is stable for discharge.    I discussed with patient and/or family/caretaker that evaluation in the ED does not suggest any emergent or life threatening medical conditions requiring immediate intervention beyond what was provided in the ED, and I believe patient is safe for discharge.  Regardless, an unremarkable evaluation in the ED does not preclude the development or presence of a serious of life threatening condition. As such, patient was instructed to return immediately for any worsening or change in current symptoms.     7:51 PM  Patient is stable nontoxic.  Patient has been noncompliant with blood pressure medication now for several months as he has run out of medications.  He does have good follow-up all ever.  I have re-initiated blood pressure medications here with close follow-up in mind advised to see his doctor tomorrow or Monday. "  Patient has small corneal abrasion to the inferior lateral left cornea as well.  I will treat this with Ilotycin.  His tetanus is up-to-date per his report.  He has chronic neck pain as well which has been aggravating him.  Add Celebrex and Flexeril to his regimen.  The patient verbalized understanding room with all seems reliable.  He is safe for discharge in my opinion.            ED Medication(s):  Medications   cloNIDine tablet 0.2 mg (0.2 mg Oral Given 11/7/19 1922)   ibuprofen tablet 800 mg (800 mg Oral Given 11/7/19 1921)   tetracaine HCl (PF) 0.5 % Drop 2 drop (2 drops Left Eye Given 11/7/19 1931)   fluorescein-benoxinate 0.25-0.4% ophthalmic solution 1 drop (1 drop Left Eye Given 11/7/19 1931)       New Prescriptions    CELECOXIB (CELEBREX) 100 MG CAPSULE    Take 1 capsule (100 mg total) by mouth once daily.    CYCLOBENZAPRINE (FLEXERIL) 10 MG TABLET    Take 0.5 tablets (5 mg total) by mouth 3 (three) times daily as needed for Muscle spasms.    ERYTHROMYCIN (ROMYCIN) OPHTHALMIC OINTMENT    Place a 1/2 inch ribbon of ointment into the left lower eyelid for 5 days.    NIFEDIPINE (PROCARDIA-XL) 60 MG (OSM) 24 HR TABLET    Take 1 tablet (60 mg total) by mouth once daily.       Follow-up Information     Idris Fuller MD In 1 day.    Specialty:  Family Medicine  Contact information:  [address] [place] [address]  [phone]                       Scribe Attestation:   Scribe #1: I performed the above scribed service and the documentation accurately describes the services I performed. I attest to the accuracy of the note.     Attending:   Physician Attestation Statement for Scribe #1: I, Ottoniel Louise Jr., MD, personally performed the services described in this documentation, as scribed by Tomeka Feldman/Fadi Wagner, in my presence, and it is both accurate and complete.           Clinical Impression       ICD-10-CM ICD-9-CM   1. Strain of neck muscle, initial encounter S16.1XXA 847.0   2. Abrasion  of left cornea, initial encounter S05.02XA 918.1   3. Hypertension, unspecified type I10 401.9       Disposition:   Disposition: Discharged  Condition: Stable       Ottoniel Louise Jr., MD  11/07/19 1951

## 2019-11-08 NOTE — DISCHARGE INSTRUCTIONS
Apply Ilotycin ointment to the lower eyelid on the left eye twice daily for 5 days.  This should clear up the abrasion on her cornea.  Use Procardia for her blood pressure, Celebrex for your pain and Flexeril as a muscle relaxer.  Follow up with your doctor tomorrow for re-evaluation in re-initiation of any medications he may be out of.  Return as needed for any worsening symptoms, problems, questions or concerns

## 2019-11-18 DIAGNOSIS — Z12.11 COLON CANCER SCREENING: ICD-10-CM

## 2020-01-09 ENCOUNTER — HOSPITAL ENCOUNTER (EMERGENCY)
Facility: HOSPITAL | Age: 55
Discharge: HOME | End: 2020-01-09
Attending: EMERGENCY MEDICINE
Payer: MEDICAID

## 2020-01-09 VITALS
RESPIRATION RATE: 18 BRPM | BODY MASS INDEX: 36.7 KG/M2 | HEART RATE: 89 BPM | DIASTOLIC BLOOD PRESSURE: 92 MMHG | WEIGHT: 215 LBS | OXYGEN SATURATION: 99 % | SYSTOLIC BLOOD PRESSURE: 189 MMHG | TEMPERATURE: 98 F | HEIGHT: 64 IN

## 2020-01-09 DIAGNOSIS — S05.02XA ABRASION OF LEFT CORNEA, INITIAL ENCOUNTER: Primary | ICD-10-CM

## 2020-01-09 DIAGNOSIS — T15.92XA FOREIGN BODY, EYE, LEFT, INITIAL ENCOUNTER: ICD-10-CM

## 2020-01-09 PROCEDURE — 99283 EMERGENCY DEPT VISIT LOW MDM: CPT

## 2020-01-09 PROCEDURE — 25000003 PHARM REV CODE 250

## 2020-01-09 PROCEDURE — 25000003 PHARM REV CODE 250: Performed by: EMERGENCY MEDICINE

## 2020-01-09 RX ORDER — TETRACAINE HYDROCHLORIDE 5 MG/ML
2 SOLUTION OPHTHALMIC
Status: COMPLETED | OUTPATIENT
Start: 2020-01-09 | End: 2020-01-09

## 2020-01-09 RX ORDER — ERYTHROMYCIN 5 MG/G
OINTMENT OPHTHALMIC
Status: DISCONTINUED | OUTPATIENT
Start: 2020-01-09 | End: 2020-01-09 | Stop reason: HOSPADM

## 2020-01-09 RX ORDER — ERYTHROMYCIN 5 MG/G
OINTMENT OPHTHALMIC
Qty: 1 TUBE | Refills: 0 | Status: SHIPPED | OUTPATIENT
Start: 2020-01-09 | End: 2020-01-13 | Stop reason: CLARIF

## 2020-01-09 RX ADMIN — FLUORESCEIN SODIUM 2 EACH: 1 STRIP OPHTHALMIC at 01:01

## 2020-01-09 RX ADMIN — TETRACAINE HYDROCHLORIDE 2 DROP: 5 SOLUTION OPHTHALMIC at 01:01

## 2020-01-09 NOTE — ED PROVIDER NOTES
Encounter Date: 1/9/2020       History     Chief Complaint   Patient presents with    Foreign Body in Eye     left eye    Abdominal Pain     54-year-old male with a history of gout, HTN presents to the ER with left eye pain. Patient states that prior to arrival, he was lying in bed when he felt like something got in his eye.  Since then, he has had a foreign body sensation in that eye.  Denies rubbing it.  Denies any change in vision.  Denies any issues with his right eye.  Patient also notes that the past month, he has had scratchy feeling in his throat. He is also concerned that he might have an abdominal hernia because his abdomen sticks out when he eats a lot.        Review of patient's allergies indicates:   Allergen Reactions    Codeine      Other reaction(s): Hives    Lisinopril      Dry coughing    Phenytoin sodium extended      Other reaction(s): leg cramps     Past Medical History:   Diagnosis Date    Allergic rhinitis due to allergen     Arthritis     Cardiomyopathy     Gout     Hypertension     Left bundle branch block      Past Surgical History:   Procedure Laterality Date    WISDOM TOOTH EXTRACTION       Family History   Problem Relation Age of Onset    Heart disease Father     Hypertension Father     Gout Father     Cancer Sister         lung    Heart attack Neg Hx      Social History     Tobacco Use    Smoking status: Never Smoker    Smokeless tobacco: Never Used   Substance Use Topics    Alcohol use: No    Drug use: No     Review of Systems   Constitutional: Negative for fever.   HENT: Positive for sore throat.    Eyes: Positive for pain and redness. Negative for discharge, itching and visual disturbance.   Respiratory: Negative for shortness of breath.    Cardiovascular: Negative for chest pain.   Gastrointestinal: Negative for abdominal pain, nausea and vomiting.   Genitourinary: Negative for dysuria.   Musculoskeletal: Negative for back pain.   Skin: Negative for rash.    Neurological: Negative for weakness.   Hematological: Does not bruise/bleed easily.   All other systems reviewed and are negative.      Physical Exam     Initial Vitals [01/09/20 0126]   BP Pulse Resp Temp SpO2   (!) 220/103 73 16 97.6 °F (36.4 °C) 99 %      MAP       --         Physical Exam    Constitutional: He appears well-developed and well-nourished. No distress.   HENT:   Head: Normocephalic and atraumatic.   Mouth/Throat: Oropharynx is clear and moist and mucous membranes are normal. No oropharyngeal exudate, posterior oropharyngeal edema, posterior oropharyngeal erythema or tonsillar abscesses.   Eyes: EOM are normal. Pupils are equal, round, and reactive to light. Right eye exhibits no chemosis, no discharge and no exudate. No foreign body present in the right eye. Left eye exhibits no chemosis, no discharge and no exudate. Foreign body present in the left eye. Right conjunctiva is not injected. Right conjunctiva has no hemorrhage. Left conjunctiva is injected. Left conjunctiva has no hemorrhage. Right eye exhibits normal extraocular motion. Left eye exhibits normal extraocular motion. Right pupil is round and reactive. Left pupil is round and reactive. Pupils are equal.   Neck: Normal range of motion.   Cardiovascular: Normal rate, regular rhythm, normal heart sounds and intact distal pulses.   No murmur heard.  Pulmonary/Chest: Breath sounds normal. No respiratory distress. He has no wheezes.   Abdominal: Soft. He exhibits no distension. There is no tenderness. There is no rebound and no guarding.   Abdomen is nontender and nondistended. When patient Valsalva's, his upper abdomen does extend outward as if he has a ventral hernia.  Immediately reduces when relaxed.  Cannot feel a defect in his abdominal wall.   Musculoskeletal: Normal range of motion. He exhibits no edema or tenderness.   Neurological: He is alert.   Skin: Skin is warm and dry. No rash noted. No erythema.   Psychiatric: He has a normal  mood and affect. Thought content normal.         ED Course   Procedures  Labs Reviewed - No data to display       Imaging Results    None          Medical Decision Making:   Initial Assessment:   54-year-old male with a history of gout, HTN presents to the ER with left eye pain.  ED Management:  Plan:  Afebrile, vital signs stable. Visual acuity shows R 20/25, L 20/25, B 20/25. IOP R 21, L 20.  Right eye exam shows no acute abnormality.  No fluorescein uptake.  Left eye exam shows very small foreign body to the inner part of the upper lid.  Removed with a Q-tip.  Fluorescein stain shows corneal abrasion, not over the pupil.  No evidence of ulceration.  No Mil sign. Will start on erythromycin ointment for corneal abrasion.  Recommend follow-up with ophthalmology for repeat evaluation.  Given return precautions.  Patient understands the plan.                                 Clinical Impression:       ICD-10-CM ICD-9-CM   1. Abrasion of left cornea, initial encounter S05.02XA 918.1   2. Foreign body, eye, left, initial encounter T15.92XA 930.9     [id number]                             Michael Briscoe MD  01/09/20 0343

## 2020-01-13 ENCOUNTER — HOSPITAL ENCOUNTER (OUTPATIENT)
Facility: HOSPITAL | Age: 55
Discharge: HOME | End: 2020-01-14
Attending: EMERGENCY MEDICINE | Admitting: HOSPITALIST
Payer: MEDICAID

## 2020-01-13 DIAGNOSIS — R53.1 WEAKNESS: ICD-10-CM

## 2020-01-13 DIAGNOSIS — G45.9 TIA (TRANSIENT ISCHEMIC ATTACK): ICD-10-CM

## 2020-01-13 DIAGNOSIS — G45.9 TRANSIENT ISCHEMIC ATTACK: Primary | ICD-10-CM

## 2020-01-13 PROBLEM — E66.9 OBESITY (BMI 30-39.9): Status: ACTIVE | Noted: 2018-09-28

## 2020-01-13 LAB
ALBUMIN SERPL BCP-MCNC: 4.3 G/DL (ref 3.5–5.2)
ALP SERPL-CCNC: 107 U/L (ref 55–135)
ALT SERPL W/O P-5'-P-CCNC: 22 U/L (ref 10–44)
ANION GAP SERPL CALC-SCNC: 13 MMOL/L (ref 8–16)
AST SERPL-CCNC: 25 U/L (ref 10–40)
BASOPHILS # BLD AUTO: 0.03 K/UL (ref 0–0.2)
BASOPHILS NFR BLD: 0.5 % (ref 0–1.9)
BILIRUB SERPL-MCNC: 1.4 MG/DL (ref 0.1–1)
BILIRUB UR QL STRIP: NEGATIVE
BNP SERPL-MCNC: 34 PG/ML (ref 0–99)
BUN SERPL-MCNC: 17 MG/DL (ref 6–20)
CALCIUM SERPL-MCNC: 9.4 MG/DL (ref 8.7–10.5)
CHLORIDE SERPL-SCNC: 103 MMOL/L (ref 95–110)
CHOLEST SERPL-MCNC: 242 MG/DL (ref 120–199)
CHOLEST/HDLC SERPL: 7.1 {RATIO} (ref 2–5)
CLARITY UR: CLEAR
CO2 SERPL-SCNC: 24 MMOL/L (ref 23–29)
COLOR UR: YELLOW
CREAT SERPL-MCNC: 1.2 MG/DL (ref 0.5–1.4)
DIFFERENTIAL METHOD: ABNORMAL
EOSINOPHIL # BLD AUTO: 0.1 K/UL (ref 0–0.5)
EOSINOPHIL NFR BLD: 1.8 % (ref 0–8)
ERYTHROCYTE [DISTWIDTH] IN BLOOD BY AUTOMATED COUNT: 12.5 % (ref 11.5–14.5)
EST. GFR  (AFRICAN AMERICAN): >60 ML/MIN/1.73 M^2
EST. GFR  (NON AFRICAN AMERICAN): >60 ML/MIN/1.73 M^2
GLUCOSE SERPL-MCNC: 91 MG/DL (ref 70–110)
GLUCOSE UR QL STRIP: NEGATIVE
HCT VFR BLD AUTO: 44.3 % (ref 40–54)
HDLC SERPL-MCNC: 34 MG/DL (ref 40–75)
HDLC SERPL: 14 % (ref 20–50)
HGB BLD-MCNC: 15.5 G/DL (ref 14–18)
HGB UR QL STRIP: ABNORMAL
IMM GRANULOCYTES # BLD AUTO: 0.02 K/UL (ref 0–0.04)
INR PPP: 1 (ref 0.8–1.2)
KETONES UR QL STRIP: NEGATIVE
LDLC SERPL CALC-MCNC: 141.4 MG/DL (ref 63–159)
LEUKOCYTE ESTERASE UR QL STRIP: NEGATIVE
LYMPHOCYTES # BLD AUTO: 1.5 K/UL (ref 1–4.8)
LYMPHOCYTES NFR BLD: 25.2 % (ref 18–48)
MCH RBC QN AUTO: 32.5 PG (ref 27–31)
MCHC RBC AUTO-ENTMCNC: 35 G/DL (ref 32–36)
MCV RBC AUTO: 93 FL (ref 82–98)
MONOCYTES # BLD AUTO: 0.5 K/UL (ref 0.3–1)
MONOCYTES NFR BLD: 8.3 % (ref 4–15)
NEUTROPHILS # BLD AUTO: 3.9 K/UL (ref 1.8–7.7)
NEUTROPHILS NFR BLD: 63.9 % (ref 38–73)
NITRITE UR QL STRIP: NEGATIVE
NONHDLC SERPL-MCNC: 208 MG/DL
NRBC BLD-RTO: 0 /100 WBC
PH UR STRIP: 8 [PH] (ref 5–8)
PLATELET # BLD AUTO: 168 K/UL (ref 150–350)
PMV BLD AUTO: 9.5 FL (ref 9.2–12.9)
POCT GLUCOSE: 85 MG/DL (ref 70–110)
POTASSIUM SERPL-SCNC: 4.4 MMOL/L (ref 3.5–5.1)
PROT SERPL-MCNC: 7.9 G/DL (ref 6–8.4)
PROT UR QL STRIP: NEGATIVE
PROTHROMBIN TIME: 10 SEC (ref 9–12.5)
RBC # BLD AUTO: 4.77 M/UL (ref 4.6–6.2)
SODIUM SERPL-SCNC: 140 MMOL/L (ref 136–145)
SP GR UR STRIP: 1.01 (ref 1–1.03)
TRIGL SERPL-MCNC: 333 MG/DL (ref 30–150)
TROPONIN I SERPL DL<=0.01 NG/ML-MCNC: 0.01 NG/ML (ref 0–0.03)
TSH SERPL DL<=0.005 MIU/L-ACNC: 1.94 UIU/ML (ref 0.4–4)
URN SPEC COLLECT METH UR: ABNORMAL
UROBILINOGEN UR STRIP-ACNC: NEGATIVE EU/DL
WBC # BLD AUTO: 6.11 K/UL (ref 3.9–12.7)

## 2020-01-13 PROCEDURE — 63600175 PHARM REV CODE 636 W HCPCS: Performed by: HOSPITALIST

## 2020-01-13 PROCEDURE — 80053 COMPREHEN METABOLIC PANEL: CPT

## 2020-01-13 PROCEDURE — 83036 HEMOGLOBIN GLYCOSYLATED A1C: CPT

## 2020-01-13 PROCEDURE — G0378 HOSPITAL OBSERVATION PER HR: HCPCS

## 2020-01-13 PROCEDURE — 93005 ELECTROCARDIOGRAM TRACING: CPT

## 2020-01-13 PROCEDURE — 85025 COMPLETE CBC W/AUTO DIFF WBC: CPT

## 2020-01-13 PROCEDURE — 82962 GLUCOSE BLOOD TEST: CPT

## 2020-01-13 PROCEDURE — 99285 EMERGENCY DEPT VISIT HI MDM: CPT | Mod: 25

## 2020-01-13 PROCEDURE — 83880 ASSAY OF NATRIURETIC PEPTIDE: CPT

## 2020-01-13 PROCEDURE — 96372 THER/PROPH/DIAG INJ SC/IM: CPT

## 2020-01-13 PROCEDURE — 80061 LIPID PANEL: CPT

## 2020-01-13 PROCEDURE — 84443 ASSAY THYROID STIM HORMONE: CPT

## 2020-01-13 PROCEDURE — 81003 URINALYSIS AUTO W/O SCOPE: CPT

## 2020-01-13 PROCEDURE — 94761 N-INVAS EAR/PLS OXIMETRY MLT: CPT

## 2020-01-13 PROCEDURE — 84484 ASSAY OF TROPONIN QUANT: CPT

## 2020-01-13 PROCEDURE — 36415 COLL VENOUS BLD VENIPUNCTURE: CPT

## 2020-01-13 PROCEDURE — 85610 PROTHROMBIN TIME: CPT

## 2020-01-13 RX ORDER — ATORVASTATIN CALCIUM 40 MG/1
40 TABLET, FILM COATED ORAL DAILY
Status: DISCONTINUED | OUTPATIENT
Start: 2020-01-14 | End: 2020-01-14 | Stop reason: HOSPADM

## 2020-01-13 RX ORDER — LABETALOL HYDROCHLORIDE 5 MG/ML
5 INJECTION, SOLUTION INTRAVENOUS
Status: DISCONTINUED | OUTPATIENT
Start: 2020-01-13 | End: 2020-01-13

## 2020-01-13 RX ORDER — NAPROXEN SODIUM 220 MG/1
81 TABLET, FILM COATED ORAL DAILY
Status: DISCONTINUED | OUTPATIENT
Start: 2020-01-14 | End: 2020-01-14 | Stop reason: HOSPADM

## 2020-01-13 RX ORDER — ENOXAPARIN SODIUM 100 MG/ML
40 INJECTION SUBCUTANEOUS EVERY 24 HOURS
Status: DISCONTINUED | OUTPATIENT
Start: 2020-01-13 | End: 2020-01-14 | Stop reason: HOSPADM

## 2020-01-13 RX ORDER — SODIUM CHLORIDE 0.9 % (FLUSH) 0.9 %
10 SYRINGE (ML) INJECTION
Status: DISCONTINUED | OUTPATIENT
Start: 2020-01-13 | End: 2020-01-14 | Stop reason: HOSPADM

## 2020-01-13 RX ORDER — ONDANSETRON 4 MG/1
8 TABLET, ORALLY DISINTEGRATING ORAL EVERY 8 HOURS PRN
Status: DISCONTINUED | OUTPATIENT
Start: 2020-01-13 | End: 2020-01-14 | Stop reason: HOSPADM

## 2020-01-13 RX ORDER — LABETALOL HYDROCHLORIDE 5 MG/ML
10 INJECTION, SOLUTION INTRAVENOUS
Status: DISCONTINUED | OUTPATIENT
Start: 2020-01-13 | End: 2020-01-14 | Stop reason: HOSPADM

## 2020-01-13 RX ADMIN — ENOXAPARIN SODIUM 40 MG: 100 INJECTION SUBCUTANEOUS at 05:01

## 2020-01-13 NOTE — HPI
Patient is a 54-year-old male with history of hypertension who presents to the ED today with complaint of left arm and leg heaviness that lasted approximately 15 min this morning in his now resolved.  Also associated with transient left-sided vision deficit which is not back to normal.  He says all these occurred for about 15-20 minutes while driving to  some equipment per work.  He reports he has not taken any of his blood pressure medications for about 2 weeks.  Blood pressure grossly elevated the ED.  CT head negative.  Neuro exam is normal now and he has no complaints this time.  He reports that earlier his brain felt like Jell-O.  But he is back to his usual self at this time.  Does not provide a clear reason for not taking his blood pressure medications.  He is placed in observation by hospital medicine service for TIA workup.  Neurology will be consulted

## 2020-01-13 NOTE — ASSESSMENT & PLAN NOTE
Has not been taking any of his home blood pressure medications.  Will have nurse verify home medication list.  Hold all p.o. antihypertensives for now until stroke ruled out on MRI.  P.r.n. labetalol for SBP greater than 220 or diastolic blood pressure greater than 110

## 2020-01-13 NOTE — ED NOTES
Pt awake alert oriented reports lt arm heaviness that started when he woke up this morning that is getting better now, pt reports lt eye vision change that started around midday and has resolved currently

## 2020-01-13 NOTE — CONSULTS
"[facility]  Neurology  Consult Note    Patient Name: Jarod Pierce  MRN: [id number]  Admission Date: 1/13/2020  Hospital Length of Stay: 0 days  Code Status: No Order   Attending Provider: Sudeep Solorio MD   Consulting Provider: Wilbert Shore MD  Consulting NP Aissatou Botello NP  Primary Care Physician: Idris Fuller MD  Principal Problem:<principal problem not specified>    Inpatient consult to Neurology  Consult performed by: Aissatou Botello NP  Consult ordered by: Junie Rodriguez MD        Subjective:     Chief Complaint:    Chief Complaint   Patient presents with    Eye Problem     " seeing spots "    Headache     not taking  HTN med. x 2 weeks          HPI: Patient is a 54-year-old male with history of hypertension who presents to the ED today with complaint of left arm and leg heaviness that lasted approximately 15 min this morning in his now resolved.  Also associated with transient left-sided vision deficit which is not back to normal.  He says all these occurred for about 15-20 minutes while driving to  some equipment per work.  He reports he has not taken any of his blood pressure medications for about 2 weeks.  Blood pressure grossly elevated the ED.  CT head negative.  Neuro exam is normal now and he has no complaints this time.  He reports that earlier his brain felt like Jell-O.  But he is back to his usual self at this time.  Does not provide a clear reason for not taking his blood pressure medications.  He is placed in observation by hospital medicine service for TIA workup.  Neurology will be consulted    Neurology Interval History:Patient seen and examined in the ED. Plan of care discussed with Dr. Shore. Patient states around 9 AM this morning he went to work and went to bend down to pick something up and noticed whenever he stood up his left visual field of you was obscured. He states his vision was blurred and he did see spots. He states this occurred for " approximately 15-20 minutes. On exam, he states this Blurred vision has resolved.  He appears somewhat confused, he answers questions rapidly often not hearing the full question before he provides an answer. No family is at bedside. He states does not take any blood thinners at home. States he has not taken his BP meds in 2 weeks. He states he has never had any episodes like this in the past. He denies any past history of seizures. His neurological exam is normal. He denies any unilateral extremity weakness, headache, current visual disturbance, decreased sensation, gait disturbance, or headache. Will obtain a full stroke work up on the patient. Will continue to monitor the patient closely.        Past Medical History:   Diagnosis Date    Allergic rhinitis due to allergen     Arthritis     Cardiomyopathy     Gout     Hypertension     Left bundle branch block        Past Surgical History:   Procedure Laterality Date    WISDOM TOOTH EXTRACTION         Review of patient's allergies indicates:   Allergen Reactions    Codeine      Other reaction(s): Hives    Lisinopril      Dry coughing    Phenytoin sodium extended      Other reaction(s): leg cramps       Current Neurological Medications: Scheduled Meds:  Continuous Infusions:  PRN Meds:.      No current facility-administered medications on file prior to encounter.      Current Outpatient Medications on File Prior to Encounter   Medication Sig    acetaminophen (TYLENOL) 500 MG tablet Take 1 tablet (500 mg total) by mouth every 6 (six) hours as needed for Pain.    albuterol (PROAIR HFA) 90 mcg/actuation inhaler INHALE TWO PUFFS INTO THE LUNGS 4 TIMES DAILY PRN wheezing, shortness of breath    clotrimazole-betamethasone 1-0.05% (LOTRISONE) cream APPLY  CREAM TOPICALLY TWICE DAILY TO AFFECTED AREA    cyclobenzaprine (FLEXERIL) 10 MG tablet Take 0.5 tablets (5 mg total) by mouth 3 (three) times daily as needed for Muscle spasms.    diclofenac sodium 1 % Gel  Apply 2 g topically 4 (four) times daily. for 10 days    erythromycin (ROMYCIN) ophthalmic ointment Place a 1/2 inch ribbon of ointment into the left lower eyelid for 5 days.    indomethacin (INDOCIN) 25 MG capsule Take 1 capsule (25 mg total) by mouth 3 (three) times daily as needed (gout flare up).    metoprolol succinate (TOPROL-XL) 25 MG 24 hr tablet Take 1 tablet (25 mg total) by mouth once daily. For blood pressure in the evening    montelukast (SINGULAIR) 10 mg tablet Take 1 tablet (10 mg total) by mouth every evening. For sinus allergies    NIFEdipine (PROCARDIA-XL) 60 MG (OSM) 24 hr tablet Take 1 tablet (60 mg total) by mouth once daily.    pravastatin (PRAVACHOL) 20 MG tablet Take 1 tablet (20 mg total) by mouth once daily.    ULORIC 40 mg Tab TAKE 1 TABLET BY MOUTH ONCE DAILY FOR  GOUT,  LOWERING  URIC  ACID    valsartan (DIOVAN) 320 MG tablet TAKE 1 TABLET BY MOUTH ONCE DAILY FOR  HEART  DISEASE  AND  HIGH  BLOOD  PRESSURE      Family History     Problem Relation (Age of Onset)    Cancer Sister    Gout Father    Heart disease Father    Hypertension Father        Tobacco Use    Smoking status: Never Smoker    Smokeless tobacco: Never Used   Substance and Sexual Activity    Alcohol use: No    Drug use: No    Sexual activity: Not on file     Review of Systems   Constitutional: Negative.    HENT: Negative.    Eyes: Positive for visual disturbance (PTA; has resolved).   Respiratory: Negative.    Cardiovascular: Negative.    Gastrointestinal: Negative.    Genitourinary: Negative.    Musculoskeletal: Negative.    Skin: Negative.    Neurological: Negative.    Hematological: Negative.    Psychiatric/Behavioral: Negative.      Objective:     Vital Signs (Most Recent):  Temp: 98.2 °F (36.8 °C) (01/13/20 1227)  Pulse: 65 (01/13/20 1401)  Resp: 18 (01/13/20 1227)  BP: (!) 176/109 (01/13/20 1401)  SpO2: 96 % (01/13/20 1401) Vital Signs (24h Range):  Temp:  [98.2 °F (36.8 °C)] 98.2 °F (36.8 °C)  Pulse:   [65-82] 65  Resp:  [18] 18  SpO2:  [96 %-97 %] 96 %  BP: (176-192)/(109-111) 176/109     Weight: 97.5 kg (215 lb)  Body mass index is 36.9 kg/m².    Physical Exam   Constitutional: He is oriented to person, place, and time. He appears well-developed and well-nourished.   HENT:   Head: Normocephalic and atraumatic.   Eyes: Pupils are equal, round, and reactive to light. EOM are normal.   Neck: Normal range of motion. Neck supple.   Cardiovascular: Normal rate.   Pulmonary/Chest: Effort normal.   Abdominal: Soft.   Musculoskeletal: Normal range of motion.   Neurological: He is alert and oriented to person, place, and time. He displays normal reflexes. No cranial nerve deficit or sensory deficit. He exhibits normal muscle tone. He has a normal Finger-Nose-Finger Test. Coordination normal.   Reflex Scores:       Tricep reflexes are 2+ on the right side and 2+ on the left side.       Bicep reflexes are 2+ on the right side and 2+ on the left side.       Brachioradialis reflexes are 2+ on the right side and 2+ on the left side.       Patellar reflexes are 2+ on the right side and 2+ on the left side.       Achilles reflexes are 2+ on the right side and 2+ on the left side.  Skin: Skin is warm and dry. Capillary refill takes less than 2 seconds.   Psychiatric: He has a normal mood and affect.       NEUROLOGICAL EXAMINATION:     MENTAL STATUS   Oriented to person, place, and time.   Level of consciousness: alert    CRANIAL NERVES   Cranial nerves II through XII intact.     CN III, IV, VI   Pupils are equal, round, and reactive to light.  Extraocular motions are normal.     MOTOR EXAM   Muscle bulk: normal    REFLEXES     Reflexes   Right brachioradialis: 2+  Left brachioradialis: 2+  Right biceps: 2+  Left biceps: 2+  Right triceps: 2+  Left triceps: 2+  Right patellar: 2+  Left patellar: 2+  Right achilles: 2+  Left achilles: 2+  Right plantar: normal  Left plantar: normal    SENSORY EXAM   Light touch normal.     GAIT AND  COORDINATION      Coordination   Finger to nose coordination: normal      Significant Labs:  Lab Results   Component Value Date    CREATININE 1.2 01/13/2020     Lab Results   Component Value Date    TSH 1.935 01/13/2020     Lab Results   Component Value Date    LDLCALC 141.4 01/13/2020     Lab Results   Component Value Date    HGBA1C 5.3 09/08/2017       Significant Imaging:     Ct Head Without Contrast    Result Date: 1/13/2020  1.  There is no acute intracranial abnormality.  There is no hemorrhage, mass/mass effect, acute edema or ischemia. It should be noted that MRI is more sensitive in the detection of subtle or acute nonhemorrhagic ischemic disease. Electronically signed by: Dre Keenan MD Date:    01/13/2020 Time:    13:24        Pending tests:  -MRI brain w/o contrast  MRA brain w/o contrast  -CUS  EHO      Assessment and Plan:    1. Visual disturbance  -Rule out CVA/TIA  -Stroke work up pending; CT negative for acute process  -Will f/u for MRI brain w/o contrast, CUS, MRA, and ECHO  -ASA 81mg daily for secondary stroke prevention  -High intensity statins for secondary stroke prevention and hyperlipidemia  -Risk factor modification: HTN, HLD  -PT/OT/ST       2. Hypertension  -Allow for permissive HTN 24-48 hours  -Management per internal medicine        Patient is to follow up with [facility] at [phone] within 2 weeks from discharge.  Stroke education was provided to patient/family members including stroke risk factor modification and patient/family members were informed if patient experiences any acute neurological changes including weakness, confusion, visual changes to come straight to the ER.  All side effects of new medications were discussed with patient/family members.             There are no hospital problems to display for this patient.      VTE Risk Mitigation (From admission, onward)    None        Plan of care discussed with Dr. Shore  Thank you for your consult. I will  follow-up with patient. Please contact us if you have any additional questions.    Aissatou Botello NP  Neurology  [facility]

## 2020-01-13 NOTE — SUBJECTIVE & OBJECTIVE
Past Medical History:   Diagnosis Date    Allergic rhinitis due to allergen     Arthritis     Cardiomyopathy     Gout     Hypertension     Left bundle branch block        Past Surgical History:   Procedure Laterality Date    WISDOM TOOTH EXTRACTION         Review of patient's allergies indicates:   Allergen Reactions    Codeine      Other reaction(s): Hives    Lisinopril      Dry coughing    Phenytoin sodium extended      Other reaction(s): leg cramps       No current facility-administered medications on file prior to encounter.      Current Outpatient Medications on File Prior to Encounter   Medication Sig    diclofenac sodium 1 % Gel Apply 2 g topically 4 (four) times daily. for 10 days    indomethacin (INDOCIN) 25 MG capsule Take 1 capsule (25 mg total) by mouth 3 (three) times daily as needed (gout flare up).    [DISCONTINUED] acetaminophen (TYLENOL) 500 MG tablet Take 1 tablet (500 mg total) by mouth every 6 (six) hours as needed for Pain.    [DISCONTINUED] albuterol (PROAIR HFA) 90 mcg/actuation inhaler INHALE TWO PUFFS INTO THE LUNGS 4 TIMES DAILY PRN wheezing, shortness of breath    [DISCONTINUED] clotrimazole-betamethasone 1-0.05% (LOTRISONE) cream APPLY  CREAM TOPICALLY TWICE DAILY TO AFFECTED AREA    [DISCONTINUED] cyclobenzaprine (FLEXERIL) 10 MG tablet Take 0.5 tablets (5 mg total) by mouth 3 (three) times daily as needed for Muscle spasms.    [DISCONTINUED] erythromycin (ROMYCIN) ophthalmic ointment Place a 1/2 inch ribbon of ointment into the left lower eyelid for 5 days.    [DISCONTINUED] metoprolol succinate (TOPROL-XL) 25 MG 24 hr tablet Take 1 tablet (25 mg total) by mouth once daily. For blood pressure in the evening    [DISCONTINUED] montelukast (SINGULAIR) 10 mg tablet Take 1 tablet (10 mg total) by mouth every evening. For sinus allergies    [DISCONTINUED] NIFEdipine (PROCARDIA-XL) 60 MG (OSM) 24 hr tablet Take 1 tablet (60 mg total) by mouth once daily.    [DISCONTINUED]  pravastatin (PRAVACHOL) 20 MG tablet Take 1 tablet (20 mg total) by mouth once daily.    [DISCONTINUED] ULORIC 40 mg Tab TAKE 1 TABLET BY MOUTH ONCE DAILY FOR  GOUT,  LOWERING  URIC  ACID    [DISCONTINUED] valsartan (DIOVAN) 320 MG tablet TAKE 1 TABLET BY MOUTH ONCE DAILY FOR  HEART  DISEASE  AND  HIGH  BLOOD  PRESSURE     Family History     Problem Relation (Age of Onset)    Cancer Sister    Gout Father    Heart disease Father    Hypertension Father        Tobacco Use    Smoking status: Never Smoker    Smokeless tobacco: Never Used   Substance and Sexual Activity    Alcohol use: No    Drug use: No    Sexual activity: Not on file     Review of Systems   Constitutional: Negative for chills and fever.   HENT: Negative for congestion and rhinorrhea.    Eyes: Positive for visual disturbance ( left side, resolved).   Respiratory: Negative for cough, shortness of breath and wheezing.    Cardiovascular: Negative for chest pain, palpitations and leg swelling.   Gastrointestinal: Negative for abdominal distention, abdominal pain, nausea and vomiting.   Endocrine: Negative for cold intolerance and heat intolerance.   Genitourinary: Negative for dysuria and urgency.   Musculoskeletal: Negative for arthralgias and neck stiffness.   Skin: Negative for rash and wound.   Neurological: Positive for weakness (Left side, resolved). Negative for dizziness.   Psychiatric/Behavioral: Negative for agitation and confusion.   All other systems reviewed and are negative.    Objective:     Vital Signs (Most Recent):  Temp: 98.2 °F (36.8 °C) (01/13/20 1227)  Pulse: 81 (01/13/20 1437)  Resp: 18 (01/13/20 1227)  BP: (!) 191/111 (01/13/20 1437)  SpO2: 96 % (01/13/20 1437) Vital Signs (24h Range):  Temp:  [98.2 °F (36.8 °C)] 98.2 °F (36.8 °C)  Pulse:  [65-82] 81  Resp:  [18] 18  SpO2:  [96 %-97 %] 96 %  BP: (176-192)/(109-111) 191/111     Weight: 97.5 kg (215 lb)  Body mass index is 36.9 kg/m².    Physical Exam   Constitutional: He is  oriented to person, place, and time. He appears well-developed and well-nourished. No distress.   HENT:   Head: Normocephalic and atraumatic.   Eyes: Pupils are equal, round, and reactive to light. EOM are normal.   Cardiovascular: Normal rate and regular rhythm.   No murmur heard.  Pulmonary/Chest: Effort normal and breath sounds normal. No respiratory distress. He has no wheezes. He has no rales.   Abdominal: Soft. Bowel sounds are normal. He exhibits no distension. There is no tenderness.   Musculoskeletal: Normal range of motion. He exhibits no edema.   Neurological: He is alert and oriented to person, place, and time. No cranial nerve deficit.   Skin: Skin is warm and dry. No rash noted.   Psychiatric: He has a normal mood and affect. His behavior is normal.         CRANIAL NERVES     CN III, IV, VI   Pupils are equal, round, and reactive to light.  Extraocular motions are normal.        Significant Labs: All pertinent labs within the past 24 hours have been reviewed.    Significant Imaging: I have reviewed and interpreted all pertinent imaging results/findings within the past 24 hours.

## 2020-01-13 NOTE — H&P
"[facility] Medicine  History & Physical    Patient Name: Jarod Pierce  MRN: [id number]  Admission Date: 1/13/2020  Attending Physician: Junie Rodriguez MD  Primary Care Provider: Idris Fuller MD         Patient information was obtained from patient and ER records.     Subjective:     Principal Problem:Transient ischemic attack    Chief Complaint:   Chief Complaint   Patient presents with    Eye Problem     " seeing spots "    Headache     not taking  HTN med. x 2 weeks         HPI: Patient is a 54-year-old male with history of hypertension who presents to the ED today with complaint of left arm and leg heaviness that lasted approximately 15 min this morning in his now resolved.  Also associated with transient left-sided vision deficit which is not back to normal.  He says all these occurred for about 15-20 minutes while driving to  some equipment per work.  He reports he has not taken any of his blood pressure medications for about 2 weeks.  Blood pressure grossly elevated the ED.  CT head negative.  Neuro exam is normal now and he has no complaints this time.  He reports that earlier his brain felt like Jell-O.  But he is back to his usual self at this time.  Does not provide a clear reason for not taking his blood pressure medications.  He is placed in observation by hospital medicine service for TIA workup.  Neurology will be consulted    Past Medical History:   Diagnosis Date    Allergic rhinitis due to allergen     Arthritis     Cardiomyopathy     Gout     Hypertension     Left bundle branch block        Past Surgical History:   Procedure Laterality Date    WISDOM TOOTH EXTRACTION         Review of patient's allergies indicates:   Allergen Reactions    Codeine      Other reaction(s): Hives    Lisinopril      Dry coughing    Phenytoin sodium extended      Other reaction(s): leg cramps       No current facility-administered medications on file prior to " encounter.      Current Outpatient Medications on File Prior to Encounter   Medication Sig    diclofenac sodium 1 % Gel Apply 2 g topically 4 (four) times daily. for 10 days    indomethacin (INDOCIN) 25 MG capsule Take 1 capsule (25 mg total) by mouth 3 (three) times daily as needed (gout flare up).    [DISCONTINUED] acetaminophen (TYLENOL) 500 MG tablet Take 1 tablet (500 mg total) by mouth every 6 (six) hours as needed for Pain.    [DISCONTINUED] albuterol (PROAIR HFA) 90 mcg/actuation inhaler INHALE TWO PUFFS INTO THE LUNGS 4 TIMES DAILY PRN wheezing, shortness of breath    [DISCONTINUED] clotrimazole-betamethasone 1-0.05% (LOTRISONE) cream APPLY  CREAM TOPICALLY TWICE DAILY TO AFFECTED AREA    [DISCONTINUED] cyclobenzaprine (FLEXERIL) 10 MG tablet Take 0.5 tablets (5 mg total) by mouth 3 (three) times daily as needed for Muscle spasms.    [DISCONTINUED] erythromycin (ROMYCIN) ophthalmic ointment Place a 1/2 inch ribbon of ointment into the left lower eyelid for 5 days.    [DISCONTINUED] metoprolol succinate (TOPROL-XL) 25 MG 24 hr tablet Take 1 tablet (25 mg total) by mouth once daily. For blood pressure in the evening    [DISCONTINUED] montelukast (SINGULAIR) 10 mg tablet Take 1 tablet (10 mg total) by mouth every evening. For sinus allergies    [DISCONTINUED] NIFEdipine (PROCARDIA-XL) 60 MG (OSM) 24 hr tablet Take 1 tablet (60 mg total) by mouth once daily.    [DISCONTINUED] pravastatin (PRAVACHOL) 20 MG tablet Take 1 tablet (20 mg total) by mouth once daily.    [DISCONTINUED] ULORIC 40 mg Tab TAKE 1 TABLET BY MOUTH ONCE DAILY FOR  GOUT,  LOWERING  URIC  ACID    [DISCONTINUED] valsartan (DIOVAN) 320 MG tablet TAKE 1 TABLET BY MOUTH ONCE DAILY FOR  HEART  DISEASE  AND  HIGH  BLOOD  PRESSURE     Family History     Problem Relation (Age of Onset)    Cancer Sister    Gout Father    Heart disease Father    Hypertension Father        Tobacco Use    Smoking status: Never Smoker    Smokeless tobacco:  Never Used   Substance and Sexual Activity    Alcohol use: No    Drug use: No    Sexual activity: Not on file     Review of Systems   Constitutional: Negative for chills and fever.   HENT: Negative for congestion and rhinorrhea.    Eyes: Positive for visual disturbance ( left side, resolved).   Respiratory: Negative for cough, shortness of breath and wheezing.    Cardiovascular: Negative for chest pain, palpitations and leg swelling.   Gastrointestinal: Negative for abdominal distention, abdominal pain, nausea and vomiting.   Endocrine: Negative for cold intolerance and heat intolerance.   Genitourinary: Negative for dysuria and urgency.   Musculoskeletal: Negative for arthralgias and neck stiffness.   Skin: Negative for rash and wound.   Neurological: Positive for weakness (Left side, resolved). Negative for dizziness.   Psychiatric/Behavioral: Negative for agitation and confusion.   All other systems reviewed and are negative.    Objective:     Vital Signs (Most Recent):  Temp: 98.2 °F (36.8 °C) (01/13/20 1227)  Pulse: 81 (01/13/20 1437)  Resp: 18 (01/13/20 1227)  BP: (!) 191/111 (01/13/20 1437)  SpO2: 96 % (01/13/20 1437) Vital Signs (24h Range):  Temp:  [98.2 °F (36.8 °C)] 98.2 °F (36.8 °C)  Pulse:  [65-82] 81  Resp:  [18] 18  SpO2:  [96 %-97 %] 96 %  BP: (176-192)/(109-111) 191/111     Weight: 97.5 kg (215 lb)  Body mass index is 36.9 kg/m².    Physical Exam   Constitutional: He is oriented to person, place, and time. He appears well-developed and well-nourished. No distress.   HENT:   Head: Normocephalic and atraumatic.   Eyes: Pupils are equal, round, and reactive to light. EOM are normal.   Cardiovascular: Normal rate and regular rhythm.   No murmur heard.  Pulmonary/Chest: Effort normal and breath sounds normal. No respiratory distress. He has no wheezes. He has no rales.   Abdominal: Soft. Bowel sounds are normal. He exhibits no distension. There is no tenderness.   Musculoskeletal: Normal range of  motion. He exhibits no edema.   Neurological: He is alert and oriented to person, place, and time. No cranial nerve deficit.   Skin: Skin is warm and dry. No rash noted.   Psychiatric: He has a normal mood and affect. His behavior is normal.         CRANIAL NERVES     CN III, IV, VI   Pupils are equal, round, and reactive to light.  Extraocular motions are normal.        Significant Labs: All pertinent labs within the past 24 hours have been reviewed.    Significant Imaging: I have reviewed and interpreted all pertinent imaging results/findings within the past 24 hours.    Assessment/Plan:     * Transient ischemic attack  Placed in observation.  Check MRI brain, MRA brain, carotid ultrasound, TTE with bubble.  Consult with Neurology.  PT/OT/ST consults.  Allow for permissive hypertension with p.r.n. labetalol for BP greater than 220/110.  NPO until passes bedside swallow with nurse.  Initiate aspirin and statin.  Neuro checks Q 4.       Obesity (BMI 30-39.9)  Body mass index is 36.9 kg/m². Morbid obesity complicates all aspects of disease management from diagnostic modalities to treatment. Weight loss encouraged and health benefits explained to patient.        Non compliance w medication regimen  Counseled patient on need for compliance with medication regimen.      Essential hypertension  Has not been taking any of his home blood pressure medications.  Will have nurse verify home medication list.  Hold all p.o. antihypertensives for now until stroke ruled out on MRI.  P.r.n. labetalol for SBP greater than 220 or diastolic blood pressure greater than 110      Mixed hyperlipidemia  Statin not listed as 1 of his home medications.  .  Start atorvastatin 40 mg.       Lovenox 40 subcu  Daily for VTE prophylaxis     Junie Rodriguez MD  Department of Hospital Medicine   [facility]

## 2020-01-13 NOTE — NURSING
Received via w/c, report received from ed nurse. Alert, oriented on admit. Oriented to room and unit. Fall prevention explained, bed alarm explained, verbalized understanding

## 2020-01-13 NOTE — ASSESSMENT & PLAN NOTE
Body mass index is 36.9 kg/m². Morbid obesity complicates all aspects of disease management from diagnostic modalities to treatment. Weight loss encouraged and health benefits explained to patient.

## 2020-01-13 NOTE — ED PROVIDER NOTES
"Encounter Date: 1/13/2020    SCRIBE #1 NOTE: I, eSnia Dooley, am scribing for, and in the presence of, Dr. Sudeep Solorio.       History     Chief Complaint   Patient presents with    Eye Problem     " seeing spots "    Headache     not taking  HTN med. x 2 weeks        Time seen by provider: 12:33 PM on 01/13/2020    Jarod Pierce is a 54 y.o. male who presents the ED with complaints of left upper extremity "heaviness" and blindness to half of his left eye since waking up x4.5 hours ago. The patient reports that his visual disturbance resolved x30 minutes ago and his left arm weakness has improved since arrival at the ED. He mentions not taking his HTN medication in the past x2 weeks secondary to drowsy side effects. He denies history of stroke. He denies alcohol or tobacco use. The patient denies fever, SOB, chest pain, nausea, diarrhea, constipation, dysuria, weakness, numbness, or any other symptoms at this time. PMHx of HTN, LBBB, and cardiomyopathy.    The history is provided by the patient.     Review of patient's allergies indicates:   Allergen Reactions    Codeine      Other reaction(s): Hives    Lisinopril      Dry coughing    Phenytoin sodium extended      Other reaction(s): leg cramps     Past Medical History:   Diagnosis Date    Allergic rhinitis due to allergen     Arthritis     Cardiomyopathy     Gout     Hypertension     Left bundle branch block      Past Surgical History:   Procedure Laterality Date    WISDOM TOOTH EXTRACTION       Family History   Problem Relation Age of Onset    Heart disease Father     Hypertension Father     Gout Father     Cancer Sister         lung    Heart attack Neg Hx      Social History     Tobacco Use    Smoking status: Never Smoker    Smokeless tobacco: Never Used   Substance Use Topics    Alcohol use: No    Drug use: No     Review of Systems   Constitutional: Negative for activity change, appetite change, chills, diaphoresis, fatigue and fever. " "  HENT: Negative for congestion, rhinorrhea, sore throat, trouble swallowing and voice change.    Eyes: Positive for visual disturbance.   Respiratory: Negative for cough, choking, chest tightness, shortness of breath, wheezing and stridor.    Cardiovascular: Negative for chest pain, palpitations and leg swelling.   Gastrointestinal: Negative for abdominal distention, abdominal pain, blood in stool, constipation, diarrhea, nausea and vomiting.   Genitourinary: Negative for difficulty urinating, dysuria, flank pain, frequency and hematuria.   Musculoskeletal: Negative for arthralgias, back pain, joint swelling, myalgias, neck pain and neck stiffness.   Skin: Negative for color change and rash.   Neurological: Negative for dizziness, syncope, speech difficulty, weakness, numbness and headaches.        + left upper extremity "heaviness"   Hematological: Negative for adenopathy. Does not bruise/bleed easily.   All other systems reviewed and are negative.      Physical Exam     Initial Vitals [01/13/20 1227]   BP Pulse Resp Temp SpO2   (!) 192/111 82 18 98.2 °F (36.8 °C) 97 %      MAP       --         Physical Exam    Constitutional: He appears well-developed and well-nourished. He is not diaphoretic. No distress.   HENT:   Head: Normocephalic and atraumatic.   Mouth/Throat: Oropharynx is clear and moist and mucous membranes are normal.   Eyes: EOM are normal. Pupils are equal, round, and reactive to light.   Pupils are 3 mm round and reactive to light.    Neck: Neck supple. No tracheal deviation present.   Cardiovascular: Normal rate, regular rhythm and normal heart sounds. Exam reveals no gallop and no friction rub.    No murmur heard.  Pulses:       Radial pulses are 2+ on the right side, and 2+ on the left side.        Dorsalis pedis pulses are 2+ on the right side, and 2+ on the left side.   Pulmonary/Chest: Breath sounds normal. He has no wheezes. He has no rhonchi. He has no rales.   Abdominal: Soft. Bowel sounds " are normal. He exhibits no distension. There is no hepatosplenomegaly. There is no tenderness. There is no rebound, no guarding and no CVA tenderness.   No palpable organomegaly.    Lymphadenopathy:     He has no cervical adenopathy.   Neurological: He is alert and oriented to person, place, and time. He has normal strength. No cranial nerve deficit or sensory deficit.   5/5 strength and sensation to light touch intact.   Skin: Skin is warm and dry.   Capillary refill is less than 2 seconds.          ED Course   Procedures  Labs Reviewed   CBC W/ AUTO DIFFERENTIAL - Abnormal; Notable for the following components:       Result Value    Mean Corpuscular Hemoglobin 32.5 (*)     All other components within normal limits   COMPREHENSIVE METABOLIC PANEL - Abnormal; Notable for the following components:    Total Bilirubin 1.4 (*)     All other components within normal limits   LIPID PANEL - Abnormal; Notable for the following components:    Cholesterol 242 (*)     Triglycerides 333 (*)     HDL 34 (*)     Hdl/Cholesterol Ratio 14.0 (*)     Total Cholesterol/HDL Ratio 7.1 (*)     All other components within normal limits   PROTIME-INR   TSH   TROPONIN I   B-TYPE NATRIURETIC PEPTIDE   HEMOGLOBIN A1C   POCT GLUCOSE, HAND-HELD DEVICE   POCT GLUCOSE   POCT PROTIME-INR          Imaging Results          CT Head Without Contrast (Final result)  Result time 01/13/20 13:24:47    Final result by Dre Keenan MD (01/13/20 13:24:47)                 Impression:      1.  There is no acute intracranial abnormality.  There is no hemorrhage, mass/mass effect, acute edema or ischemia. It should be noted that MRI is more sensitive in the detection of subtle or acute nonhemorrhagic ischemic disease.      Electronically signed by: Dre Keenan MD  Date:    01/13/2020  Time:    13:24             Narrative:    EXAMINATION:  CT HEAD WITHOUT CONTRAST    CLINICAL HISTORY:  Stroke;    TECHNIQUE:  Routine unenhanced axial images were  obtained through the head.  Sagittal and coronal reformatted images were created.  The study is reviewed in bone and soft tissue windows.    COMPARISON:  None    FINDINGS:  Intracranial contents: There is no acute intracranial abnormality.  Brain volume, ventricular size and position are normal.  There is no hemorrhage or mass/mass effect.  There is no acute edema or ischemia.  The gray-white interface is preserved without obvious acute infarction.  There is no significant white matter disease.  There is no dense vessel.  There is no abnormal extra-axial fluid collection.  The basilar cisterns are open.  The cerebellar tonsils are in normal position at the level of the foramen magnum.    Extracranial contents, calvarium, soft tissues: The calvarium is normal.  The included paranasal sinuses and mastoid air cells are clear.                               X-Ray Chest AP Portable (Final result)  Result time 01/13/20 13:28:27    Final result by Ramírez Reed MD (01/13/20 13:28:27)                 Impression:      No acute process.  No significant change.      Electronically signed by: Ramírez Reed MD  Date:    01/13/2020  Time:    13:28             Narrative:    EXAMINATION:  XR CHEST AP PORTABLE    CLINICAL HISTORY:  weakness;    TECHNIQUE:  Single frontal view of the chest was performed.    COMPARISON:  09/22/2018    FINDINGS:  The cardiomediastinal silhouette is with normal limits.  There is no consolidation, edema, or pleural effusion.                                 Medical Decision Making:   History:   Old Medical Records: I decided to obtain old medical records.  Initial Assessment:   This is an emergent evaluation of an 54 y.o. Male awakening this morning with left arm weakness and experiencing loss of vision in half of the left eye. These symptoms have resolved.  Differential Diagnosis:   My initial differential diagnoses include but are not limited to: TIA, hypertensive emergency, and electrolyte  abnormality.  Independently Interpreted Test(s):   I have ordered and independently interpreted EKG Reading(s) - see summary below       <> Summary of EKG Reading(s): NSR. LBBB. 80 bpm. T wave inversions in lateral leads.  Clinical Tests:   Lab Tests: Reviewed and Ordered  Radiological Study: Reviewed and Ordered  Medical Tests: Reviewed and Ordered            Scribe Attestation:   Scribe #1: I performed the above scribed service and the documentation accurately describes the services I performed. I attest to the accuracy of the note.    Attending Attestation:             Attending ED Notes:   Patient consulted to neuro who requests admission for further evaluation and management, patient consulted to Internal Medicine for further evaluation and management   I, Dr. Sudeep Solorio, personally performed the services described in this documentation. All medical record entries made by the scribe were at my direction and in my presence.  I have reviewed the chart and agree that the record reflects my personal performance and is accurate and complete. Sudeep Melgar MD.                            Clinical Impression:       ICD-10-CM ICD-9-CM   1. Transient ischemic attack G45.9 435.9   2. Weakness R53.1 780.79         Disposition:   Disposition: Placed in Observation                     Sudeep Solorio MD  01/13/20 1458

## 2020-01-13 NOTE — ASSESSMENT & PLAN NOTE
Placed in observation.  Check MRI brain, MRA brain, carotid ultrasound, TTE with bubble.  Consult with Neurology.  PT/OT/ST consults.  Allow for permissive hypertension with p.r.n. labetalol for BP greater than 220/110.  NPO until passes bedside swallow with nurse.  Initiate aspirin and statin.  Neuro checks Q 4.

## 2020-01-14 VITALS
WEIGHT: 210 LBS | OXYGEN SATURATION: 96 % | SYSTOLIC BLOOD PRESSURE: 175 MMHG | DIASTOLIC BLOOD PRESSURE: 95 MMHG | BODY MASS INDEX: 35.85 KG/M2 | HEART RATE: 87 BPM | RESPIRATION RATE: 18 BRPM | TEMPERATURE: 99 F | HEIGHT: 64 IN

## 2020-01-14 LAB
ALBUMIN SERPL BCP-MCNC: 4 G/DL (ref 3.5–5.2)
ALP SERPL-CCNC: 96 U/L (ref 55–135)
ALT SERPL W/O P-5'-P-CCNC: 20 U/L (ref 10–44)
ANION GAP SERPL CALC-SCNC: 11 MMOL/L (ref 8–16)
AORTIC ROOT ANNULUS: 3.3 CM
AORTIC VALVE CUSP SEPERATION: 2.56 CM
APTT BLDCRRT: 28.7 SEC (ref 21–32)
AST SERPL-CCNC: 18 U/L (ref 10–40)
AV INDEX (PROSTH): 0.95
AV MEAN GRADIENT: 3 MMHG
AV PEAK GRADIENT: 5 MMHG
AV VALVE AREA: 4.46 CM2
AV VELOCITY RATIO: 1.03
BASOPHILS # BLD AUTO: 0.04 K/UL (ref 0–0.2)
BASOPHILS NFR BLD: 0.7 % (ref 0–1.9)
BILIRUB SERPL-MCNC: 1.4 MG/DL (ref 0.1–1)
BSA FOR ECHO PROCEDURE: 2.07 M2
BUN SERPL-MCNC: 16 MG/DL (ref 6–20)
CALCIUM SERPL-MCNC: 9.1 MG/DL (ref 8.7–10.5)
CHLORIDE SERPL-SCNC: 104 MMOL/L (ref 95–110)
CK MB SERPL-MCNC: 0.7 NG/ML (ref 0.1–6.5)
CK MB SERPL-RTO: 0.9 % (ref 0–5)
CK SERPL-CCNC: 79 U/L (ref 20–200)
CO2 SERPL-SCNC: 25 MMOL/L (ref 23–29)
CREAT SERPL-MCNC: 1.2 MG/DL (ref 0.5–1.4)
CV ECHO LV RWT: 0.43 CM
DIFFERENTIAL METHOD: ABNORMAL
DOP CALC AO PEAK VEL: 1.08 M/S
DOP CALC AO VTI: 22.03 CM
DOP CALC LVOT AREA: 4.7 CM2
DOP CALC LVOT DIAMETER: 2.45 CM
DOP CALC LVOT PEAK VEL: 1.11 M/S
DOP CALC LVOT STROKE VOLUME: 98.2 CM3
DOP CALCLVOT PEAK VEL VTI: 20.84 CM
E WAVE DECELERATION TIME: 225.55 MSEC
E/A RATIO: 0.76
E/E' RATIO: 13.8 M/S
ECHO LV POSTERIOR WALL: 1.2 CM (ref 0.6–1.1)
EOSINOPHIL # BLD AUTO: 0.1 K/UL (ref 0–0.5)
EOSINOPHIL NFR BLD: 2.2 % (ref 0–8)
ERYTHROCYTE [DISTWIDTH] IN BLOOD BY AUTOMATED COUNT: 12.4 % (ref 11.5–14.5)
EST. GFR  (AFRICAN AMERICAN): >60 ML/MIN/1.73 M^2
EST. GFR  (NON AFRICAN AMERICAN): >60 ML/MIN/1.73 M^2
ESTIMATED AVG GLUCOSE: 111 MG/DL (ref 68–131)
FRACTIONAL SHORTENING: 27 % (ref 28–44)
GLUCOSE SERPL-MCNC: 103 MG/DL (ref 70–110)
HBA1C MFR BLD HPLC: 5.5 % (ref 4–5.6)
HCT VFR BLD AUTO: 44.2 % (ref 40–54)
HGB BLD-MCNC: 15.3 G/DL (ref 14–18)
IMM GRANULOCYTES # BLD AUTO: 0.01 K/UL (ref 0–0.04)
INR PPP: 1 (ref 0.8–1.2)
INTERVENTRICULAR SEPTUM: 1.2 CM (ref 0.6–1.1)
IVC PROX: 1.9 CM
IVRT: 0.14 MSEC
LEFT ATRIUM SIZE: 3.34 CM
LEFT INTERNAL DIMENSION IN SYSTOLE: 4.03 CM (ref 2.1–4)
LEFT VENTRICLE DIASTOLIC VOLUME INDEX: 74.36 ML/M2
LEFT VENTRICLE DIASTOLIC VOLUME: 148.49 ML
LEFT VENTRICLE MASS INDEX: 137 G/M2
LEFT VENTRICLE SYSTOLIC VOLUME INDEX: 35.7 ML/M2
LEFT VENTRICLE SYSTOLIC VOLUME: 71.32 ML
LEFT VENTRICULAR INTERNAL DIMENSION IN DIASTOLE: 5.52 CM (ref 3.5–6)
LEFT VENTRICULAR MASS: 273.99 G
LV LATERAL E/E' RATIO: 11.5 M/S
LV SEPTAL E/E' RATIO: 17.25 M/S
LYMPHOCYTES # BLD AUTO: 2.1 K/UL (ref 1–4.8)
LYMPHOCYTES NFR BLD: 37.3 % (ref 18–48)
MAGNESIUM SERPL-MCNC: 2 MG/DL (ref 1.6–2.6)
MCH RBC QN AUTO: 31.9 PG (ref 27–31)
MCHC RBC AUTO-ENTMCNC: 34.6 G/DL (ref 32–36)
MCV RBC AUTO: 92 FL (ref 82–98)
MONOCYTES # BLD AUTO: 0.5 K/UL (ref 0.3–1)
MONOCYTES NFR BLD: 9.1 % (ref 4–15)
MV A" WAVE DURATION": 263 MSEC
MV MEAN GRADIENT: 1 MMHG
MV PEAK A VEL: 0.91 M/S
MV PEAK E VEL: 0.69 M/S
MV STENOSIS PRESSURE HALF TIME: 66 MS
MV VALVE AREA P 1/2 METHOD: 3.33 CM2
NEUTROPHILS # BLD AUTO: 2.8 K/UL (ref 1.8–7.7)
NEUTROPHILS NFR BLD: 50.5 % (ref 38–73)
NRBC BLD-RTO: 0 /100 WBC
PHOSPHATE SERPL-MCNC: 4.1 MG/DL (ref 2.7–4.5)
PISA TR MAX VEL: 2.29 M/S
PLATELET # BLD AUTO: 169 K/UL (ref 150–350)
PMV BLD AUTO: 9 FL (ref 9.2–12.9)
POTASSIUM SERPL-SCNC: 3.6 MMOL/L (ref 3.5–5.1)
PROT SERPL-MCNC: 7.2 G/DL (ref 6–8.4)
PROTHROMBIN TIME: 10.3 SEC (ref 9–12.5)
PROX AORTA: 3.4 CM
PULM VEIN A" WAVE DURATION": 125 MSEC
PULM VEIN S/D RATIO: 2.13
PV PEAK D VEL: 0.23 M/S
PV PEAK S VEL: 0.49 M/S
PV PEAK VELOCITY: 0.77 CM/S
RA PRESSURE: 8 MMHG
RBC # BLD AUTO: 4.79 M/UL (ref 4.6–6.2)
RIGHT VENTRICULAR END-DIASTOLIC DIMENSION: 3.31 CM
SODIUM SERPL-SCNC: 140 MMOL/L (ref 136–145)
TDI LATERAL: 0.06 M/S
TDI SEPTAL: 0.04 M/S
TDI: 0.05 M/S
TR MAX PG: 21 MMHG
TRICUSPID ANNULAR PLANE SYSTOLIC EXCURSION: 2.72 CM
TROPONIN I SERPL DL<=0.01 NG/ML-MCNC: 0.01 NG/ML (ref 0–0.03)
TV REST PULMONARY ARTERY PRESSURE: 29 MMHG
WBC # BLD AUTO: 5.52 K/UL (ref 3.9–12.7)

## 2020-01-14 PROCEDURE — 85025 COMPLETE CBC W/AUTO DIFF WBC: CPT

## 2020-01-14 PROCEDURE — 92610 EVALUATE SWALLOWING FUNCTION: CPT

## 2020-01-14 PROCEDURE — 25000003 PHARM REV CODE 250: Performed by: NURSE PRACTITIONER

## 2020-01-14 PROCEDURE — G0378 HOSPITAL OBSERVATION PER HR: HCPCS

## 2020-01-14 PROCEDURE — 84484 ASSAY OF TROPONIN QUANT: CPT

## 2020-01-14 PROCEDURE — 99900039 HC SLP GENERIC THERAPY SCREENING (STAT)

## 2020-01-14 PROCEDURE — 84100 ASSAY OF PHOSPHORUS: CPT

## 2020-01-14 PROCEDURE — 97161 PT EVAL LOW COMPLEX 20 MIN: CPT

## 2020-01-14 PROCEDURE — 25500020 PHARM REV CODE 255: Performed by: HOSPITALIST

## 2020-01-14 PROCEDURE — 83735 ASSAY OF MAGNESIUM: CPT

## 2020-01-14 PROCEDURE — 97802 MEDICAL NUTRITION INDIV IN: CPT | Mod: 59

## 2020-01-14 PROCEDURE — 25000003 PHARM REV CODE 250: Performed by: HOSPITALIST

## 2020-01-14 PROCEDURE — 82550 ASSAY OF CK (CPK): CPT

## 2020-01-14 PROCEDURE — 94761 N-INVAS EAR/PLS OXIMETRY MLT: CPT

## 2020-01-14 PROCEDURE — 97803 MED NUTRITION INDIV SUBSEQ: CPT

## 2020-01-14 PROCEDURE — 99900038 HC OT GENERIC THERAPY SCREENING (STAT)

## 2020-01-14 PROCEDURE — 96374 THER/PROPH/DIAG INJ IV PUSH: CPT

## 2020-01-14 PROCEDURE — 82553 CREATINE MB FRACTION: CPT

## 2020-01-14 PROCEDURE — 36415 COLL VENOUS BLD VENIPUNCTURE: CPT

## 2020-01-14 PROCEDURE — 85730 THROMBOPLASTIN TIME PARTIAL: CPT

## 2020-01-14 PROCEDURE — 85610 PROTHROMBIN TIME: CPT

## 2020-01-14 PROCEDURE — 80053 COMPREHEN METABOLIC PANEL: CPT

## 2020-01-14 RX ORDER — AMLODIPINE BESYLATE 5 MG/1
5 TABLET ORAL DAILY
Status: DISCONTINUED | OUTPATIENT
Start: 2020-01-14 | End: 2020-01-14 | Stop reason: HOSPADM

## 2020-01-14 RX ORDER — NAPROXEN SODIUM 220 MG/1
81 TABLET, FILM COATED ORAL DAILY
Qty: 90 TABLET | Refills: 3 | Status: ON HOLD | OUTPATIENT
Start: 2020-01-15 | End: 2022-09-23 | Stop reason: SDUPTHER

## 2020-01-14 RX ORDER — AMLODIPINE BESYLATE 5 MG/1
5 TABLET ORAL DAILY
Qty: 90 TABLET | Refills: 0 | Status: SHIPPED | OUTPATIENT
Start: 2020-01-14 | End: 2020-01-31 | Stop reason: SDUPTHER

## 2020-01-14 RX ORDER — GUAIFENESIN 600 MG/1
600 TABLET, EXTENDED RELEASE ORAL 2 TIMES DAILY
Status: DISCONTINUED | OUTPATIENT
Start: 2020-01-14 | End: 2020-01-14 | Stop reason: HOSPADM

## 2020-01-14 RX ORDER — ATORVASTATIN CALCIUM 40 MG/1
40 TABLET, FILM COATED ORAL DAILY
Qty: 90 TABLET | Refills: 0 | Status: SHIPPED | OUTPATIENT
Start: 2020-01-15 | End: 2020-01-31 | Stop reason: SDUPTHER

## 2020-01-14 RX ORDER — CLOPIDOGREL BISULFATE 75 MG/1
75 TABLET ORAL DAILY
Status: DISCONTINUED | OUTPATIENT
Start: 2020-01-14 | End: 2020-01-14 | Stop reason: HOSPADM

## 2020-01-14 RX ORDER — CLOPIDOGREL BISULFATE 75 MG/1
75 TABLET ORAL DAILY
Qty: 21 TABLET | Refills: 0 | Status: ON HOLD | OUTPATIENT
Start: 2020-01-14 | End: 2020-05-09 | Stop reason: HOSPADM

## 2020-01-14 RX ADMIN — ATORVASTATIN CALCIUM 40 MG: 40 TABLET, FILM COATED ORAL at 08:01

## 2020-01-14 RX ADMIN — LABETALOL HYDROCHLORIDE 10 MG: 5 INJECTION INTRAVENOUS at 04:01

## 2020-01-14 RX ADMIN — GUAIFENESIN 600 MG: 600 TABLET, EXTENDED RELEASE ORAL at 08:01

## 2020-01-14 RX ADMIN — ASPIRIN 81 MG 81 MG: 81 TABLET ORAL at 08:01

## 2020-01-14 RX ADMIN — SULFUR HEXAFLUORIDE 2.4 ML: KIT at 10:01

## 2020-01-14 RX ADMIN — CLOPIDOGREL BISULFATE 75 MG: 75 TABLET ORAL at 02:01

## 2020-01-14 RX ADMIN — AMLODIPINE BESYLATE 5 MG: 5 TABLET ORAL at 11:01

## 2020-01-14 NOTE — DISCHARGE INSTRUCTIONS
Thank you for choosing [facility] for your medical care. The primary doctor who is taking care of you at the time of your discharge is Junie Rodriguez MD.     You were admitted to the hospital with Transient ischemic attack.     Please note your discharge instructions, including diet/activity restrictions, follow-up appointments, and medication changes.  If you have any questions about your medical issues, prescriptions, or any other questions, please feel free to contact the [facility] Medicine Dept at [phone] and we will help.    If you are previously with Home health, outpatient PT/OT or under a therapy program, you are cleared to return to those programs.    Please direct all long term medication refills and follow up to your primary care provider, Idris Fuller MD. Thank you again for letting us take care of your health care needs.    Please note the following discharge instructions per your discharging physician-  Take aspirin daily lifelong  Take plavix daily for 21 days.

## 2020-01-14 NOTE — PLAN OF CARE
01/14/20 0800   Patient Assessment/Suction   Level of Consciousness (AVPU) alert   Respiratory Effort Unlabored   Expansion/Accessory Muscles/Retractions expansion symmetric   PRE-TX-O2   O2 Device (Oxygen Therapy) room air   SpO2 96 %   Pulse Oximetry Type Intermittent   $ Pulse Oximetry - Multiple Charge Pulse Oximetry - Multiple   Pulse 73   Resp 16

## 2020-01-14 NOTE — CONSULTS
Food & Nutrition                                                           Education    Diet Education: Heart Healthy Diet  Time Spent: 15 Minutes  Learners: Patient      Nutrition Education provided with handouts: Yes      Comments: Patient admitted for TIA work up. Pt states he has been educated on a heart healthy diet previously and usually does not use salt to flavor foods. He does, however, eat out every day because he finds I difficult too cook for just himself. Pt not very motivated to make changes, stating that he knows what he needs to eat but doesn't find it very important to make changes at this time. Discussed importance of a heart healthy diet, reviewed heart healthy choices, adding heart healthy fats to his day. Gave pt tips on heart healthy convenient food options/snacks.       All questions and concerns answered. Dietitian's contact information provided.       Follow-Up: yes    Please Re-consult as needed        Thanks!

## 2020-01-14 NOTE — PLAN OF CARE
CM met with pt bedside to complete discharge assessment. Pt verified information on facesheet as correct. Denies POA/LW. Reports living at listed address alone- states he has help from family if needed. PCP is Dr. Fuller. Pharm is [facility] on [address]. Pt denies hh/hd/dme. Pt reports being independent with ADLs and able to drive himself to appts. Upon discharge, pt is driving himself home. DC plan is home. CM following to assist in any DC needs.      01/14/20 1115   Discharge Assessment   Assessment Type Discharge Planning Assessment   Confirmed/corrected address and phone number on facesheet? Yes   Assessment information obtained from? Patient   Communicated expected length of stay with patient/caregiver yes   Prior to hospitilization cognitive status: Alert/Oriented   Prior to hospitalization functional status: Independent   Current cognitive status: Alert/Oriented   Current Functional Status: Independent   Lives With alone   Able to Return to Prior Arrangements yes   Is patient able to care for self after discharge? Yes   Patient's perception of discharge disposition home or selfcare   Readmission Within the Last 30 Days no previous admission in last 30 days   Patient currently being followed by outpatient case management? No   Patient currently receives any other outside agency services? No   Equipment Currently Used at Home none   Do you have any problems affording any of your prescribed medications? No   Is the patient taking medications as prescribed? yes   Does the patient have transportation home? Yes   Transportation Anticipated car, drives self   Does the patient receive services at the [facility]? No   Discharge Plan A Home   DME Needed Upon Discharge  none   Patient/Family in Agreement with Plan yes

## 2020-01-14 NOTE — PLAN OF CARE
Hospital follow up scheduled with PCP. AVS updated.      01/14/20 1149   Discharge Assessment   Assessment Type Discharge Planning Reassessment

## 2020-01-14 NOTE — PT/OT/SLP EVAL
Speech Language Pathology Evaluation  Bedside Swallow    Patient Name:  Jarod Pierce   MRN:  [id number]  Admitting Diagnosis: Transient ischemic attack    Recommendations:                 General Recommendations:  Follow-up not indicated  Diet recommendations:  Regular, Thin   Aspiration Precautions: Standard aspiration precautions   General Precautions: Standard,    Communication strategies:  Redirection as needed    History:     Past Medical History:   Diagnosis Date    Allergic rhinitis due to allergen     Arthritis     Cardiomyopathy     Gout     Hypertension     Left bundle branch block        Past Surgical History:   Procedure Laterality Date    WISDOM TOOTH EXTRACTION         Social History: Patient lives alone.    Prior Intubation HX:  None    Modified Barium Swallow: None    Imaging:  MRI BRAIN WITHOUT CONTRAST   Final Result      1. The study is mildly motion degraded.  There is no acute abnormality.  There is no intracranial hemorrhage, mass effect, acute infarction.         Electronically signed by: Dre Keenan MD   Date:    01/14/2020   Time:    07:40      MRA Brain   Final Result      There is no critical stenosis, occlusion, thrombosis, dissection or large aneurysm involving the vessels which comprise the anterior or posterior circulation.  The right vertebral artery is hypoplastic and terminates in a posterior inferior cerebellar artery.  The dominant left vertebral arteries mostly supplies the posterior circulation.         Electronically signed by: Dre Keenan MD   Date:    01/14/2020   Time:    07:43      US Carotid Bilateral   Final Result      No evidence of a hemodynamically significant carotid bifurcation stenosis.         Electronically signed by: Ramírez Reed MD   Date:    01/13/2020   Time:    17:40      CT Head Without Contrast   Final Result      1.  There is no acute intracranial abnormality.  There is no hemorrhage, mass/mass effect, acute edema or ischemia.  "It should be noted that MRI is more sensitive in the detection of subtle or acute nonhemorrhagic ischemic disease.         Electronically signed by: Dre Keenan MD   Date:    01/13/2020   Time:    13:24      X-Ray Chest AP Portable   Final Result      No acute process.  No significant change.         Electronically signed by: Ramírez Reed MD   Date:    01/13/2020   Time:    13:28           Prior diet: Regular/thin.    Occupation/hobbies/homemaking: PLOF: independent. 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 9th grade education. He endorses learning disability and states he was medicated as a child. He specifically mentions Ritalin.    Subjective   C/o nasal and sinus congestion  "I took medicine when I was little."    Objective:   Pt seen for clinical swallow evaluation and administration of MOCA. He is awake, alert and cooperative. He follows commands and answers simple questions. Presents with poor topic maintenance and turn taking. Frequently interrupts. Poor attention.     Oral Musculature Evaluation  · Oral Musculature: WFL  · Dentition: present and adequate  · Secretion Management: adequate  · Mucosal Quality: adequate  · Mandibular Strength and Mobility: WFL  · Oral Labial Strength and Mobility: WFL  · Lingual Strength and Mobility: WFL  · Velar Elevation: WFL  · Buccal Strength and Mobility: WFL  · Volitional Cough: strong  · Volitional Swallow: able to palpate laryngeal rise  · Voice Prior to PO Intake: clear    Bedside Swallow Eval:   Consistencies Assessed:  · Thin liquids --via cup and straw  · Puree --applesauce  · Mixed consistencies --diced peaches  · Solids --cookie     Oral Phase:   · WFL    Pharyngeal Phase:   · no overt clinical signs/symptoms of aspiration    Compensatory Strategies  · None    Treatment: MOCA administered with pt achieving a score of 13/30 suggesting possible moderate cognitive-linguistic impairment. Pt earned 2 of 5 points on visuospatial/executive " "functions, 3 of 3 points on naming, 2 of 6 points for attention, 0 of 3 points for language, 0 of 2 points for abstraction, 0 of 5 points for delayed recall and 5 of 6 points for orientation.      Per further chart review and pt interview, these deficits do not appear to be acute. Pt with h/o learning disability per his report. He also states "I took medicine when I was little" when questioned about prior attention deficits.     Assessment:   Clinical swallowing evaluation completed. All po trials tolerated with no overt s/s swallow dysfunction. REC Regular textures with thin liquids. MOCA administered with pt achieving score of 13/30 suggesting possible moderate cognitive-linguistic impairment. However, per further chart review and pt interview, these deficits do not appear to be acute. Pt with h/o learning disability per his report. He also states "I took medicine when I was little" when questioned about prior attention deficits. See note above for further details.   No f/u indicated ATT. Please re consult PRN.     Goals:   Multidisciplinary Problems     SLP Goals     Not on file          Multidisciplinary Problems (Resolved)        Problem: SLP Goal    Goal Priority Disciplines Outcome   SLP Goal   (Resolved)     SLP Met                   Plan:     · Patient to be seen:      · Plan of Care expires:     · Plan of Care reviewed with:  patient   · SLP Follow-Up:  No       Discharge recommendations:  home   Barriers to Discharge:  None    Time Tracking:     SLP Treatment Date:   01/14/20  Speech Start Time:  1032  Speech Stop Time:  1103     Speech Total Time (min):  31 min    Billable Minutes: Eval Swallow and Oral Function 10 and Total Time 31 (MOCA administered)    Eli Yeh CCC-SLP  01/14/2020           "

## 2020-01-14 NOTE — PLAN OF CARE
Problem: SLP Goal  Goal: SLP Goal  Outcome: Met    Clinical swallowing evaluation completed. All po trials tolerated with no overt s/s swallow dysfunction. REC Regular textures with thin liquids. No f/u indicated ATT. Please reconsult PRN.

## 2020-01-14 NOTE — PT/OT/SLP DISCHARGE
Occupational Therapy Discharge Summary    Jarod Pierce  MRN: [id number]   Principal Problem: Transient ischemic attack      Patient Discharged from acute Occupational Therapy on 1/14/20.    Assessment:      OT screen completed and OTR met with patient; patient does not demonstrate the need for acute skilled OT services at this time. Pt ambulating through room to toilet and return to bed with Supervision to [place] with no AD. Patient toileting Independently and performing LB dressing independently as well. Patient demonstrates no UE strength or coordination deficits.     Objective:     GOALS:   Multidisciplinary Problems     Occupational Therapy Goals     Not on file                Reasons for Discontinuation of Therapy Services  Patient is at baseline for ADL and associated functional mobility.       Plan:     Patient Discharged to: medical management then home.    SONNY Montana, LOTR  1/14/2020

## 2020-01-14 NOTE — PLAN OF CARE
01/14/20 1541   Final Note   Assessment Type Final Discharge Note   Anticipated Discharge Disposition Home   Hospital Follow Up  Appt(s) scheduled? Yes

## 2020-01-14 NOTE — PROGRESS NOTES
"[facility]  Neurology  Progress Note    Patient Name: Jarod Pierce  MRN: [id number]  Admission Date: 1/13/2020  Hospital Length of Stay: 0 days  Code Status: Full Code   Attending Provider: Junie Rodriguez MD   Consulting Provider: Wilbert Shore MD  Consulting NP Aissatou Botello NP  Primary Care Physician: Idris Fuller MD  Principal Problem:Transient ischemic attack    Subjective:     Chief Complaint:    Chief Complaint   Patient presents with    Eye Problem     " seeing spots "    Headache     not taking  HTN med. x 2 weeks          HPI: Patient is a 54-year-old male with history of hypertension who presents to the ED today with complaint of left arm and leg heaviness that lasted approximately 15 min this morning in his now resolved.  Also associated with transient left-sided vision deficit which is not back to normal.  He says all these occurred for about 15-20 minutes while driving to  some equipment per work.  He reports he has not taken any of his blood pressure medications for about 2 weeks.  Blood pressure grossly elevated the ED.  CT head negative.  Neuro exam is normal now and he has no complaints this time.  He reports that earlier his brain felt like Jell-O.  But he is back to his usual self at this time.  Does not provide a clear reason for not taking his blood pressure medications.  He is placed in observation by hospital medicine service for TIA workup.  Neurology will be consulted    Neurology Interval History:Patient seen and examined by Dr. Shore. He states he no longer has blurred vision or sees spots. He states he feels back to baseline. Stroke work up has been negative. Echo results pending. Educated patient about the importance of compliance with blood pressure medication. Well also place patient on aspirin 81mg and Plavix 75mg for three weeks, then discontinue Plavix and continue aspirin 81 mg daily. Patient also needs to be placed on a Statin as his LDL is " elevated. His Neuro exam is normal. Will follow up with patient on outpatient basis, can discuss echo results then.        Past Medical History:   Diagnosis Date    Allergic rhinitis due to allergen     Arthritis     Cardiomyopathy     Gout     Hypertension     Left bundle branch block        Past Surgical History:   Procedure Laterality Date    WISDOM TOOTH EXTRACTION         Review of patient's allergies indicates:   Allergen Reactions    Codeine      Other reaction(s): Hives    Lisinopril      Dry coughing    Phenytoin sodium extended      Other reaction(s): leg cramps       Current Neurological Medications: Scheduled Meds:  Continuous Infusions:  PRN Meds:.      No current facility-administered medications on file prior to encounter.      Current Outpatient Medications on File Prior to Encounter   Medication Sig    diclofenac sodium 1 % Gel Apply 2 g topically 4 (four) times daily. for 10 days      Family History     Problem Relation (Age of Onset)    Cancer Sister    Gout Father    Heart disease Father    Hypertension Father        Tobacco Use    Smoking status: Never Smoker    Smokeless tobacco: Never Used   Substance and Sexual Activity    Alcohol use: No    Drug use: No    Sexual activity: Not on file     Review of Systems   Constitutional: Negative.    HENT: Negative.    Eyes: Positive for visual disturbance (PTA; has resolved).   Respiratory: Negative.    Cardiovascular: Negative.    Gastrointestinal: Negative.    Genitourinary: Negative.    Musculoskeletal: Negative.    Skin: Negative.    Neurological: Negative.    Hematological: Negative.    Psychiatric/Behavioral: Negative.      Objective:     Vital Signs (Most Recent):  Temp: 98.7 °F (37.1 °C) (01/14/20 1216)  Pulse: 87 (01/14/20 1216)  Resp: 18 (01/14/20 1216)  BP: (!) 175/95 (01/14/20 1216)  SpO2: 96 % (01/14/20 1216) Vital Signs (24h Range):  Temp:  [97 °F (36.1 °C)-98.7 °F (37.1 °C)] 98.7 °F (37.1 °C)  Pulse:  [65-87] 87  Resp:   [16-18] 18  SpO2:  [95 %-98 %] 96 %  BP: (160-205)/() 175/95     Weight: 95.3 kg (210 lb)  Body mass index is 36.05 kg/m².    Physical Exam   Constitutional: He is oriented to person, place, and time. He appears well-developed and well-nourished.   HENT:   Head: Normocephalic and atraumatic.   Eyes: Pupils are equal, round, and reactive to light. EOM are normal.   Neck: Normal range of motion. Neck supple.   Cardiovascular: Normal rate.   Pulmonary/Chest: Effort normal.   Abdominal: Soft.   Musculoskeletal: Normal range of motion.   Neurological: He is alert and oriented to person, place, and time. He displays normal reflexes. No cranial nerve deficit or sensory deficit. He exhibits normal muscle tone. He has a normal Finger-Nose-Finger Test. Coordination normal.   Reflex Scores:       Tricep reflexes are 2+ on the right side and 2+ on the left side.       Bicep reflexes are 2+ on the right side and 2+ on the left side.       Brachioradialis reflexes are 2+ on the right side and 2+ on the left side.       Patellar reflexes are 2+ on the right side and 2+ on the left side.       Achilles reflexes are 2+ on the right side and 2+ on the left side.  Skin: Skin is warm and dry. Capillary refill takes less than 2 seconds.   Psychiatric: He has a normal mood and affect.       NEUROLOGICAL EXAMINATION:     MENTAL STATUS   Oriented to person, place, and time.   Level of consciousness: alert    CRANIAL NERVES   Cranial nerves II through XII intact.     CN III, IV, VI   Pupils are equal, round, and reactive to light.  Extraocular motions are normal.     MOTOR EXAM   Muscle bulk: normal    REFLEXES     Reflexes   Right brachioradialis: 2+  Left brachioradialis: 2+  Right biceps: 2+  Left biceps: 2+  Right triceps: 2+  Left triceps: 2+  Right patellar: 2+  Left patellar: 2+  Right achilles: 2+  Left achilles: 2+  Right plantar: normal  Left plantar: normal    SENSORY EXAM   Light touch normal.     GAIT AND COORDINATION       Coordination   Finger to nose coordination: normal      Significant Labs:  Lab Results   Component Value Date    CREATININE 1.2 01/14/2020     Lab Results   Component Value Date    TSH 1.935 01/13/2020     Lab Results   Component Value Date    LDLCALC 141.4 01/13/2020     Lab Results   Component Value Date    HGBA1C 5.5 01/13/2020       Significant Imaging:     Ct Head Without Contrast    Result Date: 1/13/2020  1.  There is no acute intracranial abnormality.  There is no hemorrhage, mass/mass effect, acute edema or ischemia. It should be noted that MRI is more sensitive in the detection of subtle or acute nonhemorrhagic ischemic disease. Electronically signed by: Dre Keenan MD Date:    01/13/2020 Time:    13:24      Mra Brain    Result Date: 1/14/2020  There is no critical stenosis, occlusion, thrombosis, dissection or large aneurysm involving the vessels which comprise the anterior or posterior circulation.  The right vertebral artery is hypoplastic and terminates in a posterior inferior cerebellar artery.  The dominant left vertebral arteries mostly supplies the posterior circulation. Electronically signed by: Dre Keenan MD Date:    01/14/2020 Time:    07:43    Mri Brain Without Contrast    Result Date: 1/14/2020  1. The study is mildly motion degraded.  There is no acute abnormality.  There is no intracranial hemorrhage, mass effect, acute infarction. Electronically signed by: Dre Keenan MD Date:    01/14/2020 Time:    07:40    Us Carotid Bilateral    Result Date: 1/13/2020  No evidence of a hemodynamically significant carotid bifurcation stenosis. Electronically signed by: Ramírez Reed MD Date:    01/13/2020 Time:    17:40        Assessment and Plan:    1. TIA  -ASA 81mg and Plavix 75mg daily for secondary stroke prevention x 3 weeks; then d/c Plavix and continue ASA 81mg daily  -High intensity statins for secondary stroke prevention and hyperlipidemia  -Risk factor modification: HTN,  HLD  -PT/OT/ST  -Can f/u on echo results on outpatient basis      2. Hypertension  -Allow for permissive HTN 24-48 hours  -Management per internal medicine        Patient is to follow up with [facility] at [phone] within 2 weeks from discharge.  Stroke education was provided to patient/family members including stroke risk factor modification and patient/family members were informed if patient experiences any acute neurological changes including weakness, confusion, visual changes to come straight to the ER.  All side effects of new medications were discussed with patient/family members.             Active Diagnoses:    Diagnosis Date Noted POA    PRINCIPAL PROBLEM:  Transient ischemic attack [G45.9] 01/13/2020 Yes    Obesity (BMI 30-39.9) [E66.9] 09/28/2018 Yes    Non compliance w medication regimen [Z91.14] 09/28/2018 Not Applicable    Essential hypertension [I10] 09/11/2017 Yes    Mixed hyperlipidemia [E78.2] 10/24/2013 Yes      Problems Resolved During this Admission:       VTE Risk Mitigation (From admission, onward)         Ordered     enoxaparin injection 40 mg  Daily      01/13/20 1653     IP VTE HIGH RISK PATIENT  Once      01/13/20 1653     Place sequential compression device  Until discontinued      01/13/20 1653              Plan of care discussed with Dr. Shore  Thank you for your consult. I will follow-up with patient. Please contact us if you have any additional questions.    Aissatou Botello NP  Neurology  [facility]

## 2020-01-14 NOTE — PLAN OF CARE
NAD noted. POC reviewed w pt and they verbalized understanding.  Tele [phone] .  Pt free from falls.  Pt ambulated to the bathroom.  Bed in low position, side rails up X2, bed alarm on, wheels locked, call light in reach. Will continue to monitor.

## 2020-01-14 NOTE — HOSPITAL COURSE
Patient was admitted to the hospital medicine service for TIA workup.  MRI brain ruled out stroke.  MRA brain and carotid ultrasound done and within normal limits, results below.  Echo was done and was pending at time of discharge. Neuro approved discharge for patient on aspirin 81 mg lifelong Plavix for 21 days.  High-intensity statin started as well.  Patient is to follow up with Neuro for echo results.  He is also to follow up with his cardiologist and primary care provider.  Patient was cleared for discharge by PT/OT/ST with no needs.  He was started on Norvasc 5 mg daily at time of discharge for hypertension.  Compliance with medical therapy and follow-up with providers was strongly encouraged.  Patient felt back to baseline at time of discharge.    Discharge exam  Awake, alert, no apparent distress  Regular rate and rhythm no murmur  Lungs clear to auscultation no wheeze  Abdomen soft nontender nondistended;

## 2020-01-14 NOTE — DISCHARGE SUMMARY
[facility] Medicine  Discharge Summary      Patient Name: Jarod Pierce  MRN: [id number]  Admission Date: 1/13/2020  Hospital Length of Stay: 0 days  Discharge Date and Time: No discharge date for patient encounter.  Attending Physician: Junie Rodriguez MD   Discharging Provider: Junie Rodriguez MD  Primary Care Provider: Idris Fuller MD      HPI:   Patient is a 54-year-old male with history of hypertension who presents to the ED today with complaint of left arm and leg heaviness that lasted approximately 15 min this morning in his now resolved.  Also associated with transient left-sided vision deficit which is not back to normal.  He says all these occurred for about 15-20 minutes while driving to  some equipment per work.  He reports he has not taken any of his blood pressure medications for about 2 weeks.  Blood pressure grossly elevated the ED.  CT head negative.  Neuro exam is normal now and he has no complaints this time.  He reports that earlier his brain felt like Jell-O.  But he is back to his usual self at this time.  Does not provide a clear reason for not taking his blood pressure medications.  He is placed in observation by hospital medicine service for TIA workup.  Neurology will be consulted    * No surgery found *      Hospital Course:   Patient was admitted to the hospital medicine service for TIA workup.  MRI brain ruled out stroke.  MRA brain and carotid ultrasound done and within normal limits, results below.  Echo was done and was pending at time of discharge. Neuro approved discharge for patient on aspirin 81 mg lifelong Plavix for 21 days.  High-intensity statin started as well.  Patient is to follow up with Neuro for echo results.  He is also to follow up with his cardiologist and primary care provider.  Patient was cleared for discharge by PT/OT/ST with no needs.  He was started on Norvasc 5 mg daily at time of discharge for hypertension.   Compliance with medical therapy and follow-up with providers was strongly encouraged.  Patient felt back to baseline at time of discharge.    Discharge exam  Awake, alert, no apparent distress  Regular rate and rhythm no murmur  Lungs clear to auscultation no wheeze  Abdomen soft nontender nondistended;     Consults:   Consults (From admission, onward)        Status Ordering Provider     Inpatient consult to Neurology  Once     Provider:  Boo Shore MD    Completed KIKE MOORE     Inpatient consult to Registered Dietitian/Nutritionist  Once     Provider:  (Not yet assigned)    Completed KIKE MOORE     IP consult to case management/social work  Once     Provider:  (Not yet assigned)    Completed KIKE MOORE          No new Assessment & Plan notes have been filed under this hospital service since the last note was generated.  Service: Hospital Medicine    Final Active Diagnoses:    Diagnosis Date Noted POA    PRINCIPAL PROBLEM:  Transient ischemic attack [G45.9] 01/13/2020 Yes    Obesity (BMI 30-39.9) [E66.9] 09/28/2018 Yes    Non compliance w medication regimen [Z91.14] 09/28/2018 Not Applicable    Essential hypertension [I10] 09/11/2017 Yes    Mixed hyperlipidemia [E78.2] 10/24/2013 Yes      Problems Resolved During this Admission:       Discharged Condition: good    Disposition: Home or Self Care    Follow Up:  Follow-up Information     Robbin Parsons MD In 1 week.    Specialties:  Vascular Neurology, Neurology  Contact information:  [address] [facility] [address]  [phone]             Idris Fuller MD In 1 week.    Specialty:  Family Medicine  Contact information:  [address]  [facility] [id number] [phone]             Kumar Manriquez MD In 2 weeks.    Specialties:  Cardiology, General Surgery  Contact information:  [address]  [address]  [facility] [address] [phone]                 Patient Instructions:      Diet Cardiac     Notify your  health care provider if you experience any of the following:  increased confusion or weakness     Notify your health care provider if you experience any of the following:  persistent dizziness, light-headedness, or visual disturbances     Activity as tolerated       Significant Diagnostic Studies: Labs:   BMP:   Recent Labs   Lab 01/13/20  1324 01/14/20  0505   GLU 91 103    140   K 4.4 3.6    104   CO2 24 25   BUN 17 16   CREATININE 1.2 1.2   CALCIUM 9.4 9.1   MG  --  2.0   , CMP   Recent Labs   Lab 01/13/20  1324 01/14/20  0505    140   K 4.4 3.6    104   CO2 24 25   GLU 91 103   BUN 17 16   CREATININE 1.2 1.2   CALCIUM 9.4 9.1   PROT 7.9 7.2   ALBUMIN 4.3 4.0   BILITOT 1.4* 1.4*   ALKPHOS 107 96   AST 25 18   ALT 22 20   ANIONGAP 13 11   ESTGFRAFRICA >60 >60   EGFRNONAA >60 >60   , CBC   Recent Labs   Lab 01/13/20  1324 01/14/20  0505   WBC 6.11 5.52   HGB 15.5 15.3   HCT 44.3 44.2    169   , Lipid Panel   Lab Results   Component Value Date    CHOL 242 (H) 01/13/2020    HDL 34 (L) 01/13/2020    LDLCALC 141.4 01/13/2020    TRIG 333 (H) 01/13/2020    CHOLHDL 14.0 (L) 01/13/2020   , Troponin   Recent Labs   Lab 01/14/20  0505   TROPONINI 0.014    and A1C:   Recent Labs   Lab 01/13/20  1324   HGBA1C 5.5       Pending Diagnostic Studies:     Procedure Component Value Units Date/Time    Echo Color Flow Doppler? Yes; Bubble Contrast? Yes [[id number]]  (Abnormal) Resulted:  01/14/20 1159    Order Status:  Sent Lab Status:  In process Updated:  01/14/20 1200     BSA 2.07 m2      TDI SEPTAL 0.04 m/s      LV LATERAL E/E' RATIO 11.50 m/s      LV SEPTAL E/E' RATIO 17.25 m/s      AORTIC VALVE CUSP SEPERATION 2.56 cm      TDI LATERAL 0.06 m/s      PV PEAK VELOCITY 0.77 cm/s      LVIDD 5.52 cm      IVS 1.09 cm      PW 1.06 cm      Ao root annulus 3.30 cm      LVIDS 4.03 cm      FS 27 %      LV mass 236.07 g      LA size 3.34 cm      RVDD 3.31 cm      TAPSE 2.72 cm      Left Ventricle Relative Wall  "Thickness 0.38 cm      AV mean gradient 3 mmHg      AV valve area 4.46 cm2      AV Velocity Ratio 1.03     AV index (prosthetic) 0.95     MV mean gradient 1 mmHg      MV valve area p 1/2 method 3.33 cm2      E/A ratio 0.76     Mean e' 0.05 m/s      E wave decelartion time 225.55 msec      IVRT 0.14 msec      MV "A" wave duration 263.00 msec      Pulm vein "A" wave 125.00 msec      Pulm vein S/D ratio 2.13     LVOT diameter 2.45 cm      LVOT area 4.7 cm2      LVOT peak gustavo 1.11 m/s      LVOT peak VTI 20.84 cm      Ao peak gustavo 1.08 m/s      Ao VTI 22.03 cm      LVOT stroke volume 98.20 cm3      AV peak gradient 5 mmHg      E/E' ratio 13.80 m/s      MV Peak E Gustavo 0.69 m/s      TR Max Gustavo 2.29 m/s      MV stenosis pressure 1/2 time 66 ms      MV Peak A Gustavo 0.91 m/s      PV Peak S Gustavo 0.49 m/s      PV Peak D Gustavo 0.23 m/s      LV Systolic Volume 71.32 mL      LV Systolic Volume Index 35.7 mL/m2      LV Diastolic Volume 148.49 mL      LV Diastolic Volume Index 74.36 mL/m2      LV Mass Index 118 g/m2      Triscuspid Valve Regurgitation Peak Gradient 21 mmHg     Narrative:       · Eccentric left ventricular hypertrophy.  · The mean diastolic gradient across the mitral valve is 1 mmHg at a heart   rate of bpm.       Impression:              Radiology Results (last 7 days)     Procedure Component Value Units Date/Time   MRA Brain [[id number]] Resulted: 01/14/20 0743   Order Status: Completed Updated: 01/14/20 0745   Narrative:     EXAMINATION:  MRA BRAIN WITHOUT CONTRAST    CLINICAL HISTORY:  Stroke;    TECHNIQUE:  3D Time-of-flight Images were performed over the brain.  MIP/MPR 3D  reformatted images were created.  Contrast was not administered    COMPARISON:  None    FINDINGS:  Anterior circulation: There is normal flow related signal intensity within the petrous and cavernous segments of the internal carotid arteries.  The supraclinoid internal carotid arteries are normal as is the carotid terminus bilaterally.  There is " normal branching into the anterior and middle cerebral arteries.  The right A1 segment is slightly hypoplastic with decreased caliber.  This vessel however is patent.  There is no critical stenosis, occlusion, thrombosis, dissection, aneurysm or other vascular malformation.    Posterior circulation: The left vertebral artery is dominant.  The right vertebral artery is hypoplastic and mostly terminates in a posterior inferior cerebellar artery.  A miniscule distal V4 segment is present.  The left vertebral artery supplies the basilar artery.  The basilar artery is normal in caliber and contour.  The basilar tip is normal.  There is normal branching into the superior cerebellar and posterior cerebral arteries.  There is no critical stenosis, occlusion, thrombosis, dissection, aneurysm.   Impression:       There is no critical stenosis, occlusion, thrombosis, dissection or large aneurysm involving the vessels which comprise the anterior or posterior circulation.  The right vertebral artery is hypoplastic and terminates in a posterior inferior cerebellar artery.  The dominant left vertebral arteries mostly supplies the posterior circulation.      Electronically signed by: Dre Keenan MD  Date: 01/14/2020  Time: 07:43   MRI BRAIN WITHOUT CONTRAST [[id number]] Resulted: 01/14/20 0740   Order Status: Completed Updated: 01/14/20 0743   Narrative:     EXAM:  MRI BRAIN WITHOUT CONTRAST    CLINICAL HISTORY:  Stroke; .    COMPARISON:  Head CT dated 01/13/2020 at 13:10 hours    FINDINGS:  Intracranial contents:The study is mildly motion degraded.  There is no acute abnormality.  There is no acute intracranial hemorrhage.  There is no mass.  There are no regions of true restricted diffusion to suggest acute infarction.  There is no hydrocephalus or midline shift.  Brain volume is normal.  There is no hydrocephalus or midline shift.  There is minimal nonspecific white matter change.  There is no abnormal extra-axial fluid  collection.  The basilar cisterns are open.  Flow voids indicating patency are present in the major vessels at the base of the brain.  The cerebellar tonsils are normal position.  Sellar structures are normal.  The orbits are grossly normal.    Extracranial contents, calvarium, soft tissues:Baseline marrow signal is normal.  There is only trace scattered mucosal thickening in the ethmoid air cells.  Otherwise, the included paranasal sinuses and mastoid air cells are clear.   Impression:       1. The study is mildly motion degraded.  There is no acute abnormality.  There is no intracranial hemorrhage, mass effect, acute infarction.      Electronically signed by: Dre Keenan MD  Date: 01/14/2020  Time: 07:40   US Carotid Bilateral [[id number]] Resulted: 01/13/20 1740   Order Status: Completed Updated: 01/13/20 1743   Narrative:     EXAMINATION:  US CAROTID BILATERAL    CLINICAL HISTORY:  Velocities evaluation;    TECHNIQUE:  Grayscale and color Doppler ultrasound examination of the carotid and vertebral artery systems bilaterally.  Stenosis estimates are per the NASCET measurement criteria.    COMPARISON:  None.    FINDINGS:  Right:    Internal Carotid Artery (ICA) peak systolic velocity 68 cm/sec    ICA/CCA peak systolic velocity ratio: 0.9    Plaque formation: Minimal homogeneous    Vertebral artery: Antegrade flow and normal waveform.    Left:    Internal Carotid Artery (ICA)  peak systolic velocity 70 cm/sec    ICA/CCA peak systolic velocity ratio: 1.0    Plaque formation: Minimal homogeneous    Vertebral artery: Antegrade flow and normal waveform.   Impression:       No evidence of a hemodynamically significant carotid bifurcation stenosis.      Electronically signed by: Ramírez Reed MD  Date: 01/13/2020  Time: 17:40   X-Ray Chest AP Portable [[id number]] Resulted: 01/13/20 1328   Order Status: Completed Updated: 01/13/20 1330   Narrative:     EXAMINATION:  XR CHEST AP PORTABLE    CLINICAL  HISTORY:  weakness;    TECHNIQUE:  Single frontal view of the chest was performed.    COMPARISON:  09/22/2018    FINDINGS:  The cardiomediastinal silhouette is with normal limits.  There is no consolidation, edema, or pleural effusion.   Impression:       No acute process.  No significant change.      Electronically signed by: Ramírez Reed MD  Date: 01/13/2020  Time: 13:28   CT Head Without Contrast [[id number]] Resulted: 01/13/20 1324   Order Status: Completed Updated: 01/13/20 1327   Narrative:     EXAMINATION:  CT HEAD WITHOUT CONTRAST    CLINICAL HISTORY:  Stroke;    TECHNIQUE:  Routine unenhanced axial images were obtained through the head.  Sagittal and coronal reformatted images were created.  The study is reviewed in bone and soft tissue windows.    COMPARISON:  None    FINDINGS:  Intracranial contents: There is no acute intracranial abnormality.  Brain volume, ventricular size and position are normal.  There is no hemorrhage or mass/mass effect.  There is no acute edema or ischemia.  The gray-white interface is preserved without obvious acute infarction.  There is no significant white matter disease.  There is no dense vessel.  There is no abnormal extra-axial fluid collection.  The basilar cisterns are open.  The cerebellar tonsils are in normal position at the level of the foramen magnum.    Extracranial contents, calvarium, soft tissues: The calvarium is normal.  The included paranasal sinuses and mastoid air cells are clear.   Impression:       1.  There is no acute intracranial abnormality.  There is no hemorrhage, mass/mass effect, acute edema or ischemia. It should be noted that MRI is more sensitive in the detection of subtle or acute nonhemorrhagic ischemic disease.      Electronically signed by: Dre Keenan MD  Date: 01/13/2020  Time: 13:24       Medications:  Reconciled Home Medications:      Medication List      START taking these medications    amLODIPine 5 MG tablet  Commonly known  as:  NORVASC  Take 1 tablet (5 mg total) by mouth once daily.     aspirin 81 MG Chew  Take 1 tablet (81 mg total) by mouth once daily.  Start taking on:  January 15, 2020     atorvastatin 40 MG tablet  Commonly known as:  LIPITOR  Take 1 tablet (40 mg total) by mouth once daily.  Start taking on:  January 15, 2020     clopidogrel 75 mg tablet  Commonly known as:  PLAVIX  Take 1 tablet (75 mg total) by mouth once daily. for 21 days        CONTINUE taking these medications    diclofenac sodium 1 % Gel  Commonly known as:  VOLTAREN  Apply 2 g topically 4 (four) times daily. for 10 days        STOP taking these medications    indomethacin 25 MG capsule  Commonly known as:  INDOCIN            Indwelling Lines/Drains at time of discharge:   Lines/Drains/Airways     None                 Time spent on the discharge of patient: 35 minutes  Patient was seen and examined on the date of discharge and determined to be suitable for discharge.  Patient was cleared by Neurology on day of discharge.       Junie Rodriguez MD  Department of Hospital Medicine  [facility]

## 2020-01-14 NOTE — PT/OT/SLP EVAL
Physical Therapy Evaluation and Discharge Note    Patient Name:  Jarod Pierce   MRN:  [id number]    Recommendations:     Discharge Recommendations:  home   Discharge Equipment Recommendations: none   Barriers to discharge: None    Assessment:     Jarod Pierce is a 54 y.o. male admitted with a medical diagnosis of Transient ischemic attack. .  At this time, patient is functioning at their prior level of function and does not require further acute PT services.     Recent Surgery: * No surgery found *      Plan:     During this hospitalization, patient does not require further acute PT services.  Please re-consult if situation changes.      Subjective   Stated everything's back to normal  Vision normal  Stated does work for [employer] and travels around  Chief Complaint: stated is  feeling well and could do jumping jacks    Patient/Family Comments/goals: get home  Pain/Comfort:  · Pain Rating 1: 0/10    Patients cultural, spiritual, Restoration conflicts given the current situation:      Living Environment:  Home alone  Prior to admission, patients level of function was independent.  Equipment used at home: none.  DME owned (not currently used): none.  Upon discharge, patient will have assistance from friends.    Objective:     Communicated with nurse Graham prior to session.  Patient found HOB elevated with telemetry, bed alarm upon PT entry to room.    General Precautions: Standard,     Orthopedic Precautions:N/A   Braces: N/A     Exams:  · RLE ROM: WFL  · RLE Strength: WFL  · LLE ROM: WFL  · LLE Strength: WFL    Functional Mobility:  · Bed Mobility:     · Rolling Right: independence  · Scooting: independence  · Supine to Sit: independence  · Transfers:     · Sit to Stand:  independence with no AD  · Toilet Transfer: independence with  no AD  using  Stand Pivot  · Gait: 250ft with wide based gait- no loss of balance   · Pt able to walk backwards and talking    AM-PAC 6 CLICK MOBILITY  Total Score:24           AM-PAC 6 CLICK MOBILITY  Total Score:24     Patient left EOB with all lines intact, call button in reach and OT Ivone present.    GOALS:   Multidisciplinary Problems     Physical Therapy Goals     Not on file                History:     Past Medical History:   Diagnosis Date    Allergic rhinitis due to allergen     Arthritis     Cardiomyopathy     Gout     Hypertension     Left bundle branch block        Past Surgical History:   Procedure Laterality Date    WISDOM TOOTH EXTRACTION         Time Tracking:     PT Received On: 01/14/20  PT Start Time: 1013     PT Stop Time: 1022  PT Total Time (min): 9 min     Billable Minutes: Evaluation 9      Susan Dennis, PT  01/14/2020

## 2020-01-15 ENCOUNTER — TELEPHONE (OUTPATIENT)
Dept: MEDSURG UNIT | Facility: HOSPITAL | Age: 55
End: 2020-01-15

## 2020-01-15 NOTE — NURSING
Discharge instructions explained to patient, verbalized understanding. PIV removed, tolerated well, catheter intact. Telemetry monitor removed, returned to monitor unit. Vital Signs stable,denies complaints. Patient walked self off unit.

## 2020-01-27 ENCOUNTER — TELEPHONE (OUTPATIENT)
Dept: FAMILY MEDICINE | Facility: CLINIC | Age: 55
End: 2020-01-27

## 2020-01-27 NOTE — TELEPHONE ENCOUNTER
----- Message from Inna Montano sent at 1/27/2020  2:50 PM CST -----  Contact: Patient  Type: Needs Medical Advice    Who Called:  Patient  Best Call Back Number:   Additional Information: Calling to reschedule missed appointment for sooner time than is available. Got the dates mixed up and missed it by mistake.

## 2020-01-31 ENCOUNTER — PATIENT OUTREACH (OUTPATIENT)
Dept: ADMINISTRATIVE | Facility: OTHER | Age: 55
End: 2020-01-31

## 2020-01-31 ENCOUNTER — TELEPHONE (OUTPATIENT)
Dept: CARDIOLOGY | Facility: CLINIC | Age: 55
End: 2020-01-31

## 2020-01-31 ENCOUNTER — TELEPHONE (OUTPATIENT)
Dept: FAMILY MEDICINE | Facility: CLINIC | Age: 55
End: 2020-01-31

## 2020-01-31 ENCOUNTER — OFFICE VISIT (OUTPATIENT)
Dept: CARDIOLOGY | Facility: CLINIC | Age: 55
End: 2020-01-31
Payer: MEDICAID

## 2020-01-31 ENCOUNTER — OFFICE VISIT (OUTPATIENT)
Dept: FAMILY MEDICINE | Facility: CLINIC | Age: 55
End: 2020-01-31
Payer: MEDICAID

## 2020-01-31 VITALS
OXYGEN SATURATION: 95 % | WEIGHT: 220.25 LBS | HEART RATE: 89 BPM | BODY MASS INDEX: 37.6 KG/M2 | SYSTOLIC BLOOD PRESSURE: 136 MMHG | DIASTOLIC BLOOD PRESSURE: 86 MMHG | HEIGHT: 64 IN

## 2020-01-31 VITALS
BODY MASS INDEX: 37.56 KG/M2 | WEIGHT: 220 LBS | RESPIRATION RATE: 18 BRPM | HEART RATE: 85 BPM | OXYGEN SATURATION: 97 % | TEMPERATURE: 98 F | DIASTOLIC BLOOD PRESSURE: 70 MMHG | SYSTOLIC BLOOD PRESSURE: 134 MMHG | HEIGHT: 64 IN

## 2020-01-31 DIAGNOSIS — E78.2 MIXED HYPERLIPIDEMIA: ICD-10-CM

## 2020-01-31 DIAGNOSIS — E66.01 CLASS 2 SEVERE OBESITY DUE TO EXCESS CALORIES WITH SERIOUS COMORBIDITY AND BODY MASS INDEX (BMI) OF 37.0 TO 37.9 IN ADULT: ICD-10-CM

## 2020-01-31 DIAGNOSIS — I44.7 LEFT BUNDLE BRANCH BLOCK: ICD-10-CM

## 2020-01-31 DIAGNOSIS — I10 ESSENTIAL HYPERTENSION: ICD-10-CM

## 2020-01-31 DIAGNOSIS — R94.39 ABNORMAL CARDIOVASCULAR STRESS TEST: ICD-10-CM

## 2020-01-31 DIAGNOSIS — I10 UNCONTROLLED HYPERTENSION: ICD-10-CM

## 2020-01-31 DIAGNOSIS — R93.1 DECREASED CARDIAC EJECTION FRACTION: ICD-10-CM

## 2020-01-31 DIAGNOSIS — E66.9 OBESITY (BMI 30-39.9): ICD-10-CM

## 2020-01-31 DIAGNOSIS — I42.8 NONISCHEMIC CARDIOMYOPATHY: ICD-10-CM

## 2020-01-31 DIAGNOSIS — N52.8 OTHER MALE ERECTILE DYSFUNCTION: ICD-10-CM

## 2020-01-31 DIAGNOSIS — H35.032 HYPERTENSIVE RETINOPATHY OF LEFT EYE: ICD-10-CM

## 2020-01-31 DIAGNOSIS — Z91.148 NON COMPLIANCE W MEDICATION REGIMEN: ICD-10-CM

## 2020-01-31 DIAGNOSIS — E78.1 HYPERTRIGLYCERIDEMIA: ICD-10-CM

## 2020-01-31 DIAGNOSIS — I51.7 LVH (LEFT VENTRICULAR HYPERTROPHY): ICD-10-CM

## 2020-01-31 DIAGNOSIS — G45.9 TRANSIENT ISCHEMIC ATTACK: Primary | ICD-10-CM

## 2020-01-31 DIAGNOSIS — M54.2 NECK PAIN: ICD-10-CM

## 2020-01-31 PROCEDURE — 99999 PR PBB SHADOW E&M-EST. PATIENT-LVL III: CPT | Mod: PBBFAC,,, | Performed by: INTERNAL MEDICINE

## 2020-01-31 PROCEDURE — 99213 OFFICE O/P EST LOW 20 MIN: CPT | Mod: PBBFAC,PO,25 | Performed by: INTERNAL MEDICINE

## 2020-01-31 PROCEDURE — 99215 PR OFFICE/OUTPT VISIT, EST, LEVL V, 40-54 MIN: ICD-10-PCS | Mod: S$PBB,,, | Performed by: INTERNAL MEDICINE

## 2020-01-31 PROCEDURE — 99215 OFFICE O/P EST HI 40 MIN: CPT | Mod: PBBFAC,27,PO | Performed by: PHYSICIAN ASSISTANT

## 2020-01-31 PROCEDURE — 99214 PR OFFICE/OUTPT VISIT, EST, LEVL IV, 30-39 MIN: ICD-10-PCS | Mod: S$PBB,,, | Performed by: PHYSICIAN ASSISTANT

## 2020-01-31 PROCEDURE — 99999 PR PBB SHADOW E&M-EST. PATIENT-LVL V: ICD-10-PCS | Mod: PBBFAC,,, | Performed by: PHYSICIAN ASSISTANT

## 2020-01-31 PROCEDURE — 99999 PR PBB SHADOW E&M-EST. PATIENT-LVL III: ICD-10-PCS | Mod: PBBFAC,,, | Performed by: INTERNAL MEDICINE

## 2020-01-31 PROCEDURE — 99214 OFFICE O/P EST MOD 30 MIN: CPT | Mod: S$PBB,,, | Performed by: PHYSICIAN ASSISTANT

## 2020-01-31 PROCEDURE — 99999 PR PBB SHADOW E&M-EST. PATIENT-LVL V: CPT | Mod: PBBFAC,,, | Performed by: PHYSICIAN ASSISTANT

## 2020-01-31 PROCEDURE — 99215 OFFICE O/P EST HI 40 MIN: CPT | Mod: S$PBB,,, | Performed by: INTERNAL MEDICINE

## 2020-01-31 RX ORDER — TOPIRAMATE SPINKLE 25 MG/1
25 CAPSULE ORAL 2 TIMES DAILY
Status: ON HOLD | COMMUNITY
End: 2020-05-09 | Stop reason: HOSPADM

## 2020-01-31 RX ORDER — SILDENAFIL 50 MG/1
50 TABLET, FILM COATED ORAL DAILY PRN
Qty: 10 TABLET | Refills: 10 | Status: ON HOLD | OUTPATIENT
Start: 2020-01-31 | End: 2020-05-09 | Stop reason: HOSPADM

## 2020-01-31 RX ORDER — ATORVASTATIN CALCIUM 40 MG/1
40 TABLET, FILM COATED ORAL DAILY
Qty: 90 TABLET | Refills: 0 | Status: SHIPPED | OUTPATIENT
Start: 2020-01-31 | End: 2020-05-19 | Stop reason: SDUPTHER

## 2020-01-31 RX ORDER — AMLODIPINE BESYLATE 5 MG/1
5 TABLET ORAL DAILY
Qty: 90 TABLET | Refills: 0 | Status: SHIPPED | OUTPATIENT
Start: 2020-01-31 | End: 2020-05-07 | Stop reason: ALTCHOICE

## 2020-01-31 RX ORDER — TIZANIDINE 4 MG/1
4 TABLET ORAL EVERY 8 HOURS
Qty: 30 TABLET | Refills: 0 | Status: SHIPPED | OUTPATIENT
Start: 2020-01-31 | End: 2020-02-06

## 2020-01-31 RX ORDER — CLOTRIMAZOLE AND BETAMETHASONE DIPROPIONATE 10; .64 MG/G; MG/G
CREAM TOPICAL 2 TIMES DAILY
Qty: 45 G | Refills: 0 | Status: ON HOLD | OUTPATIENT
Start: 2020-01-31 | End: 2020-05-09 | Stop reason: HOSPADM

## 2020-01-31 NOTE — TELEPHONE ENCOUNTER
----- Message from Avis Swan sent at 1/31/2020  9:39 AM CST -----  Pt last seen 12/202018  Needs appt asap p/yomi for Decreased cardiac ejection fraction  Essential hypertension  LVH (left ventricular hypertrophy)  Mixed hyperlipidemia  No appts available till April  Please call him @ [phone]  Thanks !

## 2020-01-31 NOTE — PROGRESS NOTES
Subjective:       Patient ID: Jarod Pierce is a 54 y.o. male.    Chief Complaint: Follow-up (hospital f/u ) and Neck Pain    Mr. Pierce comes to clinic today for hospital follow-up.  The patient was admitted to [facility] on January 13th.  The patient presented to the emergency room with left arm pain in leg heaviness.  He also admitted to left-sided vision deficit.  Patient had very elevated blood pressure in the emergency department.  The patient's CT scan of the head was negative.  Neurological exam was normal.  The patient was placed in patient for observation to be worked up for a TIA.  Neurology was also consulted.  MRI of the brain ruled out stroke.  MRA of the brain and carotid ultrasound were completed and returned normal.  The patient had an echocardiogram completed while in the hospital; results were not available at discharge. Echocardiogram did reveal a decreased ejection fraction of 30%.  Neurology approved discharge of patient on aspirin 81 mg daily lifelong.  The patient was also prescribed Plavix for 21 days.  High-intensity statin was also started.  Patient was cleared for discharge by PT, OT, ST with no when it was completed.  needs for outpatient therapy at this time.  Patient was also started on amlodipine 5 mg at discharge for high blood pressure.  See HPI and hospital course below.    The patient reports he has been feeling well since hospital discharge other than neck pain that has been bothering him.  He reports that he slept in a chair and since that time his neck has been hurting.  The patient reports he did follow up with Neurology 2 days ago.  He reports that he was advised that he could stop the Plavix      Patient Name: Jarod Pierce  MRN: [id number]  Admission Date: 1/13/2020  Hospital Length of Stay: 0 days  Discharge Date and Time: No discharge date for patient encounter.  Attending Physician: Junie Rodriguez MD   Discharging Provider: Junie Rodriguez MD  Primary  "Care Provider: Idris Fuller MD        HPI:   Patient is a 54-year-old male with history of hypertension who presents to the ED today with complaint of left arm and leg heaviness that lasted approximately 15 min this morning in his now resolved.  Also associated with transient left-sided vision deficit which is not back to normal.  He says all these occurred for about 15-20 minutes while driving to  some equipment per work.  He reports he has not taken any of his blood pressure medications for about 2 weeks.  Blood pressure grossly elevated the ED.  CT head negative.  Neuro exam is normal now and he has no complaints this time.  He reports that earlier his brain "felt like Jell-O."  But he is back to his usual self at this time.  Does not provide a clear reason for not taking his blood pressure medications.  He is placed in observation by hospital medicine service for TIA workup.  Neurology will be consulted     * No surgery found *       Hospital Course:   Patient was admitted to the hospital medicine service for TIA workup.  MRI brain ruled out stroke.  MRA brain and carotid ultrasound done and within normal limits, results below.  Echo was done and was pending at time of discharge. Neuro approved discharge for patient on aspirin 81 mg lifelong Plavix for 21 days.  High-intensity statin started as well.  Patient is to follow up with Neuro for echo results.  He is also to follow up with his cardiologist and primary care provider.  Patient was cleared for discharge by PT/OT/ST with no needs.  He was started on Norvasc 5 mg daily at time of discharge for hypertension.  Compliance with medical therapy and follow-up with providers was strongly encouraged.  Patient felt back to baseline at time of discharge.     Discharge exam  Awake, alert, no apparent distress  Regular rate and rhythm no murmur  Lungs clear to auscultation no wheeze  Abdomen soft nontender nondistended;      Consults:   Consults (From " admission, onward)        Status Ordering Provider        Inpatient consult to Neurology  Once    Provider:  Boo Shore MD   Completed KIKE MOORE        Inpatient consult to Registered Dietitian/Nutritionist  Once    Provider:  (Not yet assigned)   Completed KIKE MOORE        IP consult to case management/social work  Once    Provider:  (Not yet assigned)   Completed KIKE MOORE           No new Assessment & Plan notes have been filed under this hospital service since the last note was generated.  Service: Hospital Medicine     Final Active Diagnoses:    Diagnosis Date Noted POA   PRINCIPAL PROBLEM:  Transient ischemic attack (G45.9) 01/13/2020 Yes   Obesity (BMI 30-39.9) (E66.9) 09/28/2018 Yes   Non compliance w medication regimen (Z91.14) 09/28/2018 Not Applicable   Essential hypertension (I10) 09/11/2017 Yes   Mixed hyperlipidemia (E78.2) 10/24/2013 Yes     Problems Resolved During this Admission:        Review of patient's allergies indicates:   Allergen Reactions    Codeine      Other reaction(s): Hives    Lisinopril      Dry coughing    Phenytoin sodium extended      Other reaction(s): leg cramps         Current Outpatient Medications:     amLODIPine (NORVASC) 5 MG tablet, Take 1 tablet (5 mg total) by mouth once daily., Disp: 90 tablet, Rfl: 0    aspirin 81 MG Chew, Take 1 tablet (81 mg total) by mouth once daily., Disp: 90 tablet, Rfl: 3    atorvastatin (LIPITOR) 40 MG tablet, Take 1 tablet (40 mg total) by mouth once daily., Disp: 90 tablet, Rfl: 0    clopidogrel (PLAVIX) 75 mg tablet, Take 1 tablet (75 mg total) by mouth once daily. for 21 days, Disp: 21 tablet, Rfl: 0    diclofenac sodium 1 % Gel, Apply 2 g topically 4 (four) times daily. for 10 days, Disp: 1 Tube, Rfl: 3    topiramate 25 mg capsule, Take 25 mg by mouth 2 (two) times daily., Disp: , Rfl:     clotrimazole-betamethasone 1-0.05% (LOTRISONE) cream, Apply topically 2 (two) times daily., Disp: 45 g, Rfl:  0    tiZANidine (ZANAFLEX) 4 MG tablet, Take 1 tablet (4 mg total) by mouth every 8 (eight) hours. for 10 days, Disp: 30 tablet, Rfl: 0    Lab Results   Component Value Date    WBC 5.52 01/14/2020    HGB 15.3 01/14/2020    HCT 44.2 01/14/2020     01/14/2020    CHOL 242 (H) 01/13/2020    TRIG 333 (H) 01/13/2020    HDL 34 (L) 01/13/2020    ALT 20 01/14/2020    AST 18 01/14/2020     01/14/2020    K 3.6 01/14/2020     01/14/2020    CREATININE 1.2 01/14/2020    BUN 16 01/14/2020    CO2 25 01/14/2020    TSH 1.935 01/13/2020    PSA 0.45 09/22/2011    INR 1.0 01/14/2020    HGBA1C 5.5 01/13/2020       Review of Systems   Constitutional: Negative for activity change, appetite change, fatigue and fever.   HENT: Negative for congestion, ear pain, postnasal drip and rhinorrhea.    Eyes: Negative for pain, itching and visual disturbance.   Respiratory: Negative for shortness of breath and wheezing.    Cardiovascular: Negative for chest pain.   Gastrointestinal: Negative for abdominal distention, abdominal pain, constipation, diarrhea and nausea.   Genitourinary: Negative for difficulty urinating, dysuria, frequency, hematuria and urgency.   Musculoskeletal: Positive for myalgias and neck pain. Negative for arthralgias and back pain.   Skin: Negative for color change, pallor and rash.   Neurological: Negative for dizziness, syncope and headaches.   Hematological: Negative for adenopathy.   Psychiatric/Behavioral: Negative for behavioral problems. The patient is not nervous/anxious.        Objective:      Physical Exam   Constitutional: He is oriented to person, place, and time.   HENT:   Mouth/Throat: Oropharynx is clear and moist. No oropharyngeal exudate.   Eyes: Pupils are equal, round, and reactive to light. Conjunctivae are normal.   Cardiovascular: Normal rate and regular rhythm.   Pulmonary/Chest: Effort normal and breath sounds normal. He has no wheezes.   Abdominal: Soft. Bowel sounds are normal. He  exhibits no distension. There is no tenderness.   Musculoskeletal: He exhibits no edema.   Discomfort of the cervical spine with flexion and extension.   Lymphadenopathy:     He has no cervical adenopathy.   Neurological: He is alert and oriented to person, place, and time.   Skin: No erythema.   Psychiatric: His behavior is normal.       Assessment:       1. Transient ischemic attack    2. Obesity (BMI 30-39.9)    3. Decreased cardiac ejection fraction    4. Essential hypertension    5. LVH (left ventricular hypertrophy)    6. Mixed hyperlipidemia    7. Neck pain        Plan:   Jarod was seen today for follow-up and neck pain.    Diagnoses and all orders for this visit:    Transient ischemic attack  Patient advised to continue prescribed medications.  Patient encouraged to be compliant with medical regimen.  Obesity (BMI 30-39.9)  Low carbohydrate, high fiber diet.  Increase exercise as able.  Decreased cardiac ejection fraction  -     Ambulatory referral to Cardiology    Essential hypertension  -     amLODIPine (NORVASC) 5 MG tablet; Take 1 tablet (5 mg total) by mouth once daily.  -     Ambulatory referral to Cardiology  Controlled.  Low salt diet.  Continue current medication.  Continue to monitor blood pressure at home.   LVH (left ventricular hypertrophy)  -     Ambulatory referral to Cardiology    Mixed hyperlipidemia  -     atorvastatin (LIPITOR) 40 MG tablet; Take 1 tablet (40 mg total) by mouth once daily.  -     Ambulatory referral to Cardiology  -     Comprehensive metabolic panel; Future  -     Lipid panel; Future    Neck pain  -     tiZANidine (ZANAFLEX) 4 MG tablet; Take 1 tablet (4 mg total) by mouth every 8 (eight) hours. for 10 days  -     Ambulatory referral to Physical Medicine Rehab    Other orders  -     clotrimazole-betamethasone 1-0.05% (LOTRISONE) cream; Apply topically 2 (two) times daily.

## 2020-01-31 NOTE — TELEPHONE ENCOUNTER
----- Message from Avis Swan sent at 1/31/2020  9:38 AM CST -----  Pt last seen 2/2018  Saw yomi today but needs to see dr ceballos before the end of march or sooner if possible, he will lose his insurance then  Please call him @ [phone]  Thanks !

## 2020-01-31 NOTE — PROGRESS NOTES
"Subjective:    Patient ID:  Jarod Pierce is a 54 y.o. male who presents for evaluation of decrease EF  For cardiomyopathy, fatigue, gout, HTN, off all medication again resulting in [facility] hospitalization for TIA  PCP: Dr. Fuller. Not seen for some time  Neurologist: Robbin Parsons MD   Rheumatologist: to see Dr. Nolasco  Podiatrist: Dr. Sarkar  Eye: [facility], had fundoscopic examination 2018, told of HTN changes in the left eye, had gotten all HTN meds.  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  Nicotine: none  Alcohol: none  Illicit drugs: none  Cardiac symptoms: none  Home BP: do not check  Medication compliance: not, stopped all HTN and statin due to fear of gout for about a year  Diet: regular try to avoid salt  Caffeine: 1 cpd  Labs: 2020 .4,  not on Rx, normal CMP, CBC, magnesium, BNP 34, negative troponin I  Last Echo: 2020  Last stress test: 2013 nuclear  Cardiovascular angiogram: 2014  EC2020 NSR, rate 80, LBBB  Fundoscopic exam: over a year, have HTN retinopathy    In 10/2013:  White male with recent noted cardiomyopathy with LBBB, here to establish cardiac care. History of stress testing in the past 2-3 years ago with Dr. Fuller, results negative. Noted some fatigue over the past year and a half, progressive. Difficult to do regular work at time. Only able to work for an hour and half recently over the summer. Hot temperature is more difficult. Also occasion lightheaded spell with bending over. After recent evaluation was placed on metoprolol tartrate at 50 mg BID and felt real dizzy and feel like "looking out of a box". Tried cutting dose in half " "and still feels poorly. Recent EKG on 8/28 showed sinus arrhythmia with LBBB, rate of 83.   Echo done, 8/28/2013 - CONCLUSIONS     1 - Concentric remodeling. Wall thickness is increased, with the septum and the posterior wall each measuring 1.1 cm across.    2 - Moderately depressed left ventricular systolic function (EF 30-35%).     3 - Diastolic dysfunction.     4 - Normal right ventricular systolic function .    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 Recent blood work reviewed - Uric acid 8.7, normal RF, ESR, CBC, BMP, and A1C. Lipid showed LDL of 157, [place] Cardiovascular Risk Score is 10%, which puts him in the intermediate risk category.    since visit of 9/13, no new problem. Feel better off metoprolol except for slight HA. Mild SOB, no CP.  Work up: Lexiscan 9/18/2013.  Nuclear Quantitative Functional Analysis:   LVEF: 31 % (normal is 47 - 59)  LVED Volume: 152 ml (normal is 91 - 155)  LVES Volume: 105 ml (normal is 40 - 78)    Impression: ABNORMAL MYOCARDIAL PERFUSION  1. There is evidence for moderate myocardial ischemia in the inferoseptal wall of the left ventricle with underlying injury present.   2. There is abnormal wall motion at rest showing moderate global hypokinesis of the left ventricle. severe hypokinesis of the septal wall of the left ventricle.   3. There is resting LV dysfunction with a reduced ejection fraction of 31 %. (normal is 47 - 59)  4. The ventricular volumes are normal at rest and stress.   5. The extracardiac distribution of radioactivity is normal.   6. [facility] SSS =3 = SDS, suggest that 3.8% of myocardium may be ischemic.    EKG shows LBBB with QRS duration of 156 msec. Discussed possibilities of AICD with CRT     Since visit of 9/26, had problem with increase dose of Lisinopril causing dizziness, lower back to 20 mg daily with improvement but then noticed sinus problem with " "recurrence of the dizziness. Also stopped the spironolactone/HCT due to "dryness" with cramps in the leg. Blood work showed normal BNP of 25, and BMP. Now also with gout flair up in the right arm. Last Uric acid was elevated, see above, could not tolerate Allopurinol due to stomach upset. Weight creeping upward. Sits alot at work.    Since visit of 10/24/2013. 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 Evaluated at Occupational Medicine clinic in [place] and told of need for cardiac clearance. Recent symptoms includes GARNETT. Anticipated work will involve tow ropes, making and breaking tows, and ratchets, have to lift 10 -20 lbs for maximum 10 feet. Recent ECG LBBB, rate 74,  msec. Never had coronary angiogram for CM. Have continued on 40 mg of Lisinopril without further problem.     Since visit of 7/2014, underwent [facility]  HEMODYNAMIC RESULTS:    LVEDP (Pre): 9 mmHg  LVEDP (Post): 11 mmHg  Ejection Fraction: 40%  Global LV Function: mildly depressed    C. ANGIOGRAPHIC RESULTS:    DIAGNOSTIC:       Patient has a right dominant coronary artery.        The coronary vessels have luminal irregularities.        - Left Main Coronary Artery:             The LM has luminal irregularities. There is ALLYN 3 flow.       - Left Anterior Descending Artery:             The LAD has luminal irregularities. There is ALLYN 3 flow.       - Left Circumflex Artery:             The LCX has luminal irregularities. There is ALLYN 3 flow.       - Right Coronary Artery:             The RCA has luminal irregularities. There is ALLYN 3 flow.       - Aortic Root:             The Aortic Root has luminal irregularities. There is ALLYN 3 flow.      D. SUMMARY:    1. Non-obstructive CAD.  2. - Improve LV function from non-invasive evaluation.    E. RECOMMENDATIONS:    1. Maximize medical management.  2. Follow up with Dr. Kumar Manriquez.  3. Clear for new employment without restriction.    BP at " "home today 147/95 to 121/94, acute gouty attack yesterday. Indocin causes HTN and upset stomach. Urin acid in 3/2014 was 9.4. Discuss options, will refer back to Dr. Fuller. Consideration for referral to . Also discuss possible ICD with CRT Rx for the CM with LBBB, QRS duration 150 msec. Complain of some fluttering with dizziness. Cough potato recently, just got lazy. Suggest consideration for Cardiac Rehab, but history of hospitalization for CHF.     In 4/2017, returned for check up now have insurance. Had hospitalization for gout and taken off Lisinopril and changed to Norvasc due to coughing, 4-5 months ago. Here with concern of BP, no home check and still with occasional CP, associated with lack of sleep. Long term problem. Angio results as above. ECG shows chronic LBBB. BP elevated in office.    Holter 8/2014 - PREDOMINANT RHYTHM  1. Sinus rhythm with heart rates varying between 49 and 131 bpm with an average of 90 bpm.     VENTRICULAR ARRHYTHMIAS  1. There were very rare PVCs recorded totalling 2 and averaging less than 1 per hour.     2. There were no episodes of ventricular tachycardia.    SUPRA VENTRICULAR ARRHYTHMIAS  1. There were very rare PACs recorded totalling 8 and averaging less than 1 per hour.     2. There were no episodes of sustained supraventricular tachycardia.    SINUS NODE FUNCTION  1. There was no evidence of high grade SA ru block.     AV CONDUCTION  1. There was no evidence of high grade AV block.     DIARY  1. The diary was not returned    MISCELLANEOUS  1. This was a tape of adequate length (23 hrs).     In 12/2017, lots of concern about the gouty arthritis, several medication intolerance; Colchicine caused diarrhea, Uloric not helpful, NSAID cause LEONIE. Only helpful Rx was steroid injection, now refer to Podiatrist to complete the Rx. Last uric acid in 10/2017 was at target 5.8, peak was 10.1. K+ 3.9. Cardiac wise doing well, some worry of occasional "indigestion". ECG " "shows NSR, LBBB.    In 12/2018, back for annual review, had taken self off all HTN medication, recall some electric discomfort in the left neck, once and decided to stop all medication. Recall that Lisinopril was working but stopped due to cough but also recall being on 40 mg Chart reviewed, was given single Rx for lisinopril HCT 20 - 12.5 for 30 days, never refilled. Do not check home BP, no machine. ECG remains with LBBB. Labs reviewed, 11/2018 uric acid 10.4, CMP K+ 3.9 with normal renal function, .  Echo 5/2017  CONCLUSIONS     1 - Concentric hypertrophy. Wall thickness is increased, with the septum measuring 1.3 cm and the posterior wall measuring 1.4 cm across.    2 - No wall motion abnormalities.     3 - Normal left ventricular systolic function (EF 55-60%).     4 - Indeterminate LV diastolic function.     5 - Normal right ventricular systolic function .     6 - Difficult windows, recommend contrast use for future studies.     7 - Suggest improved function from Echo in 8/2013.     HPI Comments: in 1/2020, return post hospital for TIA  DCS "54-year-old male with history of hypertension who presents to the ED today with complaint of left arm and leg heaviness that lasted approximately 15 min this morning in his now resolved.  Also associated with transient left-sided vision deficit which is not back to normal.  He says all these occurred for about 15-20 minutes while driving to  some equipment per work.  He reports he has not taken any of his blood pressure medications for about 2 weeks.  Blood pressure grossly elevated the ED.  CT head negative.  Neuro exam is normal now and he has no complaints this time.  He reports that earlier his brain felt like Jell-O.  But he is back to his usual self at this time.  Does not provide a clear reason for not taking his blood pressure medications.  He is placed in observation by hospital medicine service for TIA workup.  Neurology will be consulted " "    Hospital Course:   Patient was admitted to the hospital medicine service for TIA workup.  MRI brain ruled out stroke.  MRA brain and carotid ultrasound done and within normal limits, results below.  Echo was done and was pending at time of discharge. Neuro approved discharge for patient on aspirin 81 mg lifelong Plavix for 21 days.  High-intensity statin started as well.  Patient is to follow up with Neuro for echo results.  He is also to follow up with his cardiologist and primary care provider.  Patient was cleared for discharge by PT/OT/ST with no needs.  He was started on Norvasc 5 mg daily at time of discharge for hypertension.  Compliance with medical therapy and follow-up with providers was strongly encouraged.  Patient felt back to baseline at time of discharge."    Echo - Concentric left ventricular hypertrophy.Moderately decreased left ventricular systolic function. The estimated ejection fraction is 30%. Anteroseptum akinesis. Grade 1 diastolic dysfunction.  Normal right ventricular systolic function.  Mild mitral regurgitation.  The estimated PA systolic pressure is 29 mmHg.  Bubble study inconclusive due to poor quality.        Review of Systems   Constitution: Positive for little weakness, malaise/fatigue and weight up and down with loss of appetite during gouty attacks. Negative for diaphoresis, fever, night sweats and weight gain.   HENT: Negative for nosebleeds. Have congestion, ear discharge with ringing, headaches , sore throat, pain on swallowing  Eyes: Negative for visual disturbance, have little left eye pain and recovered vision in the OS   Cardiovascular: no further chest pain, mild dyspnea on exertion and no further near-syncope. Negative for claudication, cyanosis, irregular heartbeat, leg swelling, orthopnea, palpitations and paroxysmal nocturnal dyspnia.   Respiratory: Positive for cough, wheezing, shortness of breath and no further sleep disturbances due to breathing. Positive for " "snoring. 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 Negative for polydipsia and polyuria.   Hematologic/Lymphatic: Does not bruise/bleed easily.   Skin: Negative for nail changes, color change, flushing, poor wound healing and suspicious lesions.   Musculoskeletal: Positive for arthritis, back pain, falls, gout, joint pain, joint swelling, muscle cramps, muscle weakness, myalgias, neck pain and stiffness.   Gastrointestinal: occasional for nausea and heartburn, bloating, pain, Negative for hematemesis, hematochezia and melena.   Genitourinary:        Recent UTI after sex.   Neurological: Positive for difficulty with concentration, excessive daytime sleepiness, dizziness, focal weakness, light-headedness and vertigo. Negative for disturbances in coordination, loss of balance and numbness.   Psychiatric/Behavioral: Positive for memory loss. Negative for depression and substance abuse. The patient has insomnia and is nervous/anxious.         Objective:    Physical Exam   Constitutional: He is oriented to person, place, and time. He appears well-developed and well-nourished.   HENT:   Head: Normocephalic.   Eyes: Conjunctivae normal and EOM are normal. Pupils are equal, round, and reactive to light.   Neck: Normal range of motion. Neck supple. No JVD present. No thyromegaly present.   Cardiovascular: Normal rate, regular rhythm and intact distal pulses.  Exam reveals no gallop and no friction rub.    No Murmur heard.  Pulmonary/Chest: Effort normal and breath sounds normal. He has no rales. He exhibits no tenderness.   Abdominal: Soft. Bowel sounds are normal. There is no tenderness.        Waist 42" up to 45.5", hip 42"   Musculoskeletal: Normal range of motion. He exhibits trace pitting edema around the sock line.   Lymphadenopathy:     He has no cervical adenopathy.   Neurological: He is alert and oriented to person, place, and time.   Skin: " Skin is warm and dry. No rash noted.         Assessment:       1. Transient ischemic attack    2. Uncontrolled hypertension    3. Class 2 severe obesity due to excess calories with serious comorbidity and body mass index (BMI) of 37.0 to 37.9 in adult    4. Abnormal cardiovascular stress test    5. Hypertensive retinopathy of left eye    6. Non compliance w medication regimen    7. Nonischemic cardiomyopathy, , EF 30% to 35%    8. Mixed hyperlipidemia, baseline     9. Hypertriglyceridemia    10. Left bundle branch block,  msec    11. Other male erectile dysfunction         Plan:     Jarod was seen today for follow-up.    Diagnoses and associated orders for this visit:    Transient ischemic attack    Uncontrolled hypertension    Class 2 severe obesity due to excess calories with serious comorbidity and body mass index (BMI) of 37.0 to 37.9 in adult    Abnormal cardiovascular stress test    Hypertensive retinopathy of left eye    Non compliance w medication regimen    Nonischemic cardiomyopathy, , EF 30% to 35%    Mixed hyperlipidemia, baseline     Hypertriglyceridemia    Left bundle branch block,  msec    Other male erectile dysfunction  -     sildenafil (VIAGRA) 50 MG tablet; Take 1 tablet (50 mg total) by mouth daily as needed for Erectile Dysfunction.  Dispense: 10 tablet; Refill: 10      - All medical issues reviewed, continue current Rx.  - Recommend healthy living: no smoking, healthy diet (low salt / sodium) and regular exercise, and weight control  - Instruction for Mediterranean with low carbohydrate diet and heart healthy exercise given.  - Weigh twice weekly, try to lose 1-2 lbs per week, target loss of 5% t0 10%  - Highly recommend 30 minutes of exercise daily, can have Sunday off, with 2-3 sessions of muscle strengthening weekly. A  would be very helpful.  - Check home blood pressure, 2 days weekly, do 2 readings within 5 minutes in AM and PM, keep log for  review. Can do at drug [facility] for free  - Recommend at least biannual cardiovascular evaluation in view of his significant risk factors.  - Consider repeat Echo in 6 months.    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 The above assessment and plan have been discussed at length. Labs and procedure over the last 6 months reviewed. Problem List updated. Asked to bring in all active medications / pills bottles with next visit.

## 2020-01-31 NOTE — TELEPHONE ENCOUNTER
Call placed to  Stan, Offered him an appt today 1/31/2020 for decreased EF per PCP. He accepted. No further issues noted.

## 2020-01-31 NOTE — PROGRESS NOTES
" Patient ID:  Jarod Pierce is a 54 y.o. male who presents for {Misc; evaluation/follow-up:93238::"follow-up"} of decrease EF      Health literacy: {DESC; LOW/MEDIUM/HIGH:73925}medium  Activities: work  Nicotine: none  Alcohol: none  Illicit drugs: none  Cardiac symptoms: none  Home BP: do not check  Medication compliance: yes  Diet: regular, diabetic, Mediterranean regular  Caffeine: 1 cpd  Labs:   Last Echo: 2020  Last stress test: 2013  Cardiovascular angiogram:   EC2020  Fundoscopic exam:     No flowsheet data found.      HPI    ROS     Objective:    Physical Exam      Assessment:       No diagnosis found.     Plan:         There are no diagnoses linked to this encounter.            "

## 2020-02-06 ENCOUNTER — PATIENT OUTREACH (OUTPATIENT)
Dept: ADMINISTRATIVE | Facility: OTHER | Age: 55
End: 2020-02-06

## 2020-02-06 ENCOUNTER — OFFICE VISIT (OUTPATIENT)
Dept: PHYSICAL MEDICINE AND REHAB | Facility: CLINIC | Age: 55
End: 2020-02-06
Payer: MEDICAID

## 2020-02-06 VITALS
HEIGHT: 64 IN | DIASTOLIC BLOOD PRESSURE: 89 MMHG | WEIGHT: 220 LBS | BODY MASS INDEX: 37.56 KG/M2 | HEART RATE: 77 BPM | SYSTOLIC BLOOD PRESSURE: 150 MMHG

## 2020-02-06 DIAGNOSIS — R20.2 PARESTHESIAS: Primary | ICD-10-CM

## 2020-02-06 DIAGNOSIS — M54.2 CERVICALGIA: ICD-10-CM

## 2020-02-06 DIAGNOSIS — M79.10 MYALGIA: ICD-10-CM

## 2020-02-06 PROCEDURE — 99213 OFFICE O/P EST LOW 20 MIN: CPT | Mod: PBBFAC,PN,25 | Performed by: PHYSICAL MEDICINE & REHABILITATION

## 2020-02-06 PROCEDURE — 20553 PR INJECT TRIGGER POINTS, > 3: ICD-10-PCS | Mod: S$PBB,,, | Performed by: PHYSICAL MEDICINE & REHABILITATION

## 2020-02-06 PROCEDURE — 99999 PR PBB SHADOW E&M-EST. PATIENT-LVL III: CPT | Mod: PBBFAC,,, | Performed by: PHYSICAL MEDICINE & REHABILITATION

## 2020-02-06 PROCEDURE — 20553 NJX 1/MLT TRIGGER POINTS 3/>: CPT | Mod: PBBFAC,PN | Performed by: PHYSICAL MEDICINE & REHABILITATION

## 2020-02-06 PROCEDURE — 99204 PR OFFICE/OUTPT VISIT, NEW, LEVL IV, 45-59 MIN: ICD-10-PCS | Mod: S$PBB,,, | Performed by: PHYSICAL MEDICINE & REHABILITATION

## 2020-02-06 PROCEDURE — 99999 PR PBB SHADOW E&M-EST. PATIENT-LVL III: ICD-10-PCS | Mod: PBBFAC,,, | Performed by: PHYSICAL MEDICINE & REHABILITATION

## 2020-02-06 PROCEDURE — 20553 NJX 1/MLT TRIGGER POINTS 3/>: CPT | Mod: S$PBB,,, | Performed by: PHYSICAL MEDICINE & REHABILITATION

## 2020-02-06 PROCEDURE — 99204 OFFICE O/P NEW MOD 45 MIN: CPT | Mod: S$PBB,,, | Performed by: PHYSICAL MEDICINE & REHABILITATION

## 2020-02-06 RX ORDER — LIDOCAINE HYDROCHLORIDE 10 MG/ML
3 INJECTION INFILTRATION; PERINEURAL ONCE
Status: COMPLETED | OUTPATIENT
Start: 2020-02-06 | End: 2020-02-06

## 2020-02-06 RX ORDER — BACLOFEN 10 MG/1
10 TABLET ORAL NIGHTLY
Qty: 30 TABLET | Refills: 6 | Status: SHIPPED | OUTPATIENT
Start: 2020-02-06 | End: 2020-05-27

## 2020-02-06 RX ADMIN — LIDOCAINE HYDROCHLORIDE 3 ML: 10 INJECTION, SOLUTION INFILTRATION; PERINEURAL at 09:02
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
## 2020-02-06 NOTE — LETTER
February 6, 2020      Soheila Avitia PA-C  [address]  [place] [address]           [place] [facility]  [address] [place] [address] [address]  [place] [address]  Phone: [phone]  Fax: [phone]          Patient: Jarod Pierce   MR Number: [id number]   YOB: 1965   Date of Visit: 2/6/2020       Dear Soheila Avitia:    Thank you for referring Jarod Pierce to me for evaluation. Attached you will find relevant portions of my assessment and plan of care.    If you have questions, please do not hesitate to call me. I look forward to following Jarod Pierce along with you.    Sincerely,    Nichole Ruvalcaba,     Enclosure  CC:  No Recipients    If you would like to receive this communication electronically, please contact [email or web address] or [phone] to request more information on [facility] access.    For providers and/or their staff who would like to refer a patient to [facility], please contact us through our one-stop-shop provider referral line, [facility] , at [phone].    If you feel you have received this communication in error or would no longer like to receive these types of communications, please e-mail [email or web address]

## 2020-02-14 ENCOUNTER — PATIENT OUTREACH (OUTPATIENT)
Dept: ADMINISTRATIVE | Facility: OTHER | Age: 55
End: 2020-02-14

## 2020-02-15 ENCOUNTER — HOSPITAL ENCOUNTER (OUTPATIENT)
Dept: RADIOLOGY | Facility: HOSPITAL | Age: 55
Discharge: HOME | End: 2020-02-15
Attending: PHYSICAL MEDICINE & REHABILITATION
Payer: MEDICAID

## 2020-02-15 DIAGNOSIS — M54.2 CERVICALGIA: ICD-10-CM

## 2020-02-15 PROCEDURE — 72050 X-RAY EXAM NECK SPINE 4/5VWS: CPT | Mod: 26,,, | Performed by: RADIOLOGY

## 2020-02-15 PROCEDURE — 72050 X-RAY EXAM NECK SPINE 4/5VWS: CPT | Mod: TC,FY

## 2020-02-15 PROCEDURE — 72050 XR CERVICAL SPINE COMPLETE 5 VIEW: ICD-10-PCS | Mod: 26,,, | Performed by: RADIOLOGY

## 2020-02-17 ENCOUNTER — OFFICE VISIT (OUTPATIENT)
Dept: PHYSICAL MEDICINE AND REHAB | Facility: CLINIC | Age: 55
End: 2020-02-17
Payer: MEDICAID

## 2020-02-17 ENCOUNTER — PROCEDURE VISIT (OUTPATIENT)
Dept: PHYSICAL MEDICINE AND REHAB | Facility: CLINIC | Age: 55
End: 2020-02-17
Payer: MEDICAID

## 2020-02-17 VITALS
WEIGHT: 220 LBS | HEIGHT: 64 IN | HEART RATE: 77 BPM | DIASTOLIC BLOOD PRESSURE: 102 MMHG | SYSTOLIC BLOOD PRESSURE: 163 MMHG | BODY MASS INDEX: 37.56 KG/M2

## 2020-02-17 DIAGNOSIS — R20.2 PARESTHESIAS: ICD-10-CM

## 2020-02-17 DIAGNOSIS — G56.00 CARPAL TUNNEL SYNDROME, UNSPECIFIED LATERALITY: Primary | ICD-10-CM

## 2020-02-17 PROCEDURE — 95911 NRV CNDJ TEST 9-10 STUDIES: CPT | Mod: 26,S$PBB,, | Performed by: PHYSICAL MEDICINE & REHABILITATION

## 2020-02-17 PROCEDURE — 95886 MUSC TEST DONE W/N TEST COMP: CPT | Mod: PBBFAC,PN | Performed by: PHYSICAL MEDICINE & REHABILITATION

## 2020-02-17 PROCEDURE — 99499 NO LOS: ICD-10-PCS | Mod: S$PBB,,, | Performed by: PHYSICAL MEDICINE & REHABILITATION

## 2020-02-17 PROCEDURE — 99211 OFF/OP EST MAY X REQ PHY/QHP: CPT | Mod: PBBFAC,PN,25 | Performed by: PHYSICAL MEDICINE & REHABILITATION

## 2020-02-17 PROCEDURE — 95886 PR EMG COMPLETE, W/ NERVE CONDUCTION STUDIES, 5+ MUSCLES: ICD-10-PCS | Mod: 26,S$PBB,, | Performed by: PHYSICAL MEDICINE & REHABILITATION

## 2020-02-17 PROCEDURE — 95886 MUSC TEST DONE W/N TEST COMP: CPT | Mod: 26,S$PBB,, | Performed by: PHYSICAL MEDICINE & REHABILITATION

## 2020-02-17 PROCEDURE — 99499 UNLISTED E&M SERVICE: CPT | Mod: S$PBB,,, | Performed by: PHYSICAL MEDICINE & REHABILITATION

## 2020-02-17 PROCEDURE — 99999 PR PBB SHADOW E&M-EST. PATIENT-LVL I: CPT | Mod: PBBFAC,,, | Performed by: PHYSICAL MEDICINE & REHABILITATION

## 2020-02-17 PROCEDURE — 99999 PR PBB SHADOW E&M-EST. PATIENT-LVL I: ICD-10-PCS | Mod: PBBFAC,,, | Performed by: PHYSICAL MEDICINE & REHABILITATION

## 2020-02-17 PROCEDURE — 95911 NRV CNDJ TEST 9-10 STUDIES: CPT | Mod: PBBFAC,PN | Performed by: PHYSICAL MEDICINE & REHABILITATION

## 2020-02-17 PROCEDURE — 95911 PR NERVE CONDUCTION STUDY; 9-10 STUDIES: ICD-10-PCS | Mod: 26,S$PBB,, | Performed by: PHYSICAL MEDICINE & REHABILITATION

## 2020-02-17 RX ORDER — GABAPENTIN 300 MG/1
300 CAPSULE ORAL NIGHTLY
Qty: 30 CAPSULE | Refills: 11 | Status: SHIPPED | OUTPATIENT
Start: 2020-02-17 | End: 2021-02-17

## 2020-02-17 NOTE — PROGRESS NOTES
HPI:  Patient is a 54 y.o. year old male w. B/l hand numbness. His EMG/NCS results are:    Moderate bilateral carpal tunnel syndrome  Mild bilateral ulnar neuropathies at elbows  Superimposed sensorimotor predominantly axonal neuropathy         Past Medical History:   Diagnosis Date    Allergic rhinitis due to allergen     Arthritis     Cardiomyopathy     Gout     Hypertension     Left bundle branch block      Past Surgical History:   Procedure Laterality Date    WISDOM TOOTH EXTRACTION       Family History   Problem Relation Age of Onset    Heart disease Father     Hypertension Father     Gout Father     Cancer Sister         lung    Heart attack Neg Hx      Social History     Socioeconomic History    Marital status: Single     Spouse name: Not on file    Number of children: Not on file    Years of education: Not on file    Highest education level: Not on file   Occupational History     Employer: [employer]   Social Needs    Financial resource strain: Not on file    Food insecurity:     Worry: Not on file     Inability: Not on file    Transportation needs:     Medical: Not on file     Non-medical: Not on file   Tobacco Use    Smoking status: Never Smoker    Smokeless tobacco: Never Used   Substance and Sexual Activity    Alcohol use: No    Drug use: No    Sexual activity: Not on file   Lifestyle    Physical activity:     Days per week: Not on file     Minutes per session: Not on file    Stress: Not on file   Relationships    Social connections:     Talks on phone: Not on file     Gets together: Not on file     Attends Mandaeism service: Not on file     Active member of club or organization: Not on file     Attends meetings of clubs or organizations: Not on file     Relationship status: Not on file   Other Topics Concern    Not on file   Social History Narrative    Not on file       Review of patient's allergies indicates:   Allergen Reactions    Codeine      Other  reaction(s): Hives    Lisinopril      Dry coughing    Phenytoin sodium extended      Other reaction(s): leg cramps       Current Outpatient Medications:     amLODIPine (NORVASC) 5 MG tablet, Take 1 tablet (5 mg total) by mouth once daily., Disp: 90 tablet, Rfl: 0    aspirin 81 MG Chew, Take 1 tablet (81 mg total) by mouth once daily., Disp: 90 tablet, Rfl: 3    atorvastatin (LIPITOR) 40 MG tablet, Take 1 tablet (40 mg total) by mouth once daily., Disp: 90 tablet, Rfl: 0    baclofen (LIORESAL) 10 MG tablet, Take 1 tablet (10 mg total) by mouth every evening., Disp: 30 tablet, Rfl: 6    clopidogrel (PLAVIX) 75 mg tablet, Take 1 tablet (75 mg total) by mouth once daily. for 21 days, Disp: 21 tablet, Rfl: 0    clotrimazole-betamethasone 1-0.05% (LOTRISONE) cream, Apply topically 2 (two) times daily., Disp: 45 g, Rfl: 0    diclofenac sodium 1 % Gel, Apply 2 g topically 4 (four) times daily. for 10 days, Disp: 1 Tube, Rfl: 3    gabapentin (NEURONTIN) 300 MG capsule, Take 1 capsule (300 mg total) by mouth every evening., Disp: 30 capsule, Rfl: 11    sildenafil (VIAGRA) 50 MG tablet, Take 1 tablet (50 mg total) by mouth daily as needed for Erectile Dysfunction., Disp: 10 tablet, Rfl: 10    topiramate 25 mg capsule, Take 25 mg by mouth 2 (two) times daily., Disp: , Rfl:           Review of Systems  No nausea, vomiting, fevers, Chills , contipation, diarrhea or sweats    Physical Exam:      alert and oriented ×4 follows commands answers all questions appropriately,affect wnl  Manual muscle test 5 out of 5 sensation to light touch grossly intact  No focal muscular atrophy of thenar or hypothenar emminences  Nl gait  No C/C/E        Assessment:    Moderate bilateral carpal tunnel syndrome  Mild bilateral ulnar neuropathies at elbows  Superimposed sensorimotor predominantly axonal neuropathy    Plan:   Refer to ortho for carpal tunnel release  In the meantime he is to use bilateral carpal tunnel splints  Will also do  a trial of neurontin at night

## 2020-02-17 NOTE — PROCEDURES
Procedures     [facility]  Physical Medicine and Rehabilitation   [address] [place] [address], [address]  [place] [address]             Patient: owen ventura   Patient ID: [id number]   Sex:     YOB: 1965   Age: 54 Years 4 Months   Notes:     Last visit date: 2/17/2020         Visit date and time: 2/17/2020 09:50   Patient Age on Visit Date: 54 Years 4 Months   Referring Physician:     Diagnoses:         Bilateral hand numbness    Sensory NCS      Nerve / Sites Rec. Site Onset Lat Peak Lat Ref. NP Amp Ref. PP Amp Ref. Segments Distance Velocity     ms ms ms µV µV µV µV  cm m/s   R Median - Digit II (Antidromic)      Wrist Dig II 3.39 3.85 ?3.40 3.5 ?15.0 7.3 ?20.0 Wrist - Dig II 13 38   L Median - Digit II (Antidromic)      Wrist Dig II 2.81 3.49 ?3.40 4.4 ?15.0 4.7 ?20.0 Wrist - Dig II 13 46   R Ulnar - Digit V (Antidromic)      Wrist Dig V 3.18 4.06 ?3.10 62.1 ?10.0 34.7 ?15.0 Wrist - Dig V 11 35   L Ulnar - Digit V (Antidromic)      Wrist Dig V 2.55 3.33 ?3.10 2.7 ?10.0 3.8 ?15.0 Wrist - Dig V 11 43   R Radial - Anatomical snuff box (Forearm)      Forearm Wrist 1.88 2.55 ?2.90 4.1 ?15.0 4.6 ?15.0 Forearm - Wrist 10 53   L Radial - Anatomical snuff box (Forearm)      Forearm Wrist 1.77 2.55 ?2.90 13.0 ?15.0 4.4 ?15.0 Forearm - Wrist 10 56       Motor NCS      Nerve / Sites Muscle Latency Ref. Amplitude Ref. Amp % Duration Segments Distance Lat Diff Velocity Ref.     ms ms mV mV % ms  cm ms m/s m/s   L Median - APB      Wrist APB 3.13 ?4.40 7.2 ?4.0 100 9.38 Wrist - APB 7         Elbow APB 8.02  0.2  2.24 8.02 Elbow - Wrist 21.5 4.90 44 ?49   R Median - APB      Wrist APB 3.23 ?4.40 8.5 ?4.0 100 9.22 Wrist - APB 7         Elbow APB 8.65  3.3  38.7 9.32 Elbow - Wrist 23.5 5.42 43 ?49   L Ulnar - ADM      Wrist ADM 2.66 ?3.60 10.0 ?5.0 100 6.35 Wrist - ADM 7         B.Elbow ADM 6.82  10.1  101 6.51 B.Elbow - Wrist 22.5 4.17 54 ?49      A.Elbow ADM 9.01  10.1  101 6.35 A.Elbow - B.Elbow 10 2.19 46  ?49   R Ulnar - ADM      Wrist ADM 3.07 ?3.60 12.6 ?5.0 100 5.47 Wrist - ADM 7         B.Elbow ADM 6.82  11.6  92.7 6.09 B.Elbow - Wrist 22 3.75 59 ?49      A.Elbow ADM 9.22  11.2  89.1 5.68 A.Elbow - B.Elbow 10 2.40 42 ?49       EMG Summary Table     Spontaneous MUAP Recruitment   Muscle IA Fib PSW Fasc H.F. Amp Dur. PPP Pattern   L. Biceps brachii N None None None None N N N N   R. Biceps brachii N None None None None N N N N   L. Deltoid N None None None None N N N N   R. Deltoid N None None None None N N N N   L. Triceps brachii N None None None None N N N N   R. Triceps brachii N None None None None N N N N   L. Extensor carpi radialis brevis N None None None None N N N N   R. Extensor carpi radialis brevis N None None None None N N N N   L. First dorsal interosseous N None None None None N N N N   R. First dorsal interosseous N None None None None N N N N       Summary    The motor conduction test had results outside of the specified normal range in all 4 of the tested nerves:   In the L Median - APB study  o the take off velocity result was reduced for Elbow - Wrist segment   In the R Median - APB study  o the take off velocity result was reduced for Elbow - Wrist segment   In the L Ulnar - ADM study  o the take off velocity result was reduced for A.Elbow - B.Elbow segment   In the R Ulnar - ADM study  o the take off velocity result was reduced for A.Elbow - B.Elbow segment    The sensory conduction test had results outside of the specified normal range in all 6 of the tested nerves:   In the R Median - Digit II (Antidromic) study  o the peak latency result was increased for Wrist stimulation  o the peak amplitude result was reduced for Wrist stimulation   In the L Median - Digit II (Antidromic) study  o the peak latency result was increased for Wrist stimulation  o the peak amplitude result was reduced for Wrist stimulation   In the R Ulnar - Digit V (Antidromic) study  o the peak latency result was  increased for Wrist stimulation   In the L Ulnar - Digit V (Antidromic) study  o the peak latency result was increased for Wrist stimulation  o the peak amplitude result was reduced for Wrist stimulation   In the R Radial - Anatomical snuff box (Forearm) study  o the peak amplitude result was reduced for Forearm stimulation   In the L Radial - Anatomical snuff box (Forearm) study  o the peak amplitude result was reduced for Forearm stimulation    The needle EMG study was normal in all 10 tested muscles: L. Biceps brachii, R. Biceps brachii, L. Deltoid, R. Deltoid, L. Triceps brachii, R. Triceps brachii, L. Extensor carpi radialis brevis, R. Extensor carpi radialis brevis, L. First dorsal interosseous, R. First dorsal interosseous.          Conclusion:     Moderate bilateral carpal tunnel syndrome  Mild bilateral ulnar neuropathies at elbows  Superimposed sensorimotor predominantly axonal neuropathy            ____________________________

## 2020-03-16 ENCOUNTER — PATIENT OUTREACH (OUTPATIENT)
Dept: ADMINISTRATIVE | Facility: OTHER | Age: 55
End: 2020-03-16

## 2020-03-16 NOTE — PROGRESS NOTES
LINKS immunization registry, Care Everywhere and Health Maintenance updated.  Chart reviewed for overdue Proactive [facility] health maintenance testing.   Jeff

## 2020-03-17 ENCOUNTER — TELEPHONE (OUTPATIENT)
Dept: ORTHOPEDICS | Facility: CLINIC | Age: 55
End: 2020-03-17

## 2020-03-17 NOTE — TELEPHONE ENCOUNTER
----- Message from Colleen Ramirez sent at 3/17/2020 11:57 AM CDT -----  Contact: self [phone]  Patient call stated he may be late for 3/18/2020, conflicting appts.    Patient may be reached at [phone].

## 2020-03-17 NOTE — TELEPHONE ENCOUNTER
Informed pt current recommendations for COVID-19 are to reschedule any non emergent appointments 30 days out.  Pt stated his hands are swollen and are in a lot of pain.  Pt insistent on keeping his appointment.

## 2020-03-17 NOTE — TELEPHONE ENCOUNTER
Pt states he has a conflicting appointment tomorrow with Dr. Cox scheduled at 2:20 PM.  Pt rescheduled to 3:40 PM 3/18.  Pt agreed to appointment date and time.

## 2020-03-18 ENCOUNTER — OFFICE VISIT (OUTPATIENT)
Dept: ORTHOPEDICS | Facility: CLINIC | Age: 55
End: 2020-03-18
Payer: MEDICAID

## 2020-03-18 ENCOUNTER — LAB VISIT (OUTPATIENT)
Dept: LAB | Facility: HOSPITAL | Age: 55
End: 2020-03-18
Attending: INTERNAL MEDICINE
Payer: MEDICAID

## 2020-03-18 ENCOUNTER — OFFICE VISIT (OUTPATIENT)
Dept: RHEUMATOLOGY | Facility: CLINIC | Age: 55
End: 2020-03-18
Payer: MEDICAID

## 2020-03-18 VITALS
TEMPERATURE: 96 F | WEIGHT: 216.19 LBS | SYSTOLIC BLOOD PRESSURE: 169 MMHG | BODY MASS INDEX: 37.11 KG/M2 | DIASTOLIC BLOOD PRESSURE: 90 MMHG

## 2020-03-18 VITALS
HEART RATE: 73 BPM | WEIGHT: 220 LBS | SYSTOLIC BLOOD PRESSURE: 143 MMHG | TEMPERATURE: 98 F | BODY MASS INDEX: 37.56 KG/M2 | HEIGHT: 64 IN | DIASTOLIC BLOOD PRESSURE: 89 MMHG

## 2020-03-18 DIAGNOSIS — M15.9 OSTEOARTHRITIS, GENERALIZED: Primary | ICD-10-CM

## 2020-03-18 DIAGNOSIS — R20.2 PARESTHESIA: ICD-10-CM

## 2020-03-18 DIAGNOSIS — M10.9 ARTHRITIS DUE TO GOUT: ICD-10-CM

## 2020-03-18 DIAGNOSIS — M15.9 OSTEOARTHRITIS, GENERALIZED: ICD-10-CM

## 2020-03-18 DIAGNOSIS — R20.2 PARESTHESIAS: ICD-10-CM

## 2020-03-18 DIAGNOSIS — M1A.0311 IDIOPATHIC CHRONIC GOUT OF RIGHT WRIST WITH TOPHUS: Primary | ICD-10-CM

## 2020-03-18 DIAGNOSIS — M10.9 GOUT, UNSPECIFIED: ICD-10-CM

## 2020-03-18 DIAGNOSIS — G56.00 CARPAL TUNNEL SYNDROME, UNSPECIFIED LATERALITY: ICD-10-CM

## 2020-03-18 DIAGNOSIS — M15.9 GENERALIZED OSTEOARTHROSIS, INVOLVING MULTIPLE SITES: Primary | ICD-10-CM

## 2020-03-18 LAB
BACTERIA #/AREA URNS HPF: NEGATIVE /HPF
BILIRUB UR QL STRIP: NEGATIVE
CLARITY UR: CLEAR
COLOR UR: YELLOW
FOLATE SERPL-MCNC: 10.9 NG/ML (ref 4–24)
GLUCOSE UR QL STRIP: NEGATIVE
HGB UR QL STRIP: ABNORMAL
HYALINE CASTS #/AREA URNS LPF: 2 /LPF
KETONES UR QL STRIP: NEGATIVE
LEUKOCYTE ESTERASE UR QL STRIP: NEGATIVE
MICROSCOPIC COMMENT: ABNORMAL
NITRITE UR QL STRIP: NEGATIVE
PH UR STRIP: 6 [PH] (ref 5–8)
PROT UR QL STRIP: NEGATIVE
RBC #/AREA URNS HPF: 5 /HPF (ref 0–4)
SP GR UR STRIP: 1.02 (ref 1–1.03)
SQUAMOUS #/AREA URNS HPF: 0 /HPF
URATE SERPL-MCNC: 8 MG/DL (ref 3.4–7)
URN SPEC COLLECT METH UR: ABNORMAL
UROBILINOGEN UR STRIP-ACNC: ABNORMAL EU/DL
VIT B12 SERPL-MCNC: 96 PG/ML (ref 210–950)
WBC #/AREA URNS HPF: 0 /HPF (ref 0–5)

## 2020-03-18 PROCEDURE — 82607 VITAMIN B-12: CPT

## 2020-03-18 PROCEDURE — 99203 PR OFFICE/OUTPT VISIT, NEW, LEVL III, 30-44 MIN: ICD-10-PCS | Mod: S$GLB,,, | Performed by: INTERNAL MEDICINE

## 2020-03-18 PROCEDURE — 20526 THER INJECTION CARP TUNNEL: CPT | Mod: PBBFAC,PN | Performed by: ORTHOPAEDIC SURGERY

## 2020-03-18 PROCEDURE — 99203 OFFICE O/P NEW LOW 30 MIN: CPT | Mod: S$GLB,,, | Performed by: INTERNAL MEDICINE

## 2020-03-18 PROCEDURE — 84550 ASSAY OF BLOOD/URIC ACID: CPT

## 2020-03-18 PROCEDURE — 20526 CARPAL TUNNEL: ICD-10-PCS | Mod: S$PBB,50,, | Performed by: ORTHOPAEDIC SURGERY

## 2020-03-18 PROCEDURE — 86431 RHEUMATOID FACTOR QUANT: CPT

## 2020-03-18 PROCEDURE — 99203 PR OFFICE/OUTPT VISIT, NEW, LEVL III, 30-44 MIN: ICD-10-PCS | Mod: S$PBB,25,, | Performed by: ORTHOPAEDIC SURGERY

## 2020-03-18 PROCEDURE — 83520 IMMUNOASSAY QUANT NOS NONAB: CPT

## 2020-03-18 PROCEDURE — 82746 ASSAY OF FOLIC ACID SERUM: CPT

## 2020-03-18 PROCEDURE — 99213 OFFICE O/P EST LOW 20 MIN: CPT | Mod: PBBFAC,PN | Performed by: ORTHOPAEDIC SURGERY

## 2020-03-18 PROCEDURE — 99203 OFFICE O/P NEW LOW 30 MIN: CPT | Mod: S$PBB,25,, | Performed by: ORTHOPAEDIC SURGERY

## 2020-03-18 PROCEDURE — 99999 PR PBB SHADOW E&M-EST. PATIENT-LVL III: CPT | Mod: PBBFAC,,, | Performed by: ORTHOPAEDIC SURGERY

## 2020-03-18 PROCEDURE — 99999 PR PBB SHADOW E&M-EST. PATIENT-LVL III: ICD-10-PCS | Mod: PBBFAC,,, | Performed by: ORTHOPAEDIC SURGERY

## 2020-03-18 PROCEDURE — 81374 HLA I TYPING 1 ANTIGEN LR: CPT

## 2020-03-18 PROCEDURE — 86200 CCP ANTIBODY: CPT

## 2020-03-18 PROCEDURE — 81001 URINALYSIS AUTO W/SCOPE: CPT

## 2020-03-18 RX ORDER — ALLOPURINOL 100 MG/1
100 TABLET ORAL DAILY
Qty: 30 TABLET | Refills: 1 | Status: SHIPPED | OUTPATIENT
Start: 2020-03-18 | End: 2020-07-06

## 2020-03-18 RX ADMIN — TRIAMCINOLONE ACETONIDE 40 MG: 40 INJECTION, SUSPENSION INTRA-ARTICULAR; INTRAMUSCULAR at 10:03

## 2020-03-18 NOTE — PROGRESS NOTES
[facility] RHEUMATOLOGY        NEW PATIENT      Subjective:       Patient ID:   NAME: Jarod Pierce : 1965     54 y.o. male    Referring Doc: No ref. provider found  Other Physicians:    Chief Complaint:  Gout      History of Present Illness:     New patient with hx of gout since .Was under the care of Dr Dickinson.;Saw her one yr ago.No hx of  Nephrolithiasis.  He was restarted on Allopurinol which he took for 6 months( stopped on his own )States he has not had an attack for last year.  Has arthralgias in PIP's.,wrists, knees and ankles.          ROS:   GEN: no fevers night sweats or significant weight changes   + fatigue  HEENT: no HA's, changes in vision , no mouth ulcers, no sicca symptoms, no scalp tenderness, jaw claudication  CV: no CP, SOB, PND, + GARNETT or orthopnea,no palpitations  PULM:no SOB,sl NP  Cough for 6 months,No hemoptysis, sputum or pleuritic pain  GI: no abdominal pain,+ occ  nausea, No vomiting, constipation, diarrhea, melanotic stools, BRBPR, or hematemesis, no dysphagia  : no hematuria, dysuria  NEURO:+ paresthesias hands ,feet, No headaches, visual disturbances, muscle weakness  SKIN:  no rashes , erythema, bruising, or swelling, no Raynauds, no photosensitivity  MUSCULOSKELETAL:no joint swelling, no prolonged AM stiffness, no back pain   PSYCH:    Insomnia,   depression,  anxiety    Medications:    Current Outpatient Medications:     amLODIPine (NORVASC) 5 MG tablet, Take 1 tablet (5 mg total) by mouth once daily., Disp: 90 tablet, Rfl: 0    aspirin 81 MG Chew, Take 1 tablet (81 mg total) by mouth once daily., Disp: 90 tablet, Rfl: 3    atorvastatin (LIPITOR) 40 MG tablet, Take 1 tablet (40 mg total) by mouth once daily., Disp: 90 tablet, Rfl: 0    baclofen (LIORESAL) 10 MG tablet, Take 1 tablet (10 mg total) by mouth every evening., Disp: 30 tablet, Rfl: 6    topiramate 25 mg capsule, Take 25 mg by mouth 2 (two) times daily., Disp: , Rfl:     clopidogrel (PLAVIX) 75 mg  tablet, Take 1 tablet (75 mg total) by mouth once daily. for 21 days (Patient not taking: Reported on 3/18/2020), Disp: 21 tablet, Rfl: 0    clotrimazole-betamethasone 1-0.05% (LOTRISONE) cream, Apply topically 2 (two) times daily. (Patient not taking: Reported on 3/18/2020), Disp: 45 g, Rfl: 0    diclofenac sodium 1 % Gel, Apply 2 g topically 4 (four) times daily. for 10 days (Patient not taking: Reported on 3/18/2020), Disp: 1 Tube, Rfl: 3    gabapentin (NEURONTIN) 300 MG capsule, Take 1 capsule (300 mg total) by mouth every evening. (Patient not taking: Reported on 3/18/2020), Disp: 30 capsule, Rfl: 11    sildenafil (VIAGRA) 50 MG tablet, Take 1 tablet (50 mg total) by mouth daily as needed for Erectile Dysfunction. (Patient not taking: Reported on 3/18/2020), Disp: 10 tablet, Rfl: 10    FAMILY HISTORY: negative for Connective Tissue Disease      PAST MEDICAL HISTORY:  HTN  Edy CTS  TIA  Hypercholesterolemia  PAST SURGICAL HISTORY:  None  SOCIAL HISTORY:  Work Welding  No ETOH,No smoking  ALLERGIES:  Lisinopril: cough  No allergy to Codeine      Objective:     Vitals:  Blood pressure (!) 169/90, temperature 96 °F (35.6 °C), weight 98.1 kg (216 lb 3.2 oz).    Physical Examination:   GEN: no apparent distress, comfortable; AAOx3  SKIN: no rashes, no lesions, no sclerodactyly or induration, no Raynaud's, no periungual erythema+ SQ nodule l elbow  HEAD: normal  EYES: no pallor, no icterus, PERRLA  ENT:  no thrush,no mucosal dryness or ulcerations  NECK: no masses, thyroid normal, trachea midline, no LAD/LN's, supple  CV:   S1 and S2 regular, no murmurs, gallop or rubs  CHEST: Normal respiratory effort;  normal breath sounds; no rubs, no wheezes, no crackles.   ABDOM: nontender and nondistended; soft; ; no rebound/guarding,no masses  MUSC/Skeletal: ROM decrease in both wrists.  normal; no crepitus; joints without synovitis,+ tenderness in some PIP and the IP joints.  EXTREM: no clubbing, cyanosis, + 1 pitting  edema, normal pulses.  NEURO: grossly intact; motorWNL; AAOx3; no tremors  PSYCH: normal mood, affect and behavior  LYMPH: normal cervical, supraclavicular            Labs:   @RESUFAST(WBC,HGB,HCT,MCV,PLT)  )@RESUFAST(NA,K,CL,CO2,GLU,BUN,Creatinine,Calcium,PROT,Albumin,Bilitot,Alkphos,AST,ALT,TEJINDER,Sed Rate,CRP,RF,CCP)      Radiology/Diagnostic Studies:    I have reviewed all available labs and XRay reports    Assessment/Plan:   54 y.o. male with history of gout.  He is noncompliant with  Gout medications.  Osteoarthritis  HTN: monitor BP at home and contact PCP if it remains elevated  Hx CTS    PLAN:  Labs including urinalysis , uric acid, sed rate etcetera  Restart allopurinol 100 mg a day.  He will add ibuprofen 200 mg twice a day p.c..  Instead of Colcrys  due to intolerance in the past  He will continue to monitor his blood pressure at home and will contact primary care physician if it remains elevated  X-ray of both hands  Discussion:     I have explained all of the above in detail and the patient understands all of the current recommendation(s). I have answered all of their questions to the best of my ability and to their complete satisfaction.      I have reviewed the risks and benefits of the medication in detail with patient, who understands and wishes to proceed. Printed information regarding the disease and/or medication was also provided.        RTC 3 weeks        Electronically signed by Bjorn Nolasco MD

## 2020-03-18 NOTE — PROGRESS NOTES
3/18/2020    Chief Complaint:  Chief Complaint   Patient presents with    Hand Problem     pt c/o pain, swelling and numbness in both hands       HPI:  Jarod Pierce is a 54 y.o. male, who presents to clinic today has a history of bilateral hand numbness and swelling.  He states that this has been going on for over a year but over the last couple of months has gotten significantly worse.  States that he has difficulty with flexing his hands at time.  States he has been told that he has arthritis in his hands as well.  He has had a nerve conduction study.  He is here today for further evaluation.    PMHX:  Past Medical History:   Diagnosis Date    Allergic rhinitis due to allergen     Arthritis     Cardiomyopathy     Gout     Hypertension     Left bundle branch block        PSHX:  Past Surgical History:   Procedure Laterality Date    WISDOM TOOTH EXTRACTION         FMHX:  Family History   Problem Relation Age of Onset    Heart disease Father     Hypertension Father     Gout Father     Cancer Sister         lung    Heart attack Neg Hx        SOCHX:  Social History     Tobacco Use    Smoking status: Never Smoker    Smokeless tobacco: Never Used   Substance Use Topics    Alcohol use: No       ALLERGIES:  Codeine; Lisinopril; and Phenytoin sodium extended    CURRENT MEDICATIONS:  Current Outpatient Medications on File Prior to Visit   Medication Sig Dispense Refill    amLODIPine (NORVASC) 5 MG tablet Take 1 tablet (5 mg total) by mouth once daily. 90 tablet 0    aspirin 81 MG Chew Take 1 tablet (81 mg total) by mouth once daily. 90 tablet 3    atorvastatin (LIPITOR) 40 MG tablet Take 1 tablet (40 mg total) by mouth once daily. 90 tablet 0    clotrimazole-betamethasone 1-0.05% (LOTRISONE) cream Apply topically 2 (two) times daily. 45 g 0    gabapentin (NEURONTIN) 300 MG capsule Take 1 capsule (300 mg total) by mouth every evening. 30 capsule 11    sildenafil (VIAGRA) 50 MG tablet Take 1 tablet  "(50 mg total) by mouth daily as needed for Erectile Dysfunction. 10 tablet 10    topiramate 25 mg capsule Take 25 mg by mouth 2 (two) times daily.      baclofen (LIORESAL) 10 MG tablet Take 1 tablet (10 mg total) by mouth every evening. 30 tablet 6    clopidogrel (PLAVIX) 75 mg tablet Take 1 tablet (75 mg total) by mouth once daily. for 21 days 21 tablet 0    diclofenac sodium 1 % Gel Apply 2 g topically 4 (four) times daily. for 10 days 1 Tube 3     No current facility-administered medications on file prior to visit.        REVIEW OF SYSTEMS:  Review of Systems   Constitutional: Positive for malaise/fatigue. Negative for chills and fever.   HENT: Positive for tinnitus. Negative for ear pain, nosebleeds and sore throat.    Eyes: Negative for pain and discharge.   Respiratory: Positive for cough. Negative for shortness of breath and wheezing.    Cardiovascular: Positive for leg swelling. Negative for chest pain and palpitations.   Gastrointestinal: Negative for heartburn, nausea and vomiting.   Genitourinary: Negative for dysuria and urgency.   Musculoskeletal: Positive for back pain, joint pain and neck pain.   Skin: Negative for rash.   Neurological: Positive for dizziness and weakness. Negative for seizures and headaches.   Psychiatric/Behavioral: The patient is nervous/anxious.        GENERAL PHYSICAL EXAM:   BP (!) 143/89   Pulse 73   Temp 98.4 °F (36.9 °C) (Oral)   Ht 5' 4" (1.626 m)   Wt 99.8 kg (220 lb)   BMI 37.76 kg/m²    GEN: well developed, well nourished, no acute distress   HENT: Normocephalic, atraumatic   EYES: No discharge, conjunctiva normal   NECK: Supple, non-tender   PULM: No wheezing, no respiratory distress   CV: RRR   ABD: Soft, non-tender    ORTHO EXAM:   Examination of both the right and left hand reveals there is no significant edema.  There is no erythema.  He does have enlargement of some of his MCP joints which is consistent with arthritis.  Palpation about the wrist does " produce mild tenderness and he does have limited flexion and extensions of the wrist.  He does report decreased sensation in the median distribution bilaterally.  He has intact ulnar and radial sensation.  Has negative Tinel's but positive Durkan's bilaterally.  Has 2+ radial pulse bilaterally.    EMG/nerve conduction study:   The nerve conduction study has been reviewed.  He is noted have mild to moderate bilateral carpal tunnel syndrome    ASSESSMENT:   Bilateral carpal tunnel syndrome    PLAN:  1.  After informed consent was obtained injections were placed to both right and left carpal tunnels.  The patient tolerated that well.    2.  Will follow up with me on a p.r.n. basis

## 2020-03-18 NOTE — LETTER
March 19, 2020      Nichole Ruvalcaba [address]   [address]  [facility] [address]           [facility]  [address] [place] [address]  Phone: [phone]          Patient: Jarod Pierce   MR Number: [id number]   YOB: 1965   Date of Visit: 3/18/2020       Dear Dr. Nichole Ruvalcaba:    Thank you for referring Jarod Pierce to me for evaluation. Attached you will find relevant portions of my assessment and plan of care.    If you have questions, please do not hesitate to call me. I look forward to following Jarod Pierce along with you.    Sincerely,    Yoandy Cox MD    Enclosure  CC:  No Recipients    If you would like to receive this communication electronically, please contact [email or web address] or [phone] to request more information on [employer] Link access.    For providers and/or their staff who would like to refer a patient to [facility], please contact us through our one-stop-shop provider referral line, [facility], at [phone].    If you feel you have received this communication in error or would no longer like to receive these types of communications, please e-mail [email or web address]

## 2020-03-19 RX ORDER — TRIAMCINOLONE ACETONIDE 40 MG/ML
40 INJECTION, SUSPENSION INTRA-ARTICULAR; INTRAMUSCULAR
Status: DISCONTINUED | OUTPATIENT
Start: 2020-03-18 | End: 2020-03-19 | Stop reason: HOSPADM

## 2020-03-19 NOTE — PROCEDURES
Carpal Tunnel  Date/Time: 3/18/2020 10:40 AM  Performed by: Yoandy Cox MD  Authorized by: Yoandy Cox MD     Consent Done?: Yes (Verbal)  Indications: Pain  Site marked: The procedure site was marked    Timeout: Prior to procedure the correct patient, procedure, and site was verified      Location:  Wrist (Left carpal tunnel)  Prep: Patient was prepped and draped in usual sterile fashion    Needle size:  25 G  Medications:  40 mg triamcinolone acetonide 40 mg/mL (20 mg injected)  Patient tolerance:  Patient tolerated the procedure well with no immediate complications

## 2020-03-19 NOTE — PROCEDURES
Carpal Tunnel  Date/Time: 3/18/2020 10:40 AM  Performed by: Yoandy Cox MD  Authorized by: Yoandy Cox MD     Consent Done?: Yes (Verbal)  Indications: Pain  Site marked: The procedure site was marked    Timeout: Prior to procedure the correct patient, procedure, and site was verified      Location:  Wrist (Right carpal tunnel)  Prep: Patient was prepped and draped in usual sterile fashion    Needle size:  25 G  Medications:  40 mg triamcinolone acetonide 40 mg/mL (20 mg injected)  Patient tolerance:  Patient tolerated the procedure well with no immediate complications

## 2020-03-19 NOTE — PROGRESS NOTES
Patient notified of low vitamin B -12  level and to f/u with PCP. Will need tx. Patient verbalized understanding  of all.

## 2020-03-20 ENCOUNTER — TELEPHONE (OUTPATIENT)
Dept: FAMILY MEDICINE | Facility: CLINIC | Age: 55
End: 2020-03-20

## 2020-03-20 DIAGNOSIS — E53.8 B12 DEFICIENCY: Primary | ICD-10-CM

## 2020-03-20 LAB — RHEUMATOID FACT SERPL-ACNC: <10 IU/ML (ref 0–13.9)

## 2020-03-20 RX ORDER — CYANOCOBALAMIN 1000 UG/ML
1000 INJECTION, SOLUTION INTRAMUSCULAR; SUBCUTANEOUS
Status: DISCONTINUED | OUTPATIENT
Start: 2020-05-11 | End: 2020-03-26

## 2020-03-20 RX ORDER — CYANOCOBALAMIN 1000 UG/ML
1000 INJECTION, SOLUTION INTRAMUSCULAR; SUBCUTANEOUS
Status: DISCONTINUED | OUTPATIENT
Start: 2020-03-21 | End: 2020-03-26

## 2020-03-20 NOTE — TELEPHONE ENCOUNTER
----- Message from Sophie Borges sent at 3/20/2020  7:57 AM CDT -----  Contact: Jarod ayala  Type:  Sooner Apoointment Request    Caller is requesting a sooner appointment.  Caller declined first available appointment listed below.  Caller will not accept being placed on the waitlist and is requesting a message be sent to doctor.    Name of Caller:  Jarod  When is the first available appointment?  n/a  Symptoms:  Pt was told he had a vitamin B deficiency  Best Call Back Number:  [phone]  Additional Information:  Pt refused virtual visit. He wants to come in for a visit. Pt does not want to wait until April for an appt

## 2020-03-20 NOTE — TELEPHONE ENCOUNTER
1000 mcg weekly for four weeks then monthly for six months with a recheck B12 level at the end of six months total

## 2020-03-21 LAB — CCP IGA+IGG SERPL IA-ACNC: 8 UNITS (ref 0–19)

## 2020-03-22 LAB
C-ANCA TITR SER IF: ABNORMAL TITER
MYELOPEROXIDASE AB SER IA-ACNC: <9 U/ML (ref 0–9)
P-ANCA ATYPICAL TITR SER IF: ABNORMAL TITER
P-ANCA TITR SER IF: ABNORMAL TITER
PROTEINASE3 AB SER IA-ACNC: 5.5 U/ML (ref 0–3.5)

## 2020-03-23 ENCOUNTER — CLINICAL SUPPORT (OUTPATIENT)
Dept: FAMILY MEDICINE | Facility: CLINIC | Age: 55
End: 2020-03-23
Payer: MEDICAID

## 2020-03-23 DIAGNOSIS — E53.8 B12 DEFICIENCY: Primary | ICD-10-CM

## 2020-03-23 PROCEDURE — 96372 THER/PROPH/DIAG INJ SC/IM: CPT | Mod: PBBFAC,PO

## 2020-03-23 RX ADMIN — CYANOCOBALAMIN 1000 MCG: 1000 INJECTION, SOLUTION INTRAMUSCULAR at 09:03

## 2020-03-24 DIAGNOSIS — R31.9 HEMATURIA, UNSPECIFIED TYPE: ICD-10-CM

## 2020-03-24 DIAGNOSIS — R76.8 ANTINEUTROPHIL CYTOPLASMIC ANTIBODY (ANCA) POSITIVE: Primary | ICD-10-CM

## 2020-03-24 DIAGNOSIS — Z79.899 ENCOUNTER FOR LONG-TERM (CURRENT) USE OF MEDICATIONS: ICD-10-CM

## 2020-03-24 DIAGNOSIS — R06.02 SOB (SHORTNESS OF BREATH): ICD-10-CM

## 2020-03-24 DIAGNOSIS — R31.9 HEMATURIA, UNSPECIFIED TYPE: Primary | ICD-10-CM

## 2020-03-24 LAB — HLA-B27 QL NAA+PROBE: NEGATIVE

## 2020-03-24 NOTE — PROGRESS NOTES
Patient notified of results and all instructions. All tests ordered. Copies of all mailed to patient with instructions.

## 2020-03-26 RX ORDER — VITAMIN B COMPLEX
TABLET ORAL
Qty: 90 EACH | Refills: 0 | Status: ON HOLD | OUTPATIENT
Start: 2020-03-26 | End: 2020-05-09 | Stop reason: HOSPADM

## 2020-03-26 NOTE — TELEPHONE ENCOUNTER
----- Message from Tawnya Durbin sent at 3/26/2020 12:06 PM CDT -----  Type:  Patient Returning Call    Who Called:  Patient  Who Left Message for Patient:  Jyoti  Does the patient know what this is regarding?:  no  Best Call Back Number:  [phone] (home)

## 2020-03-26 NOTE — TELEPHONE ENCOUNTER
Patient does not need the injection schedule he has the one I gave him. He was told at this time of Covid 19 we are discouraging patients from coming in for nurse visits. He was advised Dr. Fuller will put him on oral b12 that he will dissolve under his tongue.

## 2020-03-26 NOTE — TELEPHONE ENCOUNTER
----- Message from Nicolasa Ley sent at 3/26/2020 11:21 AM CDT -----  Contact: Patient  This patient is scheduled for several nurses visit for Dr Fuller.  He is asking if something can be sent to him like a patient itinerary or schedule with all of these  Upcoming appointments. [facility] so he should be able to see all of his appointments there.      He did ask that I send this message to the office.    Thank you for your help.

## 2020-04-03 ENCOUNTER — LAB VISIT (OUTPATIENT)
Dept: LAB | Facility: HOSPITAL | Age: 55
End: 2020-04-03
Attending: INTERNAL MEDICINE
Payer: MEDICAID

## 2020-04-03 DIAGNOSIS — Z79.899 ENCOUNTER FOR LONG-TERM (CURRENT) USE OF MEDICATIONS: ICD-10-CM

## 2020-04-03 DIAGNOSIS — R31.9 HEMATURIA, UNSPECIFIED TYPE: ICD-10-CM

## 2020-04-03 LAB
BACTERIA #/AREA URNS HPF: NEGATIVE /HPF
BILIRUB UR QL STRIP: NEGATIVE
CLARITY UR: CLEAR
COLOR UR: YELLOW
GLUCOSE UR QL STRIP: NEGATIVE
HGB UR QL STRIP: ABNORMAL
HYALINE CASTS #/AREA URNS LPF: 1 /LPF
KETONES UR QL STRIP: NEGATIVE
LEUKOCYTE ESTERASE UR QL STRIP: NEGATIVE
MICROSCOPIC COMMENT: NORMAL
NITRITE UR QL STRIP: NEGATIVE
PH UR STRIP: 6 [PH] (ref 5–8)
PROT UR QL STRIP: NEGATIVE
RBC #/AREA URNS HPF: 1 /HPF (ref 0–4)
SP GR UR STRIP: 1.02 (ref 1–1.03)
SQUAMOUS #/AREA URNS HPF: 0 /HPF
URN SPEC COLLECT METH UR: ABNORMAL
UROBILINOGEN UR STRIP-ACNC: NEGATIVE EU/DL
WBC #/AREA URNS HPF: 0 /HPF (ref 0–5)

## 2020-04-03 PROCEDURE — 81001 URINALYSIS AUTO W/SCOPE: CPT

## 2020-04-06 ENCOUNTER — TELEPHONE (OUTPATIENT)
Dept: UROLOGY | Facility: CLINIC | Age: 55
End: 2020-04-06

## 2020-04-30 DIAGNOSIS — Z01.84 ENCOUNTER FOR IMMUNOLOGICAL TEST: Primary | ICD-10-CM

## 2020-05-05 ENCOUNTER — HOSPITAL ENCOUNTER (OUTPATIENT)
Dept: RADIOLOGY | Facility: HOSPITAL | Age: 55
Discharge: HOME | End: 2020-05-05
Attending: INTERNAL MEDICINE
Payer: MEDICAID

## 2020-05-05 ENCOUNTER — PATIENT MESSAGE (OUTPATIENT)
Dept: ADMINISTRATIVE | Facility: HOSPITAL | Age: 55
End: 2020-05-05

## 2020-05-05 DIAGNOSIS — R76.8 ANTINEUTROPHIL CYTOPLASMIC ANTIBODY (ANCA) POSITIVE: ICD-10-CM

## 2020-05-05 DIAGNOSIS — E53.8 B12 DEFICIENCY: Primary | ICD-10-CM

## 2020-05-05 DIAGNOSIS — J45.909 MODERATE ASTHMA WITHOUT COMPLICATION, UNSPECIFIED WHETHER PERSISTENT: ICD-10-CM

## 2020-05-05 DIAGNOSIS — T30.0 BURN INJURY: ICD-10-CM

## 2020-05-05 PROCEDURE — 71250 CT THORAX DX C-: CPT | Mod: TC,PO

## 2020-05-05 RX ORDER — SILVER SULFADIAZINE 10 G/1000G
CREAM TOPICAL 2 TIMES DAILY
Qty: 25 G | Refills: 2 | Status: ON HOLD | OUTPATIENT
Start: 2020-05-05 | End: 2020-05-09 | Stop reason: HOSPADM

## 2020-05-05 RX ORDER — ALBUTEROL SULFATE 90 UG/1
2 AEROSOL, METERED RESPIRATORY (INHALATION) EVERY 6 HOURS PRN
Qty: 18 G | Refills: 2 | Status: SHIPPED | OUTPATIENT
Start: 2020-05-05 | End: 2021-07-08

## 2020-05-05 NOTE — TELEPHONE ENCOUNTER
----- Message from Shiela Sanders MA sent at 5/5/2020  3:44 PM CDT -----  Called patient to see if he wanted to reschedule his B-12 injections and he would like to speak with someone first.  He is also requesting a prescription for silvadene cream, he is a  and he gets burns at times.

## 2020-05-06 ENCOUNTER — LAB VISIT (OUTPATIENT)
Dept: LAB | Facility: HOSPITAL | Age: 55
End: 2020-05-06
Attending: INTERNAL MEDICINE
Payer: MEDICAID

## 2020-05-06 ENCOUNTER — OFFICE VISIT (OUTPATIENT)
Dept: PRIMARY CARE CLINIC | Facility: CLINIC | Age: 55
End: 2020-05-06
Payer: MEDICAID

## 2020-05-06 VITALS
RESPIRATION RATE: 20 BRPM | OXYGEN SATURATION: 95 % | DIASTOLIC BLOOD PRESSURE: 89 MMHG | TEMPERATURE: 99 F | SYSTOLIC BLOOD PRESSURE: 145 MMHG | HEART RATE: 95 BPM

## 2020-05-06 DIAGNOSIS — M15.9 OSTEOARTHRITIS, GENERALIZED: ICD-10-CM

## 2020-05-06 DIAGNOSIS — M10.9 ARTHRITIS DUE TO GOUT: ICD-10-CM

## 2020-05-06 DIAGNOSIS — Z20.822 SUSPECTED COVID-19 VIRUS INFECTION: Primary | ICD-10-CM

## 2020-05-06 DIAGNOSIS — J45.909 MODERATE ASTHMA WITHOUT COMPLICATION, UNSPECIFIED WHETHER PERSISTENT: ICD-10-CM

## 2020-05-06 DIAGNOSIS — Z01.84 ENCOUNTER FOR IMMUNOLOGICAL TEST: ICD-10-CM

## 2020-05-06 DIAGNOSIS — R20.2 PARESTHESIAS: ICD-10-CM

## 2020-05-06 LAB
CCP AB SER IA-ACNC: <0.5 U/ML
FOLATE SERPL-MCNC: 7 NG/ML (ref 4–24)
RHEUMATOID FACT SERPL-ACNC: <10 IU/ML (ref 0–15)
URATE SERPL-MCNC: 6.8 MG/DL (ref 3.4–7)
VIT B12 SERPL-MCNC: >2000 PG/ML (ref 210–950)

## 2020-05-06 PROCEDURE — 99213 OFFICE O/P EST LOW 20 MIN: CPT | Mod: S$GLB,,, | Performed by: EMERGENCY MEDICINE

## 2020-05-06 PROCEDURE — 84550 ASSAY OF BLOOD/URIC ACID: CPT

## 2020-05-06 PROCEDURE — 86200 CCP ANTIBODY: CPT

## 2020-05-06 PROCEDURE — 36415 COLL VENOUS BLD VENIPUNCTURE: CPT

## 2020-05-06 PROCEDURE — 99213 PR OFFICE/OUTPT VISIT, EST, LEVL III, 20-29 MIN: ICD-10-PCS | Mod: S$GLB,,, | Performed by: EMERGENCY MEDICINE

## 2020-05-06 PROCEDURE — 81374 HLA I TYPING 1 ANTIGEN LR: CPT

## 2020-05-06 PROCEDURE — U0002 COVID-19 LAB TEST NON-CDC: HCPCS

## 2020-05-06 PROCEDURE — 86431 RHEUMATOID FACTOR QUANT: CPT

## 2020-05-06 PROCEDURE — 82607 VITAMIN B-12: CPT

## 2020-05-06 PROCEDURE — 82746 ASSAY OF FOLIC ACID SERUM: CPT

## 2020-05-06 RX ORDER — ALBUTEROL SULFATE 90 UG/1
2 AEROSOL, METERED RESPIRATORY (INHALATION) EVERY 6 HOURS PRN
Qty: 18 G | Refills: 2 | Status: CANCELLED | OUTPATIENT
Start: 2020-05-06 | End: 2021-05-06

## 2020-05-06 RX ORDER — ALBUTEROL SULFATE 90 UG/1
180 INHALANT RESPIRATORY (INHALATION) EVERY 4 HOURS
Qty: 1 EACH | Refills: 2 | Status: SHIPPED | OUTPATIENT
Start: 2020-05-06 | End: 2020-05-07

## 2020-05-06 NOTE — PATIENT INSTRUCTIONS
Instructions for Patients with Confirmed or Suspected COVID-19    If you are awaiting your test result, you will either be called or it will be released to the patient portal.  If you have any questions about your test, please visit [email or web address] or call our COVID-19 information line at [phone].       Stay home and stay away from family members and friends. The CDC says, you can leave home after these three things have happened: 1) You have had no fever for at least 72 hours (that is three full days of no fever without the use of medicine that reduces fevers) 2) AND other symptoms have improved (for example, when your cough or shortness of breath have improved) 3) AND at least 7 days have passed since your symptoms first appeared.   Separate yourself from other people and animals in your home.   Call ahead before visiting your doctor.   Wear a facemask.   Cover your coughs and sneezes.   Wash your hands often with soap and water; hand  can be used, too.   Avoid sharing personal household items.   Wipe down surfaces used daily.   Monitor your symptoms. Seek prompt medical attention if your illness is worsening (e.g., difficulty breathing).    Before seeking care, call your healthcare provider.   If you have a medical emergency and need to call 911, notify the dispatch personnel that you have, or are being evaluated for COVID-19. If possible, put on a facemask before emergency medical services arrive.        Recommended precautions for household members, intimate partners, and caregivers in a home setting of a patient with symptomatic laboratory-confirmed COVID-19 or a patient under investigation.  Household members, intimate partners, and caregivers in the home setting awaiting tests results have close contact with a person with symptomatic, laboratory-confirmed COVID-19 or a person under investigation. Close contacts should monitor their health; they should call their  provider right away if they develop symptoms suggestive of COVID-19 (e.g., fever, cough, shortness of breath).    Close contacts should also follow these recommendations:   Make sure that you understand and can help the patient follow their provider's instructions for medication(s) and care. You should help the patient with basic needs in the home and provide support for getting groceries, prescriptions, and other personal needs.   Monitor the patient's symptoms. If the patient is getting sicker, call his or her healthcare provider and tell them that the patient has laboratory-confirmed COVID-19. If the patient has a medical emergency and you need to call 911, notify the dispatch personnel that the patient has, or is being evaluated for COVID-19.   Household members should stay in another room or be  from the patient. Household members should use a separate bedroom and bathroom, if available.   Prohibit visitors.   Household members should care for any pets in the home.   Make sure that shared spaces in the home have good air flow, such as by an air conditioner or an opened window, weather permitting.   Perform hand hygiene frequently. Wash your hands often with soap and water for at least 20 seconds or use an alcohol-based hand  (that contains > 60% alcohol) covering all surfaces of your hands and rubbing them together until they feel dry. Soap and water should be used preferentially.   Avoid touching your eyes, nose, and mouth.   The patient should wear a facemask. If the patient is not able to wear a facemask (for example, because it causes trouble breathing), caregivers should wear a mask when they are in the same room as the patient.   Wear a disposable facemask and gloves when you touch or have contact with the patient's blood, stool, or body fluids, such as saliva, sputum, nasal mucus, vomit, urine.  o Throw out disposable facemasks and gloves after using them. Do not  reuse.  o When removing personal protective equipment, first remove and dispose of gloves. Then, immediately clean your hands with soap and water or alcohol-based hand . Next, remove and dispose of facemask, and immediately clean your hands again with soap and water or alcohol-based hand .   You should not share dishes, drinking glasses, cups, eating utensils, towels, bedding, or other items with the patient. After the patient uses these items, you should wash them thoroughly (see below Wash laundry thoroughly).   Clean all high-touch surfaces, such as counters, tabletops, doorknobs, bathroom fixtures, toilets, phones, keyboards, tablets, and bedside tables, every day. Also, clean any surfaces that may have blood, stool, or body fluids on them.   Use a household cleaning spray or wipe, according to the label instructions. Labels contain instructions for safe and effective use of the cleaning product including precautions you should take when applying the product, such as wearing gloves and making sure you have good ventilation during use of the product.   Wash laundry thoroughly.  o Immediately remove and wash clothes or bedding that have blood, stool, or body fluids on them.  o Wear disposable gloves while handling soiled items and keep soiled items away from your body. Clean your hands (with soap and water or an alcohol-based hand ) immediately after removing your gloves.  o Read and follow directions on labels of laundry or clothing items and detergent. In general, using a normal laundry detergent according to washing machine instructions and dry thoroughly using the warmest temperatures recommended on the clothing label.   Place all used disposable gloves, facemasks, and other contaminated items in a lined container before disposing of them with other household waste. Clean your hands (with soap and water or an alcohol-based hand ) immediately after handling these  items. Soap and water should be used preferentially if hands are visibly dirty.   Discuss any additional questions with your state or local health department or healthcare provider. Check available hours when contacting your local health department.    For more information see CDC link below.      https://www.cdc.gov/coronavirus/2019-ncov/hcp/guidance-prevent-spread.html#precautions        Sources:  [facility], [facility] and [facility]

## 2020-05-06 NOTE — PROGRESS NOTES
"Subjective:        Time seen by provider: 12:02 PM on 05/06/2020    Jarod Pierce is a 54 y.o. male with PMHx of HTN who presents for an evaluation of possible COVID-19. Pt c/o dizziness, lightheadedness, fatigue, generalized body aches, and chest tightness, stating it feels as though "someone is sitting on his chest." He reports feeling dehydrated and "drained out," despite efforts to remain hydrated with water. He notes he was outside in the sun all day yesterday. The patient denies cough, SOB, or any other symptoms at this time. CT of chest conducted yesterday was normal. No pertinent PMHx or PSHx.    Review of Systems   Constitutional: Positive for fatigue. Negative for activity change, appetite change and fever.   HENT: Negative for congestion, rhinorrhea and sore throat.    Respiratory: Positive for chest tightness. Negative for cough, shortness of breath and wheezing.    Cardiovascular: Negative for chest pain and palpitations.   Gastrointestinal: Negative for diarrhea, nausea and vomiting.   Musculoskeletal: Positive for myalgias (generalized). Negative for arthralgias.   Skin: Negative for rash.   Neurological: Positive for dizziness and light-headedness. Negative for weakness and headaches.       Objective:      Physical Exam   Constitutional: He is oriented to person, place, and time. He appears well-developed and well-nourished. No distress.   HENT:   Head: Normocephalic and atraumatic.   Nose: Nose normal.   Mouth/Throat: Oropharynx is clear and moist.   Eyes: Conjunctivae are normal.   Neck: Normal range of motion.   Cardiovascular: Normal rate, regular rhythm, normal heart sounds and intact distal pulses.   No murmur heard.  Pulmonary/Chest: Effort normal and breath sounds normal. No respiratory distress. He has no wheezes.   Musculoskeletal: Normal range of motion.   Neurological: He is alert and oriented to person, place, and time.   Skin: Skin is warm and dry. He is not diaphoretic.   Nursing " note and vitals reviewed.      Assessment:       1. Suspected Covid-19 Virus Infection    2. Encounter for immunological test        Plan:       1. Suspected Covid-19 Virus Infection  - COVID-19 Routine Screening  2. Discharge home and await results.   3. Return to clinic or ED for new or worsening symptoms.   4. Follow-up with PCP as needed.       Scribe Attestation:   I, Marcelle Esquivel, am scribing for, and in the presence of, Rachael Silva PA-C. I performed the above scribed service and the documentation accurately describes the services I performed. I attest to the accuracy of the note.    I, Rachael Silva PA-C, personally performed the services described in this documentation. All medical record entries made by the scribe were at my direction and in my presence.  I have reviewed the chart and agree that the record reflects my personal performance and is accurate and complete. Rachael Silva PA-C.  4:19 PM 05/06/2020

## 2020-05-07 ENCOUNTER — TELEPHONE (OUTPATIENT)
Dept: FAMILY MEDICINE | Facility: CLINIC | Age: 55
End: 2020-05-07

## 2020-05-07 ENCOUNTER — HOSPITAL ENCOUNTER (OUTPATIENT)
Facility: HOSPITAL | Age: 55
Discharge: HOME | End: 2020-05-09
Attending: EMERGENCY MEDICINE | Admitting: FAMILY MEDICINE
Payer: MEDICAID

## 2020-05-07 DIAGNOSIS — J02.9 VIRAL PHARYNGITIS: Primary | ICD-10-CM

## 2020-05-07 DIAGNOSIS — I42.8 NONISCHEMIC CARDIOMYOPATHY: ICD-10-CM

## 2020-05-07 DIAGNOSIS — R11.0 NAUSEA: ICD-10-CM

## 2020-05-07 DIAGNOSIS — I16.0 HYPERTENSIVE URGENCY: ICD-10-CM

## 2020-05-07 DIAGNOSIS — R19.7 DIARRHEA, UNSPECIFIED TYPE: ICD-10-CM

## 2020-05-07 DIAGNOSIS — I10 HTN (HYPERTENSION): ICD-10-CM

## 2020-05-07 DIAGNOSIS — R07.9 CHEST PAIN: ICD-10-CM

## 2020-05-07 DIAGNOSIS — J06.9 UPPER RESPIRATORY TRACT INFECTION, UNSPECIFIED TYPE: ICD-10-CM

## 2020-05-07 PROBLEM — B37.0 ORAL THRUSH: Status: ACTIVE | Noted: 2020-05-07

## 2020-05-07 PROBLEM — G45.9 TRANSIENT ISCHEMIC ATTACK: Status: RESOLVED | Noted: 2020-01-13 | Resolved: 2020-05-07

## 2020-05-07 LAB
ALBUMIN SERPL BCP-MCNC: 4.1 G/DL (ref 3.5–5.2)
ALP SERPL-CCNC: 101 U/L (ref 55–135)
ALT SERPL W/O P-5'-P-CCNC: 19 U/L (ref 10–44)
ANION GAP SERPL CALC-SCNC: 10 MMOL/L (ref 8–16)
AST SERPL-CCNC: 18 U/L (ref 10–40)
BACTERIA #/AREA URNS HPF: ABNORMAL /HPF
BASOPHILS # BLD AUTO: 0.02 K/UL (ref 0–0.2)
BASOPHILS NFR BLD: 0.3 % (ref 0–1.9)
BILIRUB SERPL-MCNC: 2 MG/DL (ref 0.1–1)
BILIRUB UR QL STRIP: NEGATIVE
BUN SERPL-MCNC: 14 MG/DL (ref 6–20)
CALCIUM SERPL-MCNC: 9.1 MG/DL (ref 8.7–10.5)
CHLORIDE SERPL-SCNC: 103 MMOL/L (ref 95–110)
CK SERPL-CCNC: 73 U/L (ref 20–200)
CLARITY UR: CLEAR
CO2 SERPL-SCNC: 26 MMOL/L (ref 23–29)
COLOR UR: YELLOW
CREAT SERPL-MCNC: 1 MG/DL (ref 0.5–1.4)
DEPRECATED S PYO AG THROAT QL EIA: NEGATIVE
DIFFERENTIAL METHOD: ABNORMAL
EOSINOPHIL # BLD AUTO: 0 K/UL (ref 0–0.5)
EOSINOPHIL NFR BLD: 0.5 % (ref 0–8)
ERYTHROCYTE [DISTWIDTH] IN BLOOD BY AUTOMATED COUNT: 12.6 % (ref 11.5–14.5)
EST. GFR  (AFRICAN AMERICAN): >60 ML/MIN/1.73 M^2
EST. GFR  (NON AFRICAN AMERICAN): >60 ML/MIN/1.73 M^2
GLUCOSE SERPL-MCNC: 106 MG/DL (ref 70–110)
GLUCOSE UR QL STRIP: NEGATIVE
HCT VFR BLD AUTO: 43.9 % (ref 40–54)
HGB BLD-MCNC: 15.4 G/DL (ref 14–18)
HGB UR QL STRIP: ABNORMAL
HYALINE CASTS #/AREA URNS LPF: 1 /LPF
IMM GRANULOCYTES # BLD AUTO: 0.01 K/UL (ref 0–0.04)
IMM GRANULOCYTES NFR BLD AUTO: 0.2 % (ref 0–0.5)
KETONES UR QL STRIP: ABNORMAL
LEUKOCYTE ESTERASE UR QL STRIP: NEGATIVE
LYMPHOCYTES # BLD AUTO: 1.2 K/UL (ref 1–4.8)
LYMPHOCYTES NFR BLD: 18.6 % (ref 18–48)
MCH RBC QN AUTO: 32.5 PG (ref 27–31)
MCHC RBC AUTO-ENTMCNC: 35.1 G/DL (ref 32–36)
MCV RBC AUTO: 93 FL (ref 82–98)
MICROSCOPIC COMMENT: ABNORMAL
MONOCYTES # BLD AUTO: 0.6 K/UL (ref 0.3–1)
MONOCYTES NFR BLD: 9.3 % (ref 4–15)
NEUTROPHILS # BLD AUTO: 4.6 K/UL (ref 1.8–7.7)
NEUTROPHILS NFR BLD: 71.1 % (ref 38–73)
NITRITE UR QL STRIP: NEGATIVE
NRBC BLD-RTO: 0 /100 WBC
PH UR STRIP: 7 [PH] (ref 5–8)
PLATELET # BLD AUTO: 145 K/UL (ref 150–350)
PMV BLD AUTO: 9.3 FL (ref 9.2–12.9)
POTASSIUM SERPL-SCNC: 3.6 MMOL/L (ref 3.5–5.1)
PROT SERPL-MCNC: 7.6 G/DL (ref 6–8.4)
PROT UR QL STRIP: ABNORMAL
RBC # BLD AUTO: 4.74 M/UL (ref 4.6–6.2)
RBC #/AREA URNS HPF: 11 /HPF (ref 0–4)
SARS-COV-2 RDRP RESP QL NAA+PROBE: NEGATIVE
SARS-COV-2 RNA RESP QL NAA+PROBE: NOT DETECTED
SODIUM SERPL-SCNC: 139 MMOL/L (ref 136–145)
SP GR UR STRIP: >1.03 (ref 1–1.03)
SQUAMOUS #/AREA URNS HPF: 1 /HPF
TROPONIN I SERPL DL<=0.01 NG/ML-MCNC: <0.03 NG/ML
URN SPEC COLLECT METH UR: ABNORMAL
UROBILINOGEN UR STRIP-ACNC: NEGATIVE EU/DL
WBC # BLD AUTO: 6.44 K/UL (ref 3.9–12.7)
WBC #/AREA URNS HPF: 1 /HPF (ref 0–5)

## 2020-05-07 PROCEDURE — 96374 THER/PROPH/DIAG INJ IV PUSH: CPT

## 2020-05-07 PROCEDURE — 87880 STREP A ASSAY W/OPTIC: CPT

## 2020-05-07 PROCEDURE — 63600175 PHARM REV CODE 636 W HCPCS: Performed by: NURSE PRACTITIONER

## 2020-05-07 PROCEDURE — 63600175 PHARM REV CODE 636 W HCPCS: Performed by: PHYSICIAN ASSISTANT

## 2020-05-07 PROCEDURE — 87081 CULTURE SCREEN ONLY: CPT

## 2020-05-07 PROCEDURE — 25000003 PHARM REV CODE 250: Performed by: PHYSICIAN ASSISTANT

## 2020-05-07 PROCEDURE — 81001 URINALYSIS AUTO W/SCOPE: CPT

## 2020-05-07 PROCEDURE — 96376 TX/PRO/DX INJ SAME DRUG ADON: CPT | Mod: 59

## 2020-05-07 PROCEDURE — 96375 TX/PRO/DX INJ NEW DRUG ADDON: CPT | Mod: 59

## 2020-05-07 PROCEDURE — 80053 COMPREHEN METABOLIC PANEL: CPT

## 2020-05-07 PROCEDURE — 82550 ASSAY OF CK (CPK): CPT

## 2020-05-07 PROCEDURE — G0378 HOSPITAL OBSERVATION PER HR: HCPCS

## 2020-05-07 PROCEDURE — U0002 COVID-19 LAB TEST NON-CDC: HCPCS

## 2020-05-07 PROCEDURE — 93005 ELECTROCARDIOGRAM TRACING: CPT | Performed by: INTERNAL MEDICINE

## 2020-05-07 PROCEDURE — 84484 ASSAY OF TROPONIN QUANT: CPT

## 2020-05-07 PROCEDURE — 25500020 PHARM REV CODE 255: Performed by: EMERGENCY MEDICINE

## 2020-05-07 PROCEDURE — 99285 EMERGENCY DEPT VISIT HI MDM: CPT | Mod: 25

## 2020-05-07 PROCEDURE — 36415 COLL VENOUS BLD VENIPUNCTURE: CPT

## 2020-05-07 PROCEDURE — 85025 COMPLETE CBC W/AUTO DIFF WBC: CPT

## 2020-05-07 PROCEDURE — 25000003 PHARM REV CODE 250: Performed by: NURSE PRACTITIONER

## 2020-05-07 RX ORDER — POTASSIUM CHLORIDE 7.45 MG/ML
20 INJECTION INTRAVENOUS
Status: DISCONTINUED | OUTPATIENT
Start: 2020-05-07 | End: 2020-05-09 | Stop reason: HOSPADM

## 2020-05-07 RX ORDER — AMLODIPINE BESYLATE 5 MG/1
10 TABLET ORAL DAILY
Qty: 30 TABLET | Refills: 0 | Status: SHIPPED | OUTPATIENT
Start: 2020-05-07 | End: 2020-05-09 | Stop reason: SDUPTHER

## 2020-05-07 RX ORDER — POTASSIUM CHLORIDE 20 MEQ/1
40 TABLET, EXTENDED RELEASE ORAL
Status: DISCONTINUED | OUTPATIENT
Start: 2020-05-07 | End: 2020-05-09 | Stop reason: HOSPADM

## 2020-05-07 RX ORDER — AMLODIPINE BESYLATE 5 MG/1
10 TABLET ORAL NIGHTLY
Status: DISCONTINUED | OUTPATIENT
Start: 2020-05-07 | End: 2020-05-09 | Stop reason: HOSPADM

## 2020-05-07 RX ORDER — ACETAMINOPHEN 325 MG/1
650 TABLET ORAL
Status: COMPLETED | OUTPATIENT
Start: 2020-05-07 | End: 2020-05-07

## 2020-05-07 RX ORDER — POTASSIUM CHLORIDE 7.45 MG/ML
40 INJECTION INTRAVENOUS
Status: DISCONTINUED | OUTPATIENT
Start: 2020-05-07 | End: 2020-05-09 | Stop reason: HOSPADM

## 2020-05-07 RX ORDER — ONDANSETRON 4 MG/1
4 TABLET, FILM COATED ORAL EVERY 6 HOURS PRN
Qty: 12 TABLET | Refills: 0 | Status: SHIPPED | OUTPATIENT
Start: 2020-05-07 | End: 2020-05-09 | Stop reason: HOSPADM

## 2020-05-07 RX ORDER — NYSTATIN 100000 [USP'U]/ML
500000 SUSPENSION ORAL
Status: DISCONTINUED | OUTPATIENT
Start: 2020-05-07 | End: 2020-05-09 | Stop reason: HOSPADM

## 2020-05-07 RX ORDER — CALCIUM CHLORIDE IN 0.9 % NACL 1 G/100 ML
1 INTRAVENOUS SOLUTION, PIGGYBACK (ML) INTRAVENOUS
Status: DISCONTINUED | OUTPATIENT
Start: 2020-05-07 | End: 2020-05-09 | Stop reason: HOSPADM

## 2020-05-07 RX ORDER — MONTELUKAST SODIUM 10 MG/1
10 TABLET ORAL NIGHTLY
Status: DISCONTINUED | OUTPATIENT
Start: 2020-05-07 | End: 2020-05-09 | Stop reason: HOSPADM

## 2020-05-07 RX ORDER — MAGNESIUM SULFATE HEPTAHYDRATE 40 MG/ML
2 INJECTION, SOLUTION INTRAVENOUS
Status: DISCONTINUED | OUTPATIENT
Start: 2020-05-07 | End: 2020-05-09 | Stop reason: HOSPADM

## 2020-05-07 RX ORDER — HYDRALAZINE HYDROCHLORIDE 20 MG/ML
10 INJECTION INTRAMUSCULAR; INTRAVENOUS EVERY 4 HOURS PRN
Status: DISCONTINUED | OUTPATIENT
Start: 2020-05-07 | End: 2020-05-09 | Stop reason: HOSPADM

## 2020-05-07 RX ORDER — LANOLIN ALCOHOL/MO/W.PET/CERES
800 CREAM (GRAM) TOPICAL
Status: DISCONTINUED | OUTPATIENT
Start: 2020-05-07 | End: 2020-05-09 | Stop reason: HOSPADM

## 2020-05-07 RX ORDER — POTASSIUM CHLORIDE 20 MEQ/1
20 TABLET, EXTENDED RELEASE ORAL
Status: DISCONTINUED | OUTPATIENT
Start: 2020-05-07 | End: 2020-05-09 | Stop reason: HOSPADM

## 2020-05-07 RX ORDER — ONDANSETRON 2 MG/ML
4 INJECTION INTRAMUSCULAR; INTRAVENOUS EVERY 8 HOURS PRN
Status: DISCONTINUED | OUTPATIENT
Start: 2020-05-07 | End: 2020-05-09 | Stop reason: HOSPADM

## 2020-05-07 RX ORDER — GABAPENTIN 300 MG/1
300 CAPSULE ORAL NIGHTLY
Status: DISCONTINUED | OUTPATIENT
Start: 2020-05-07 | End: 2020-05-09 | Stop reason: HOSPADM

## 2020-05-07 RX ORDER — HYDRALAZINE HYDROCHLORIDE 20 MG/ML
10 INJECTION INTRAMUSCULAR; INTRAVENOUS
Status: COMPLETED | OUTPATIENT
Start: 2020-05-07 | End: 2020-05-07

## 2020-05-07 RX ORDER — MAGNESIUM SULFATE HEPTAHYDRATE 40 MG/ML
4 INJECTION, SOLUTION INTRAVENOUS
Status: DISCONTINUED | OUTPATIENT
Start: 2020-05-07 | End: 2020-05-09 | Stop reason: HOSPADM

## 2020-05-07 RX ORDER — FLUTICASONE PROPIONATE 50 MCG
2 SPRAY, SUSPENSION (ML) NASAL DAILY
Status: DISCONTINUED | OUTPATIENT
Start: 2020-05-08 | End: 2020-05-09 | Stop reason: HOSPADM

## 2020-05-07 RX ORDER — BISMUTH SUBSALICYLATE 525 MG/30ML
30 LIQUID ORAL EVERY 6 HOURS PRN
Status: DISCONTINUED | OUTPATIENT
Start: 2020-05-07 | End: 2020-05-08

## 2020-05-07 RX ORDER — ONDANSETRON 4 MG/1
4 TABLET, FILM COATED ORAL EVERY 6 HOURS
Qty: 12 TABLET | Refills: 0 | Status: SHIPPED | OUTPATIENT
Start: 2020-05-07 | End: 2020-05-09 | Stop reason: HOSPADM

## 2020-05-07 RX ORDER — TOPIRAMATE 25 MG/1
25 TABLET ORAL 2 TIMES DAILY
Status: DISCONTINUED | OUTPATIENT
Start: 2020-05-07 | End: 2020-05-07

## 2020-05-07 RX ORDER — NAPROXEN SODIUM 220 MG/1
81 TABLET, FILM COATED ORAL DAILY
Status: DISCONTINUED | OUTPATIENT
Start: 2020-05-07 | End: 2020-05-09 | Stop reason: HOSPADM

## 2020-05-07 RX ORDER — TALC
6 POWDER (GRAM) TOPICAL NIGHTLY PRN
Status: DISCONTINUED | OUTPATIENT
Start: 2020-05-07 | End: 2020-05-09 | Stop reason: HOSPADM

## 2020-05-07 RX ORDER — MAGNESIUM SULFATE 1 G/100ML
1 INJECTION INTRAVENOUS
Status: DISCONTINUED | OUTPATIENT
Start: 2020-05-07 | End: 2020-05-09 | Stop reason: HOSPADM

## 2020-05-07 RX ORDER — CLOPIDOGREL BISULFATE 75 MG/1
75 TABLET ORAL DAILY
Status: DISCONTINUED | OUTPATIENT
Start: 2020-05-07 | End: 2020-05-08

## 2020-05-07 RX ORDER — SODIUM CHLORIDE 0.9 % (FLUSH) 0.9 %
10 SYRINGE (ML) INJECTION
Status: DISCONTINUED | OUTPATIENT
Start: 2020-05-07 | End: 2020-05-09 | Stop reason: HOSPADM

## 2020-05-07 RX ORDER — ALLOPURINOL 100 MG/1
100 TABLET ORAL NIGHTLY
Status: DISCONTINUED | OUTPATIENT
Start: 2020-05-07 | End: 2020-05-09 | Stop reason: HOSPADM

## 2020-05-07 RX ORDER — ACETAMINOPHEN 325 MG/1
650 TABLET ORAL EVERY 4 HOURS PRN
Status: DISCONTINUED | OUTPATIENT
Start: 2020-05-07 | End: 2020-05-09 | Stop reason: HOSPADM

## 2020-05-07 RX ORDER — BACLOFEN 10 MG/1
10 TABLET ORAL NIGHTLY
Status: DISCONTINUED | OUTPATIENT
Start: 2020-05-07 | End: 2020-05-09 | Stop reason: HOSPADM

## 2020-05-07 RX ORDER — BUTALBITAL, ACETAMINOPHEN AND CAFFEINE 50; 325; 40 MG/1; MG/1; MG/1
1 TABLET ORAL
Status: DISCONTINUED | OUTPATIENT
Start: 2020-05-07 | End: 2020-05-07

## 2020-05-07 RX ORDER — ALBUTEROL SULFATE 90 UG/1
2 AEROSOL, METERED RESPIRATORY (INHALATION) EVERY 6 HOURS PRN
Status: DISCONTINUED | OUTPATIENT
Start: 2020-05-07 | End: 2020-05-09 | Stop reason: HOSPADM

## 2020-05-07 RX ORDER — AMLODIPINE BESYLATE 10 MG/1
5 TABLET ORAL DAILY
Status: ON HOLD | COMMUNITY
End: 2020-05-09 | Stop reason: HOSPADM

## 2020-05-07 RX ORDER — ATORVASTATIN CALCIUM 40 MG/1
40 TABLET, FILM COATED ORAL NIGHTLY
Status: DISCONTINUED | OUTPATIENT
Start: 2020-05-07 | End: 2020-05-09 | Stop reason: HOSPADM

## 2020-05-07 RX ADMIN — ALLOPURINOL 100 MG: 100 TABLET ORAL at 09:05

## 2020-05-07 RX ADMIN — ASPIRIN 81 MG 81 MG: 81 TABLET ORAL at 09:05

## 2020-05-07 RX ADMIN — GABAPENTIN 300 MG: 300 CAPSULE ORAL at 09:05

## 2020-05-07 RX ADMIN — CLOPIDOGREL BISULFATE 75 MG: 75 TABLET, FILM COATED ORAL at 09:05

## 2020-05-07 RX ADMIN — ATORVASTATIN CALCIUM 40 MG: 40 TABLET, FILM COATED ORAL at 09:05

## 2020-05-07 RX ADMIN — ONDANSETRON 4 MG: 2 INJECTION INTRAMUSCULAR; INTRAVENOUS at 09:05

## 2020-05-07 RX ADMIN — BACLOFEN 10 MG: 10 TABLET ORAL at 09:05

## 2020-05-07 RX ADMIN — HYDRALAZINE HYDROCHLORIDE 10 MG: 20 INJECTION INTRAMUSCULAR; INTRAVENOUS at 08:05

## 2020-05-07 RX ADMIN — NYSTATIN 500000 UNITS: 500000 SUSPENSION ORAL at 09:05

## 2020-05-07 RX ADMIN — MONTELUKAST 10 MG: 10 TABLET, FILM COATED ORAL at 09:05

## 2020-05-07 RX ADMIN — AMLODIPINE BESYLATE 10 MG: 5 TABLET ORAL at 09:05

## 2020-05-07 RX ADMIN — IOHEXOL 100 ML: 350 INJECTION, SOLUTION INTRAVENOUS at 05:05

## 2020-05-07 RX ADMIN — HYDRALAZINE HYDROCHLORIDE 10 MG: 20 INJECTION INTRAMUSCULAR; INTRAVENOUS at 04:05

## 2020-05-07 RX ADMIN — ACETAMINOPHEN 650 MG: 325 TABLET ORAL at 01:05

## 2020-05-07 NOTE — DISCHARGE INSTRUCTIONS
Call your doctor tomorrow for further evaluation. Advised supportive treatment drink plenty of fluids advised follow-up with regular doctor tomorrow for medication management for blood pressure.  Change blood pressure medication dosage from 5 mg to 10 mg Norvasc.  Return to ER for any worsening concerning symptoms weakness dizziness headaches.

## 2020-05-07 NOTE — ED NOTES
FACIAL CONGESTION REPORTED AS WELL AS FATIGUE.  MAEW.  NO RD NOTED.  HTN PRESENT.  STATES CT 2 DAYS AGO.

## 2020-05-07 NOTE — ED PROVIDER NOTES
Encounter Date: 5/7/2020       History     Chief Complaint   Patient presents with    Sore Throat     seen at [facility] yesterday tested for covid,also had labs states still feeling tired and dehydrated    sinus pressure    Diarrhea     53 yo male presenting to ER with complaint of sinus congestion sore throat and some diarrhea 2 days ago.  Patient states was seen by primary doctor cannot be fully evaluated sent for COVID testing with outpatient.  Patient states was seen at [place] [facility] patient  COVID testing done and vitamin D testing. Told negative but persist to not feel well.   Patient has a history hypertension cardiomyopathy TIA.          Review of patient's allergies indicates:   Allergen Reactions    Codeine      Other reaction(s): Hives    Lisinopril      Dry coughing    Phenytoin sodium extended      Other reaction(s): leg cramps     Past Medical History:   Diagnosis Date    Allergic rhinitis due to allergen     Arthritis     Cardiomyopathy     Gout     Hypertension     Left bundle branch block      Past Surgical History:   Procedure Laterality Date    WISDOM TOOTH EXTRACTION       Family History   Problem Relation Age of Onset    Heart disease Father     Hypertension Father     Gout Father     Cancer Sister         lung    Heart attack Neg Hx      Social History     Tobacco Use    Smoking status: Never Smoker    Smokeless tobacco: Never Used   Substance Use Topics    Alcohol use: No    Drug use: No     Review of Systems   Constitutional: Positive for fatigue. Negative for fever.   HENT: Positive for congestion and sore throat.    Respiratory: Negative.    Cardiovascular: Negative.    Gastrointestinal: Positive for diarrhea. Negative for abdominal distention and abdominal pain.   Genitourinary: Negative.  Negative for difficulty urinating, dysuria, enuresis, flank pain, frequency and urgency.   Musculoskeletal: Positive for myalgias.   Skin: Negative.     Neurological: Positive for headaches. Negative for dizziness and facial asymmetry.   Hematological: Negative.    Psychiatric/Behavioral: Negative.    All other systems reviewed and are negative.      Physical Exam     Initial Vitals [05/07/20 1137]   BP Pulse Resp Temp SpO2   (!) 162/103 88 18 97.8 °F (36.6 °C) 97 %      MAP       --         Physical Exam    Nursing note and vitals reviewed.  Constitutional: He appears well-developed and well-nourished.   HENT:   Head: Normocephalic and atraumatic.   Right Ear: External ear normal.   Left Ear: External ear normal.   Mouth/Throat: Oropharynx is clear and moist. No oropharyngeal exudate.   Mild pharyngeal erythema   Eyes: EOM are normal. Pupils are equal, round, and reactive to light.   Neck: Normal range of motion. Neck supple.   Cardiovascular: Normal rate and regular rhythm.   Pulmonary/Chest: Breath sounds normal.   Abdominal: Soft. Bowel sounds are normal.   Musculoskeletal: Normal range of motion. He exhibits no tenderness.   Neurological: He is alert and oriented to person, place, and time. He has normal strength.   Skin: Skin is warm. Capillary refill takes less than 2 seconds.         ED Course   Procedures  Labs Reviewed   CBC W/ AUTO DIFFERENTIAL - Abnormal; Notable for the following components:       Result Value    Mean Corpuscular Hemoglobin 32.5 (*)     Platelets 145 (*)     All other components within normal limits   COMPREHENSIVE METABOLIC PANEL - Abnormal; Notable for the following components:    Total Bilirubin 2.0 (*)     All other components within normal limits   THROAT SCREEN, RAPID   CULTURE, STREP A,  THROAT   SARS-COV-2 RNA AMPLIFICATION, QUAL    Narrative:     What symptom criteria does the patient meet?->Cough   TROPONIN I        ECG Results          EKG 12-lead (In process)  Result time 05/07/20 14:39:56    In process by Interface, Lab In [facility] (05/07/20 14:39:56)                 Narrative:    Test Reason : I10,    Vent. Rate : 071 BPM      Atrial Rate : 071 BPM     P-R Int : 160 ms          QRS Dur : 164 ms      QT Int : 456 ms       P-R-T Axes : 013 -20 212 degrees     QTc Int : 495 ms    Normal sinus rhythm  Left bundle branch block  Abnormal ECG  When compared with ECG of 13-JAN-2020 13:02,  T wave inversion more evident in Inferior leads    Referred By: [email or web address]   SELF           Confirmed By:                             Imaging Results          CT Abdomen Pelvis With Contrast (Final result)  Result time 05/07/20 17:40:25    Final result by Stephane Boyer MD (05/07/20 17:40:25)                 Impression:      No acute findings in the abdomen or the pelvis.      Electronically signed by: Stephane Boyer MD  Date:    05/07/2020  Time:    17:40             Narrative:    EXAMINATION:  CT ABDOMEN PELVIS WITH CONTRAST    CLINICAL HISTORY:  Left lower quadrant abdominal pain, suspect diverticulitis;    TECHNIQUE:  CMS MANDATED QUALITY DATA-CT RADIATION DOSE-436    All CT scans at this facility use dose modulation, iterative reconstruction, and or weight-based dosing when appropriate to reduce radiation dose to as low as reasonably achievable.    Axial postcontrast imaging was performed with 100 mL Omnipaque 350 IV contrast and enteric contrast.    COMPARISON:  Multiple prior exams including CT of 10/12/2017.    FINDINGS:  CT ABDOMEN: The lung bases are clear.  The liver and gallbladder enhance normally, with no calcified gallstones or biliary ductal dilatation.  The spleen is normal in size and enhances normally, with the pancreas, adrenal glands and kidneys enhancing normally.  No renal calculi, ureteral calculi, or hydroureteronephrosis.    Mild scattered calcified plaque involves normal-caliber abdominal aorta and iliac arteries, with no aortic dissection.  No mesenteric or retroperitoneal lymph node enlargement.  There is no bowel wall thickening, inflammation, or bowel obstruction, with no ascites or intraperitoneal free air.  The appendix is  normal.  No ventral abdominal hernia.    CT PELVIS: The sigmoid colon, rectum, urinary bladder and pelvic vasculature enhance normally, with a few calcified pelvic phleboliths.  There is no pelvic free fluid or pelvic mass, with no pelvic or inguinal lymph node enlargement.  No distal ureteral or bladder calculi.  No inguinal or femoral hernia.    The extraperitoneal soft tissues enhance normally.  There is degenerative disc space narrowing and facet arthropathy in the lumbar spine, with degenerative narrowing of the sacroiliac joints and both hip joint spaces.                               CT Head Without Contrast (Final result)  Result time 05/07/20 15:20:53    Final result by Stephane Boyer MD (05/07/20 15:20:53)                 Impression:      Negative noncontrast head CT.      Electronically signed by: Stephane Boyer MD  Date:    05/07/2020  Time:    15:20             Narrative:    EXAMINATION:  CT HEAD WITHOUT CONTRAST    CLINICAL HISTORY:  Headache, acute, norm neuro exam; hypertension.    TECHNIQUE:  CMS MANDATED QUALITY DATA-CT RADIATION DOSE-436    All CT scans at this facility dose modulation, iterative reconstruction, and or weight-based dosing when appropriate to reduce radiation dose to as low as reasonably achievable.    FINDINGS:  Comparison to the MRI of 01/13/2020. There is no acute intracranial hemorrhage, with no mass effect or abnormal extra-axial fluid. Scattered areas of nonspecific hypoattenuation involve the subcortical and deep periventricular white matter, with gray-white differentiation maintained.    The cortical sulci and ventricles are normal in size.  The cerebellum and brainstem are unremarkable.    The visualized paranasal sinuses and mastoid air cells are clear. There is no acute osseous abnormality.                                 Medical Decision Making:   Clinical Tests:   Lab Tests: Ordered and Reviewed  Radiological Study: Ordered and Reviewed  ED Management:  The lab work with  patient CT done 2 days ago CT abdomen done today negative patient continues to have headache fatigue upper for still elevated, patient treated hydralazine 10 mg IV.  Continue blood pressure 202/85 over the and symptomatic patient denies any chest pain or shortness of breath for continues to have headache will admit to the hospital for observation for hypertensive urgency.                                   Clinical Impression:       ICD-10-CM ICD-9-CM   1. Viral pharyngitis J02.9 462   2. HTN (hypertension) I10 401.9   3. Diarrhea, unspecified type R19.7 787.91   4. Upper respiratory tract infection, unspecified type J06.9 465.9   5. Nausea R11.0 787.02   6. Hypertensive urgency I16.0 401.9   7. Chest pain R07.9 786.50         Disposition:   Disposition: Placed in Observation  Condition: Stable     ED Disposition Condition    Observation                           Elise Pedraza PA-C  05/07/20 8332

## 2020-05-07 NOTE — TELEPHONE ENCOUNTER
----- Message from Trina Muñoz sent at 5/7/2020  9:15 AM CDT -----  Type:  Pharmacy Calling to Clarify an RX    Name of Caller:  Ramiro  Pharmacy Name:    [facility] [address] [place] [address] - [id number]  [id number]  [facility] [address]  Phone: [phone] Fax: [phone]  Prescription Name: albuterol (PROAIR HFA) 90 mcg/actuation inhaler  What do they need to clarify?:  Several things regarding prescripton  Best Call Back Number:  [phone]  Additional Information:

## 2020-05-07 NOTE — TELEPHONE ENCOUNTER
Called pharmacy and spoke to Ramiro he stated that he figured it all out and got everything straight.

## 2020-05-08 PROBLEM — E86.1 HYPOVOLEMIA: Status: ACTIVE | Noted: 2020-05-08

## 2020-05-08 PROBLEM — M54.2 NECK PAIN: Chronic | Status: ACTIVE | Noted: 2020-05-08

## 2020-05-08 PROBLEM — E87.6 HYPOKALEMIA: Status: ACTIVE | Noted: 2020-05-08

## 2020-05-08 PROBLEM — I16.0 HYPERTENSIVE URGENCY: Chronic | Status: ACTIVE | Noted: 2020-05-07

## 2020-05-08 PROBLEM — G45.9 TIA (TRANSIENT ISCHEMIC ATTACK): Chronic | Status: ACTIVE | Noted: 2020-05-08

## 2020-05-08 LAB
AMPHET+METHAMPHET UR QL: NEGATIVE
AMPHET+METHAMPHET UR QL: NEGATIVE
ANION GAP SERPL CALC-SCNC: 11 MMOL/L (ref 8–16)
BARBITURATES UR QL SCN>200 NG/ML: NEGATIVE
BARBITURATES UR QL SCN>200 NG/ML: NEGATIVE
BASOPHILS # BLD AUTO: 0.03 K/UL (ref 0–0.2)
BASOPHILS NFR BLD: 0.5 % (ref 0–1.9)
BENZODIAZ UR QL SCN>200 NG/ML: NEGATIVE
BENZODIAZ UR QL SCN>200 NG/ML: NEGATIVE
BUN SERPL-MCNC: 14 MG/DL (ref 6–20)
BZE UR QL SCN: NEGATIVE
BZE UR QL SCN: NEGATIVE
CALCIUM SERPL-MCNC: 8.9 MG/DL (ref 8.7–10.5)
CANNABINOIDS UR QL SCN: NEGATIVE
CANNABINOIDS UR QL SCN: NEGATIVE
CHLORIDE SERPL-SCNC: 102 MMOL/L (ref 95–110)
CO2 SERPL-SCNC: 26 MMOL/L (ref 23–29)
CREAT SERPL-MCNC: 0.9 MG/DL (ref 0.5–1.4)
CREAT UR-MCNC: 185 MG/DL (ref 23–375)
CREAT UR-MCNC: 185 MG/DL (ref 23–375)
DIFFERENTIAL METHOD: ABNORMAL
EOSINOPHIL # BLD AUTO: 0 K/UL (ref 0–0.5)
EOSINOPHIL NFR BLD: 0.5 % (ref 0–8)
ERYTHROCYTE [DISTWIDTH] IN BLOOD BY AUTOMATED COUNT: 12.8 % (ref 11.5–14.5)
EST. GFR  (AFRICAN AMERICAN): >60 ML/MIN/1.73 M^2
EST. GFR  (NON AFRICAN AMERICAN): >60 ML/MIN/1.73 M^2
GLUCOSE SERPL-MCNC: 96 MG/DL (ref 70–110)
HCT VFR BLD AUTO: 44.5 % (ref 40–54)
HGB BLD-MCNC: 15.3 G/DL (ref 14–18)
IMM GRANULOCYTES # BLD AUTO: 0.01 K/UL (ref 0–0.04)
IMM GRANULOCYTES NFR BLD AUTO: 0.2 % (ref 0–0.5)
LYMPHOCYTES # BLD AUTO: 1.4 K/UL (ref 1–4.8)
LYMPHOCYTES NFR BLD: 21.6 % (ref 18–48)
MAGNESIUM SERPL-MCNC: 2 MG/DL (ref 1.6–2.6)
MCH RBC QN AUTO: 31.6 PG (ref 27–31)
MCHC RBC AUTO-ENTMCNC: 34.4 G/DL (ref 32–36)
MCV RBC AUTO: 92 FL (ref 82–98)
MONOCYTES # BLD AUTO: 0.7 K/UL (ref 0.3–1)
MONOCYTES NFR BLD: 10.6 % (ref 4–15)
NEUTROPHILS # BLD AUTO: 4.2 K/UL (ref 1.8–7.7)
NEUTROPHILS NFR BLD: 66.6 % (ref 38–73)
NRBC BLD-RTO: 0 /100 WBC
OPIATES UR QL SCN: NEGATIVE
OPIATES UR QL SCN: NEGATIVE
PCP UR QL SCN>25 NG/ML: NEGATIVE
PCP UR QL SCN>25 NG/ML: NEGATIVE
PLATELET # BLD AUTO: 153 K/UL (ref 150–350)
PMV BLD AUTO: 9.4 FL (ref 9.2–12.9)
POTASSIUM SERPL-SCNC: 3.2 MMOL/L (ref 3.5–5.1)
RBC # BLD AUTO: 4.84 M/UL (ref 4.6–6.2)
SODIUM SERPL-SCNC: 139 MMOL/L (ref 136–145)
TOXICOLOGY INFORMATION: NORMAL
TOXICOLOGY INFORMATION: NORMAL
WBC # BLD AUTO: 6.3 K/UL (ref 3.9–12.7)

## 2020-05-08 PROCEDURE — 94640 AIRWAY INHALATION TREATMENT: CPT

## 2020-05-08 PROCEDURE — G0378 HOSPITAL OBSERVATION PER HR: HCPCS

## 2020-05-08 PROCEDURE — 25000003 PHARM REV CODE 250: Performed by: NURSE PRACTITIONER

## 2020-05-08 PROCEDURE — 80048 BASIC METABOLIC PNL TOTAL CA: CPT

## 2020-05-08 PROCEDURE — 99900035 HC TECH TIME PER 15 MIN (STAT)

## 2020-05-08 PROCEDURE — 25000242 PHARM REV CODE 250 ALT 637 W/ HCPCS: Performed by: NURSE PRACTITIONER

## 2020-05-08 PROCEDURE — 85025 COMPLETE CBC W/AUTO DIFF WBC: CPT

## 2020-05-08 PROCEDURE — 36415 COLL VENOUS BLD VENIPUNCTURE: CPT

## 2020-05-08 PROCEDURE — 63600175 PHARM REV CODE 636 W HCPCS: Performed by: INTERNAL MEDICINE

## 2020-05-08 PROCEDURE — 80307 DRUG TEST PRSMV CHEM ANLYZR: CPT

## 2020-05-08 PROCEDURE — 83735 ASSAY OF MAGNESIUM: CPT

## 2020-05-08 PROCEDURE — 94761 N-INVAS EAR/PLS OXIMETRY MLT: CPT

## 2020-05-08 PROCEDURE — 25000003 PHARM REV CODE 250: Performed by: INTERNAL MEDICINE

## 2020-05-08 RX ORDER — ISOSORBIDE MONONITRATE 30 MG/1
30 TABLET, EXTENDED RELEASE ORAL DAILY
Status: DISCONTINUED | OUTPATIENT
Start: 2020-05-08 | End: 2020-05-09 | Stop reason: HOSPADM

## 2020-05-08 RX ORDER — CETIRIZINE HYDROCHLORIDE 10 MG/1
10 TABLET ORAL NIGHTLY
Status: DISCONTINUED | OUTPATIENT
Start: 2020-05-08 | End: 2020-05-09 | Stop reason: HOSPADM

## 2020-05-08 RX ORDER — SODIUM CHLORIDE AND POTASSIUM CHLORIDE 150; 900 MG/100ML; MG/100ML
INJECTION, SOLUTION INTRAVENOUS CONTINUOUS
Status: DISCONTINUED | OUTPATIENT
Start: 2020-05-08 | End: 2020-05-09

## 2020-05-08 RX ORDER — HYDRALAZINE HYDROCHLORIDE 25 MG/1
25 TABLET, FILM COATED ORAL EVERY 8 HOURS
Status: DISCONTINUED | OUTPATIENT
Start: 2020-05-08 | End: 2020-05-09 | Stop reason: HOSPADM

## 2020-05-08 RX ORDER — HYDROCHLOROTHIAZIDE 25 MG/1
25 TABLET ORAL DAILY
Status: DISCONTINUED | OUTPATIENT
Start: 2020-05-08 | End: 2020-05-08

## 2020-05-08 RX ORDER — CETIRIZINE HYDROCHLORIDE 10 MG/1
10 TABLET ORAL DAILY
Status: DISCONTINUED | OUTPATIENT
Start: 2020-05-09 | End: 2020-05-08

## 2020-05-08 RX ORDER — POTASSIUM CHLORIDE 20 MEQ/1
40 TABLET, EXTENDED RELEASE ORAL ONCE
Status: COMPLETED | OUTPATIENT
Start: 2020-05-08 | End: 2020-05-08

## 2020-05-08 RX ADMIN — BACLOFEN 10 MG: 10 TABLET ORAL at 08:05

## 2020-05-08 RX ADMIN — ACETAMINOPHEN 650 MG: 325 TABLET ORAL at 08:05

## 2020-05-08 RX ADMIN — CLOPIDOGREL BISULFATE 75 MG: 75 TABLET, FILM COATED ORAL at 09:05

## 2020-05-08 RX ADMIN — GABAPENTIN 300 MG: 300 CAPSULE ORAL at 08:05

## 2020-05-08 RX ADMIN — HYDRALAZINE HYDROCHLORIDE 25 MG: 25 TABLET, FILM COATED ORAL at 09:05

## 2020-05-08 RX ADMIN — SODIUM CHLORIDE AND POTASSIUM CHLORIDE: .9; .15 SOLUTION INTRAVENOUS at 05:05

## 2020-05-08 RX ADMIN — MONTELUKAST 10 MG: 10 TABLET, FILM COATED ORAL at 08:05

## 2020-05-08 RX ADMIN — CETIRIZINE HYDROCHLORIDE 10 MG: 10 TABLET, FILM COATED ORAL at 08:05

## 2020-05-08 RX ADMIN — NYSTATIN 500000 UNITS: 500000 SUSPENSION ORAL at 12:05

## 2020-05-08 RX ADMIN — FLUTICASONE PROPIONATE 100 MCG: 50 SPRAY, METERED NASAL at 09:05

## 2020-05-08 RX ADMIN — NYSTATIN 500000 UNITS: 500000 SUSPENSION ORAL at 04:05

## 2020-05-08 RX ADMIN — POTASSIUM CHLORIDE 40 MEQ: 20 TABLET, EXTENDED RELEASE ORAL at 04:05

## 2020-05-08 RX ADMIN — HYDROCHLOROTHIAZIDE 25 MG: 25 TABLET ORAL at 09:05

## 2020-05-08 RX ADMIN — ASPIRIN 81 MG 81 MG: 81 TABLET ORAL at 09:05

## 2020-05-08 RX ADMIN — NYSTATIN 500000 UNITS: 500000 SUSPENSION ORAL at 09:05

## 2020-05-08 RX ADMIN — AMLODIPINE BESYLATE 10 MG: 5 TABLET ORAL at 08:05

## 2020-05-08 RX ADMIN — POTASSIUM CHLORIDE 40 MEQ: 20 TABLET, EXTENDED RELEASE ORAL at 05:05

## 2020-05-08 RX ADMIN — ATORVASTATIN CALCIUM 40 MG: 40 TABLET, FILM COATED ORAL at 08:05

## 2020-05-08 RX ADMIN — NYSTATIN 500000 UNITS: 500000 SUSPENSION ORAL at 08:05

## 2020-05-08 RX ADMIN — ISOSORBIDE MONONITRATE 30 MG: 30 TABLET, EXTENDED RELEASE ORAL at 05:05

## 2020-05-08 RX ADMIN — ALLOPURINOL 100 MG: 100 TABLET ORAL at 08:05

## 2020-05-08 NOTE — ASSESSMENT & PLAN NOTE
Admit to cardiology B   Troponin negative  IV Hydralazine PRN  Increase norvasc from 5 mg to 10 mg qhs  Telemetry monitoring

## 2020-05-08 NOTE — ASSESSMENT & PLAN NOTE
Ordered IV fluids with potassium supplementation.  Goal is to keep greater than 4 especially with cardiomyopathy.  Sliding scale electrolyte replacements also ordered.  Repeat BMP tomorrow.

## 2020-05-08 NOTE — H&P
"[facility]  History & Physical    DOS: 05/07/2020  7:33 PM      Patient Name: Jarod Pierce  MRN: [id number]  Admission Date: 5/7/2020  Attending Physician: Dr. Polk  Primary Care Provider: Idris Fuller MD         Patient information was obtained from patient and ER records.     Subjective:     Principal Problem:Hypertensive urgency    Chief Complaint:   Chief Complaint   Patient presents with    Sore Throat     seen at [facility] yesterday tested for covid,also had labs states still feeling tired and dehydrated    sinus pressure    Diarrhea        HPI: Mr. Pierce presents today with complaints of sore throat. It is moderate. It is associated with diarrhea, weakness, myalgias,occasional dizziness, clear sinus drainage, nausea after he takes his meds, and headache. He denies fever, chills, vomiting, chest pain, SOB, or LOC. He has a history of cardiomyopathy HFrEF 30% - currently refusing AICD d/t his job, gouty arthritis, recent TIA, migraines, chronic fatigue, and chronic neck/back pain. He works as a  for a travelling [employer] group and has been inhaling dust for years, but has no diagnosis of lung dz - he states he has a PFT scheduled outpatient and uses an albuterol inhaler. He is a poor historian, is hard to redirect, and has scattered thoughts. He states he was working hard outside on Monday, began feeling bad on Tuesday with the "thi squirts" after he ate some convenience store taylor's pie that had been sitting out under a warmer. Sore throat and rhinorrhea has been present for 1 week. had a full work up at [facility] with a covid19 screen and CT chest which were negative. Symptoms persisted and he wasn't happy with the care there as it was a televisit with an ipad, so he came here today. He apparently siphoned antifreeze with his mouth months ago and his tongue and throat hasn't been the same since. He uses an albuterol inhaler and does not brush " "his teeth or rinse his mouth out. He states he was on a medication "for your lungs, but it was a pill" that helped with his rhinorrhea. He has an Rx for norvasc 5mg, which he states he took last night. He has not taken topamax in months as it made him tired and have word finding difficulties. He has a lisinopril allergy and apparently metoprolol made him too dizzy per Dr. Manriquez's chart. He has a known history of medication and treatment noncompliance.     Past Medical History:   Diagnosis Date    Allergic rhinitis due to allergen     Arthritis     Cardiomyopathy     Gout     Hypertension     Left bundle branch block        Past Surgical History:   Procedure Laterality Date    WISDOM TOOTH EXTRACTION         Review of patient's allergies indicates:   Allergen Reactions    Codeine      Other reaction(s): Hives    Lisinopril      Dry coughing    Phenytoin sodium extended      Other reaction(s): leg cramps       No current facility-administered medications on file prior to encounter.      Current Outpatient Medications on File Prior to Encounter   Medication Sig    albuterol (PROAIR HFA) 90 mcg/actuation inhaler Inhale 2 puffs into the lungs every 6 (six) hours as needed for Wheezing. Rescue    allopurinoL (ZYLOPRIM) 100 MG tablet Take 1 tablet (100 mg total) by mouth once daily.    amLODIPine (NORVASC) 10 MG tablet Take 5 mg by mouth once daily.    aspirin 81 MG Chew Take 1 tablet (81 mg total) by mouth once daily.    atorvastatin (LIPITOR) 40 MG tablet Take 1 tablet (40 mg total) by mouth once daily.    baclofen (LIORESAL) 10 MG tablet Take 1 tablet (10 mg total) by mouth every evening.    clotrimazole-betamethasone 1-0.05% (LOTRISONE) cream Apply topically 2 (two) times daily.    cyanocobalamin, vitamin B-12, (VITAMIN B-12) 2,500 mcg Subl Use one tablet under the tongue once a day. (Patient taking differently: Take 1 tablet by mouth once daily. Use one tablet under the tongue once a day.)    silver " sulfADIAZINE 1% (SILVADENE) 1 % cream Apply topically 2 (two) times daily. (Patient taking differently: Apply 1 g topically 2 (two) times daily. )    topiramate 25 mg capsule Take 25 mg by mouth 2 (two) times daily.    [DISCONTINUED] amLODIPine (NORVASC) 5 MG tablet Take 1 tablet (5 mg total) by mouth once daily.    clopidogrel (PLAVIX) 75 mg tablet Take 1 tablet (75 mg total) by mouth once daily. for 21 days    diclofenac sodium 1 % Gel Apply 2 g topically 4 (four) times daily. for 10 days (Patient not taking: Reported on 3/18/2020)    gabapentin (NEURONTIN) 300 MG capsule Take 1 capsule (300 mg total) by mouth every evening.    sildenafil (VIAGRA) 50 MG tablet Take 1 tablet (50 mg total) by mouth daily as needed for Erectile Dysfunction.    [DISCONTINUED] PROAIR DIGIHALER 90 mcg/actuation aebs Inhale 180 mcg into the lungs every 4 (four) hours.     Family History     Problem Relation (Age of Onset)    Cancer Sister    Gout Father    Heart disease Father    Hypertension Father        Tobacco Use    Smoking status: Never Smoker    Smokeless tobacco: Never Used   Substance and Sexual Activity    Alcohol use: No    Drug use: No    Sexual activity: Not on file     Review of Systems   Constitutional: Positive for fatigue. Negative for chills, diaphoresis and fever.   HENT: Positive for rhinorrhea, sinus pressure and sore throat. Negative for congestion, ear pain and trouble swallowing.    Eyes: Negative for pain, discharge and visual disturbance.   Respiratory: Negative for cough, chest tightness, shortness of breath and wheezing.    Cardiovascular: Negative for chest pain, palpitations and leg swelling.   Gastrointestinal: Positive for diarrhea and nausea. Negative for abdominal distention, abdominal pain, blood in stool, constipation and vomiting.   Endocrine: Negative for polydipsia, polyphagia and polyuria.   Genitourinary: Negative for dysuria, flank pain, frequency and urgency.   Musculoskeletal:  Positive for back pain. Negative for joint swelling, neck pain and neck stiffness.   Skin: Negative for rash and wound.   Allergic/Immunologic: Negative for immunocompromised state.   Neurological: Positive for dizziness and weakness. Negative for syncope, speech difficulty, light-headedness, numbness and headaches.   Hematological: Negative for adenopathy.   Psychiatric/Behavioral: Negative for confusion and suicidal ideas. The patient is not nervous/anxious.    All other systems reviewed and are negative.    Objective:     Vital Signs (Most Recent):  Temp: 97.8 °F (36.6 °C) (05/07/20 1500)  Pulse: 74 (05/07/20 2000)  Resp: 17 (05/07/20 1631)  BP: (!) 180/96 (05/07/20 2000)  SpO2: 96 % (05/07/20 2000) Vital Signs (24h Range):  Temp:  [97.8 °F (36.6 °C)] 97.8 °F (36.6 °C)  Pulse:  [58-90] 74  Resp:  [15-22] 17  SpO2:  [94 %-97 %] 96 %  BP: (162-189)/() 180/96     Weight: 97.5 kg (215 lb)  Body mass index is 33.67 kg/m².    Physical Exam   Constitutional: He is oriented to person, place, and time. He appears well-developed and well-nourished.   HENT:   Head: Normocephalic and atraumatic.   Nares boggy  Oral thrush   Eyes: Pupils are equal, round, and reactive to light. Conjunctivae and EOM are normal.   Neck: Normal range of motion. Neck supple.   Cardiovascular: Normal rate, regular rhythm and intact distal pulses.   Murmur heard.  Pulmonary/Chest: Effort normal and breath sounds normal.   Abdominal: Soft. Bowel sounds are normal.   Musculoskeletal: Normal range of motion.   Neurological: He is alert and oriented to person, place, and time.   Skin: Skin is warm and dry. Capillary refill takes less than 2 seconds.   Psychiatric: He has a normal mood and affect.   Disorganized thought   Nursing note and vitals reviewed.        CRANIAL NERVES     CN III, IV, VI   Pupils are equal, round, and reactive to light.  Extraocular motions are normal.        Significant Labs:   CBC:   Recent Labs   Lab 05/07/20  1345  "  WBC 6.44   HGB 15.4   HCT 43.9   *     CMP:   Recent Labs   Lab 05/07/20  1345      K 3.6      CO2 26      BUN 14   CREATININE 1.0   CALCIUM 9.1   PROT 7.6   ALBUMIN 4.1   BILITOT 2.0*   ALKPHOS 101   AST 18   ALT 19   ANIONGAP 10   EGFRNONAA >60.0     Troponin:   Recent Labs   Lab 05/07/20  1345   TROPONINI <0.030       Significant Imaging: EKG: I have reviewed all pertinent results/findings within the past 24 hours and my personal findings are: NSR, LBBB no acute ishcemic changes in EKG when compared to 1/13/2020    Assessment/Plan:     * Hypertensive urgency  Admit to cardiology B   Troponin negative  IV Hydralazine PRN  Increase norvasc from 5 mg to 10 mg qhs  Telemetry monitoring        Diarrhea  Started after he ate convenience store food   Will add stool studies   peptobismal   Appears euvolemic     Oral thrush  Likely d/t inhaler use without rinsing or brushing teeth after  Nystatin swish and swallow QID      Allergic rhinitis  Start montekulast   Start flonase  Albuterol inhaler PRN   Follow up outpatient for PFT      Dizziness  CT head negative for acute intracranial abnormality   Orthostatics       Fatigue  Pt has known chronic fatigue   Checked CPK d/t his complaint he was "overworked in the sun" - this is normal      Nonischemic cardiomyopathy, , EF 30% to 35%  Does not appear in overload  Clear CT chest 2 days ago  Does not weigh daily - educated to do this  Daily wt   Accurate I&O      VTE Risk Mitigation (From admission, onward)         Ordered     IP VTE HIGH RISK PATIENT  Once      05/07/20 1858     Place sequential compression device  Until discontinued      05/07/20 1858                   Kristel Harley NP  Department of Hospital Medicine   Replaced by [facility]  "

## 2020-05-08 NOTE — ASSESSMENT & PLAN NOTE
History of TIA 1/2020 is seen at [facility].  Noted discharge on aspirin and 21 days of Plavix, will therefore discontinue Plavix and continue aspirin and high-intensity statin.

## 2020-05-08 NOTE — ASSESSMENT & PLAN NOTE
Blood pressure grossly uncontrolled with systolics up to the 180s.  Suspect non compliant as documented in chart in the past.  Amlodipine increased to 10 mg q.h.s..  Starting Imdur 30 mg with hydralazine 25 mg Q 8 hr as patient cannot tolerate Ace inhibitor and does have cardiomyopathy.  Counseled on need to be compliant with medications with close primary care follow-up.  Continue to monitor blood pressure and adjust as needed.  Appreciate cardiology input.

## 2020-05-08 NOTE — CONSULTS
[facility]  Cardiology  Consult Note    Patient Name: Jarod Pierce  MRN: [id number]  Admission Date: 5/7/2020  Hospital Length of Stay: 0 days  Code Status: Full Code   Attending Provider: Veronica Bullard MD   Consulting Provider: Royal Masters MD  Primary Care Physician: Idris Fuller MD  Principal Problem:Hypertensive urgency    Patient information was obtained from patient, past medical records and ER records.     Inpatient consult to Cardiology  Consult performed by: Royal Masters MD  Consult ordered by: Veronica Bullard MD        Subjective:     REASON FOR CONSULT:  Syncope     HPI:  54-year-old male with a past medical history significant for nonischemic cardiomyopathy, hypertension, left bundle-branch block presented to the hospital with dizziness, severe pain on the left jaw and the back of the neck.  He reportedly had a CVA in the past and had similar symptoms.  He had an accident and subsequently had this the left sharp pain and at the back of the neck in the past and used to see Dr. Shore.  On Tuesday reportedly started having nausea and vomiting and had diarrhea.  Yesterday he felt lightheaded and dizzy and he subsequently presented to the emergency room for further evaluation and management.  He also had mild atypical chest discomfort.  He denies any shortness of breath, lower extremity edema.  He reportedly had some palpitations yesterday with a sensation of heart racing however he currently denies.  He did not have any significant arrhythmias on telemetry thus far.    Past Medical History:   Diagnosis Date    Allergic rhinitis due to allergen     Arthritis     Cardiomyopathy     Gout     Hypertension     Left bundle branch block        Past Surgical History:   Procedure Laterality Date    WISDOM TOOTH EXTRACTION         Review of patient's allergies indicates:   Allergen Reactions    Codeine      Other reaction(s): Hives    Lisinopril      Dry  coughing    Phenytoin sodium extended      Other reaction(s): leg cramps       No current facility-administered medications on file prior to encounter.      Current Outpatient Medications on File Prior to Encounter   Medication Sig    albuterol (PROAIR HFA) 90 mcg/actuation inhaler Inhale 2 puffs into the lungs every 6 (six) hours as needed for Wheezing. Rescue    allopurinoL (ZYLOPRIM) 100 MG tablet Take 1 tablet (100 mg total) by mouth once daily.    amLODIPine (NORVASC) 10 MG tablet Take 5 mg by mouth once daily.    aspirin 81 MG Chew Take 1 tablet (81 mg total) by mouth once daily.    atorvastatin (LIPITOR) 40 MG tablet Take 1 tablet (40 mg total) by mouth once daily.    baclofen (LIORESAL) 10 MG tablet Take 1 tablet (10 mg total) by mouth every evening.    clotrimazole-betamethasone 1-0.05% (LOTRISONE) cream Apply topically 2 (two) times daily.    cyanocobalamin, vitamin B-12, (VITAMIN B-12) 2,500 mcg Subl Use one tablet under the tongue once a day. (Patient taking differently: Take 1 tablet by mouth once daily. Use one tablet under the tongue once a day.)    silver sulfADIAZINE 1% (SILVADENE) 1 % cream Apply topically 2 (two) times daily. (Patient taking differently: Apply 1 g topically 2 (two) times daily. )    topiramate 25 mg capsule Take 25 mg by mouth 2 (two) times daily.    clopidogrel (PLAVIX) 75 mg tablet Take 1 tablet (75 mg total) by mouth once daily. for 21 days    diclofenac sodium 1 % Gel Apply 2 g topically 4 (four) times daily. for 10 days (Patient not taking: Reported on 3/18/2020)    gabapentin (NEURONTIN) 300 MG capsule Take 1 capsule (300 mg total) by mouth every evening.    sildenafil (VIAGRA) 50 MG tablet Take 1 tablet (50 mg total) by mouth daily as needed for Erectile Dysfunction.       Scheduled Meds:   allopurinoL  100 mg Oral QHS    amLODIPine  10 mg Oral QHS    aspirin  81 mg Oral Daily    atorvastatin  40 mg Oral QHS    baclofen  10 mg Oral QHS    clopidogreL   75 mg Oral Daily    fluticasone propionate  2 spray Each Nostril Daily    gabapentin  300 mg Oral QHS    hydroCHLOROthiazide  25 mg Oral Daily    montelukast  10 mg Oral QHS    nystatin  500,000 Units Oral QID (WM & HS)     Continuous Infusions:  PRN Meds:.acetaminophen, albuterol, calcium chloride IVPB, calcium chloride IVPB, calcium chloride IVPB, hydrALAZINE, magnesium oxide, magnesium sulfate IVPB, magnesium sulfate IVPB, magnesium sulfate IVPB, magnesium sulfate IVPB, melatonin, ondansetron, potassium chloride in water, potassium chloride in water, potassium chloride in water, potassium chloride in water, potassium chloride, potassium chloride, potassium chloride, potassium chloride, sodium chloride 0.9%, sodium phosphate IVPB, sodium phosphate IVPB, sodium phosphate IVPB, sodium phosphate IVPB, sodium phosphate IVPB    Family History     Problem Relation (Age of Onset)    Cancer Sister    Gout Father    Heart disease Father    Hypertension Father          Tobacco Use    Smoking status: Never Smoker    Smokeless tobacco: Never Used   Substance and Sexual Activity    Alcohol use: No    Drug use: No    Sexual activity: Not on file       ROS   No significant sore throat or runny nose.   No recent changes in vision.   No recent changes in hearing.  No dysphagia or odynophagia.  Denies shortness of breath.   Denies any cough or hemoptysis.   Denies any abdominal pain, reports nausea, vomiting, diarrhea.   Denies any dysuria or polyuria.   Denies any fevers or chills.   Denies any recent significant weight changes.   Denies bleeding diathesis    Objective:     Vital Signs (Most Recent):  Temp: 98.4 °F (36.9 °C) (05/08/20 1137)  Pulse: 84 (05/08/20 1137)  Resp: 19 (05/08/20 1137)  BP: (!) 158/97 (05/08/20 1137)  SpO2: (!) 94 % (05/08/20 1137) Vital Signs (24h Range):  Temp:  [97.8 °F (36.6 °C)-98.4 °F (36.9 °C)] 98.4 °F (36.9 °C)  Pulse:  [58-90] 84  Resp:  [15-22] 19  SpO2:  [93 %-97 %] 94 %  BP:  (147-189)/() 158/97     Weight: 92.2 kg (203 lb 4.2 oz)  Body mass index is 31.84 kg/m².    SpO2: (!) 94 %  O2 Device (Oxygen Therapy): room air      Intake/Output Summary (Last 24 hours) at 5/8/2020 1545  Last data filed at 5/8/2020 0400  Gross per 24 hour   Intake 120 ml   Output 400 ml   Net -280 ml       Lines/Drains/Airways     Peripheral Intravenous Line                 Peripheral IV - Single Lumen 05/07/20 1340 20 G Right Forearm 1 day                Physical Exam  HEENT: Normocephalic, atraumatic, PERRL, Conjunctiva pink, no scleral icterus.   CVS: S1S2+, RRR, no murmurs, rubs or gallops, JVP: Normal.  LUNGS: Clear  ABDOMEN: Soft, NT, BS+  EXTREMITIES: No cyanosis, clubbing or edema  NEURO: AAO X 3.       Significant Labs:   BMP:   Recent Labs   Lab 05/07/20  1345 05/08/20  0305    96    139   K 3.6 3.2*    102   CO2 26 26   BUN 14 14   CREATININE 1.0 0.9   CALCIUM 9.1 8.9   MG  --  2.0   , CMP   Recent Labs   Lab 05/07/20  1345 05/08/20  0305    139   K 3.6 3.2*    102   CO2 26 26    96   BUN 14 14   CREATININE 1.0 0.9   CALCIUM 9.1 8.9   PROT 7.6  --    ALBUMIN 4.1  --    BILITOT 2.0*  --    ALKPHOS 101  --    AST 18  --    ALT 19  --    ANIONGAP 10 11   ESTGFRAFRICA >60.0 >60.0   EGFRNONAA >60.0 >60.0   , CBC   Recent Labs   Lab 05/07/20  1345 05/08/20  0305   WBC 6.44 6.30   HGB 15.4 15.3   HCT 43.9 44.5   * 153   , INR No results for input(s): INR, PROTIME in the last 48 hours., Lipid Panel No results for input(s): CHOL, HDL, LDLCALC, TRIG, CHOLHDL in the last 48 hours. and Troponin   Recent Labs   Lab 05/07/20  1345   TROPONINI <0.030       Significant Imaging: Reviewed  Assessment and Plan:     IMPRESSION:  Left jaw pain associated with posterior neck pain and headache.  Etiology is likely musculoskeletal.  Presyncope.  Likely secondary to dehydration.  Gastroenteritis.  History of nonischemic cardiomyopathy.  LVEF in January of 2020 was 30%.  Patient  reportedly refused an ICD as he is a .  Left bundle-branch block.  Chronic.  Hypokalemia.    Reported history of CVA.  Hypertension.  Poorly controlled.      RECOMMENDATIONS:  1. Start iv fluids with KCl.   2. DC HCTZ.   3. Consider neurology consult   4. Resume home medications.   5. Check troponin.  6. Discussed with Dr Bullard.     Thank you for your consult. I will follow-up with patient. Please contact us if you have any additional questions.    Royal Masters MD  Cardiology   [facility]

## 2020-05-08 NOTE — ASSESSMENT & PLAN NOTE
Describes symptoms of postnasal drip, rhinorrhea and nonproductive cough.  Chart review history of seasonal allergies and symptoms likely consistent with same.  Start Zyrtec and continue Flonase.  Continue montelukast.
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
## 2020-05-08 NOTE — ASSESSMENT & PLAN NOTE
"Pt has known chronic fatigue   Checked CPK d/t his complaint he was "overworked in the sun" - this is normal    "

## 2020-05-08 NOTE — ASSESSMENT & PLAN NOTE
Does not appear in overload  Clear CT chest 2 days ago  Does not weigh daily - educated to do this  Daily wt   Accurate I&O

## 2020-05-08 NOTE — ASSESSMENT & PLAN NOTE
In the setting of diarrhea likely dehydrated/hypovolemic.  Orthostatic vitals were checked and negative.  Diarrhea as above is improving.  Ordered normal saline with potassium supplement at 60 cc an hour.  Encouraging oral intake.  Following clinically.

## 2020-05-08 NOTE — PLAN OF CARE
05/08/20 0444   Patient Assessment/Suction   Level of Consciousness (AVPU)   (RESTING QUIETLY)   Respiratory Effort Normal;Unlabored   PRE-TX-O2   O2 Device (Oxygen Therapy) room air   SpO2 97 %   Pulse 73   Resp 16   Inhaler   $ Inhaler Charges MDI (Metered Dose Inahler) Treatment   Daily Review of Necessity (Inhaler) completed   CONTINUE TX. AS ORDERED

## 2020-05-08 NOTE — ASSESSMENT & PLAN NOTE
Started after he ate convenience store food.  Has improved today and has only had 1 semi-formed bowel movement.  Stool samples ordered however have not been collected.  Instructed patient on same.  Possibly viral gastroenteritis as improving.  Monitoring.

## 2020-05-08 NOTE — PLAN OF CARE
Problem: Adult Inpatient Plan of Care  Goal: Plan of Care Review  Outcome: Ongoing, Progressing  Goal: Patient-Specific Goal (Individualization)  Outcome: Ongoing, Progressing  Goal: Absence of Hospital-Acquired Illness or Injury  Outcome: Ongoing, Progressing  Intervention: Identify and Manage Fall Risk  Flowsheets (Taken 5/8/2020 1754)  Safety Promotion/Fall Prevention: assistive device/personal item within reach; bed alarm set; Fall Risk reviewed with patient/family; nonskid shoes/socks when out of bed; side rails raised x 2; instructed to call staff for mobility  Intervention: Prevent VTE (venous thromboembolism)  Flowsheets (Taken 5/8/2020 1754)  VTE Prevention/Management: remove, assess skin and reapply sequential compression device  Goal: Optimal Comfort and Wellbeing  Outcome: Ongoing, Progressing  Goal: Readiness for Transition of Care  Outcome: Ongoing, Progressing  Goal: Rounds/Family Conference  Outcome: Ongoing, Progressing     Problem: Fall Injury Risk  Goal: Absence of Fall and Fall-Related Injury  Outcome: Ongoing, Progressing  Intervention: Identify and Manage Contributors to Fall Injury Risk  Flowsheets (Taken 5/8/2020 1754)  Self-Care Promotion: independence encouraged  Medication Review/Management: medications reviewed  Intervention: Promote Injury-Free Environment  Flowsheets (Taken 5/8/2020 1754)  Safety Promotion/Fall Prevention: assistive device/personal item within reach; bed alarm set; Fall Risk reviewed with patient/family; nonskid shoes/socks when out of bed; side rails raised x 2; instructed to call staff for mobility  Environmental Safety Modification: assistive device/personal items within reach; clutter free environment maintained; room organization consistent; lighting adjusted

## 2020-05-08 NOTE — ASSESSMENT & PLAN NOTE
Known history of nonischemic cardiomyopathy, has refused AICD placement due to occupation being a .  Medications have not been optimize as patient has intolerance to many including ACE-inhibitor and beta-blockers.  Left heart catheterization 2014 with nonobstructive coronary artery disease.  Echocardiogram 1/2020 with EF of 30% with anterior septal akinesia and grade 1 diastolic dysfunction.  Appears clinically euvolemic with no evidence of congestive heart failure  Starting nitrates/hydralazine in lieu of Ace inhibitor and monitoring.  Appreciate cardiology input.
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
## 2020-05-08 NOTE — SUBJECTIVE & OBJECTIVE
Past Medical History:   Diagnosis Date    Allergic rhinitis due to allergen     Arthritis     Cardiomyopathy     Gout     Hypertension     Left bundle branch block        Past Surgical History:   Procedure Laterality Date    WISDOM TOOTH EXTRACTION         Review of patient's allergies indicates:   Allergen Reactions    Codeine      Other reaction(s): Hives    Lisinopril      Dry coughing    Phenytoin sodium extended      Other reaction(s): leg cramps       No current facility-administered medications on file prior to encounter.      Current Outpatient Medications on File Prior to Encounter   Medication Sig    albuterol (PROAIR HFA) 90 mcg/actuation inhaler Inhale 2 puffs into the lungs every 6 (six) hours as needed for Wheezing. Rescue    allopurinoL (ZYLOPRIM) 100 MG tablet Take 1 tablet (100 mg total) by mouth once daily.    amLODIPine (NORVASC) 10 MG tablet Take 5 mg by mouth once daily.    aspirin 81 MG Chew Take 1 tablet (81 mg total) by mouth once daily.    atorvastatin (LIPITOR) 40 MG tablet Take 1 tablet (40 mg total) by mouth once daily.    baclofen (LIORESAL) 10 MG tablet Take 1 tablet (10 mg total) by mouth every evening.    clotrimazole-betamethasone 1-0.05% (LOTRISONE) cream Apply topically 2 (two) times daily.    cyanocobalamin, vitamin B-12, (VITAMIN B-12) 2,500 mcg Subl Use one tablet under the tongue once a day. (Patient taking differently: Take 1 tablet by mouth once daily. Use one tablet under the tongue once a day.)    silver sulfADIAZINE 1% (SILVADENE) 1 % cream Apply topically 2 (two) times daily. (Patient taking differently: Apply 1 g topically 2 (two) times daily. )    topiramate 25 mg capsule Take 25 mg by mouth 2 (two) times daily.    [DISCONTINUED] amLODIPine (NORVASC) 5 MG tablet Take 1 tablet (5 mg total) by mouth once daily.    clopidogrel (PLAVIX) 75 mg tablet Take 1 tablet (75 mg total) by mouth once daily. for 21 days    diclofenac sodium 1 % Gel Apply 2 g  topically 4 (four) times daily. for 10 days (Patient not taking: Reported on 3/18/2020)    gabapentin (NEURONTIN) 300 MG capsule Take 1 capsule (300 mg total) by mouth every evening.    sildenafil (VIAGRA) 50 MG tablet Take 1 tablet (50 mg total) by mouth daily as needed for Erectile Dysfunction.    [DISCONTINUED] PROAIR DIGIHALER 90 mcg/actuation aebs Inhale 180 mcg into the lungs every 4 (four) hours.     Family History     Problem Relation (Age of Onset)    Cancer Sister    Gout Father    Heart disease Father    Hypertension Father        Tobacco Use    Smoking status: Never Smoker    Smokeless tobacco: Never Used   Substance and Sexual Activity    Alcohol use: No    Drug use: No    Sexual activity: Not on file     Review of Systems   Constitutional: Positive for fatigue. Negative for chills, diaphoresis and fever.   HENT: Positive for rhinorrhea, sinus pressure and sore throat. Negative for congestion, ear pain and trouble swallowing.    Eyes: Negative for pain, discharge and visual disturbance.   Respiratory: Negative for cough, chest tightness, shortness of breath and wheezing.    Cardiovascular: Negative for chest pain, palpitations and leg swelling.   Gastrointestinal: Positive for diarrhea and nausea. Negative for abdominal distention, abdominal pain, blood in stool, constipation and vomiting.   Endocrine: Negative for polydipsia, polyphagia and polyuria.   Genitourinary: Negative for dysuria, flank pain, frequency and urgency.   Musculoskeletal: Positive for back pain. Negative for joint swelling, neck pain and neck stiffness.   Skin: Negative for rash and wound.   Allergic/Immunologic: Negative for immunocompromised state.   Neurological: Positive for dizziness and weakness. Negative for syncope, speech difficulty, light-headedness, numbness and headaches.   Hematological: Negative for adenopathy.   Psychiatric/Behavioral: Negative for confusion and suicidal ideas. The patient is not  nervous/anxious.    All other systems reviewed and are negative.    Objective:     Vital Signs (Most Recent):  Temp: 97.8 °F (36.6 °C) (05/07/20 1500)  Pulse: 74 (05/07/20 2000)  Resp: 17 (05/07/20 1631)  BP: (!) 180/96 (05/07/20 2000)  SpO2: 96 % (05/07/20 2000) Vital Signs (24h Range):  Temp:  [97.8 °F (36.6 °C)] 97.8 °F (36.6 °C)  Pulse:  [58-90] 74  Resp:  [15-22] 17  SpO2:  [94 %-97 %] 96 %  BP: (162-189)/() 180/96     Weight: 97.5 kg (215 lb)  Body mass index is 33.67 kg/m².    Physical Exam   Constitutional: He is oriented to person, place, and time. He appears well-developed and well-nourished.   HENT:   Head: Normocephalic and atraumatic.   Nares boggy  Oral thrush   Eyes: Pupils are equal, round, and reactive to light. Conjunctivae and EOM are normal.   Neck: Normal range of motion. Neck supple.   Cardiovascular: Normal rate, regular rhythm and intact distal pulses.   Murmur heard.  Pulmonary/Chest: Effort normal and breath sounds normal.   Abdominal: Soft. Bowel sounds are normal.   Musculoskeletal: Normal range of motion.   Neurological: He is alert and oriented to person, place, and time.   Skin: Skin is warm and dry. Capillary refill takes less than 2 seconds.   Psychiatric: He has a normal mood and affect.   Disorganized thought   Nursing note and vitals reviewed.        CRANIAL NERVES     CN III, IV, VI   Pupils are equal, round, and reactive to light.  Extraocular motions are normal.        Significant Labs:   CBC:   Recent Labs   Lab 05/07/20  1345   WBC 6.44   HGB 15.4   HCT 43.9   *     CMP:   Recent Labs   Lab 05/07/20  1345      K 3.6      CO2 26      BUN 14   CREATININE 1.0   CALCIUM 9.1   PROT 7.6   ALBUMIN 4.1   BILITOT 2.0*   ALKPHOS 101   AST 18   ALT 19   ANIONGAP 10   EGFRNONAA >60.0     Troponin:   Recent Labs   Lab 05/07/20  1345   TROPONINI <0.030       Significant Imaging: EKG: I have reviewed all pertinent results/findings within the past 24 hours and  my personal findings are: NSR, LBBB no acute ishcemic changes in EKG when compared to 1/13/2020

## 2020-05-08 NOTE — ED NOTES
Patient identifiers for Jarod Pierce checked and correct.  LOC:  Jarod Pierce is awake, alert, and aware of environment with an appropriate affect. He is oriented x 3 and speaking appropriately.  APPEARANCE:  He is resting comfortably and in no acute distress. He is clean and well groomed, patient's clothing is properly fastened.  SKIN:  The skin is warm and dry. He has normal skin turgor and moist mucus membranes. Skin is intact; no bruising or breakdown noted.  MUSCULOSKELETAL:  He is moving all extremities well, no obvious deformities noted. Pulses intact.   RESPIRATORY:  Airway is open and patent. Respirations are spontaneous and non-labored with normal effort and rate.  CARDIAC:  He has a normal rate and rhythm. No peripheral edema noted. Capillary refill < 3 seconds.  ABDOMEN:  No distention noted.  Soft and non-tender upon palpation.  NEUROLOGICAL:  PERRL. Facial expression is symmetrical. Hand grasps are equal bilaterally. Normal sensation in all extremities when touched with finger.  Allergies reported:    Review of patient's allergies indicates:   Allergen Reactions    Codeine      Other reaction(s): Hives    Lisinopril      Dry coughing    Phenytoin sodium extended      Other reaction(s): leg cramps     OTHER NOTES:
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
## 2020-05-08 NOTE — ASSESSMENT & PLAN NOTE
Started after he ate convenience store food   Will add stool studies   peptobismal   Appears euvolemic
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
## 2020-05-09 VITALS
DIASTOLIC BLOOD PRESSURE: 72 MMHG | WEIGHT: 203.25 LBS | HEIGHT: 67 IN | RESPIRATION RATE: 19 BRPM | TEMPERATURE: 98 F | OXYGEN SATURATION: 95 % | BODY MASS INDEX: 31.9 KG/M2 | SYSTOLIC BLOOD PRESSURE: 131 MMHG | HEART RATE: 85 BPM

## 2020-05-09 PROBLEM — R19.7 DIARRHEA: Status: RESOLVED | Noted: 2020-05-07 | Resolved: 2020-05-09

## 2020-05-09 PROBLEM — E86.1 HYPOVOLEMIA: Status: RESOLVED | Noted: 2020-05-08 | Resolved: 2020-05-09

## 2020-05-09 PROBLEM — R51.9 HEADACHE: Chronic | Status: ACTIVE | Noted: 2020-05-09

## 2020-05-09 PROBLEM — E87.6 HYPOKALEMIA: Status: RESOLVED | Noted: 2020-05-08 | Resolved: 2020-05-09

## 2020-05-09 LAB
ANION GAP SERPL CALC-SCNC: 10 MMOL/L (ref 8–16)
BACTERIA THROAT CULT: NORMAL
BASOPHILS # BLD AUTO: 0.02 K/UL (ref 0–0.2)
BASOPHILS NFR BLD: 0.3 % (ref 0–1.9)
BUN SERPL-MCNC: 17 MG/DL (ref 6–20)
CALCIUM SERPL-MCNC: 8.4 MG/DL (ref 8.7–10.5)
CHLORIDE SERPL-SCNC: 100 MMOL/L (ref 95–110)
CO2 SERPL-SCNC: 25 MMOL/L (ref 23–29)
CREAT SERPL-MCNC: 1 MG/DL (ref 0.5–1.4)
DIFFERENTIAL METHOD: ABNORMAL
EOSINOPHIL # BLD AUTO: 0.1 K/UL (ref 0–0.5)
EOSINOPHIL NFR BLD: 1.5 % (ref 0–8)
ERYTHROCYTE [DISTWIDTH] IN BLOOD BY AUTOMATED COUNT: 13 % (ref 11.5–14.5)
EST. GFR  (AFRICAN AMERICAN): >60 ML/MIN/1.73 M^2
EST. GFR  (NON AFRICAN AMERICAN): >60 ML/MIN/1.73 M^2
GLUCOSE SERPL-MCNC: 91 MG/DL (ref 70–110)
HCT VFR BLD AUTO: 43.2 % (ref 40–54)
HGB BLD-MCNC: 14.6 G/DL (ref 14–18)
HLA B27 INTERPRETATION: NORMAL
HLA-B27 RELATED AG QL: NEGATIVE
HLA-B27 RELATED AG QL: NORMAL
IMM GRANULOCYTES # BLD AUTO: 0.03 K/UL (ref 0–0.04)
IMM GRANULOCYTES NFR BLD AUTO: 0.5 % (ref 0–0.5)
LYMPHOCYTES # BLD AUTO: 2 K/UL (ref 1–4.8)
LYMPHOCYTES NFR BLD: 30.5 % (ref 18–48)
MAGNESIUM SERPL-MCNC: 2 MG/DL (ref 1.6–2.6)
MCH RBC QN AUTO: 31.6 PG (ref 27–31)
MCHC RBC AUTO-ENTMCNC: 33.8 G/DL (ref 32–36)
MCV RBC AUTO: 94 FL (ref 82–98)
MONOCYTES # BLD AUTO: 0.8 K/UL (ref 0.3–1)
MONOCYTES NFR BLD: 11.4 % (ref 4–15)
NEUTROPHILS # BLD AUTO: 3.7 K/UL (ref 1.8–7.7)
NEUTROPHILS NFR BLD: 55.8 % (ref 38–73)
NRBC BLD-RTO: 0 /100 WBC
PLATELET # BLD AUTO: 172 K/UL (ref 150–350)
PMV BLD AUTO: 9.4 FL (ref 9.2–12.9)
POTASSIUM SERPL-SCNC: 3.6 MMOL/L (ref 3.5–5.1)
RBC # BLD AUTO: 4.62 M/UL (ref 4.6–6.2)
SODIUM SERPL-SCNC: 135 MMOL/L (ref 136–145)
TROPONIN I SERPL DL<=0.01 NG/ML-MCNC: <0.03 NG/ML
WBC # BLD AUTO: 6.56 K/UL (ref 3.9–12.7)

## 2020-05-09 PROCEDURE — 25000003 PHARM REV CODE 250: Performed by: NURSE PRACTITIONER

## 2020-05-09 PROCEDURE — 80048 BASIC METABOLIC PNL TOTAL CA: CPT

## 2020-05-09 PROCEDURE — 94761 N-INVAS EAR/PLS OXIMETRY MLT: CPT

## 2020-05-09 PROCEDURE — 63600175 PHARM REV CODE 636 W HCPCS: Performed by: INTERNAL MEDICINE

## 2020-05-09 PROCEDURE — G0378 HOSPITAL OBSERVATION PER HR: HCPCS

## 2020-05-09 PROCEDURE — 25000003 PHARM REV CODE 250: Performed by: INTERNAL MEDICINE

## 2020-05-09 PROCEDURE — 36415 COLL VENOUS BLD VENIPUNCTURE: CPT

## 2020-05-09 PROCEDURE — 85025 COMPLETE CBC W/AUTO DIFF WBC: CPT

## 2020-05-09 PROCEDURE — 84484 ASSAY OF TROPONIN QUANT: CPT

## 2020-05-09 PROCEDURE — 99900035 HC TECH TIME PER 15 MIN (STAT)

## 2020-05-09 PROCEDURE — 83735 ASSAY OF MAGNESIUM: CPT

## 2020-05-09 RX ORDER — AMLODIPINE BESYLATE 10 MG/1
10 TABLET ORAL NIGHTLY
Qty: 30 TABLET | Refills: 0 | Status: SHIPPED | OUTPATIENT
Start: 2020-05-09 | End: 2020-06-07

## 2020-05-09 RX ORDER — BUTALBITAL, ACETAMINOPHEN AND CAFFEINE 50; 325; 40 MG/1; MG/1; MG/1
1 TABLET ORAL EVERY 8 HOURS PRN
Qty: 20 TABLET | Refills: 0 | Status: SHIPPED | OUTPATIENT
Start: 2020-05-09 | End: 2020-05-16

## 2020-05-09 RX ORDER — HYDRALAZINE HYDROCHLORIDE 25 MG/1
25 TABLET, FILM COATED ORAL EVERY 8 HOURS
Qty: 90 TABLET | Refills: 0 | Status: SHIPPED | OUTPATIENT
Start: 2020-05-09 | End: 2020-07-14 | Stop reason: SDUPTHER

## 2020-05-09 RX ORDER — FLUTICASONE PROPIONATE 50 MCG
1 SPRAY, SUSPENSION (ML) NASAL DAILY
Qty: 9.9 ML | Refills: 0 | Status: SHIPPED | OUTPATIENT
Start: 2020-05-09 | End: 2020-05-25

## 2020-05-09 RX ORDER — ISOSORBIDE MONONITRATE 30 MG/1
30 TABLET, EXTENDED RELEASE ORAL DAILY
Qty: 30 TABLET | Refills: 0 | Status: SHIPPED | OUTPATIENT
Start: 2020-05-10 | End: 2020-06-07

## 2020-05-09 RX ORDER — NYSTATIN 100000 [USP'U]/ML
500000 SUSPENSION ORAL
Qty: 100 ML | Refills: 0 | Status: SHIPPED | OUTPATIENT
Start: 2020-05-09 | End: 2020-05-14

## 2020-05-09 RX ADMIN — ISOSORBIDE MONONITRATE 30 MG: 30 TABLET, EXTENDED RELEASE ORAL at 09:05

## 2020-05-09 RX ADMIN — HYDRALAZINE HYDROCHLORIDE 25 MG: 25 TABLET, FILM COATED ORAL at 05:05

## 2020-05-09 RX ADMIN — SODIUM CHLORIDE AND POTASSIUM CHLORIDE: .9; .15 SOLUTION INTRAVENOUS at 09:05

## 2020-05-09 RX ADMIN — ASPIRIN 81 MG 81 MG: 81 TABLET ORAL at 09:05

## 2020-05-09 RX ADMIN — NYSTATIN 500000 UNITS: 500000 SUSPENSION ORAL at 09:05

## 2020-05-09 RX ADMIN — FLUTICASONE PROPIONATE 100 MCG: 50 SPRAY, METERED NASAL at 09:05
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
## 2020-05-09 NOTE — PLAN OF CARE
05/08/20 1950   Patient Assessment/Suction   Level of Consciousness (AVPU) alert   Respiratory Effort Normal;Unlabored   Expansion/Accessory Muscles/Retractions no use of accessory muscles   All Lung Fields Breath Sounds clear   Cough Type nonproductive   PRE-TX-O2   O2 Device (Oxygen Therapy) room air   SpO2 97 %   Pulse Oximetry Type Intermittent   $ Pulse Oximetry - Multiple Charge Pulse Oximetry - Multiple   Pulse 74   Resp 12   Inhaler   $ Inhaler Charges PRN treatment not required   Daily Review of Necessity (Inhaler) completed   continue tx. As ordered
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
## 2020-05-09 NOTE — ASSESSMENT & PLAN NOTE
Suspect patient with medication noncompliance as evident by uncontrolled hypertension other the complaints.  Counseled on need to be compliant.  Patient may also benefit outpatient psychology follow-up.

## 2020-05-09 NOTE — ASSESSMENT & PLAN NOTE
Blood pressure grossly uncontrolled with systolics up to the 180s.  Suspect non compliant as documented in chart in the past.  Amlodipine increased to 10 mg q.h.s..  Starting Imdur 30 mg with hydralazine 25 mg Q 8 hr as patient cannot tolerate Ace inhibitor and does have cardiomyopathy.  Counseled on need to be compliant with medications with close primary care follow-up.  Blood pressure was controlled on this regimen.  Outpatient follow-up with Cardiology.

## 2020-05-09 NOTE — ASSESSMENT & PLAN NOTE
Started after he ate convenience store food.  Has improved today and has only had 1 semi-formed bowel movement.  Stool samples ordered however have not been collected as only had 1 bowel movement and was semi-formed.  Likely secondary to viral gastroenteritis.  Resolved.  Tolerating 100% of meals.

## 2020-05-09 NOTE — ASSESSMENT & PLAN NOTE
Describes symptoms of postnasal drip, rhinorrhea and nonproductive cough.  Chart review history of seasonal allergies and symptoms likely consistent with same.  Flonase and p.r.n. antihistamine.

## 2020-05-09 NOTE — ASSESSMENT & PLAN NOTE
Known history of nonischemic cardiomyopathy, has refused AICD placement due to occupation being a .  Medications have not been optimize as patient has intolerance to many including ACE-inhibitor and beta-blockers.  Left heart catheterization 2014 with nonobstructive coronary artery disease.  Echocardiogram 1/2020 with EF of 30% with anterior septal akinesia and grade 1 diastolic dysfunction.  Starting nitrates/hydralazine in lieu of Ace inhibitor and monitoring.  Appreciate cardiology input.
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
## 2020-05-09 NOTE — PROGRESS NOTES
[facility]  Cardiology  Progress Note    Patient Name: Jarod Pierce  MRN: [id number]  Admission Date: 5/7/2020  Hospital Length of Stay: 0 days  Code Status: Full Code   Attending Physician: Veronica Bullard MD   Primary Care Physician: Idris Fuller MD  Expected Discharge Date:   Principal Problem:Diarrhea    Subjective:       Interval History: Denies any chest pain or shortness of breath. Dizziness is better. He reports head ache. Telemetry with no significant arrhythmias.     ROS   Denies any abdominal pain. Reports nausea which is better.   Denies any bleeding issues.   Denies any dysuria.   Objective:     Vital Signs (Most Recent):  Temp: 98.3 °F (36.8 °C) (05/09/20 0751)  Pulse: 82 (05/09/20 0751)  Resp: 19 (05/09/20 0751)  BP: (!) 143/84 (05/09/20 0751)  SpO2: 95 % (05/09/20 0751) Vital Signs (24h Range):  Temp:  [97.4 °F (36.3 °C)-98.4 °F (36.9 °C)] 98.3 °F (36.8 °C)  Pulse:  [65-84] 82  Resp:  [12-19] 19  SpO2:  [93 %-97 %] 95 %  BP: (115-173)/(70-97) 143/84     Weight: 92.2 kg (203 lb 4.2 oz)  Body mass index is 31.84 kg/m².    SpO2: 95 %  O2 Device (Oxygen Therapy): room air      Intake/Output Summary (Last 24 hours) at 5/9/2020 0836  Last data filed at 5/9/2020 0345  Gross per 24 hour   Intake 120 ml   Output 650 ml   Net -530 ml       Lines/Drains/Airways     Peripheral Intravenous Line                 Peripheral IV - Single Lumen 05/07/20 1340 20 G Right Forearm 1 day                Scheduled Meds:   allopurinoL  100 mg Oral QHS    amLODIPine  10 mg Oral QHS    aspirin  81 mg Oral Daily    atorvastatin  40 mg Oral QHS    baclofen  10 mg Oral QHS    cetirizine  10 mg Oral QHS    fluticasone propionate  2 spray Each Nostril Daily    gabapentin  300 mg Oral QHS    hydrALAZINE  25 mg Oral Q8H    isosorbide mononitrate  30 mg Oral Daily    montelukast  10 mg Oral QHS    nystatin  500,000 Units Oral QID (WM & HS)     Continuous Infusions:   0/9% NACL & POTASSIUM CHLORIDE  20 MEQ/L 60 mL/hr at 05/09/20 0200     PRN Meds:.acetaminophen, albuterol, calcium chloride IVPB, calcium chloride IVPB, calcium chloride IVPB, hydrALAZINE, magnesium oxide, magnesium sulfate IVPB, magnesium sulfate IVPB, magnesium sulfate IVPB, magnesium sulfate IVPB, melatonin, ondansetron, potassium chloride in water, potassium chloride in water, potassium chloride in water, potassium chloride in water, potassium chloride, potassium chloride, potassium chloride, potassium chloride, sodium chloride 0.9%, sodium phosphate IVPB, sodium phosphate IVPB, sodium phosphate IVPB, sodium phosphate IVPB, sodium phosphate IVPB     Physical Exam  HEENT: Normocephalic, atraumatic, PERRL, Conjunctiva pink, no scleral icterus.   CVS: S1S2+, RRR, no murmurs, rubs or gallops, JVP: Normal.  LUNGS: Clear  ABDOMEN: Soft, NT, BS+  EXTREMITIES: No cyanosis, clubbing or edema  NEURO: AAO X 3.       Significant Labs:   BMP:   Recent Labs   Lab 05/07/20  1345 05/08/20 0305 05/09/20  0313    96 91    139 135*   K 3.6 3.2* 3.6    102 100   CO2 26 26 25   BUN 14 14 17   CREATININE 1.0 0.9 1.0   CALCIUM 9.1 8.9 8.4*   MG  --  2.0 2.0   , CMP   Recent Labs   Lab 05/07/20 1345 05/08/20 0305 05/09/20  0313    139 135*   K 3.6 3.2* 3.6    102 100   CO2 26 26 25    96 91   BUN 14 14 17   CREATININE 1.0 0.9 1.0   CALCIUM 9.1 8.9 8.4*   PROT 7.6  --   --    ALBUMIN 4.1  --   --    BILITOT 2.0*  --   --    ALKPHOS 101  --   --    AST 18  --   --    ALT 19  --   --    ANIONGAP 10 11 10   ESTGFRAFRICA >60.0 >60.0 >60.0   EGFRNONAA >60.0 >60.0 >60.0   , CBC   Recent Labs   Lab 05/07/20  1345 05/08/20  0305 05/09/20  0313   WBC 6.44 6.30 6.56   HGB 15.4 15.3 14.6   HCT 43.9 44.5 43.2   * 153 172   , INR No results for input(s): INR, PROTIME in the last 48 hours., Lipid Panel No results for input(s): CHOL, HDL, LDLCALC, TRIG, CHOLHDL in the last 48 hours. and Troponin   Recent Labs   Lab 05/07/20  1346  05/09/20  0313   TROPONINI <0.030 <0.030       Significant Imaging: Reviewed  Assessment and Plan:     IMPRESSION:  Left jaw pain associated with posterior neck pain and headache.  Etiology is likely musculoskeletal.  Presyncope.  Likely secondary to dehydration.  Atypical chest pain. Negative serial troponins.   Gastroenteritis. Improved.   History of nonischemic cardiomyopathy.  LVEF in January of 2020 was 30%.  Patient reportedly refused an ICD as he is a .  Left bundle-branch block.  Chronic.  Hypokalemia.    Reported history of CVA.  Hypertension.  Better controlled.  H/o Vitamin B12 deficiency.     PLAN:  1. Ok to discharge home from Cardiology stand point.   2. Follow up with Dr Manriquez in 2 weeks or sooner if needed.   3. Discharge medications discussed with Dr Bullard.   4. Medications compliance was emphasized.     Royal Masters MD  Cardiology  [facility]

## 2020-05-12 ENCOUNTER — HOSPITAL ENCOUNTER (OUTPATIENT)
Dept: PULMONOLOGY | Facility: HOSPITAL | Age: 55
Discharge: HOME | End: 2020-05-12
Attending: INTERNAL MEDICINE
Payer: MEDICAID

## 2020-05-12 DIAGNOSIS — R06.02 SOB (SHORTNESS OF BREATH): ICD-10-CM

## 2020-05-12 DIAGNOSIS — R76.8 ANTINEUTROPHIL CYTOPLASMIC ANTIBODY (ANCA) POSITIVE: ICD-10-CM

## 2020-05-12 PROCEDURE — 94010 BREATHING CAPACITY TEST: CPT

## 2020-05-12 PROCEDURE — 94727 GAS DIL/WSHOT DETER LNG VOL: CPT

## 2020-05-12 PROCEDURE — 94729 DIFFUSING CAPACITY: CPT

## 2020-05-14 ENCOUNTER — TELEPHONE (OUTPATIENT)
Dept: CARDIOLOGY | Facility: CLINIC | Age: 55
End: 2020-05-14

## 2020-05-14 ENCOUNTER — PATIENT OUTREACH (OUTPATIENT)
Dept: ADMINISTRATIVE | Facility: OTHER | Age: 55
End: 2020-05-14

## 2020-05-14 NOTE — TELEPHONE ENCOUNTER
----- Message from Tanesha Diane sent at 5/14/2020  9:19 AM CDT -----  Contact: pt  Pt would like to speak to the nurse regarding was in the hospital last week Tue-Sat,needing to discuss with her medication and if needing to come in see the ..[phone] (home)

## 2020-05-18 ENCOUNTER — OFFICE VISIT (OUTPATIENT)
Dept: CARDIOLOGY | Facility: CLINIC | Age: 55
End: 2020-05-18
Payer: MEDICAID

## 2020-05-18 VITALS
DIASTOLIC BLOOD PRESSURE: 73 MMHG | BODY MASS INDEX: 36.55 KG/M2 | OXYGEN SATURATION: 94 % | WEIGHT: 214.06 LBS | HEART RATE: 82 BPM | HEIGHT: 64 IN | SYSTOLIC BLOOD PRESSURE: 135 MMHG

## 2020-05-18 DIAGNOSIS — I16.0 HYPERTENSIVE URGENCY: Chronic | ICD-10-CM

## 2020-05-18 DIAGNOSIS — G45.9 TIA (TRANSIENT ISCHEMIC ATTACK): Chronic | ICD-10-CM

## 2020-05-18 DIAGNOSIS — E66.01 CLASS 2 SEVERE OBESITY DUE TO EXCESS CALORIES WITH SERIOUS COMORBIDITY AND BODY MASS INDEX (BMI) OF 37.0 TO 37.9 IN ADULT: ICD-10-CM

## 2020-05-18 DIAGNOSIS — M05.79 RHEUMATOID ARTHRITIS INVOLVING MULTIPLE SITES WITH POSITIVE RHEUMATOID FACTOR: ICD-10-CM

## 2020-05-18 DIAGNOSIS — E78.1 HYPERTRIGLYCERIDEMIA: ICD-10-CM

## 2020-05-18 DIAGNOSIS — E78.2 MIXED HYPERLIPIDEMIA: ICD-10-CM

## 2020-05-18 DIAGNOSIS — I44.7 LEFT BUNDLE BRANCH BLOCK: ICD-10-CM

## 2020-05-18 DIAGNOSIS — I42.8 NONISCHEMIC CARDIOMYOPATHY: Primary | ICD-10-CM

## 2020-05-18 DIAGNOSIS — E83.51 HYPOCALCEMIA: ICD-10-CM

## 2020-05-18 PROCEDURE — 99215 OFFICE O/P EST HI 40 MIN: CPT | Mod: S$PBB,,, | Performed by: INTERNAL MEDICINE

## 2020-05-18 PROCEDURE — 99215 PR OFFICE/OUTPT VISIT, EST, LEVL V, 40-54 MIN: ICD-10-PCS | Mod: S$PBB,,, | Performed by: INTERNAL MEDICINE

## 2020-05-18 PROCEDURE — 99213 OFFICE O/P EST LOW 20 MIN: CPT | Mod: PBBFAC,PO | Performed by: INTERNAL MEDICINE

## 2020-05-18 PROCEDURE — 99999 PR PBB SHADOW E&M-EST. PATIENT-LVL III: ICD-10-PCS | Mod: PBBFAC,,, | Performed by: INTERNAL MEDICINE

## 2020-05-18 PROCEDURE — 99999 PR PBB SHADOW E&M-EST. PATIENT-LVL III: CPT | Mod: PBBFAC,,, | Performed by: INTERNAL MEDICINE

## 2020-05-18 RX ORDER — SPIRONOLACTONE 25 MG/1
25 TABLET ORAL DAILY
Qty: 30 TABLET | Refills: 11 | Status: SHIPPED | OUTPATIENT
Start: 2020-05-18 | End: 2021-12-29

## 2020-05-18 NOTE — PATIENT INSTRUCTIONS
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
## 2020-05-18 NOTE — PROGRESS NOTES
" Patient ID:  Jarod Pierce is a 54 y.o. male who presents for {Misc; evaluation/follow-up:75517::"follow-up"} of Hospital Follow Up      Health literacy: {DESC; LOW/MEDIUM/HIGH:40866} medium  Activities: work  Nicotine: none  Alcohol: none  Illicit drugs: none  Cardiac symptoms: none  Home BP: yes  Medication compliance: yes  Diet: regular, diabetic, Mediterranean regular  Caffeine: 1 coke per day  Labs:   Last Echo: 2020  Last stress test: 2013  Cardiovascular angiogram:   EC2020  Fundoscopic exam:     No flowsheet data found.      HPI    ROS     Objective:    Physical Exam      Assessment:       No diagnosis found.     Plan:         There are no diagnoses linked to this encounter.            "

## 2020-05-19 DIAGNOSIS — E78.2 MIXED HYPERLIPIDEMIA: ICD-10-CM

## 2020-05-19 RX ORDER — ATORVASTATIN CALCIUM 40 MG/1
40 TABLET, FILM COATED ORAL DAILY
Qty: 90 TABLET | Refills: 3 | Status: SHIPPED | OUTPATIENT
Start: 2020-05-19 | End: 2021-06-10

## 2020-05-27 ENCOUNTER — OFFICE VISIT (OUTPATIENT)
Dept: RHEUMATOLOGY | Facility: CLINIC | Age: 55
End: 2020-05-27
Payer: MEDICAID

## 2020-05-27 ENCOUNTER — LAB VISIT (OUTPATIENT)
Dept: LAB | Facility: HOSPITAL | Age: 55
End: 2020-05-27
Attending: INTERNAL MEDICINE
Payer: MEDICAID

## 2020-05-27 VITALS
BODY MASS INDEX: 36.87 KG/M2 | WEIGHT: 214.81 LBS | DIASTOLIC BLOOD PRESSURE: 86 MMHG | SYSTOLIC BLOOD PRESSURE: 148 MMHG

## 2020-05-27 DIAGNOSIS — R76.8 ANTINEUTROPHIL CYTOPLASMIC ANTIBODY (ANCA) POSITIVE: Primary | ICD-10-CM

## 2020-05-27 DIAGNOSIS — M15.9 OSTEOARTHRITIS, GENERALIZED: ICD-10-CM

## 2020-05-27 DIAGNOSIS — Z79.899 ENCOUNTER FOR LONG-TERM (CURRENT) USE OF MEDICATIONS: ICD-10-CM

## 2020-05-27 DIAGNOSIS — Z01.84 ENCOUNTER FOR IMMUNOLOGICAL TEST: ICD-10-CM

## 2020-05-27 DIAGNOSIS — R76.8 ANTINEUTROPHIL CYTOPLASMIC ANTIBODY (ANCA) POSITIVE: ICD-10-CM

## 2020-05-27 DIAGNOSIS — M1A.0311 IDIOPATHIC CHRONIC GOUT OF RIGHT WRIST WITH TOPHUS: ICD-10-CM

## 2020-05-27 LAB
ALBUMIN SERPL BCP-MCNC: 4.6 G/DL (ref 3.5–5.2)
ALP SERPL-CCNC: 110 U/L (ref 55–135)
ALT SERPL W/O P-5'-P-CCNC: 18 U/L (ref 10–44)
ANION GAP SERPL CALC-SCNC: 7 MMOL/L (ref 8–16)
AST SERPL-CCNC: 17 U/L (ref 10–40)
BILIRUB SERPL-MCNC: 0.6 MG/DL (ref 0.1–1)
BILIRUB UR QL STRIP: NEGATIVE
BUN SERPL-MCNC: 17 MG/DL (ref 6–20)
CALCIUM SERPL-MCNC: 9.2 MG/DL (ref 8.7–10.5)
CHLORIDE SERPL-SCNC: 107 MMOL/L (ref 95–110)
CLARITY UR: CLEAR
CO2 SERPL-SCNC: 28 MMOL/L (ref 23–29)
COLOR UR: YELLOW
CREAT SERPL-MCNC: 1 MG/DL (ref 0.5–1.4)
CRP SERPL-MCNC: 0.21 MG/DL
ERYTHROCYTE [SEDIMENTATION RATE] IN BLOOD BY WESTERGREN METHOD: 15 MM/HR (ref 0–10)
EST. GFR  (AFRICAN AMERICAN): >60 ML/MIN/1.73 M^2
EST. GFR  (NON AFRICAN AMERICAN): >60 ML/MIN/1.73 M^2
GLUCOSE SERPL-MCNC: 89 MG/DL (ref 70–110)
GLUCOSE UR QL STRIP: NEGATIVE
HGB UR QL STRIP: ABNORMAL
KETONES UR QL STRIP: NEGATIVE
LEUKOCYTE ESTERASE UR QL STRIP: NEGATIVE
NITRITE UR QL STRIP: NEGATIVE
PH UR STRIP: 5 [PH] (ref 5–8)
POTASSIUM SERPL-SCNC: 3.9 MMOL/L (ref 3.5–5.1)
PROT SERPL-MCNC: 7.7 G/DL (ref 6–8.4)
PROT UR QL STRIP: NEGATIVE
SODIUM SERPL-SCNC: 142 MMOL/L (ref 136–145)
SP GR UR STRIP: 1.02 (ref 1–1.03)
URN SPEC COLLECT METH UR: ABNORMAL
UROBILINOGEN UR STRIP-ACNC: NEGATIVE EU/DL

## 2020-05-27 PROCEDURE — 86140 C-REACTIVE PROTEIN: CPT

## 2020-05-27 PROCEDURE — 81003 URINALYSIS AUTO W/O SCOPE: CPT

## 2020-05-27 PROCEDURE — 80053 COMPREHEN METABOLIC PANEL: CPT

## 2020-05-27 PROCEDURE — 99214 PR OFFICE/OUTPT VISIT, EST, LEVL IV, 30-39 MIN: ICD-10-PCS | Mod: S$GLB,,, | Performed by: INTERNAL MEDICINE

## 2020-05-27 PROCEDURE — 36415 COLL VENOUS BLD VENIPUNCTURE: CPT

## 2020-05-27 PROCEDURE — 30000890 LABCORP MISCELLANEOUS TEST

## 2020-05-27 PROCEDURE — 83520 IMMUNOASSAY QUANT NOS NONAB: CPT

## 2020-05-27 PROCEDURE — 99214 OFFICE O/P EST MOD 30 MIN: CPT | Mod: S$GLB,,, | Performed by: INTERNAL MEDICINE

## 2020-05-27 PROCEDURE — 85651 RBC SED RATE NONAUTOMATED: CPT

## 2020-05-27 RX ORDER — HYDROCHLOROTHIAZIDE 12.5 MG/1
12.5 CAPSULE ORAL DAILY
COMMUNITY
End: 2020-12-22 | Stop reason: SDUPTHER

## 2020-05-27 NOTE — PROGRESS NOTES
[facility] RHEUMATOLOGY            PROGRESS NOTE      Subjective:       Patient ID:   NAME: Jarod Pierce : 1965     54 y.o. male    Referring Doc: No ref. provider found  Other Physicians:    Chief Complaint:  Gout      History of Present Illness:     Patient returns today for a regularly scheduled follow-up visit for  gout and positive ANCA test.     The patient  was last seen in the office 2 months ago.  Since then ,he has  numerous imaging studies ,blood tests,ER visit .  Patient states  he was seen in emergency room for generalized fatigue , lightheadedness.  He was tested for COVID and was negative.  .  No fevers.  Occasional shortness of breath.  No chest pains.  No rashes.  Has noticed increased fluid retention in his lower extremities over the past few wks.  Slight fatigue.No cough  No joint swelling but some arthralgias in  PIP and D IP joints.  No GI complaints  Pt is not a good historian ,rambling a bit  ROS:   GEN:  No  fever, night sweats . weight is stable   + fatigue  SKIN: no rashes, no bruising, no ulcerations, no Raynaud's  HEENT: no HA's, No visual changes, no mucosal ulcers, no sicca symptoms,  CV:   no CP, SOB, PND,+ occ  GARNETT, no orthopnea, no palpitations  PULM: normal with no SOB, cough, hemoptysis, sputum or pleuritic pain  GI:  no abdominal pain, nausea, vomiting, constipation, diarrhea, melanotic stools, BRBPR, hematemesis, no dysphagia  :   no dysuria  NEURO: no paresthesias, headaches, visual disturbances, muscle weakness  MUSCULOSKELETAL:no joint swelling, prolonged AM stiffness, no back pain, no muscle pain  Allergies:  Review of patient's allergies indicates:   Allergen Reactions    Codeine      Other reaction(s): Hives    Lisinopril      Dry coughing    Phenytoin sodium extended      Other reaction(s): leg cramps       Medications:    Current Outpatient Medications:     albuterol (PROAIR HFA) 90 mcg/actuation inhaler, Inhale 2 puffs into the lungs every 6 (six)  hours as needed for Wheezing. Rescue, Disp: 18 g, Rfl: 2    allopurinoL (ZYLOPRIM) 100 MG tablet, Take 1 tablet (100 mg total) by mouth once daily., Disp: 30 tablet, Rfl: 1    amLODIPine (NORVASC) 10 MG tablet, Take 1 tablet (10 mg total) by mouth every evening., Disp: 30 tablet, Rfl: 0    aspirin 81 MG Chew, Take 1 tablet (81 mg total) by mouth once daily., Disp: 90 tablet, Rfl: 3    atorvastatin (LIPITOR) 40 MG tablet, Take 1 tablet (40 mg total) by mouth once daily., Disp: 90 tablet, Rfl: 3    baclofen (LIORESAL) 10 MG tablet, Take 1 tablet (10 mg total) by mouth every evening., Disp: 30 tablet, Rfl: 6    gabapentin (NEURONTIN) 300 MG capsule, Take 1 capsule (300 mg total) by mouth every evening., Disp: 30 capsule, Rfl: 11    hydrALAZINE (APRESOLINE) 25 MG tablet, Take 1 tablet (25 mg total) by mouth every 8 (eight) hours., Disp: 90 tablet, Rfl: 0    hydroCHLOROthiazide (MICROZIDE) 12.5 mg capsule, Take 12.5 mg by mouth once daily., Disp: , Rfl:     isosorbide mononitrate (IMDUR) 30 MG 24 hr tablet, Take 1 tablet (30 mg total) by mouth once daily., Disp: 30 tablet, Rfl: 0    spironolactone (ALDACTONE) 25 MG tablet, Take 1 tablet (25 mg total) by mouth once daily., Disp: 30 tablet, Rfl: 11    PMHx/PSHx Updates:          Objective:     Vitals:  Blood pressure (!) 148/86, weight 97.4 kg (214 lb 12.8 oz).    Physical Examination:   GEN: no apparent distress, comfortable; AAOx3  SKIN: no rashes,no ulceration, no Raynaud's, no petechiae, no SQ nodules,  HEAD: normal  EYES: no pallor, no icterus,  NECK: no masses, thyroid normal, trachea midline, no LAD/LN's, supple  CV: RRR with no murmur; l S1 and S2 reg. ,no gallop no rubs,   CHEST: Normal respiratory effort; CTAB; normal breath sounds; no wheeze or crackles  ABDOM: nontender and nondistended; soft; no masses; no rebound/guarding  MUSC/Skeletal: ROM normal; no crepitus; joints without synovitis,  no deformities  No joint swelling or tenderness of PIP, MCP,  wrist, elbow, shoulder, or knee joints  EXTREM: no clubbing, cyanosis, + 1 pitting  edema,normal  pulses   NEURO: grossly intact; motor WNL; AAOx3;   PSYCH: normal mood, affect and behavior  LYMPH: normal cervical, supraclavicular          Labs:   Lab Results   Component Value Date    WBC 6.56 05/09/2020    HGB 14.6 05/09/2020    HCT 43.2 05/09/2020    MCV 94 05/09/2020     05/09/2020    CMP  @LASTLAB(NA,K,CL,CO2,GLU,BUN,Creatinine,Calcium,PROT,Albumin,Bilitot,Alkphos,AST,ALT,CRP,ESR,RF,CCP,TEJINDER,SSA,CPK,uric acid) )@  I have reviewed all available lab results and radiology reports.    Radiology/Diagnostic Studies:        Assessment/Plan:   (1) 54 y.o. male with diagnosis of Hx of gout. stable  Last uric acid a few wks ago 6.8. Compliant with Allopurinol  2) Hx + proteinase 3 antibody:Done initially sec mild hematuria.No other signs or symptoms of vasculitis.  Recent CT chest  with no acute pulm changes.PFT's with normal DLCO  Normal GFR,UA with no proteinuria,Sl hematuria initially,normal UA 1 month ago, sl hematuria on 5/7/2020  Recent CT Scan of Abd/pelvis: normal  MRA brain ;ok  Will monitor closely    Discussion:     I have explained all of the above in detail and the patient understands all of the current recommendation(s). I have answered all questions to the best of my ability and to their complete satisfaction.       The patient is to continue with the current management plan         RTC in   2 months      Electronically signed by Bjorn Nolasco MD

## 2020-05-28 DIAGNOSIS — G62.9 NEUROPATHY: ICD-10-CM

## 2020-05-28 DIAGNOSIS — M54.2 NECK PAIN: ICD-10-CM

## 2020-05-28 DIAGNOSIS — R29.2 HYPOREFLEXIA: Primary | ICD-10-CM

## 2020-06-01 ENCOUNTER — HOSPITAL ENCOUNTER (OUTPATIENT)
Dept: RADIOLOGY | Facility: HOSPITAL | Age: 55
Discharge: HOME | End: 2020-06-01
Attending: CLINICAL NURSE SPECIALIST
Payer: MEDICAID

## 2020-06-01 DIAGNOSIS — G62.9 NEUROPATHY: ICD-10-CM

## 2020-06-01 DIAGNOSIS — R29.2 HYPOREFLEXIA: ICD-10-CM

## 2020-06-01 DIAGNOSIS — M54.2 NECK PAIN: ICD-10-CM

## 2020-06-01 LAB
LABCORP MISC TEST CODE: NORMAL
LABCORP MISC TEST NAME: NORMAL
LABCORP MISCELLANEOUS TEST: NORMAL

## 2020-06-01 PROCEDURE — 72141 MRI NECK SPINE W/O DYE: CPT | Mod: TC,PO

## 2020-06-06 ENCOUNTER — HOSPITAL ENCOUNTER (EMERGENCY)
Facility: HOSPITAL | Age: 55
Discharge: HOME | End: 2020-06-06
Attending: EMERGENCY MEDICINE
Payer: MEDICAID

## 2020-06-06 VITALS
HEART RATE: 79 BPM | RESPIRATION RATE: 16 BRPM | OXYGEN SATURATION: 98 % | TEMPERATURE: 99 F | SYSTOLIC BLOOD PRESSURE: 148 MMHG | DIASTOLIC BLOOD PRESSURE: 80 MMHG | WEIGHT: 215 LBS | BODY MASS INDEX: 33.74 KG/M2 | HEIGHT: 67 IN

## 2020-06-06 DIAGNOSIS — M79.89 SWELLING OF HAND, UNSPECIFIED LATERALITY: Primary | ICD-10-CM

## 2020-06-06 PROCEDURE — 96372 THER/PROPH/DIAG INJ SC/IM: CPT | Mod: 59

## 2020-06-06 PROCEDURE — 63600175 PHARM REV CODE 636 W HCPCS: Performed by: EMERGENCY MEDICINE

## 2020-06-06 PROCEDURE — 99284 EMERGENCY DEPT VISIT MOD MDM: CPT | Mod: 25

## 2020-06-06 RX ORDER — PREDNISONE 20 MG/1
40 TABLET ORAL DAILY
Qty: 10 TABLET | Refills: 0 | Status: SHIPPED | OUTPATIENT
Start: 2020-06-06 | End: 2020-06-11

## 2020-06-06 RX ORDER — DEXAMETHASONE SODIUM PHOSPHATE 4 MG/ML
8 INJECTION, SOLUTION INTRA-ARTICULAR; INTRALESIONAL; INTRAMUSCULAR; INTRAVENOUS; SOFT TISSUE
Status: COMPLETED | OUTPATIENT
Start: 2020-06-06 | End: 2020-06-06

## 2020-06-06 RX ADMIN — DEXAMETHASONE SODIUM PHOSPHATE 8 MG: 4 INJECTION, SOLUTION INTRA-ARTICULAR; INTRALESIONAL; INTRAMUSCULAR; INTRAVENOUS; SOFT TISSUE at 01:06

## 2020-06-06 NOTE — ED PROVIDER NOTES
Encounter Date: 6/6/2020       History     Chief Complaint   Patient presents with    Hand Pain     LEFT, SWELLING NOTED     Patient presents complaining left hand pain and swelling.  Patient has a history of gout.  Patient states he works with his hands a lot.  Patient states yesterday he had no hand pain but this morning he woke up with swelling to the dorsal part of the hand.  Patient requesting in this and.  At the worst symptoms are moderate.        Review of patient's allergies indicates:   Allergen Reactions    Codeine      Other reaction(s): Hives    Lisinopril      Dry coughing    Phenytoin sodium extended      Other reaction(s): leg cramps     Past Medical History:   Diagnosis Date    Allergic rhinitis due to allergen     Arthritis     Cardiomyopathy     Gout     Hypertension     Left bundle branch block      Past Surgical History:   Procedure Laterality Date    WISDOM TOOTH EXTRACTION       Family History   Problem Relation Age of Onset    Heart disease Father     Hypertension Father     Gout Father     Cancer Sister         lung    Heart attack Neg Hx      Social History     Tobacco Use    Smoking status: Never Smoker    Smokeless tobacco: Never Used   Substance Use Topics    Alcohol use: No    Drug use: No     Review of Systems   All other systems reviewed and are negative.      Physical Exam     Initial Vitals [06/06/20 1241]   BP Pulse Resp Temp SpO2   (!) 148/75 84 16 98.5 °F (36.9 °C) 98 %      MAP       --         Physical Exam    Nursing note and vitals reviewed.  Constitutional: He appears well-developed and well-nourished. He is not diaphoretic. No distress.   HENT:   Head: Normocephalic and atraumatic.   Eyes: EOM are normal.   Neck: Normal range of motion. Neck supple.   Cardiovascular: Intact distal pulses.   Pulmonary/Chest: No respiratory distress.   Musculoskeletal:   The left hand is swollen over dorsal surface without erythema, warmth, cellulitis.  There is full range  of motion of all digits.  There is kanavels signs.   Neurological: He is alert.   Skin: Skin is warm and dry.   Psychiatric: He has a normal mood and affect. His behavior is normal. Judgment and thought content normal.         ED Course   Procedures  Labs Reviewed - No data to display       Imaging Results          X-Ray Hand 3 view Left (Final result)  Result time 06/06/20 13:01:10    Final result by Greg Keenan MD (06/06/20 13:01:10)                 Narrative:    CLINICAL HISTORY:  54 years (1965) Male SWELLING    TECHNIQUE:  XR HAND COMPLETE 3 VIEW LEFT. 3 view(s) obtained.    COMPARISON:  None available.    FINDINGS:  No acute fracture or dislocation. The joints and interspaces appear to  be maintained. Soft tissues appear radiographically within normal  limits and no radiopaque foreign body is seen.    IMPRESSION:  No acute fracture or dislocation.                  .    Electronically Signed by ALLEN Forte on 6/6/2020 1:02 PM                               Medical Decision Making:   ED Management:  At this juncture think most likely patient's exam is consistent with gout however certainly infection is considered and advised patient to see me again tomorrow for repeat exam.  Will give patient steroid shot today and prescription for prednisone as he does think it is a gout flare.  Patient be discharged stable condition.  Detailed return precautions discussed.                                 Clinical Impression:       ICD-10-CM ICD-9-CM   1. Swelling of hand, unspecified laterality M79.89 729.81             ED Disposition Condition    Discharge Stable        ED Prescriptions     Medication Sig Dispense Start Date End Date Auth. Provider    predniSONE (DELTASONE) 20 MG tablet Take 2 tablets (40 mg total) by mouth once daily. for 5 days 10 tablet 6/6/2020 6/11/2020 Sudeep Chris MD        Follow-up Information     Follow up With Specialties Details Why Contact Info    Sudeep Chris MD  Emergency Medicine In 1 day  [address]  [address]  [place] [address]  [phone]                                       Sudeep Chris MD  06/06/20 2950

## 2020-06-06 NOTE — DISCHARGE INSTRUCTIONS
STEROID SHOT TODAY SHOULD HELP WITH THE HAND SWELLING OF THIS IS GOUT.  IF THIS IS INFECTIOUS RETURN FOR REPEAT EVALUATION TOMORROW.  INCREASING PAIN, REDNESS, PUS, FEVER INDICATES INFECTION.  EITHER WAY PLEASE COME SEE ME TOMORROW FOR REPEAT EVALUATION.

## 2020-06-11 DIAGNOSIS — M54.2 NECK PAIN: Primary | ICD-10-CM

## 2020-06-16 ENCOUNTER — PATIENT OUTREACH (OUTPATIENT)
Dept: ADMINISTRATIVE | Facility: HOSPITAL | Age: 55
End: 2020-06-16

## 2020-06-16 DIAGNOSIS — I16.0 HYPERTENSIVE URGENCY: Primary | Chronic | ICD-10-CM

## 2020-06-16 DIAGNOSIS — I42.8 NONISCHEMIC CARDIOMYOPATHY: ICD-10-CM

## 2020-06-16 RX ORDER — ISOSORBIDE MONONITRATE 30 MG/1
30 TABLET, EXTENDED RELEASE ORAL DAILY
Qty: 90 TABLET | Refills: 3 | Status: SHIPPED | OUTPATIENT
Start: 2020-06-16 | End: 2020-07-16

## 2020-06-16 NOTE — LETTER
June 16, 2020    Jarod Pierce  [address] [address]             [facility]  [address]  [facility] [address]  Phone: [phone] [facility] is committed to your overall health.  To help you get the most out of each of your visits, we will review your information to make sure you are up to date on all of your recommended tests and/or procedures.      Dr. Idris Fuller MD has found that your chart shows you may be due for a:    COLORECTAL CANCER SCREENING    Our records indicate that you are overdue for your colorectal cancer screening.  After reviewing your chart, it has been documented that a FIT KIT (colorectal cancer screening kit) was given at a clinic visit or  mailed to you,  but the lab has yet to receive the kit so that we may update your chart.   This is a friendly reminder to complete this test (the instructions will tell you how) and then return your sample in the postage-paid return envelope within 24 hours of collection.  Please remember to put the collection date on your sample!    If your test results are negative, you won't need testing again for another year.  If results show you need more testing, we will call you with next steps.    Also, if you have misplaced the FITKIT let us know and we will mail you another one.       If you have had any of the above done at another facility, please bring the records or information with you so that your record at [facility] will be complete.  If you would like to schedule any of these, please contact the clinic at [phone].    If you are currently taking medication, please bring it with you to your appointment for review.    Also, if you have any type of Advanced Directives, please bring them with you to your office visit so we may scan them into your chart.    Thank You,    Your [facility] Team,  MD Leanna Smith LPN Clinical Care Coordinator  [facility] [address]  [place] [address]  Phone [phone]  Fax [phone]

## 2020-06-16 NOTE — PROGRESS NOTES
Chart review completed 06/16/2020.  Care Everywhere updates requested and reviewed.  Immunizations reconciled. Media reviewed.     [facility], [facility], and [employer] reviewed    REMINDER LETTER MAILED TO PT

## 2020-06-21 ENCOUNTER — PATIENT OUTREACH (OUTPATIENT)
Dept: ADMINISTRATIVE | Facility: OTHER | Age: 55
End: 2020-06-21

## 2020-06-22 ENCOUNTER — OFFICE VISIT (OUTPATIENT)
Dept: PHYSICAL MEDICINE AND REHAB | Facility: CLINIC | Age: 55
End: 2020-06-22
Payer: MEDICAID

## 2020-06-22 VITALS
SYSTOLIC BLOOD PRESSURE: 144 MMHG | WEIGHT: 215 LBS | DIASTOLIC BLOOD PRESSURE: 76 MMHG | HEART RATE: 80 BPM | HEIGHT: 67 IN | BODY MASS INDEX: 33.74 KG/M2

## 2020-06-22 DIAGNOSIS — M54.2 CERVICALGIA: ICD-10-CM

## 2020-06-22 DIAGNOSIS — M79.10 MYALGIA: ICD-10-CM

## 2020-06-22 PROCEDURE — 20553 PR INJECT TRIGGER POINTS, > 3: ICD-10-PCS | Mod: S$PBB,,, | Performed by: PHYSICAL MEDICINE & REHABILITATION

## 2020-06-22 PROCEDURE — 99999 PR PBB SHADOW E&M-EST. PATIENT-LVL III: ICD-10-PCS | Mod: PBBFAC,,, | Performed by: PHYSICAL MEDICINE & REHABILITATION

## 2020-06-22 PROCEDURE — 99214 PR OFFICE/OUTPT VISIT, EST, LEVL IV, 30-39 MIN: ICD-10-PCS | Mod: 25,S$PBB,, | Performed by: PHYSICAL MEDICINE & REHABILITATION

## 2020-06-22 PROCEDURE — 20553 NJX 1/MLT TRIGGER POINTS 3/>: CPT | Mod: PBBFAC,PN | Performed by: PHYSICAL MEDICINE & REHABILITATION

## 2020-06-22 PROCEDURE — 99213 OFFICE O/P EST LOW 20 MIN: CPT | Mod: PBBFAC,PN,25 | Performed by: PHYSICAL MEDICINE & REHABILITATION

## 2020-06-22 PROCEDURE — 99999 PR PBB SHADOW E&M-EST. PATIENT-LVL III: CPT | Mod: PBBFAC,,, | Performed by: PHYSICAL MEDICINE & REHABILITATION

## 2020-06-22 PROCEDURE — 20553 NJX 1/MLT TRIGGER POINTS 3/>: CPT | Mod: S$PBB,,, | Performed by: PHYSICAL MEDICINE & REHABILITATION

## 2020-06-22 PROCEDURE — 99214 OFFICE O/P EST MOD 30 MIN: CPT | Mod: 25,S$PBB,, | Performed by: PHYSICAL MEDICINE & REHABILITATION

## 2020-06-22 RX ORDER — BUTALBITAL, ACETAMINOPHEN AND CAFFEINE 300; 40; 50 MG/1; MG/1; MG/1
CAPSULE ORAL
COMMUNITY
End: 2022-09-21

## 2020-06-22 RX ORDER — BACLOFEN 10 MG/1
10 TABLET ORAL 2 TIMES DAILY
Qty: 60 TABLET | Refills: 6 | Status: SHIPPED | OUTPATIENT
Start: 2020-06-22 | End: 2021-02-17

## 2020-06-22 RX ORDER — AMITRIPTYLINE HYDROCHLORIDE 10 MG/1
TABLET, FILM COATED ORAL
COMMUNITY
Start: 2020-06-06 | End: 2021-02-17

## 2020-06-22 RX ORDER — LIDOCAINE HYDROCHLORIDE 10 MG/ML
3 INJECTION INFILTRATION; PERINEURAL ONCE
Status: COMPLETED | OUTPATIENT
Start: 2020-06-22 | End: 2020-06-22

## 2020-06-22 RX ORDER — AMITRIPTYLINE HYDROCHLORIDE 10 MG/1
10 TABLET, FILM COATED ORAL NIGHTLY
COMMUNITY
Start: 2020-05-28 | End: 2023-02-09

## 2020-06-22 RX ADMIN — LIDOCAINE HYDROCHLORIDE 3 ML: 10 INJECTION, SOLUTION INFILTRATION; PERINEURAL at 11:06

## 2020-06-22 NOTE — PROGRESS NOTES
HPI:  Patient is a 54 y.o. year old male w. Neck pain. He has been taking baclofen which helps. He has difficulty turning his neck. I had also started him on neurontin which helps w. Shooting pain down his arms.  Moderate bilateral carpal tunnel syndrome  Mild bilateral ulnar neuropathies at elbows  Superimposed sensorimotor predominantly axonal neuropathy  In the interim, he has been evaluated by orthopedic surgery and he received a cortisone injection to his carpal tunnels but relief did not last. He will follow up w. Them for surgical reccs    Labs  egfr cr lfts gluc nl    Imaging  MRI Cervical Spine Without Contrast  Order: [id number]  Status:  Final result   Visible to patient:  Yes (Patient Portal) Next appt:  06/30/2020 at 09:40 AM in Family Medicine (Idris Fuller MD) Dx:  Neck pain; Neuropathy; Hyporeflexia  Details    Reading Physician Reading Date Result Priority   Greg Keenan MD  [phone] 6/1/2020       Narrative & Impression     CLINICAL HISTORY:  54 years (1965) Male M54.2 G62.9 R29.2     TECHNIQUE:  MRI CERVICAL SPINE WITHOUT CONTRAST. 160 images obtained. MRI of the  cervical spine was performed utilizing 1.5 T magnet.     CONTRAST:  No contrast was administered.     COMPARISON:  Most recent radiograph of the cervical spine from June 26, 2018.     FINDINGS:  The prevertebral soft tissues are normal. Alignment is anatomic.  Individual vertebral height is maintained. Marrow signal is within  normal limits. The spinal cord and cervicomedullary junction are  within normal limits.     At C2-3, the disc demonstrates mild disc height loss with a relatively  normal posterior contour, and a tiny right paracentral posterior disc  protrusion (axial image 11). There is severe right facet arthropathy  with ankylosis across the facet joint. There is mild left facet  arthropathy. Tiny right sided uncovertebral osteophytes are seen. The  dorsal margin of the disc appears to touch the ventral  margin of the  spinal cord. These findings in combination cause moderate right and  mild left neural foraminal stenosis. There is only very mild spinal  canal stenosis.     At C3-4, the disc demonstrates mild disc height loss with a small  broad-based posterior disc bulge. There is severe left and mild right  facet arthropathy. There is a large left and tiny right uncovertebral  osteophyte. These findings in combination cause severe left neural  foraminal stenosis and mild right neural foraminal stenosis. There is  mild to moderate overall spinal canal stenosis with mild flattening of  the spinal cord secondary to disc osteophyte complex.     At C4-5, the disc demonstrates mild disc height loss with a small  broad-based posterior disc bulge. There is moderate left and mild  right facet arthropathy with tiny bilateral uncovertebral osteophytes.  This causes moderate left and mild right neural foraminal stenosis.  There is minimal spinal canal stenosis.     At C5-6, demonstrates moderate disc height loss with a moderate-sized  broad-based posterior disc bulge. There is no facet arthropathy but  there are small bilateral uncovertebral osteophytes. This causes  severe bilateral neural foraminal stenosis and mild spinal canal  stenosis. The dorsal margin and this may touch and mildly effaces the  ventral margin of the spinal cord.     At C6-7, disc demonstrates moderate disc height loss with a  moderate-sized broad-based posterior disc bulge. There is mild  bilateral facet arthropathy and tiny bilateral uncovertebral  osteophytes. This causes moderate left greater than right neural  foraminal stenosis. There is no spinal canal stenosis.     At C7-T1, the disc demonstrates mild disc height loss with a  relatively normal posterior contour. There is no facet arthropathy or  uncovertebral hypertrophy in the spinal canal/neural foramen are  patent.     IMPRESSION:  Multilevel degenerative disc disease and facet arthropathy in  the  cervical spine as outlined in detail above most pronounced at C3-C4,  C4-C5 and C5-C6 and C6-C7.              X-Ray Cervical Spine Complete 5 view  Order: [id number]  Status:  Final result   Visible to patient:  Yes (Patient Portal) Next appt:  06/30/2020 at 09:40 AM in Family Medicine (Idris Fuller MD) Dx:  Cervicalgia  Details    Reading Physician Reading Date Result Priority   Billie Eastman MD  [phone] 2/15/2020 Routine      Narrative & Impression     EXAMINATION:  XR CERVICAL SPINE COMPLETE 5 VIEW     CLINICAL HISTORY:  . Cervicalgia     TECHNIQUE:  AP, Lateral, bilateral oblique and open mouth views of the cervical spine were performed.     COMPARISON:  Cervical spine-06/26/2018.     FINDINGS:  There is multilevel degenerative change of the cervical spine in the form of marginal osteophyte formation, degenerative facet arthropathy, and disc space narrowing.  There is right-sided neuroforaminal stenosis present at C5-C6.  There is severe left-sided neuroforaminal stenosis present at C3-C4 which could produce symptoms referable to the left C4 nerve.  There is also mild left neuroforaminal stenosis present at C6-C7.  No spondylolisthesis.  No cervical spine fracture or osseous destructive process.  No widening of the anterior atlantodental interval.  No prevertebral soft tissue swelling.  The visualized soft tissues of the neck are unremarkable.  The visualized lung apices are unremarkable.     Impression:     Degenerative change of the cervical spine resulting in multilevel neuroforaminal stenosis, most pronounced at C3-C4 on the left.  Please correlate clinically for symptoms referable to the left C4 nerve.  There is also right neuroforaminal stenosis at C5-C6 which could produce symptoms referable to the right C6 nerve.                Past Medical History:   Diagnosis Date    Allergic rhinitis due to allergen     Arthritis     Cardiomyopathy     Gout     Hypertension     Left bundle  branch block      Past Surgical History:   Procedure Laterality Date    WISDOM TOOTH EXTRACTION       Family History   Problem Relation Age of Onset    Heart disease Father     Hypertension Father     Gout Father     Cancer Sister         lung    Heart attack Neg Hx      Social History     Socioeconomic History    Marital status: Single     Spouse name: Not on file    Number of children: Not on file    Years of education: Not on file    Highest education level: Not on file   Occupational History     Employer: [employer]   Social Needs    Financial resource strain: Not on file    Food insecurity     Worry: Not on file     Inability: Not on file    Transportation needs     Medical: Not on file     Non-medical: Not on file   Tobacco Use    Smoking status: Never Smoker    Smokeless tobacco: Never Used   Substance and Sexual Activity    Alcohol use: No    Drug use: No    Sexual activity: Not on file   Lifestyle    Physical activity     Days per week: Not on file     Minutes per session: Not on file    Stress: Not on file   Relationships    Social connections     Talks on phone: Not on file     Gets together: Not on file     Attends Anabaptism service: Not on file     Active member of club or organization: Not on file     Attends meetings of clubs or organizations: Not on file     Relationship status: Not on file   Other Topics Concern    Not on file   Social History Narrative    Not on file       Review of patient's allergies indicates:   Allergen Reactions    Codeine      Other reaction(s): Hives    Codeine phosphate      Other reaction(s): Unknown    Lisinopril      Dry coughing    Phenytoin sodium extended      Other reaction(s): leg cramps       Current Outpatient Medications:     albuterol (PROAIR HFA) 90 mcg/actuation inhaler, Inhale 2 puffs into the lungs every 6 (six) hours as needed for Wheezing. Rescue, Disp: 18 g, Rfl: 2    allopurinoL (ZYLOPRIM) 100 MG tablet, Take 1  tablet (100 mg total) by mouth once daily., Disp: 30 tablet, Rfl: 1    amitriptyline (ELAVIL) 10 MG tablet, 1 tablet at bedtime, Disp: , Rfl:     amitriptyline (ELAVIL) 10 MG tablet, , Disp: , Rfl:     amLODIPine (NORVASC) 10 MG tablet, TAKE 1 TABLET BY MOUTH ONCE DAILY IN THE EVENING, Disp: 30 tablet, Rfl: 0    aspirin 81 MG Chew, Take 1 tablet (81 mg total) by mouth once daily., Disp: 90 tablet, Rfl: 3    atorvastatin (LIPITOR) 40 MG tablet, Take 1 tablet (40 mg total) by mouth once daily., Disp: 90 tablet, Rfl: 3    butalbital-acetaminophen-caff (FIORICET) -40 mg Cap, 1 capsule as needed, Disp: , Rfl:     gabapentin (NEURONTIN) 300 MG capsule, Take 1 capsule (300 mg total) by mouth every evening., Disp: 30 capsule, Rfl: 11    hydroCHLOROthiazide (MICROZIDE) 12.5 mg capsule, Take 12.5 mg by mouth once daily., Disp: , Rfl:     isosorbide mononitrate (IMDUR) 30 MG 24 hr tablet, Take 1 tablet by mouth once daily, Disp: 30 tablet, Rfl: 0    isosorbide mononitrate (IMDUR) 30 MG 24 hr tablet, Take 1 tablet (30 mg total) by mouth once daily., Disp: 90 tablet, Rfl: 3    spironolactone (ALDACTONE) 25 MG tablet, Take 1 tablet (25 mg total) by mouth once daily., Disp: 30 tablet, Rfl: 11    baclofen (LIORESAL) 10 MG tablet, Take 1 tablet (10 mg total) by mouth every evening., Disp: 30 tablet, Rfl: 6    hydrALAZINE (APRESOLINE) 25 MG tablet, Take 1 tablet (25 mg total) by mouth every 8 (eight) hours., Disp: 90 tablet, Rfl: 0      Review of Systems  No nausea, vomiting, fevers, Chills , contipation, diarrhea or sweats    Physical Exam:      Vitals:    06/22/20 1011   BP: (!) 144/76   Pulse: 80     alert and oriented ×4 follows commands answers all questions appropriately,affect wnl  Manual muscle test 5 out of 5 sensation to light touch grossly intact  + tenderness cervical paraspinals   Nl gait  +spurling's left  -mayuri's  Dec cervical ROM  babinsky down  No clonus  DTR's symmetric 2+  No  "C/C/E        Assessment:  Cervicalgia DDD w.  recurrent musc spasms  Cervical radiculitis     Plan:    tpi's of cervical paraspinals today to help relieve muscular spasms  Inc baclofen to 10mg bid  Cont. neurontin  rtc 2wks    PROCEDURE NOTE:Risk and benefit of trigger point injections given to pt. Injections performed w. A" 25G needle after sterile prep w. chlorohexedine, verbal consent obtained . NO complications. b/l trapezius, splenius cap and lev scapulae were injected with a total of 3ML of lidocaine 1% to each side              "

## 2020-07-01 DIAGNOSIS — M54.16 LUMBAR RADICULOPATHY: Primary | ICD-10-CM

## 2020-07-02 ENCOUNTER — PATIENT OUTREACH (OUTPATIENT)
Dept: ADMINISTRATIVE | Facility: OTHER | Age: 55
End: 2020-07-02

## 2020-07-02 NOTE — PROGRESS NOTES
Patient's chart was reviewed for overdue [facility] ( Proactive [facility] Encounters) Topics  Requested updates within Care Everywhere.  Patient was not found in LINKS.   Health Maintenance was updated.

## 2020-07-06 ENCOUNTER — OFFICE VISIT (OUTPATIENT)
Dept: PHYSICAL MEDICINE AND REHAB | Facility: CLINIC | Age: 55
End: 2020-07-06
Payer: MEDICAID

## 2020-07-06 VITALS
HEIGHT: 67 IN | SYSTOLIC BLOOD PRESSURE: 140 MMHG | BODY MASS INDEX: 33.74 KG/M2 | WEIGHT: 215 LBS | HEART RATE: 83 BPM | DIASTOLIC BLOOD PRESSURE: 79 MMHG

## 2020-07-06 DIAGNOSIS — M79.10 MYALGIA: ICD-10-CM

## 2020-07-06 DIAGNOSIS — M54.2 CERVICALGIA: ICD-10-CM

## 2020-07-06 PROCEDURE — 99999 PR PBB SHADOW E&M-EST. PATIENT-LVL III: ICD-10-PCS | Mod: PBBFAC,,, | Performed by: PHYSICAL MEDICINE & REHABILITATION

## 2020-07-06 PROCEDURE — 20553 PR INJECT TRIGGER POINTS, > 3: ICD-10-PCS | Mod: S$PBB,,, | Performed by: PHYSICAL MEDICINE & REHABILITATION

## 2020-07-06 PROCEDURE — 99213 OFFICE O/P EST LOW 20 MIN: CPT | Mod: S$PBB,25,, | Performed by: PHYSICAL MEDICINE & REHABILITATION

## 2020-07-06 PROCEDURE — 20553 NJX 1/MLT TRIGGER POINTS 3/>: CPT | Mod: PBBFAC,PN | Performed by: PHYSICAL MEDICINE & REHABILITATION

## 2020-07-06 PROCEDURE — 99999 PR PBB SHADOW E&M-EST. PATIENT-LVL III: CPT | Mod: PBBFAC,,, | Performed by: PHYSICAL MEDICINE & REHABILITATION

## 2020-07-06 PROCEDURE — 20553 NJX 1/MLT TRIGGER POINTS 3/>: CPT | Mod: S$PBB,,, | Performed by: PHYSICAL MEDICINE & REHABILITATION

## 2020-07-06 PROCEDURE — 99213 PR OFFICE/OUTPT VISIT, EST, LEVL III, 20-29 MIN: ICD-10-PCS | Mod: S$PBB,25,, | Performed by: PHYSICAL MEDICINE & REHABILITATION

## 2020-07-06 PROCEDURE — 99213 OFFICE O/P EST LOW 20 MIN: CPT | Mod: PBBFAC,PN | Performed by: PHYSICAL MEDICINE & REHABILITATION

## 2020-07-06 RX ORDER — LIDOCAINE HYDROCHLORIDE 10 MG/ML
3 INJECTION INFILTRATION; PERINEURAL ONCE
Status: COMPLETED | OUTPATIENT
Start: 2020-07-06 | End: 2020-07-06

## 2020-07-06 RX ADMIN — LIDOCAINE HYDROCHLORIDE 3 ML: 10 INJECTION, SOLUTION INFILTRATION; PERINEURAL at 10:07

## 2020-07-06 NOTE — PROGRESS NOTES
HPI:  Patient is a 54 y.o. year old male  W. Neck pain. Last eval. He did well w. tpi's . He is requesting these again.  His neck feels less tight and he is able to turn it better. However, he is still having pain on his left side.    Imaging    X-Ray Cervical Spine AP And Lateral  Order: [id number]  Status:  Final result   Visible to patient:  Yes (Patient Portal)   Next appt:  07/09/2020 at 08:00 AM in Radiology ([facility] MRI1 LIMIT 300 LBS)   Dx:  Neck pain  Details    Reading Physician Reading Date Result Priority   Dre Keenan MD  [phone] 6/26/2018       Narrative & Impression     EXAMINATION:  XR CERVICAL SPINE AP LATERAL     CLINICAL HISTORY:  Cervicalgia     TECHNIQUE:  AP, lateral and open mouth views of the cervical spine were performed.     COMPARISON:  Plain films of the cervical spine dated 12/16/2008     FINDINGS:  There is mild-to-moderate disc space narrowing at the C5-6 level with minimal progression compared to the prior study.  Otherwise, the vertebral bodies maintain normal height and alignment and disc spaces are preserved.  The facet joints maintain normal alignment.  The odontoid process is intact.  The prevertebral soft tissues are normal.  The airway is patent.     IMPRESSION:      As above                  Past Medical History:   Diagnosis Date    Allergic rhinitis due to allergen     Arthritis     Cardiomyopathy     Gout     Hypertension     Left bundle branch block      Past Surgical History:   Procedure Laterality Date    WISDOM TOOTH EXTRACTION       Family History   Problem Relation Age of Onset    Heart disease Father     Hypertension Father     Gout Father     Cancer Sister         lung    Heart attack Neg Hx      Social History     Socioeconomic History    Marital status: Single     Spouse name: Not on file    Number of children: Not on file    Years of education: Not on file    Highest education level: Not on file   Occupational History     Employer:  [employer]   Social Needs    Financial resource strain: Not on file    Food insecurity     Worry: Not on file     Inability: Not on file    Transportation needs     Medical: Not on file     Non-medical: Not on file   Tobacco Use    Smoking status: Never Smoker    Smokeless tobacco: Never Used   Substance and Sexual Activity    Alcohol use: No    Drug use: No    Sexual activity: Not on file   Lifestyle    Physical activity     Days per week: Not on file     Minutes per session: Not on file    Stress: Not on file   Relationships    Social connections     Talks on phone: Not on file     Gets together: Not on file     Attends Anabaptism service: Not on file     Active member of club or organization: Not on file     Attends meetings of clubs or organizations: Not on file     Relationship status: Not on file   Other Topics Concern    Not on file   Social History Narrative    Not on file       Review of patient's allergies indicates:   Allergen Reactions    Codeine      Other reaction(s): Hives    Codeine phosphate      Other reaction(s): Unknown    Lisinopril      Dry coughing    Phenytoin sodium extended      Other reaction(s): leg cramps       Current Outpatient Medications:     albuterol (PROAIR HFA) 90 mcg/actuation inhaler, Inhale 2 puffs into the lungs every 6 (six) hours as needed for Wheezing. Rescue, Disp: 18 g, Rfl: 2    allopurinoL (ZYLOPRIM) 100 MG tablet, Take 1 tablet (100 mg total) by mouth once daily., Disp: 30 tablet, Rfl: 1    amitriptyline (ELAVIL) 10 MG tablet, 1 tablet at bedtime, Disp: , Rfl:     amitriptyline (ELAVIL) 10 MG tablet, , Disp: , Rfl:     amLODIPine (NORVASC) 10 MG tablet, TAKE 1 TABLET BY MOUTH ONCE DAILY IN THE EVENING, Disp: 30 tablet, Rfl: 0    aspirin 81 MG Chew, Take 1 tablet (81 mg total) by mouth once daily., Disp: 90 tablet, Rfl: 3    atorvastatin (LIPITOR) 40 MG tablet, Take 1 tablet (40 mg total) by mouth once daily., Disp: 90 tablet, Rfl:  "3    baclofen (LIORESAL) 10 MG tablet, Take 1 tablet (10 mg total) by mouth 2 (two) times daily., Disp: 60 tablet, Rfl: 6    butalbital-acetaminophen-caff (FIORICET) -40 mg Cap, 1 capsule as needed, Disp: , Rfl:     gabapentin (NEURONTIN) 300 MG capsule, Take 1 capsule (300 mg total) by mouth every evening., Disp: 30 capsule, Rfl: 11    hydralazine/reserpin/hcthiazid (HYDRALAZINE-RESERPINE-HCTZ ORAL), , Disp: , Rfl:     hydroCHLOROthiazide (MICROZIDE) 12.5 mg capsule, Take 12.5 mg by mouth once daily., Disp: , Rfl:     isosorbide mononitrate (IMDUR) 30 MG 24 hr tablet, Take 1 tablet by mouth once daily, Disp: 30 tablet, Rfl: 0    isosorbide mononitrate (IMDUR) 30 MG 24 hr tablet, Take 1 tablet (30 mg total) by mouth once daily., Disp: 90 tablet, Rfl: 3    spironolactone (ALDACTONE) 25 MG tablet, Take 1 tablet (25 mg total) by mouth once daily., Disp: 30 tablet, Rfl: 11    hydrALAZINE (APRESOLINE) 25 MG tablet, Take 1 tablet (25 mg total) by mouth every 8 (eight) hours., Disp: 90 tablet, Rfl: 0      Review of Systems  No nausea, vomiting, fevers, Chills , contipation, diarrhea or sweats    Physical Exam:      Vitals:    07/06/20 1001   BP: (!) 140/79   Pulse: 83     alert and oriented ×4 follows commands answers all questions appropriately,affect wnl  Manual muscle test 5 out of 5 sensation to light touch grossly intact  + tenderness cervical paraspinals   Nl gait  Dec cervical ROM  No C/C/E    Assessment:    Cervicalgia DDD w.  recurrent musc spasms  Cervical radiculitis     Plan:     tpi's of cervical paraspinals today to help relieve muscular spasms  Cont. baclofen   Cont. neurontin  rtc prn     PROCEDURE NOTE:Risk and benefit of trigger point injections given to pt. Injections performed w. A" 25G needle after sterile prep w. chlorohexedine, verbal consent obtained . NO complications. b/l trapezius, splenius cap and lev scapulae were injected with a total of 3ML of lidocaine 1% to each " side

## 2020-07-09 ENCOUNTER — HOSPITAL ENCOUNTER (OUTPATIENT)
Dept: RADIOLOGY | Facility: HOSPITAL | Age: 55
Discharge: HOME | End: 2020-07-09
Attending: NURSE PRACTITIONER
Payer: MEDICAID

## 2020-07-09 DIAGNOSIS — M54.16 LUMBAR RADICULOPATHY: ICD-10-CM

## 2020-07-09 PROCEDURE — 72148 MRI LUMBAR SPINE W/O DYE: CPT | Mod: TC,PO

## 2020-07-14 RX ORDER — AMLODIPINE BESYLATE 10 MG/1
10 TABLET ORAL NIGHTLY
Qty: 90 TABLET | Refills: 3 | Status: SHIPPED | OUTPATIENT
Start: 2020-07-14 | End: 2021-02-17 | Stop reason: SDUPTHER

## 2020-07-14 RX ORDER — HYDRALAZINE HYDROCHLORIDE 25 MG/1
25 TABLET, FILM COATED ORAL EVERY 8 HOURS
Qty: 90 TABLET | Refills: 3 | Status: SHIPPED | OUTPATIENT
Start: 2020-07-14 | End: 2020-12-22

## 2020-07-14 NOTE — TELEPHONE ENCOUNTER
----- Message from Stephany Connor sent at 7/14/2020 12:10 PM CDT -----  Type:  RX Refill Request    Who Called:  Jarod Parrish or New Rx:  refill  RX Name and Strength:  amLODIPine (NORVASC) 10 MG tablet and hydrALAZINE (APRESOLINE) 25 MG tablet, one every 8 hours  How is the patient currently taking it? (ex. 1XDay):  once daily   Is this a 30 day or 90 day RX:  90  Preferred Pharmacy with phone number:    [facility] [address] [place] [address] [facility]  [address] [facility] [address]  Phone: [phone] Fax: [phone]  Local or Mail Order:  local  Ordering Provider:  Dr Declan Jose Call Back Number:  [phone]  Additional Information:  thank you!

## 2020-07-27 ENCOUNTER — HOSPITAL ENCOUNTER (EMERGENCY)
Facility: HOSPITAL | Age: 55
Discharge: HOME | End: 2020-07-27
Attending: EMERGENCY MEDICINE
Payer: MEDICAID

## 2020-07-27 VITALS
DIASTOLIC BLOOD PRESSURE: 89 MMHG | TEMPERATURE: 99 F | OXYGEN SATURATION: 100 % | BODY MASS INDEX: 33.74 KG/M2 | HEIGHT: 67 IN | SYSTOLIC BLOOD PRESSURE: 161 MMHG | WEIGHT: 215 LBS | RESPIRATION RATE: 18 BRPM | HEART RATE: 81 BPM

## 2020-07-27 DIAGNOSIS — M54.50 LOW BACK PAIN WITHOUT SCIATICA, UNSPECIFIED BACK PAIN LATERALITY, UNSPECIFIED CHRONICITY: Primary | ICD-10-CM

## 2020-07-27 LAB
ALBUMIN SERPL BCP-MCNC: 4.6 G/DL (ref 3.5–5.2)
ALP SERPL-CCNC: 115 U/L (ref 55–135)
ALT SERPL W/O P-5'-P-CCNC: 22 U/L (ref 10–44)
ANION GAP SERPL CALC-SCNC: 11 MMOL/L (ref 8–16)
AST SERPL-CCNC: 18 U/L (ref 10–40)
BACTERIA #/AREA URNS HPF: NEGATIVE /HPF
BILIRUB SERPL-MCNC: 1.8 MG/DL (ref 0.1–1)
BILIRUB UR QL STRIP: NEGATIVE
BUN SERPL-MCNC: 18 MG/DL (ref 6–20)
CALCIUM SERPL-MCNC: 9.1 MG/DL (ref 8.7–10.5)
CHLORIDE SERPL-SCNC: 99 MMOL/L (ref 95–110)
CLARITY UR: CLEAR
CO2 SERPL-SCNC: 29 MMOL/L (ref 23–29)
COLOR UR: YELLOW
CREAT SERPL-MCNC: 1 MG/DL (ref 0.5–1.4)
EST. GFR  (AFRICAN AMERICAN): >60 ML/MIN/1.73 M^2
EST. GFR  (NON AFRICAN AMERICAN): >60 ML/MIN/1.73 M^2
GLUCOSE SERPL-MCNC: 143 MG/DL (ref 70–110)
GLUCOSE UR QL STRIP: NEGATIVE
HGB UR QL STRIP: ABNORMAL
HYALINE CASTS #/AREA URNS LPF: 2 /LPF
KETONES UR QL STRIP: NEGATIVE
LEUKOCYTE ESTERASE UR QL STRIP: NEGATIVE
MICROSCOPIC COMMENT: ABNORMAL
NITRITE UR QL STRIP: NEGATIVE
PH UR STRIP: 6 [PH] (ref 5–8)
POTASSIUM SERPL-SCNC: 3.4 MMOL/L (ref 3.5–5.1)
PROT SERPL-MCNC: 7.9 G/DL (ref 6–8.4)
PROT UR QL STRIP: NEGATIVE
RBC #/AREA URNS HPF: 6 /HPF (ref 0–4)
SODIUM SERPL-SCNC: 139 MMOL/L (ref 136–145)
SP GR UR STRIP: 1.02 (ref 1–1.03)
SQUAMOUS #/AREA URNS HPF: 0 /HPF
URN SPEC COLLECT METH UR: ABNORMAL
UROBILINOGEN UR STRIP-ACNC: NEGATIVE EU/DL
WBC #/AREA URNS HPF: 1 /HPF (ref 0–5)

## 2020-07-27 PROCEDURE — 99283 EMERGENCY DEPT VISIT LOW MDM: CPT

## 2020-07-27 PROCEDURE — 36415 COLL VENOUS BLD VENIPUNCTURE: CPT

## 2020-07-27 PROCEDURE — 63700000 PHARM REV CODE 250 ALT 637 W/O HCPCS: Performed by: EMERGENCY MEDICINE

## 2020-07-27 PROCEDURE — 80053 COMPREHEN METABOLIC PANEL: CPT

## 2020-07-27 PROCEDURE — 81001 URINALYSIS AUTO W/SCOPE: CPT

## 2020-07-27 RX ORDER — METHOCARBAMOL 500 MG/1
1000 TABLET, FILM COATED ORAL 3 TIMES DAILY
Qty: 30 TABLET | Refills: 0 | Status: SHIPPED | OUTPATIENT
Start: 2020-07-27 | End: 2020-08-01

## 2020-07-27 RX ORDER — POTASSIUM CHLORIDE 20 MEQ/15ML
20 SOLUTION ORAL
Status: COMPLETED | OUTPATIENT
Start: 2020-07-27 | End: 2020-07-27

## 2020-07-27 RX ADMIN — POTASSIUM CHLORIDE 20 MEQ: 20 SOLUTION ORAL at 06:07

## 2020-07-27 NOTE — ED PROVIDER NOTES
Encounter Date: 7/27/2020       History     Chief Complaint   Patient presents with    Back Pain     2 days     54-year-old male presented emergency department with soreness of lower back.  Patient denies fever or chills or nausea vomiting or chest pain or shortness of breath.  Patient has problems with back so is concerned since he is having back pain wanted to make sure his kidneys are okay.  Pain is worse with movement.  Denies dysuria or hematuria.  Patient has history of gout.  Patient recently used indomethacin for gout.  Gouty arthritis got better.        Review of patient's allergies indicates:   Allergen Reactions    Codeine      Other reaction(s): Hives    Codeine phosphate      Other reaction(s): Unknown    Lisinopril      Dry coughing    Phenytoin sodium extended      Other reaction(s): leg cramps     Past Medical History:   Diagnosis Date    Allergic rhinitis due to allergen     Arthritis     Cardiomyopathy     Gout     Hypertension     Left bundle branch block      Past Surgical History:   Procedure Laterality Date    WISDOM TOOTH EXTRACTION       Family History   Problem Relation Age of Onset    Heart disease Father     Hypertension Father     Gout Father     Cancer Sister         lung    Heart attack Neg Hx      Social History     Tobacco Use    Smoking status: Never Smoker    Smokeless tobacco: Never Used   Substance Use Topics    Alcohol use: No    Drug use: No     Review of Systems   Constitutional: Negative.    HENT: Negative.    Eyes: Negative.    Respiratory: Negative.    Cardiovascular: Negative.    Gastrointestinal: Negative.  Negative for abdominal pain, blood in stool, constipation, diarrhea, nausea, rectal pain and vomiting.   Endocrine: Negative.    Genitourinary: Negative.    Musculoskeletal: Positive for back pain.   Skin: Negative.    Allergic/Immunologic: Negative.    Neurological: Negative.    Hematological: Negative.    Psychiatric/Behavioral: Negative for  agitation.   All other systems reviewed and are negative.      Physical Exam     Initial Vitals [07/27/20 1546]   BP Pulse Resp Temp SpO2   (!) 163/100 94 18 97.8 °F (36.6 °C) 98 %      MAP       --         Physical Exam    Nursing note and vitals reviewed.  Constitutional: He appears well-developed and well-nourished.   HENT:   Head: Normocephalic and atraumatic.   Nose: Nose normal.   Eyes: Conjunctivae and EOM are normal.   Neck: Normal range of motion. Neck supple. No tracheal deviation present.   Cardiovascular: Normal rate, regular rhythm, normal heart sounds and intact distal pulses. Exam reveals no friction rub.    No murmur heard.  Pulmonary/Chest: Breath sounds normal. No respiratory distress. He has no wheezes. He has no rales.   Abdominal: Soft. He exhibits no distension. There is no abdominal tenderness.   Musculoskeletal: Normal range of motion. No edema.      Comments: Mild diffuse discomfort in the lumbar spine area and paraspinal area.  No flank tenderness.  Pain slightly worse with straight leg raising and stretching.   Neurological: He is alert and oriented to person, place, and time. He has normal strength. He displays normal reflexes. No cranial nerve deficit or sensory deficit. GCS score is 15. GCS eye subscore is 4. GCS verbal subscore is 5. GCS motor subscore is 6.   Skin: Skin is warm and dry. Capillary refill takes less than 2 seconds.   Psychiatric: He has a normal mood and affect. Thought content normal.         ED Course   Procedures  Labs Reviewed   URINALYSIS, REFLEX TO URINE CULTURE   COMPREHENSIVE METABOLIC PANEL          Imaging Results    None          Medical Decision Making:   Differential Diagnosis:   Patient with musculoskeletal low back pain.  Given patient's history renal function checked and discharged with instructions and follow up with primary care.  Muscle relaxants given.  Patient otherwise nontoxic  Clinical Tests:   Lab Tests: Reviewed                                  Clinical Impression:       ICD-10-CM ICD-9-CM   1. Low back pain without sciatica, unspecified back pain laterality, unspecified chronicity  M54.5 724.2                                Luis Fleming MD  07/27/20 8426

## 2020-07-29 ENCOUNTER — OFFICE VISIT (OUTPATIENT)
Dept: RHEUMATOLOGY | Facility: CLINIC | Age: 55
End: 2020-07-29
Payer: MEDICAID

## 2020-07-29 VITALS
TEMPERATURE: 98 F | SYSTOLIC BLOOD PRESSURE: 132 MMHG | BODY MASS INDEX: 33.16 KG/M2 | DIASTOLIC BLOOD PRESSURE: 76 MMHG | WEIGHT: 211.69 LBS

## 2020-07-29 DIAGNOSIS — M15.9 OSTEOARTHRITIS, GENERALIZED: Primary | ICD-10-CM

## 2020-07-29 DIAGNOSIS — R76.8 ANTINEUTROPHIL CYTOPLASMIC ANTIBODY (ANCA) POSITIVE: ICD-10-CM

## 2020-07-29 PROCEDURE — 99213 OFFICE O/P EST LOW 20 MIN: CPT | Mod: S$GLB,,, | Performed by: INTERNAL MEDICINE

## 2020-07-29 PROCEDURE — 99213 PR OFFICE/OUTPT VISIT, EST, LEVL III, 20-29 MIN: ICD-10-PCS | Mod: S$GLB,,, | Performed by: INTERNAL MEDICINE

## 2020-07-29 NOTE — PROGRESS NOTES
[facility] RHEUMATOLOGY            PROGRESS NOTE      Subjective:       Patient ID:   NAME: Jarod Pierce : 1965     54 y.o. male    Referring Doc: No ref. provider found  Other Physicians:    Chief Complaint:  ANCA positive and Gout      History of Present Illness:     Patient returns today for a regularly scheduled follow-up visit for + ANCA , OA    The patient denies fevers, rashes,  chest pains, cough or shortness of breath.  No GI complaints.  No joint swelling but has arthralgias in finger joints occasional low back pain.  No paresthesias.          ROS:   GEN:  No  fever, night sweats . weight is stable   No fatigue  SKIN: no rashes, no bruising, no ulcerations, no Raynaud's  HEENT: no HA's, No visual changes, no mucosal ulcers, no sicca symptoms,  CV:   no CP, SOB, PND, GARNETT, no orthopnea, no palpitations  PULM: normal with no SOB, cough, hemoptysis, sputum or pleuritic pain  GI:  no abdominal pain, nausea, vomiting, constipation, diarrhea, melanotic stools, BRBPR, hematemesis, no dysphagia  :   no dysuria  NEURO: no paresthesias, headaches, visual disturbances, muscle weakness  MUSCULOSKELETAL:no joint swelling, prolonged AM stiffness, no back pain, no muscle pain  Allergies:  Review of patient's allergies indicates:   Allergen Reactions    Codeine      Other reaction(s): Hives    Codeine phosphate      Other reaction(s): Unknown    Lisinopril      Dry coughing    Phenytoin sodium extended      Other reaction(s): leg cramps       Medications:    Current Outpatient Medications:     albuterol (PROAIR HFA) 90 mcg/actuation inhaler, Inhale 2 puffs into the lungs every 6 (six) hours as needed for Wheezing. Rescue, Disp: 18 g, Rfl: 2    allopurinoL (ZYLOPRIM) 100 MG tablet, Take 1 tablet by mouth once daily, Disp: 30 tablet, Rfl: 0    amitriptyline (ELAVIL) 10 MG tablet, 1 tablet at bedtime, Disp: , Rfl:     amitriptyline (ELAVIL) 10 MG tablet, , Disp: , Rfl:     amLODIPine (NORVASC) 10 MG  tablet, Take 1 tablet (10 mg total) by mouth every evening., Disp: 90 tablet, Rfl: 3    aspirin 81 MG Chew, Take 1 tablet (81 mg total) by mouth once daily., Disp: 90 tablet, Rfl: 3    atorvastatin (LIPITOR) 40 MG tablet, Take 1 tablet (40 mg total) by mouth once daily., Disp: 90 tablet, Rfl: 3    baclofen (LIORESAL) 10 MG tablet, Take 1 tablet (10 mg total) by mouth 2 (two) times daily., Disp: 60 tablet, Rfl: 6    butalbital-acetaminophen-caff (FIORICET) -40 mg Cap, 1 capsule as needed, Disp: , Rfl:     gabapentin (NEURONTIN) 300 MG capsule, Take 1 capsule (300 mg total) by mouth every evening., Disp: 30 capsule, Rfl: 11    hydrALAZINE (APRESOLINE) 25 MG tablet, Take 1 tablet (25 mg total) by mouth every 8 (eight) hours., Disp: 90 tablet, Rfl: 3    hydralazine/reserpin/hcthiazid (HYDRALAZINE-RESERPINE-HCTZ ORAL), , Disp: , Rfl:     hydroCHLOROthiazide (MICROZIDE) 12.5 mg capsule, Take 12.5 mg by mouth once daily., Disp: , Rfl:     isosorbide mononitrate (IMDUR) 30 MG 24 hr tablet, Take 1 tablet by mouth once daily, Disp: 30 tablet, Rfl: 0    methocarbamoL (ROBAXIN) 500 MG Tab, Take 2 tablets (1,000 mg total) by mouth 3 (three) times daily. for 5 days, Disp: 30 tablet, Rfl: 0    spironolactone (ALDACTONE) 25 MG tablet, Take 1 tablet (25 mg total) by mouth once daily., Disp: 30 tablet, Rfl: 11    PMHx/PSHx Updates:          Objective:     Vitals:  Blood pressure 132/76, temperature 98.1 °F (36.7 °C), weight 96 kg (211 lb 11.2 oz).    Physical Examination:   GEN: no apparent distress, comfortable; AAOx3  SKIN: no rashes,no ulceration, no Raynaud's, no petechiae, no SQ nodules,  HEAD: normal  EYES: no pallor, no icterus  NECK: no masses, thyroid normal, trachea midline, no LAD/LN's, supple  CV: RRR with no murmur; l S1 and S2 reg. ,no gallop no rubs,   CHEST: Normal respiratory effort; CTAB; normal breath sounds; no wheeze or crackles  ABDOM: nontender and nondistended; soft; no masses; no  rebound/guarding  MUSC/Skeletal: ROM normal; no crepitus; joints without synovitis,  no deformities  No joint swelling or tenderness of PIP, MCP, wrist, elbow, shoulder, or knee joints  EXTREM: no clubbing, cyanosis, no edema,normal  pulses   NEURO: grossly intact; motor WNL; AAOx3; ,  PSYCH: normal mood, affect and behavior  LYMPH: normal cervical, supraclavicular          Labs:   Lab Results   Component Value Date    WBC 6.56 05/09/2020    HGB 14.6 05/09/2020    HCT 43.2 05/09/2020    MCV 94 05/09/2020     05/09/2020    CMP  @LASTLAB(NA,K,CL,CO2,GLU,BUN,Creatinine,Calcium,PROT,Albumin,Bilitot,Alkphos,AST,ALT,CRP,ESR,RF,CCP,TEJINDER,SSA,CPK,uric acid) )@  I have reviewed all available lab results and radiology reports.    Radiology/Diagnostic Studies:        Assessment/Plan:   (1) 54 y.o. male with diagnosis of osteoarthritis.  This is stable  Reviewed recent blood work done at the emergency room.  Has  slight microscopic hematuria.  No proteinuria      Plan: refer to urologist for persistent microscopic hematuria.    He has a positive ANCA but no manifestations of vasculitis.  He has had normal PFTs and DLCO,normal CT scan of the chest.         Discussion:     I have explained all of the above in detail and the patient understands all of the current recommendation(s). I have answered all questions to the best of my ability and to their complete satisfaction.       The patient is to continue with the current management plan         RTC in   3-4 months or before if needed    Electronically signed by Bjorn Nolasco MD

## 2020-07-31 ENCOUNTER — PATIENT OUTREACH (OUTPATIENT)
Dept: EMERGENCY MEDICINE | Facility: HOSPITAL | Age: 55
End: 2020-07-31

## 2020-08-02 ENCOUNTER — PATIENT OUTREACH (OUTPATIENT)
Dept: ADMINISTRATIVE | Facility: OTHER | Age: 55
End: 2020-08-02

## 2020-08-03 NOTE — PROGRESS NOTES
Chart was reviewed for overdue Proactive [facility] Encounters ([facility])  topics  Updates were requested from care everywhere  Health Maintenance has been updated

## 2020-08-06 ENCOUNTER — LAB VISIT (OUTPATIENT)
Dept: LAB | Facility: HOSPITAL | Age: 55
End: 2020-08-06
Attending: INTERNAL MEDICINE
Payer: MEDICAID

## 2020-08-06 ENCOUNTER — OFFICE VISIT (OUTPATIENT)
Dept: PHYSICAL MEDICINE AND REHAB | Facility: CLINIC | Age: 55
End: 2020-08-06
Payer: MEDICAID

## 2020-08-06 VITALS
BODY MASS INDEX: 33.12 KG/M2 | WEIGHT: 211 LBS | SYSTOLIC BLOOD PRESSURE: 141 MMHG | HEART RATE: 76 BPM | HEIGHT: 67 IN | DIASTOLIC BLOOD PRESSURE: 78 MMHG

## 2020-08-06 DIAGNOSIS — R76.8 ANTINEUTROPHIL CYTOPLASMIC ANTIBODY (ANCA) POSITIVE: ICD-10-CM

## 2020-08-06 DIAGNOSIS — M15.9 OSTEOARTHRITIS, GENERALIZED: ICD-10-CM

## 2020-08-06 DIAGNOSIS — M54.50 BILATERAL LOW BACK PAIN WITHOUT SCIATICA, UNSPECIFIED CHRONICITY: ICD-10-CM

## 2020-08-06 DIAGNOSIS — M54.17 LUMBOSACRAL RADICULOPATHY AT L4: Primary | ICD-10-CM

## 2020-08-06 DIAGNOSIS — M79.10 MYALGIA: ICD-10-CM

## 2020-08-06 LAB
BASOPHILS # BLD AUTO: 0.04 K/UL (ref 0–0.2)
BASOPHILS NFR BLD: 0.7 % (ref 0–1.9)
CRP SERPL-MCNC: 0.27 MG/DL
DIFFERENTIAL METHOD: ABNORMAL
EOSINOPHIL # BLD AUTO: 0.2 K/UL (ref 0–0.5)
EOSINOPHIL NFR BLD: 2.8 % (ref 0–8)
ERYTHROCYTE [DISTWIDTH] IN BLOOD BY AUTOMATED COUNT: 12.5 % (ref 11.5–14.5)
HCT VFR BLD AUTO: 40.2 % (ref 40–54)
HGB BLD-MCNC: 13.9 G/DL (ref 14–18)
IMM GRANULOCYTES # BLD AUTO: 0.01 K/UL (ref 0–0.04)
IMM GRANULOCYTES NFR BLD AUTO: 0.2 % (ref 0–0.5)
LYMPHOCYTES # BLD AUTO: 1.5 K/UL (ref 1–4.8)
LYMPHOCYTES NFR BLD: 25.3 % (ref 18–48)
MCH RBC QN AUTO: 32.4 PG (ref 27–31)
MCHC RBC AUTO-ENTMCNC: 34.6 G/DL (ref 32–36)
MCV RBC AUTO: 94 FL (ref 82–98)
MONOCYTES # BLD AUTO: 0.6 K/UL (ref 0.3–1)
MONOCYTES NFR BLD: 9 % (ref 4–15)
NEUTROPHILS # BLD AUTO: 3.8 K/UL (ref 1.8–7.7)
NEUTROPHILS NFR BLD: 62 % (ref 38–73)
NRBC BLD-RTO: 0 /100 WBC
PLATELET # BLD AUTO: 188 K/UL (ref 150–350)
PMV BLD AUTO: 9.3 FL (ref 9.2–12.9)
POTASSIUM SERPL-SCNC: 4.1 MMOL/L (ref 3.5–5.1)
RBC # BLD AUTO: 4.29 M/UL (ref 4.6–6.2)
WBC # BLD AUTO: 6.09 K/UL (ref 3.9–12.7)

## 2020-08-06 PROCEDURE — 20553 PR INJECT TRIGGER POINTS, > 3: ICD-10-PCS | Mod: S$PBB,,, | Performed by: PHYSICAL MEDICINE & REHABILITATION

## 2020-08-06 PROCEDURE — 86140 C-REACTIVE PROTEIN: CPT

## 2020-08-06 PROCEDURE — 20553 NJX 1/MLT TRIGGER POINTS 3/>: CPT | Mod: S$PBB,,, | Performed by: PHYSICAL MEDICINE & REHABILITATION

## 2020-08-06 PROCEDURE — 85025 COMPLETE CBC W/AUTO DIFF WBC: CPT

## 2020-08-06 PROCEDURE — 84132 ASSAY OF SERUM POTASSIUM: CPT

## 2020-08-06 PROCEDURE — 99214 PR OFFICE/OUTPT VISIT, EST, LEVL IV, 30-39 MIN: ICD-10-PCS | Mod: 25,S$PBB,, | Performed by: PHYSICAL MEDICINE & REHABILITATION

## 2020-08-06 PROCEDURE — 99999 PR PBB SHADOW E&M-EST. PATIENT-LVL IV: ICD-10-PCS | Mod: PBBFAC,,, | Performed by: PHYSICAL MEDICINE & REHABILITATION

## 2020-08-06 PROCEDURE — 20553 NJX 1/MLT TRIGGER POINTS 3/>: CPT | Mod: PBBFAC,PN | Performed by: PHYSICAL MEDICINE & REHABILITATION

## 2020-08-06 PROCEDURE — 99214 OFFICE O/P EST MOD 30 MIN: CPT | Mod: PBBFAC,PN,25 | Performed by: PHYSICAL MEDICINE & REHABILITATION

## 2020-08-06 PROCEDURE — 99999 PR PBB SHADOW E&M-EST. PATIENT-LVL IV: CPT | Mod: PBBFAC,,, | Performed by: PHYSICAL MEDICINE & REHABILITATION

## 2020-08-06 PROCEDURE — 36415 COLL VENOUS BLD VENIPUNCTURE: CPT

## 2020-08-06 PROCEDURE — 99214 OFFICE O/P EST MOD 30 MIN: CPT | Mod: 25,S$PBB,, | Performed by: PHYSICAL MEDICINE & REHABILITATION

## 2020-08-06 RX ORDER — LIDOCAINE HYDROCHLORIDE 10 MG/ML
3 INJECTION INFILTRATION; PERINEURAL ONCE
Status: COMPLETED | OUTPATIENT
Start: 2020-08-06 | End: 2020-08-06

## 2020-08-06 RX ADMIN — LIDOCAINE HYDROCHLORIDE 3 ML: 10 INJECTION, SOLUTION INFILTRATION; PERINEURAL at 09:08
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
## 2020-08-17 ENCOUNTER — TELEPHONE (OUTPATIENT)
Dept: CARDIOLOGY | Facility: CLINIC | Age: 55
End: 2020-08-17

## 2020-08-17 ENCOUNTER — TELEPHONE (OUTPATIENT)
Dept: PHYSICAL MEDICINE AND REHAB | Facility: CLINIC | Age: 55
End: 2020-08-17

## 2020-08-17 NOTE — TELEPHONE ENCOUNTER
----- Message from Inna Montano sent at 8/17/2020 10:39 AM CDT -----  Type: Needs Medical Advice  Who Called:  Patient  Best Call Back Number:   Additional Information: calling to speak with the nurse to find out if there was ever a doctor that was found for him with [facility] that he could see for his back.       no

## 2020-08-17 NOTE — TELEPHONE ENCOUNTER
Call placed to Mr. Pierce in regards to insurance not going to be accepted in [place]. Provided him with [facility] group number for his six month f/u.

## 2020-08-26 ENCOUNTER — TELEPHONE (OUTPATIENT)
Dept: CARDIOLOGY | Facility: CLINIC | Age: 55
End: 2020-08-26

## 2020-08-26 NOTE — TELEPHONE ENCOUNTER
Call placed to Mr. Pierce. He stated he would like to follow Dr. Manriquez to [place] and he spoke with his insurance and they stated a PA would have to be done. I advised him that I am just helping with messages, but would make an appt for him in [place]. 11/19/2020 at 1p. Pt accepted. No further issues noted.

## 2020-08-26 NOTE — TELEPHONE ENCOUNTER
----- Message from Tavo Magallon sent at 8/26/2020  4:46 PM CDT -----  Regarding: call back  Pt calling in regards he needs to speak with a nurse in office, pt can be contact at [phone]

## 2020-10-05 ENCOUNTER — PATIENT MESSAGE (OUTPATIENT)
Dept: ADMINISTRATIVE | Facility: HOSPITAL | Age: 55
End: 2020-10-05

## 2020-10-27 RX ORDER — ALLOPURINOL 100 MG/1
TABLET ORAL
Qty: 30 TABLET | Refills: 0 | Status: SHIPPED | OUTPATIENT
Start: 2020-10-27 | End: 2020-11-24 | Stop reason: SDUPTHER

## 2020-11-08 NOTE — TELEPHONE ENCOUNTER
Called and spoke with pt he stated that he had gout. I stated that we had a message about his BP and Hr. He stated that his HR does race from time to time. He requested a sooner appt with Dr. Manriquez because he trust him. Gave him an appt with Dr. Manriquez on 11/09/2017. No further issues noted.    DISCHARGE

## 2020-11-18 ENCOUNTER — TELEPHONE (OUTPATIENT)
Dept: CARDIOLOGY | Facility: CLINIC | Age: 55
End: 2020-11-18

## 2020-11-18 ENCOUNTER — PATIENT OUTREACH (OUTPATIENT)
Dept: ADMINISTRATIVE | Facility: OTHER | Age: 55
End: 2020-11-18

## 2020-11-18 NOTE — TELEPHONE ENCOUNTER
Informed pt that Dr. Manriquez is no longer is [place] he is seeing pts in  or [facility], pt r/s for        ----- Message from Savannah Donahue sent at 11/18/2020 11:19 AM CST -----  Contact: call  pt [phone]    Type:  Sooner Apoointment Request    Caller is requesting a sooner appointment.  Caller declined first available appointment listed below.  Caller will not accept being placed on the waitlist and is requesting a message be sent to doctor.    Name of Caller:    pt  When is the first available appointment?   Pt  asking  for next  month / no schedule  Symptoms:   follow up meds  Best Call Back Number:  call  pt [phone]  Additional Information:   please call to  fit  pt in //

## 2020-11-18 NOTE — PROGRESS NOTES
Chart was reviewed for overdue Proactive [facility] Encounters (CHIOMA)  topics  Updates were requested from care everywhere  Health Maintenance has been updated  LINKS immunization registry triggered/ unable to complete due to LINKS moving slow  Colonoscopy due

## 2020-11-23 ENCOUNTER — TELEPHONE (OUTPATIENT)
Dept: CARDIOLOGY | Facility: CLINIC | Age: 55
End: 2020-11-23

## 2020-12-02 ENCOUNTER — PATIENT MESSAGE (OUTPATIENT)
Dept: ADMINISTRATIVE | Facility: HOSPITAL | Age: 55
End: 2020-12-02

## 2020-12-09 ENCOUNTER — OFFICE VISIT (OUTPATIENT)
Dept: RHEUMATOLOGY | Facility: CLINIC | Age: 55
End: 2020-12-09
Payer: MEDICAID

## 2020-12-09 ENCOUNTER — HOSPITAL ENCOUNTER (OUTPATIENT)
Dept: RADIOLOGY | Facility: HOSPITAL | Age: 55
Discharge: HOME | End: 2020-12-09
Attending: INTERNAL MEDICINE
Payer: MEDICAID

## 2020-12-09 VITALS
SYSTOLIC BLOOD PRESSURE: 145 MMHG | TEMPERATURE: 98 F | BODY MASS INDEX: 33.99 KG/M2 | WEIGHT: 217 LBS | DIASTOLIC BLOOD PRESSURE: 84 MMHG

## 2020-12-09 DIAGNOSIS — R76.8 ANTINEUTROPHIL CYTOPLASMIC ANTIBODY (ANCA) POSITIVE: ICD-10-CM

## 2020-12-09 DIAGNOSIS — M15.9 OSTEOARTHRITIS, GENERALIZED: Primary | ICD-10-CM

## 2020-12-09 DIAGNOSIS — M10.9 ARTHRITIS DUE TO GOUT: ICD-10-CM

## 2020-12-09 PROCEDURE — 99213 OFFICE O/P EST LOW 20 MIN: CPT | Mod: S$GLB,,, | Performed by: INTERNAL MEDICINE

## 2020-12-09 PROCEDURE — 70220 X-RAY EXAM OF SINUSES: CPT | Mod: TC

## 2020-12-09 PROCEDURE — 99213 PR OFFICE/OUTPT VISIT, EST, LEVL III, 20-29 MIN: ICD-10-PCS | Mod: S$GLB,,, | Performed by: INTERNAL MEDICINE

## 2020-12-09 RX ORDER — ALLOPURINOL 100 MG/1
100 TABLET ORAL DAILY
Qty: 90 TABLET | Refills: 1 | Status: SHIPPED | OUTPATIENT
Start: 2020-12-09 | End: 2021-05-03 | Stop reason: SDUPTHER

## 2020-12-09 NOTE — PROGRESS NOTES
[facility] RHEUMATOLOGY            PROGRESS NOTE      Subjective:       Patient ID:   NAME: Jarod Pierce : 1965     55 y.o. male    Referring Doc: No ref. provider found  Other Physicians:    Chief Complaint:  Osteoarthritis (both hands hurt)      History of Present Illness:     Patient returns today for a regularly scheduled follow-up visit for  OA      The patient is doing well except arthralgias in some PIP and DIP joints.  No fevers, cough or shortness of breath.  No known exposure to COVID.  No chest pains.  No ophthalmologic complaints.  No GI complaints.  No fatigue  No hemoptysis.Occ sinus congestion          ROS:   GEN:  No  fever, night sweats . weight is stable   No fatigue  SKIN: no rashes, no bruising, no ulcerations, no Raynaud's  HEENT: no HA's, No visual changes, no mucosal ulcers, no sicca symptoms,  CV:   no CP, SOB, PND, GARNETT, no orthopnea, no palpitations  PULM: normal with no SOB, cough, hemoptysis, sputum or pleuritic pain  GI:  no abdominal pain, nausea, vomiting, constipation, diarrhea, melanotic stools, BRBPR, hematemesis, no dysphagia  :   no dysuria  NEURO: no paresthesias, headaches, visual disturbances, muscle weakness  MUSCULOSKELETAL:no joint swelling, prolonged AM stiffness, no back pain, no muscle pain  Allergies:  Review of patient's allergies indicates:   Allergen Reactions    Codeine      Other reaction(s): Hives    Codeine phosphate      Other reaction(s): Unknown    Lisinopril      Dry coughing    Phenytoin sodium extended      Other reaction(s): leg cramps       Medications:    Current Outpatient Medications:     albuterol (PROAIR HFA) 90 mcg/actuation inhaler, Inhale 2 puffs into the lungs every 6 (six) hours as needed for Wheezing. Rescue, Disp: 18 g, Rfl: 2    allopurinoL (ZYLOPRIM) 100 MG tablet, Take 1 tablet by mouth once daily, Disp: 30 tablet, Rfl: 0    amitriptyline (ELAVIL) 10 MG tablet, 1 tablet at bedtime, Disp: , Rfl:     amitriptyline  (ELAVIL) 10 MG tablet, , Disp: , Rfl:     amLODIPine (NORVASC) 10 MG tablet, Take 1 tablet (10 mg total) by mouth every evening., Disp: 90 tablet, Rfl: 3    aspirin 81 MG Chew, Take 1 tablet (81 mg total) by mouth once daily., Disp: 90 tablet, Rfl: 3    atorvastatin (LIPITOR) 40 MG tablet, Take 1 tablet (40 mg total) by mouth once daily., Disp: 90 tablet, Rfl: 3    butalbital-acetaminophen-caff (FIORICET) -40 mg Cap, 1 capsule as needed, Disp: , Rfl:     gabapentin (NEURONTIN) 300 MG capsule, Take 1 capsule (300 mg total) by mouth every evening., Disp: 30 capsule, Rfl: 11    hydralazine/reserpin/hcthiazid (HYDRALAZINE-RESERPINE-HCTZ ORAL), , Disp: , Rfl:     hydroCHLOROthiazide (MICROZIDE) 12.5 mg capsule, Take 12.5 mg by mouth once daily., Disp: , Rfl:     spironolactone (ALDACTONE) 25 MG tablet, Take 1 tablet (25 mg total) by mouth once daily., Disp: 30 tablet, Rfl: 11    baclofen (LIORESAL) 10 MG tablet, Take 1 tablet (10 mg total) by mouth 2 (two) times daily., Disp: 60 tablet, Rfl: 6    hydrALAZINE (APRESOLINE) 25 MG tablet, Take 1 tablet (25 mg total) by mouth every 8 (eight) hours., Disp: 90 tablet, Rfl: 3    isosorbide mononitrate (IMDUR) 30 MG 24 hr tablet, Take 1 tablet by mouth once daily, Disp: 30 tablet, Rfl: 0    PMHx/PSHx Updates:        Objective:     Vitals:  Blood pressure (!) 145/84, temperature 97.6 °F (36.4 °C), weight 98.4 kg (217 lb).    Physical Examination:   GEN: no apparent distress, comfortable; AAOx3  SKIN: no rashes,no ulceration, no Raynaud's, no petechiae, no SQ nodules,  HEAD: normal  EYES: no pallor, no icterus,  NECK: no masses, thyroid normal, trachea midline, no LAD/LN's, supple  CV: RRR with no murmur; l S1 and S2 reg. ,no gallop no rubs,   CHEST: Normal respiratory effort; CTAB; normal breath sounds; no wheeze or crackles  MUSC/Skeletal: ROM normal; no crepitus; joints without synovitis,  no deformities  No joint swelling or tenderness of PIP, MCP, wrist,  elbow, shoulder, or knee joints  EXTREM: no clubbing, cyanosis, + trace pitting edema,normal  pulses   NEURO: grossly intact; motor WNL; AAOx3; ,  PSYCH: normal mood, affect and behavior  LYMPH: normal cervical, supraclavicular          Labs:   Lab Results   Component Value Date    WBC 6.09 08/06/2020    HGB 13.9 (L) 08/06/2020    HCT 40.2 08/06/2020    MCV 94 08/06/2020     08/06/2020    CMP  @LASTLAB(NA,K,CL,CO2,GLU,BUN,Creatinine,Calcium,PROT,Albumin,Bilitot,Alkphos,AST,ALT,CRP,ESR,RF,CCP,TEJINDER,SSA,CPK,uric acid) )@  I have reviewed all available lab results and radiology reports.    Radiology/Diagnostic Studies:        Assessment/Plan:   (1) 55 y.o. male with diagnosis of osteoarthritis.  He is stable  History of gout;No gouty attacks since last visit  History of positive ANCA .  No sxs or  signs of vasculitis.  Labs  Paranasal sinus x-rays.          Discussion:     I have explained all of the above in detail and the patient understands all of the current recommendation(s). I have answered all questions to the best of my ability and to their complete satisfaction.       The patient is to continue with the current management plan         RTC in  3- 4 months      Electronically signed by Bjorn Nolasco MD

## 2020-12-11 ENCOUNTER — PATIENT MESSAGE (OUTPATIENT)
Dept: OTHER | Facility: OTHER | Age: 55
End: 2020-12-11

## 2020-12-15 ENCOUNTER — TELEPHONE (OUTPATIENT)
Dept: RHEUMATOLOGY | Facility: CLINIC | Age: 55
End: 2020-12-15

## 2020-12-15 NOTE — TELEPHONE ENCOUNTER
----- Message from Bjorn Nolasco MD sent at 12/15/2020 10:19 AM CST -----  Regarding: labs  Labs are fine  Keep taking same dose of Allopurinol

## 2020-12-18 ENCOUNTER — HOSPITAL ENCOUNTER (EMERGENCY)
Facility: HOSPITAL | Age: 55
Discharge: HOME | End: 2020-12-18
Attending: EMERGENCY MEDICINE
Payer: MEDICAID

## 2020-12-18 VITALS
OXYGEN SATURATION: 99 % | RESPIRATION RATE: 18 BRPM | TEMPERATURE: 98 F | HEART RATE: 88 BPM | DIASTOLIC BLOOD PRESSURE: 76 MMHG | WEIGHT: 215 LBS | BODY MASS INDEX: 33.74 KG/M2 | HEIGHT: 67 IN | SYSTOLIC BLOOD PRESSURE: 156 MMHG

## 2020-12-18 DIAGNOSIS — J02.9 SORE THROAT: ICD-10-CM

## 2020-12-18 DIAGNOSIS — R05.9 COUGH: Primary | ICD-10-CM

## 2020-12-18 PROCEDURE — 99283 EMERGENCY DEPT VISIT LOW MDM: CPT | Mod: 25

## 2020-12-18 RX ORDER — CETIRIZINE HYDROCHLORIDE 5 MG/1
5 TABLET ORAL DAILY
Qty: 30 TABLET | Refills: 0 | Status: SHIPPED | OUTPATIENT
Start: 2020-12-18 | End: 2021-02-17

## 2020-12-18 RX ORDER — FLUTICASONE PROPIONATE 50 MCG
1 SPRAY, SUSPENSION (ML) NASAL 2 TIMES DAILY PRN
Qty: 15 G | Refills: 0 | Status: SHIPPED | OUTPATIENT
Start: 2020-12-18 | End: 2021-01-20 | Stop reason: SDUPTHER

## 2020-12-19 NOTE — ED NOTES
Patient identifiers for Jarod Pierce checked and correct.  LOC:  Jarod Pierce is awake, alert, and aware of environment with an appropriate affect. He is oriented x 3 and speaking appropriately.  APPEARANCE:  He is resting comfortably and in no acute distress. He is clean and well groomed, patient's clothing is properly fastened.  SKIN:  The skin is warm and dry. He has normal skin turgor and moist mucus membranes. Skin is intact; no bruising or breakdown noted.  MUSCULOSKELETAL:  He is moving all extremities well, no obvious deformities noted. Pulses intact.   RESPIRATORY:  Airway is open and patent. Respirations are spontaneous and non-labored with normal effort and rate. +cough reported. Reports cough has been off and on for a few months.   CARDIAC:  He has a normal rate and rhythm. No peripheral edema noted. Capillary refill < 3 seconds.  ABDOMEN:  No distention noted.  Soft and non-tender upon palpation.  NEUROLOGICAL:  PERRL. Facial expression is symmetrical. Hand grasps are equal bilaterally. Normal sensation in all extremities when touched with finger.  Allergies reported:    Review of patient's allergies indicates:   Allergen Reactions    Codeine      Other reaction(s): Hives    Codeine phosphate      Other reaction(s): Unknown    Lisinopril      Dry coughing    Phenytoin sodium extended      Other reaction(s): leg cramps     OTHER NOTES:    Pt c/o sore throat and cough that has been off and on for the past month.

## 2020-12-19 NOTE — ED PROVIDER NOTES
Encounter Date: 12/18/2020       History     Chief Complaint   Patient presents with    Cough     ongoing for several months    Sore Throat     ongoing for several months- worsens at night    Gastroesophageal Reflux     55-year-old male with history of gout, HTN, GERD presents to the ER with a cough.  Patient states that since the beginning of hit this year, he has had a chronic cough.  It is usually brought on at night when he goes to lay down.  Feels like he has some drainage in the back of his throat and has an associated sore throat.  Denies fever, nausea or vomiting, chest pain, shortness of breath, abdominal pain.  Denies pain or swelling in extremities.        Review of patient's allergies indicates:   Allergen Reactions    Codeine      Other reaction(s): Hives    Codeine phosphate      Other reaction(s): Unknown    Lisinopril      Dry coughing    Phenytoin sodium extended      Other reaction(s): leg cramps     Past Medical History:   Diagnosis Date    Allergic rhinitis due to allergen     Arthritis     Cardiomyopathy     Gout     Hypertension     Left bundle branch block      Past Surgical History:   Procedure Laterality Date    WISDOM TOOTH EXTRACTION       Family History   Problem Relation Age of Onset    Heart disease Father     Hypertension Father     Gout Father     Cancer Sister         lung    Heart attack Neg Hx      Social History     Tobacco Use    Smoking status: Never Smoker    Smokeless tobacco: Never Used   Substance Use Topics    Alcohol use: No    Drug use: No     Review of Systems   Constitutional: Negative for fever.   HENT: Positive for congestion, postnasal drip, rhinorrhea and sore throat.    Respiratory: Positive for cough. Negative for shortness of breath.    Cardiovascular: Negative for chest pain.   Gastrointestinal: Negative for abdominal pain, nausea and vomiting.   Genitourinary: Negative for dysuria.   Musculoskeletal: Negative for back pain.   Skin:  Negative for rash.   Neurological: Negative for weakness.   Hematological: Does not bruise/bleed easily.   All other systems reviewed and are negative.      Physical Exam     Initial Vitals [12/18/20 2035]   BP Pulse Resp Temp SpO2   (!) 168/80 101 20 97.9 °F (36.6 °C) 98 %      MAP       --         Physical Exam    Constitutional: He appears well-developed and well-nourished. No distress.   HENT:   Head: Normocephalic and atraumatic.   Nose: Nose normal. No mucosal edema or rhinorrhea. Right sinus exhibits no maxillary sinus tenderness and no frontal sinus tenderness. Left sinus exhibits no maxillary sinus tenderness and no frontal sinus tenderness.   Mouth/Throat: Oropharynx is clear and moist and mucous membranes are normal. No oropharyngeal exudate, posterior oropharyngeal edema, posterior oropharyngeal erythema or tonsillar abscesses.   Eyes: Conjunctivae and EOM are normal. Pupils are equal, round, and reactive to light.   Neck: Normal range of motion.   Cardiovascular: Normal rate, regular rhythm, normal heart sounds and intact distal pulses.   No murmur heard.  Pulmonary/Chest: Breath sounds normal. No respiratory distress. He has no wheezes. He has no rhonchi. He has no rales.   Abdominal: Soft. He exhibits no distension. There is no abdominal tenderness. There is no rebound and no guarding.   Musculoskeletal: Normal range of motion. No tenderness or edema.   Neurological: He is alert.   Skin: Skin is warm and dry. No rash noted. No erythema.   Psychiatric: He has a normal mood and affect. Thought content normal.         ED Course   Procedures  Labs Reviewed - No data to display       Imaging Results          X-Ray Chest PA And Lateral (In process)                  Medical Decision Making:   Initial Assessment:   55-year-old male with history of gout, HTN, GERD presents to the ER with a chronic cough.   ED Management:  Plan:  Afebrile, signs stable.  Chest x-ray shows no acute abnormality on my read.   Chronic cough suspicious for postnasal drip.  Patient does have a history of GERD which could also cause his chronic cough.  Low suspicion for infectious etiology at this time.  Low suspicion for cardiac cause.  Will start intranasal steroid and antihistamine.  Recommend follow-up with PCP and possibly ENT for repeat evaluation.  Given return precautions.  Patient understands the plan.                             Clinical Impression:       ICD-10-CM ICD-9-CM   1. Cough  R05 786.2   2. Sore throat  J02.9 462                          ED Disposition Condition    Discharge Stable        ED Prescriptions     Medication Sig Dispense Start Date End Date Auth. Provider    fluticasone propionate (FLONASE) 50 mcg/actuation nasal spray 1 spray (50 mcg total) by Each Nostril route 2 (two) times daily as needed for Rhinitis or Allergies (Congestion). 15 g 12/18/2020  Michael Briscoe MD    cetirizine (ZYRTEC) 5 MG tablet Take 1 tablet (5 mg total) by mouth once daily. 30 tablet 12/18/2020 1/17/2021 Michael Briscoe MD        Follow-up Information     Follow up With Specialties Details Why Contact Info    Idris Fuller MD Family Medicine Schedule an appointment as soon as possible for a visit  For further evaluation of your symptoms. [address]  [place] [address]  [phone]      Abhilash Martinez MD Otolaryngology Schedule an appointment as soon as possible for a visit  For further evaluation of your symptoms. [address] [facility] [address]  [place] [address]  [phone]                                         Michael Briscoe MD  12/18/20 9697

## 2020-12-21 ENCOUNTER — PATIENT OUTREACH (OUTPATIENT)
Dept: ADMINISTRATIVE | Facility: OTHER | Age: 55
End: 2020-12-21

## 2020-12-21 NOTE — PROGRESS NOTES
Chart was reviewed for overdue [facility] Encounters ([facility])  topics  Updates were requested from care everywhere  Health Maintenance has been updated  LINKS immunization registry triggered/ unable to complete due to slow processing times

## 2020-12-22 ENCOUNTER — OFFICE VISIT (OUTPATIENT)
Dept: CARDIOLOGY | Facility: CLINIC | Age: 55
End: 2020-12-22
Payer: MEDICAID

## 2020-12-22 VITALS
OXYGEN SATURATION: 99 % | HEIGHT: 67 IN | TEMPERATURE: 98 F | BODY MASS INDEX: 33.74 KG/M2 | DIASTOLIC BLOOD PRESSURE: 82 MMHG | WEIGHT: 215 LBS | SYSTOLIC BLOOD PRESSURE: 139 MMHG | HEART RATE: 71 BPM | RESPIRATION RATE: 14 BRPM

## 2020-12-22 DIAGNOSIS — G45.9 TIA (TRANSIENT ISCHEMIC ATTACK): Chronic | ICD-10-CM

## 2020-12-22 DIAGNOSIS — I42.8 NONISCHEMIC CARDIOMYOPATHY: Primary | ICD-10-CM

## 2020-12-22 DIAGNOSIS — E65 ABDOMINAL OBESITY: ICD-10-CM

## 2020-12-22 DIAGNOSIS — E78.2 MIXED HYPERLIPIDEMIA: ICD-10-CM

## 2020-12-22 DIAGNOSIS — Z91.148 NON COMPLIANCE W MEDICATION REGIMEN: ICD-10-CM

## 2020-12-22 DIAGNOSIS — M1A.0311 IDIOPATHIC CHRONIC GOUT OF RIGHT WRIST WITH TOPHUS: ICD-10-CM

## 2020-12-22 DIAGNOSIS — E78.1 HYPERTRIGLYCERIDEMIA: ICD-10-CM

## 2020-12-22 DIAGNOSIS — I44.7 LEFT BUNDLE BRANCH BLOCK: ICD-10-CM

## 2020-12-22 DIAGNOSIS — H35.032 HYPERTENSIVE RETINOPATHY OF LEFT EYE: ICD-10-CM

## 2020-12-22 DIAGNOSIS — I16.0 HYPERTENSIVE URGENCY: Chronic | ICD-10-CM

## 2020-12-22 PROCEDURE — 99215 OFFICE O/P EST HI 40 MIN: CPT | Mod: S$PBB,,, | Performed by: INTERNAL MEDICINE

## 2020-12-22 PROCEDURE — 99215 PR OFFICE/OUTPT VISIT, EST, LEVL V, 40-54 MIN: ICD-10-PCS | Mod: S$PBB,,, | Performed by: INTERNAL MEDICINE

## 2020-12-22 PROCEDURE — 99214 OFFICE O/P EST MOD 30 MIN: CPT | Mod: PBBFAC,PN | Performed by: INTERNAL MEDICINE

## 2020-12-22 PROCEDURE — 99999 PR PBB SHADOW E&M-EST. PATIENT-LVL IV: CPT | Mod: PBBFAC,,, | Performed by: INTERNAL MEDICINE

## 2020-12-22 PROCEDURE — 99999 PR PBB SHADOW E&M-EST. PATIENT-LVL IV: ICD-10-PCS | Mod: PBBFAC,,, | Performed by: INTERNAL MEDICINE

## 2020-12-22 RX ORDER — TELMISARTAN 40 MG/1
40 TABLET ORAL NIGHTLY
Qty: 30 TABLET | Refills: 11 | Status: SHIPPED | OUTPATIENT
Start: 2020-12-22 | End: 2021-02-17

## 2020-12-22 RX ORDER — HYDROCHLOROTHIAZIDE 12.5 MG/1
12.5 CAPSULE ORAL DAILY
Qty: 90 CAPSULE | Refills: 3 | Status: SHIPPED | OUTPATIENT
Start: 2020-12-22 | End: 2021-02-17 | Stop reason: ALTCHOICE
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
## 2020-12-22 NOTE — PATIENT INSTRUCTIONS

## 2020-12-22 NOTE — LETTER
December 22, 2020      Soheila Avitia PA-C  [address] [facility] [address]           [facility] - Card[facility]  [address], [address]  [place] [address]  Phone: [phone]  Fax: [phone]          Patient: Jarod Pierce   MR Number: [id number]   YOB: 1965   Date of Visit: 12/22/2020       Dear Soheila Avitia:    Thank you for referring Jarod Pierce to me for evaluation. Attached you will find relevant portions of my assessment and plan of care.    If you have questions, please do not hesitate to call me. I look forward to following Jarod Pierce along with you.    Sincerely,    Kumar Manriquez MD    Enclosure  CC:  No Recipients    If you would like to receive this communication electronically, please contact [email or web address] or [phone] to request more information on [employer] Link access.    For providers and/or their staff who would like to refer a patient to [facility], please contact us through our one-stop-shop provider referral line, [facility], at [phone].    If you feel you have received this communication in error or would no longer like to receive these types of communications, please e-mail [email or web address]

## 2020-12-31 ENCOUNTER — TELEPHONE (OUTPATIENT)
Dept: UROLOGY | Facility: CLINIC | Age: 55
End: 2020-12-31

## 2020-12-31 NOTE — TELEPHONE ENCOUNTER
----- Message from Shannon  sent at 12/31/2020 11:07 AM CST -----  Regarding: advice  Contact: pt  Type: Needs Medical Advice    Who Called:      Best Call Back Number:     Additional Information: Requesting a call back from Nurse, regarding pt has questions for nurse about blood in urine and possible appt he has a referral on file ,please call

## 2020-12-31 NOTE — TELEPHONE ENCOUNTER
Spoke w pt informed that he will need to est with a provider that is accepting medicaid at this time we are accepting emergency medicaid and pt was provided with Dr Vernon for further urology care pt voiced ok and understanding.

## 2021-01-19 DIAGNOSIS — Z28.39 NOT UP TO DATE WITH TETANUS TOXOID IMMUNIZATION: ICD-10-CM

## 2021-01-19 DIAGNOSIS — Z23 IMMUNIZATION, TETANUS-DIPHTHERIA: Primary | ICD-10-CM

## 2021-01-20 ENCOUNTER — LAB VISIT (OUTPATIENT)
Dept: LAB | Facility: HOSPITAL | Age: 56
End: 2021-01-20
Attending: FAMILY MEDICINE
Payer: MEDICAID

## 2021-01-20 ENCOUNTER — CLINICAL SUPPORT (OUTPATIENT)
Dept: INTERNAL MEDICINE | Facility: CLINIC | Age: 56
End: 2021-01-20
Payer: MEDICAID

## 2021-01-20 DIAGNOSIS — Z23 NEED FOR VACCINATION: ICD-10-CM

## 2021-01-20 DIAGNOSIS — G45.9 TIA (TRANSIENT ISCHEMIC ATTACK): Chronic | ICD-10-CM

## 2021-01-20 DIAGNOSIS — E78.2 MIXED HYPERLIPIDEMIA: ICD-10-CM

## 2021-01-20 PROCEDURE — 80061 LIPID PANEL: CPT

## 2021-01-20 PROCEDURE — 90714 TD VACC NO PRESV 7 YRS+ IM: CPT | Mod: PBBFAC,PO

## 2021-01-20 PROCEDURE — 99499 UNLISTED E&M SERVICE: CPT | Mod: S$PBB,,, | Performed by: FAMILY MEDICINE

## 2021-01-20 PROCEDURE — 36415 COLL VENOUS BLD VENIPUNCTURE: CPT | Mod: PO

## 2021-01-20 PROCEDURE — 99499 NO LOS: ICD-10-PCS | Mod: S$PBB,,, | Performed by: FAMILY MEDICINE

## 2021-01-20 RX ORDER — FLUTICASONE PROPIONATE 50 MCG
1 SPRAY, SUSPENSION (ML) NASAL 2 TIMES DAILY PRN
Qty: 15 G | Refills: 0 | OUTPATIENT
Start: 2021-01-20 | End: 2021-09-27

## 2021-01-21 ENCOUNTER — PATIENT OUTREACH (OUTPATIENT)
Dept: ADMINISTRATIVE | Facility: OTHER | Age: 56
End: 2021-01-21

## 2021-01-21 DIAGNOSIS — Z12.11 SPECIAL SCREENING FOR MALIGNANT NEOPLASM OF COLON: Primary | ICD-10-CM

## 2021-01-21 LAB
CHOLEST SERPL-MCNC: 140 MG/DL (ref 120–199)
CHOLEST/HDLC SERPL: 4.5 {RATIO} (ref 2–5)
HDLC SERPL-MCNC: 31 MG/DL (ref 40–75)
HDLC SERPL: 22.1 % (ref 20–50)
LDLC SERPL CALC-MCNC: 47.8 MG/DL (ref 63–159)
NONHDLC SERPL-MCNC: 109 MG/DL
TRIGL SERPL-MCNC: 306 MG/DL (ref 30–150)

## 2021-01-29 ENCOUNTER — TELEPHONE (OUTPATIENT)
Dept: CARDIOLOGY | Facility: CLINIC | Age: 56
End: 2021-01-29

## 2021-02-04 ENCOUNTER — TELEPHONE (OUTPATIENT)
Dept: CARDIOLOGY | Facility: HOSPITAL | Age: 56
End: 2021-02-04

## 2021-02-05 ENCOUNTER — CLINICAL SUPPORT (OUTPATIENT)
Dept: CARDIOLOGY | Facility: HOSPITAL | Age: 56
End: 2021-02-05
Attending: INTERNAL MEDICINE
Payer: MEDICAID

## 2021-02-05 VITALS — BODY MASS INDEX: 33.74 KG/M2 | HEIGHT: 67 IN | WEIGHT: 214.94 LBS

## 2021-02-05 DIAGNOSIS — I42.8 NONISCHEMIC CARDIOMYOPATHY: ICD-10-CM

## 2021-02-05 DIAGNOSIS — I16.0 HYPERTENSIVE URGENCY: Chronic | ICD-10-CM

## 2021-02-05 DIAGNOSIS — I16.0 HYPERTENSIVE URGENCY: ICD-10-CM

## 2021-02-05 PROCEDURE — 93306 TTE W/DOPPLER COMPLETE: CPT | Mod: 26,,, | Performed by: INTERNAL MEDICINE

## 2021-02-05 PROCEDURE — 93306 ECHO (CUPID ONLY): ICD-10-PCS | Mod: 26,,, | Performed by: INTERNAL MEDICINE

## 2021-02-05 PROCEDURE — 93306 TTE W/DOPPLER COMPLETE: CPT

## 2021-02-06 LAB
AORTIC ROOT ANNULUS: 3.21 CM
AORTIC VALVE CUSP SEPERATION: 2.38 CM
AV INDEX (PROSTH): 0.58
AV MEAN GRADIENT: 3 MMHG
AV PEAK GRADIENT: 7 MMHG
AV VALVE AREA: 2.3 CM2
AV VELOCITY RATIO: 63.55
BSA FOR ECHO PROCEDURE: 2.15 M2
CV ECHO LV RWT: 0.47 CM
DOP CALC AO PEAK VEL: 1.3 M/S
DOP CALC AO VTI: 26.89 CM
DOP CALC LVOT AREA: 4 CM2
DOP CALC LVOT DIAMETER: 2.25 CM
DOP CALC LVOT PEAK VEL: 82.62 M/S
DOP CALC LVOT STROKE VOLUME: 61.72 CM3
DOP CALCLVOT PEAK VEL VTI: 15.53 CM
E WAVE DECELERATION TIME: 198.91 MSEC
E/A RATIO: 0.83
E/E' RATIO: 12 M/S
ECHO LV POSTERIOR WALL: 1.31 CM (ref 0.6–1.1)
FRACTIONAL SHORTENING: 16 % (ref 28–44)
INTERVENTRICULAR SEPTUM: 1.31 CM (ref 0.6–1.1)
IVRT: 110.5 MSEC
LEFT ATRIUM SIZE: 3.55 CM
LEFT INTERNAL DIMENSION IN SYSTOLE: 4.66 CM (ref 2.1–4)
LEFT VENTRICLE MASS INDEX: 150 G/M2
LEFT VENTRICULAR INTERNAL DIMENSION IN DIASTOLE: 5.56 CM (ref 3.5–6)
LEFT VENTRICULAR MASS: 312.99 G
LV LATERAL E/E' RATIO: 9.43 M/S
LV SEPTAL E/E' RATIO: 16.5 M/S
MV PEAK A VEL: 0.8 M/S
MV PEAK E VEL: 0.66 M/S
PISA TR MAX VEL: 2.26 M/S
PV PEAK VELOCITY: 108.37 CM/S
RA PRESSURE: 3 MMHG
RIGHT VENTRICULAR END-DIASTOLIC DIMENSION: 195 CM
TDI LATERAL: 0.07 M/S
TDI SEPTAL: 0.04 M/S
TDI: 0.06 M/S
TR MAX PG: 20 MMHG
TV REST PULMONARY ARTERY PRESSURE: 23 MMHG

## 2021-02-12 RX ORDER — LANOLIN ALCOHOL/MO/W.PET/CERES
400 CREAM (GRAM) TOPICAL DAILY
Qty: 30 TABLET | Refills: 1 | COMMUNITY
Start: 2021-02-12 | End: 2021-02-17

## 2021-02-12 RX ORDER — PANTOPRAZOLE SODIUM 40 MG/1
40 TABLET, DELAYED RELEASE ORAL DAILY
COMMUNITY
End: 2021-02-12 | Stop reason: SDUPTHER

## 2021-02-12 RX ORDER — PANTOPRAZOLE SODIUM 40 MG/1
40 TABLET, DELAYED RELEASE ORAL DAILY
Qty: 37 TABLET | Refills: 1 | Status: SHIPPED | OUTPATIENT
Start: 2021-02-12 | End: 2021-04-26

## 2021-02-12 RX ORDER — LANOLIN ALCOHOL/MO/W.PET/CERES
400 CREAM (GRAM) TOPICAL DAILY
COMMUNITY
End: 2021-02-12 | Stop reason: SDUPTHER

## 2021-02-17 ENCOUNTER — OFFICE VISIT (OUTPATIENT)
Dept: CARDIOLOGY | Facility: CLINIC | Age: 56
End: 2021-02-17
Payer: MEDICAID

## 2021-02-17 VITALS
HEIGHT: 67 IN | OXYGEN SATURATION: 98 % | RESPIRATION RATE: 16 BRPM | WEIGHT: 222 LBS | SYSTOLIC BLOOD PRESSURE: 132 MMHG | BODY MASS INDEX: 34.84 KG/M2 | HEART RATE: 75 BPM | DIASTOLIC BLOOD PRESSURE: 80 MMHG

## 2021-02-17 DIAGNOSIS — E66.09 CLASS 1 OBESITY DUE TO EXCESS CALORIES WITH SERIOUS COMORBIDITY AND BODY MASS INDEX (BMI) OF 33.0 TO 33.9 IN ADULT: ICD-10-CM

## 2021-02-17 DIAGNOSIS — I50.23 ACUTE ON CHRONIC SYSTOLIC CONGESTIVE HEART FAILURE: Primary | ICD-10-CM

## 2021-02-17 DIAGNOSIS — I44.7 LEFT BUNDLE BRANCH BLOCK: ICD-10-CM

## 2021-02-17 DIAGNOSIS — I16.0 HYPERTENSIVE URGENCY: Chronic | ICD-10-CM

## 2021-02-17 DIAGNOSIS — E78.2 MIXED HYPERLIPIDEMIA: ICD-10-CM

## 2021-02-17 PROCEDURE — 93000 ELECTROCARDIOGRAM COMPLETE: CPT | Mod: S$GLB,,, | Performed by: SPECIALIST

## 2021-02-17 PROCEDURE — 93000 EKG 12-LEAD: ICD-10-PCS | Mod: S$GLB,,, | Performed by: SPECIALIST

## 2021-02-17 PROCEDURE — 99215 PR OFFICE/OUTPT VISIT, EST, LEVL V, 40-54 MIN: ICD-10-PCS | Mod: S$GLB,,, | Performed by: INTERNAL MEDICINE

## 2021-02-17 PROCEDURE — 99215 OFFICE O/P EST HI 40 MIN: CPT | Mod: S$GLB,,, | Performed by: INTERNAL MEDICINE

## 2021-02-17 RX ORDER — AMLODIPINE BESYLATE 5 MG/1
5 TABLET ORAL NIGHTLY
Qty: 90 TABLET | Refills: 3 | OUTPATIENT
Start: 2021-02-17 | End: 2021-09-27

## 2021-02-17 RX ORDER — FUROSEMIDE 20 MG/1
20 TABLET ORAL DAILY
Qty: 90 TABLET | Refills: 3 | Status: SHIPPED | OUTPATIENT
Start: 2021-02-17 | End: 2021-12-29

## 2021-03-15 DIAGNOSIS — M10.9 ARTHRITIS DUE TO GOUT: ICD-10-CM

## 2021-03-15 DIAGNOSIS — Z79.899 ENCOUNTER FOR LONG-TERM (CURRENT) USE OF MEDICATIONS: ICD-10-CM

## 2021-03-15 DIAGNOSIS — M15.9 OSTEOARTHRITIS, GENERALIZED: Primary | ICD-10-CM

## 2021-03-16 ENCOUNTER — OFFICE VISIT (OUTPATIENT)
Dept: RHEUMATOLOGY | Facility: CLINIC | Age: 56
End: 2021-03-16
Payer: MEDICAID

## 2021-03-16 ENCOUNTER — LAB VISIT (OUTPATIENT)
Dept: LAB | Facility: HOSPITAL | Age: 56
End: 2021-03-16
Attending: INTERNAL MEDICINE
Payer: MEDICAID

## 2021-03-16 VITALS
SYSTOLIC BLOOD PRESSURE: 144 MMHG | BODY MASS INDEX: 34.86 KG/M2 | DIASTOLIC BLOOD PRESSURE: 91 MMHG | WEIGHT: 222.63 LBS | TEMPERATURE: 98 F

## 2021-03-16 DIAGNOSIS — M15.9 OSTEOARTHRITIS, GENERALIZED: ICD-10-CM

## 2021-03-16 DIAGNOSIS — Z79.899 ENCOUNTER FOR LONG-TERM (CURRENT) USE OF MEDICATIONS: ICD-10-CM

## 2021-03-16 DIAGNOSIS — M10.9 ARTHRITIS DUE TO GOUT: ICD-10-CM

## 2021-03-16 DIAGNOSIS — M15.9 OSTEOARTHRITIS, GENERALIZED: Primary | ICD-10-CM

## 2021-03-16 LAB
ALBUMIN SERPL BCP-MCNC: 4.4 G/DL (ref 3.5–5.2)
ALP SERPL-CCNC: 85 U/L (ref 55–135)
ALT SERPL W/O P-5'-P-CCNC: 26 U/L (ref 10–44)
ANION GAP SERPL CALC-SCNC: 8 MMOL/L (ref 8–16)
AST SERPL-CCNC: 23 U/L (ref 10–40)
BASOPHILS # BLD AUTO: 0.02 K/UL (ref 0–0.2)
BASOPHILS NFR BLD: 0.4 % (ref 0–1.9)
BILIRUB SERPL-MCNC: 1.4 MG/DL (ref 0.1–1)
BUN SERPL-MCNC: 17 MG/DL (ref 6–20)
CALCIUM SERPL-MCNC: 9 MG/DL (ref 8.7–10.5)
CHLORIDE SERPL-SCNC: 104 MMOL/L (ref 95–110)
CO2 SERPL-SCNC: 27 MMOL/L (ref 23–29)
CREAT SERPL-MCNC: 1 MG/DL (ref 0.5–1.4)
CRP SERPL-MCNC: 0.07 MG/DL
DIFFERENTIAL METHOD: ABNORMAL
EOSINOPHIL # BLD AUTO: 0.1 K/UL (ref 0–0.5)
EOSINOPHIL NFR BLD: 1.7 % (ref 0–8)
ERYTHROCYTE [DISTWIDTH] IN BLOOD BY AUTOMATED COUNT: 12.7 % (ref 11.5–14.5)
EST. GFR  (AFRICAN AMERICAN): >60 ML/MIN/1.73 M^2
EST. GFR  (NON AFRICAN AMERICAN): >60 ML/MIN/1.73 M^2
GLUCOSE SERPL-MCNC: 151 MG/DL (ref 70–110)
HCT VFR BLD AUTO: 43.6 % (ref 40–54)
HGB BLD-MCNC: 15.1 G/DL (ref 14–18)
IMM GRANULOCYTES # BLD AUTO: 0.02 K/UL (ref 0–0.04)
IMM GRANULOCYTES NFR BLD AUTO: 0.4 % (ref 0–0.5)
LYMPHOCYTES # BLD AUTO: 1.4 K/UL (ref 1–4.8)
LYMPHOCYTES NFR BLD: 26.3 % (ref 18–48)
MCH RBC QN AUTO: 31.9 PG (ref 27–31)
MCHC RBC AUTO-ENTMCNC: 34.6 G/DL (ref 32–36)
MCV RBC AUTO: 92 FL (ref 82–98)
MONOCYTES # BLD AUTO: 0.4 K/UL (ref 0.3–1)
MONOCYTES NFR BLD: 7.2 % (ref 4–15)
NEUTROPHILS # BLD AUTO: 3.5 K/UL (ref 1.8–7.7)
NEUTROPHILS NFR BLD: 64 % (ref 38–73)
NRBC BLD-RTO: 0 /100 WBC
PLATELET # BLD AUTO: 189 K/UL (ref 150–350)
PMV BLD AUTO: 9.1 FL (ref 9.2–12.9)
POTASSIUM SERPL-SCNC: 3.4 MMOL/L (ref 3.5–5.1)
PROT SERPL-MCNC: 7.6 G/DL (ref 6–8.4)
RBC # BLD AUTO: 4.73 M/UL (ref 4.6–6.2)
SODIUM SERPL-SCNC: 139 MMOL/L (ref 136–145)
URATE SERPL-MCNC: 7.4 MG/DL (ref 3.4–7)
WBC # BLD AUTO: 5.4 K/UL (ref 3.9–12.7)

## 2021-03-16 PROCEDURE — 85025 COMPLETE CBC W/AUTO DIFF WBC: CPT | Performed by: INTERNAL MEDICINE

## 2021-03-16 PROCEDURE — 86140 C-REACTIVE PROTEIN: CPT | Performed by: INTERNAL MEDICINE

## 2021-03-16 PROCEDURE — 36415 COLL VENOUS BLD VENIPUNCTURE: CPT | Performed by: INTERNAL MEDICINE

## 2021-03-16 PROCEDURE — 80053 COMPREHEN METABOLIC PANEL: CPT | Performed by: INTERNAL MEDICINE

## 2021-03-16 PROCEDURE — 99214 OFFICE O/P EST MOD 30 MIN: CPT | Mod: S$GLB,,, | Performed by: INTERNAL MEDICINE

## 2021-03-16 PROCEDURE — 99214 PR OFFICE/OUTPT VISIT, EST, LEVL IV, 30-39 MIN: ICD-10-PCS | Mod: S$GLB,,, | Performed by: INTERNAL MEDICINE

## 2021-03-16 PROCEDURE — 84550 ASSAY OF BLOOD/URIC ACID: CPT | Performed by: INTERNAL MEDICINE

## 2021-03-16 RX ORDER — DIFLUNISAL 500 MG/1
TABLET, FILM COATED ORAL
Qty: 60 TABLET | Refills: 1 | Status: SHIPPED | OUTPATIENT
Start: 2021-03-16 | End: 2021-11-08

## 2021-03-17 DIAGNOSIS — E79.0 HYPERURICEMIA: ICD-10-CM

## 2021-03-17 DIAGNOSIS — E87.6 HYPOKALEMIA: Primary | ICD-10-CM

## 2021-03-17 DIAGNOSIS — Z79.899 ENCOUNTER FOR LONG-TERM (CURRENT) USE OF MEDICATIONS: ICD-10-CM
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
## 2021-04-23 ENCOUNTER — HOSPITAL ENCOUNTER (EMERGENCY)
Facility: HOSPITAL | Age: 56
Discharge: HOME | End: 2021-04-24
Attending: EMERGENCY MEDICINE
Payer: MEDICAID

## 2021-04-23 DIAGNOSIS — M70.21 OLECRANON BURSITIS OF RIGHT ELBOW: ICD-10-CM

## 2021-04-23 DIAGNOSIS — M25.421 SWELLING OF RIGHT ELBOW: Primary | ICD-10-CM

## 2021-04-23 PROCEDURE — 99284 EMERGENCY DEPT VISIT MOD MDM: CPT | Mod: 25

## 2021-04-23 RX ORDER — DEXAMETHASONE SODIUM PHOSPHATE 4 MG/ML
8 INJECTION, SOLUTION INTRA-ARTICULAR; INTRALESIONAL; INTRAMUSCULAR; INTRAVENOUS; SOFT TISSUE
Status: COMPLETED | OUTPATIENT
Start: 2021-04-24 | End: 2021-04-24

## 2021-04-24 VITALS
DIASTOLIC BLOOD PRESSURE: 91 MMHG | TEMPERATURE: 98 F | BODY MASS INDEX: 34.53 KG/M2 | WEIGHT: 220 LBS | OXYGEN SATURATION: 97 % | HEART RATE: 87 BPM | HEIGHT: 67 IN | SYSTOLIC BLOOD PRESSURE: 183 MMHG | RESPIRATION RATE: 16 BRPM

## 2021-04-24 PROCEDURE — 96372 THER/PROPH/DIAG INJ SC/IM: CPT

## 2021-04-24 PROCEDURE — 63600175 PHARM REV CODE 636 W HCPCS: Performed by: EMERGENCY MEDICINE

## 2021-04-24 RX ORDER — SULFAMETHOXAZOLE AND TRIMETHOPRIM 800; 160 MG/1; MG/1
1 TABLET ORAL 2 TIMES DAILY
Qty: 20 TABLET | Refills: 0 | Status: SHIPPED | OUTPATIENT
Start: 2021-04-24 | End: 2021-05-04

## 2021-04-24 RX ORDER — PREDNISONE 20 MG/1
40 TABLET ORAL DAILY
Qty: 10 TABLET | Refills: 0 | Status: SHIPPED | OUTPATIENT
Start: 2021-04-24 | End: 2021-04-29

## 2021-04-24 RX ORDER — HYDROCODONE BITARTRATE AND ACETAMINOPHEN 5; 325 MG/1; MG/1
1 TABLET ORAL EVERY 4 HOURS PRN
Qty: 12 TABLET | Refills: 0 | Status: SHIPPED | OUTPATIENT
Start: 2021-04-24 | End: 2021-12-29

## 2021-04-24 RX ADMIN — DEXAMETHASONE SODIUM PHOSPHATE 8 MG: 4 INJECTION, SOLUTION INTRA-ARTICULAR; INTRALESIONAL; INTRAMUSCULAR; INTRAVENOUS; SOFT TISSUE at 12:04

## 2021-05-03 RX ORDER — ALLOPURINOL 100 MG/1
200 TABLET ORAL DAILY
COMMUNITY
End: 2021-07-08

## 2021-05-03 RX ORDER — ALLOPURINOL 100 MG/1
100 TABLET ORAL DAILY
Qty: 90 TABLET | Refills: 1 | Status: SHIPPED | OUTPATIENT
Start: 2021-05-03 | End: 2021-07-08 | Stop reason: SDUPTHER

## 2021-06-08 RX ORDER — PANTOPRAZOLE SODIUM 40 MG/1
40 TABLET, DELAYED RELEASE ORAL DAILY
Qty: 90 TABLET | Refills: 0 | Status: SHIPPED | OUTPATIENT
Start: 2021-06-08 | End: 2021-09-15 | Stop reason: SDUPTHER

## 2021-06-21 ENCOUNTER — LAB VISIT (OUTPATIENT)
Dept: LAB | Facility: HOSPITAL | Age: 56
End: 2021-06-21
Attending: INTERNAL MEDICINE
Payer: MEDICAID

## 2021-06-21 DIAGNOSIS — E87.6 HYPOKALEMIA: ICD-10-CM

## 2021-06-21 DIAGNOSIS — Z79.899 ENCOUNTER FOR LONG-TERM (CURRENT) USE OF MEDICATIONS: ICD-10-CM

## 2021-06-21 DIAGNOSIS — E79.0 HYPERURICEMIA: ICD-10-CM

## 2021-06-21 LAB
POTASSIUM SERPL-SCNC: 3.8 MMOL/L (ref 3.5–5.1)
URATE SERPL-MCNC: 5.8 MG/DL (ref 3.4–7)

## 2021-06-21 PROCEDURE — 36415 COLL VENOUS BLD VENIPUNCTURE: CPT | Performed by: INTERNAL MEDICINE

## 2021-06-21 PROCEDURE — 84132 ASSAY OF SERUM POTASSIUM: CPT | Performed by: INTERNAL MEDICINE

## 2021-06-21 PROCEDURE — 84550 ASSAY OF BLOOD/URIC ACID: CPT | Performed by: INTERNAL MEDICINE

## 2021-06-24 ENCOUNTER — TELEPHONE (OUTPATIENT)
Dept: CARDIOLOGY | Facility: CLINIC | Age: 56
End: 2021-06-24

## 2021-06-30 DIAGNOSIS — J45.909 MODERATE ASTHMA WITHOUT COMPLICATION, UNSPECIFIED WHETHER PERSISTENT: ICD-10-CM

## 2021-07-06 RX ORDER — ALBUTEROL SULFATE 90 UG/1
AEROSOL, METERED RESPIRATORY (INHALATION)
Qty: 24 G | Refills: 0 | OUTPATIENT
Start: 2021-07-06

## 2021-07-08 ENCOUNTER — OFFICE VISIT (OUTPATIENT)
Dept: RHEUMATOLOGY | Facility: CLINIC | Age: 56
End: 2021-07-08
Payer: MEDICAID

## 2021-07-08 VITALS — WEIGHT: 223.5 LBS | DIASTOLIC BLOOD PRESSURE: 90 MMHG | SYSTOLIC BLOOD PRESSURE: 160 MMHG | BODY MASS INDEX: 35.01 KG/M2

## 2021-07-08 DIAGNOSIS — E79.0 HYPERURICEMIA: Primary | ICD-10-CM

## 2021-07-08 DIAGNOSIS — M15.9 OSTEOARTHRITIS, GENERALIZED: ICD-10-CM

## 2021-07-08 PROCEDURE — 99213 OFFICE O/P EST LOW 20 MIN: CPT | Mod: S$GLB,,, | Performed by: INTERNAL MEDICINE

## 2021-07-08 PROCEDURE — 99213 PR OFFICE/OUTPT VISIT, EST, LEVL III, 20-29 MIN: ICD-10-PCS | Mod: S$GLB,,, | Performed by: INTERNAL MEDICINE

## 2021-07-08 RX ORDER — ALLOPURINOL 100 MG/1
TABLET ORAL
Qty: 180 TABLET | Refills: 1 | Status: SHIPPED | OUTPATIENT
Start: 2021-07-08 | End: 2021-11-08

## 2021-07-08 RX ORDER — ALLOPURINOL 100 MG/1
TABLET ORAL
Qty: 180 TABLET | Refills: 1 | Status: SHIPPED | OUTPATIENT
Start: 2021-07-08 | End: 2021-12-29 | Stop reason: SDUPTHER

## 2021-08-18 ENCOUNTER — HOSPITAL ENCOUNTER (EMERGENCY)
Facility: HOSPITAL | Age: 56
Discharge: HOME | End: 2021-08-18
Attending: EMERGENCY MEDICINE
Payer: MEDICAID

## 2021-08-18 VITALS
HEIGHT: 67 IN | SYSTOLIC BLOOD PRESSURE: 158 MMHG | BODY MASS INDEX: 35.16 KG/M2 | HEART RATE: 80 BPM | OXYGEN SATURATION: 96 % | RESPIRATION RATE: 18 BRPM | TEMPERATURE: 99 F | WEIGHT: 224 LBS | DIASTOLIC BLOOD PRESSURE: 91 MMHG

## 2021-08-18 DIAGNOSIS — J30.1 SEASONAL ALLERGIC RHINITIS DUE TO POLLEN: Primary | ICD-10-CM

## 2021-08-18 DIAGNOSIS — U07.1 COVID-19: ICD-10-CM

## 2021-08-18 LAB — SARS-COV-2 RDRP RESP QL NAA+PROBE: NEGATIVE

## 2021-08-18 PROCEDURE — 99284 EMERGENCY DEPT VISIT MOD MDM: CPT

## 2021-08-18 PROCEDURE — U0002 COVID-19 LAB TEST NON-CDC: HCPCS | Performed by: NURSE PRACTITIONER

## 2021-08-18 PROCEDURE — 93010 ELECTROCARDIOGRAM REPORT: CPT | Mod: ,,, | Performed by: SPECIALIST

## 2021-08-18 PROCEDURE — 93005 ELECTROCARDIOGRAM TRACING: CPT | Performed by: SPECIALIST

## 2021-08-18 PROCEDURE — 93010 EKG 12-LEAD: ICD-10-PCS | Mod: ,,, | Performed by: SPECIALIST

## 2021-08-18 RX ORDER — PROMETHAZINE HYDROCHLORIDE AND DEXTROMETHORPHAN HYDROBROMIDE 6.25; 15 MG/5ML; MG/5ML
5 SYRUP ORAL NIGHTLY PRN
Qty: 50 ML | Refills: 0 | Status: SHIPPED | OUTPATIENT
Start: 2021-08-18 | End: 2021-08-28

## 2021-08-18 RX ORDER — FLUTICASONE PROPIONATE 50 MCG
1 SPRAY, SUSPENSION (ML) NASAL 2 TIMES DAILY PRN
Qty: 15 G | Refills: 0 | OUTPATIENT
Start: 2021-08-18 | End: 2021-09-27

## 2021-08-18 RX ORDER — ALBUTEROL SULFATE 90 UG/1
2 AEROSOL, METERED RESPIRATORY (INHALATION) EVERY 6 HOURS PRN
Qty: 18 G | Refills: 0 | OUTPATIENT
Start: 2021-08-18 | End: 2021-09-27

## 2021-08-18 RX ORDER — MONTELUKAST SODIUM 10 MG/1
10 TABLET ORAL NIGHTLY
Qty: 30 TABLET | Refills: 0 | Status: SHIPPED | OUTPATIENT
Start: 2021-08-18 | End: 2021-09-17

## 2021-08-18 RX ORDER — PROMETHAZINE HYDROCHLORIDE AND DEXTROMETHORPHAN HYDROBROMIDE 6.25; 15 MG/5ML; MG/5ML
5 SYRUP ORAL NIGHTLY PRN
Qty: 50 ML | Refills: 0 | Status: SHIPPED | OUTPATIENT
Start: 2021-08-18 | End: 2021-08-18 | Stop reason: SDUPTHER

## 2021-08-18 RX ORDER — BENZONATATE 100 MG/1
100 CAPSULE ORAL 3 TIMES DAILY PRN
Qty: 30 CAPSULE | Refills: 0 | Status: SHIPPED | OUTPATIENT
Start: 2021-08-18 | End: 2021-08-28

## 2021-09-15 DIAGNOSIS — E78.2 MIXED HYPERLIPIDEMIA: ICD-10-CM

## 2021-09-15 RX ORDER — PANTOPRAZOLE SODIUM 40 MG/1
40 TABLET, DELAYED RELEASE ORAL DAILY
Qty: 90 TABLET | Refills: 3 | OUTPATIENT
Start: 2021-09-15 | End: 2021-09-27

## 2021-09-15 RX ORDER — ATORVASTATIN CALCIUM 40 MG/1
40 TABLET, FILM COATED ORAL DAILY
Qty: 90 TABLET | Refills: 3 | Status: SHIPPED | OUTPATIENT
Start: 2021-09-15 | End: 2022-03-07 | Stop reason: SDUPTHER

## 2021-09-26 ENCOUNTER — HOSPITAL ENCOUNTER (EMERGENCY)
Facility: HOSPITAL | Age: 56
Discharge: HOME | End: 2021-09-27
Attending: EMERGENCY MEDICINE
Payer: MEDICAID

## 2021-09-26 DIAGNOSIS — H57.10 PAIN IN EYE, UNSPECIFIED LATERALITY: Primary | ICD-10-CM

## 2021-09-26 DIAGNOSIS — S05.00XA CORNEAL ABRASION, UNSPECIFIED LATERALITY, INITIAL ENCOUNTER: ICD-10-CM

## 2021-09-26 PROCEDURE — 25000003 PHARM REV CODE 250: Performed by: NURSE PRACTITIONER

## 2021-09-26 PROCEDURE — 99283 EMERGENCY DEPT VISIT LOW MDM: CPT

## 2021-09-26 RX ORDER — TETRACAINE HYDROCHLORIDE 5 MG/ML
2 SOLUTION OPHTHALMIC
Status: COMPLETED | OUTPATIENT
Start: 2021-09-26 | End: 2021-09-26

## 2021-09-26 RX ADMIN — TETRACAINE HYDROCHLORIDE 2 DROP: 5 SOLUTION OPHTHALMIC at 11:09

## 2021-09-27 VITALS
OXYGEN SATURATION: 98 % | SYSTOLIC BLOOD PRESSURE: 160 MMHG | RESPIRATION RATE: 18 BRPM | TEMPERATURE: 99 F | BODY MASS INDEX: 34.53 KG/M2 | HEIGHT: 67 IN | WEIGHT: 220 LBS | HEART RATE: 70 BPM | DIASTOLIC BLOOD PRESSURE: 96 MMHG

## 2021-09-27 PROCEDURE — 25000003 PHARM REV CODE 250: Performed by: NURSE PRACTITIONER

## 2021-09-27 RX ORDER — FLUTICASONE PROPIONATE 50 MCG
1 SPRAY, SUSPENSION (ML) NASAL DAILY
Qty: 9.9 ML | Refills: 0 | Status: SHIPPED | OUTPATIENT
Start: 2021-09-27 | End: 2022-09-21

## 2021-09-27 RX ORDER — PANTOPRAZOLE SODIUM 40 MG/1
40 TABLET, DELAYED RELEASE ORAL DAILY
Qty: 30 TABLET | Refills: 0 | Status: SHIPPED | OUTPATIENT
Start: 2021-09-27 | End: 2021-12-02 | Stop reason: SDUPTHER

## 2021-09-27 RX ORDER — AMLODIPINE BESYLATE 5 MG/1
5 TABLET ORAL DAILY
Qty: 30 TABLET | Refills: 0 | Status: SHIPPED | OUTPATIENT
Start: 2021-09-27 | End: 2021-12-02 | Stop reason: SDUPTHER

## 2021-09-27 RX ORDER — POLYMYXIN B SULFATE AND TRIMETHOPRIM 1; 10000 MG/ML; [USP'U]/ML
1 SOLUTION OPHTHALMIC EVERY 6 HOURS
Qty: 10 ML | Refills: 0 | Status: SHIPPED | OUTPATIENT
Start: 2021-09-27 | End: 2021-10-04

## 2021-09-27 RX ORDER — ALBUTEROL SULFATE 90 UG/1
1-2 AEROSOL, METERED RESPIRATORY (INHALATION) EVERY 6 HOURS PRN
Qty: 6.7 G | Refills: 0 | Status: ON HOLD | OUTPATIENT
Start: 2021-09-27 | End: 2022-09-23 | Stop reason: SDUPTHER

## 2021-09-27 RX ADMIN — FLUORESCEIN SODIUM 1 EACH: 1 STRIP OPHTHALMIC at 12:09

## 2021-11-08 ENCOUNTER — OFFICE VISIT (OUTPATIENT)
Dept: RHEUMATOLOGY | Facility: CLINIC | Age: 56
End: 2021-11-08
Payer: MEDICAID

## 2021-11-08 ENCOUNTER — LAB VISIT (OUTPATIENT)
Dept: LAB | Facility: HOSPITAL | Age: 56
End: 2021-11-08
Attending: INTERNAL MEDICINE
Payer: MEDICAID

## 2021-11-08 VITALS — WEIGHT: 227.31 LBS | BODY MASS INDEX: 35.6 KG/M2 | SYSTOLIC BLOOD PRESSURE: 144 MMHG | DIASTOLIC BLOOD PRESSURE: 84 MMHG

## 2021-11-08 DIAGNOSIS — M15.9 OSTEOARTHRITIS, GENERALIZED: ICD-10-CM

## 2021-11-08 DIAGNOSIS — E79.0 HYPERURICEMIA: ICD-10-CM

## 2021-11-08 DIAGNOSIS — E79.0 HYPERURICEMIA: Primary | ICD-10-CM

## 2021-11-08 LAB
ALBUMIN SERPL BCP-MCNC: 4.5 G/DL (ref 3.5–5.2)
ALP SERPL-CCNC: 97 U/L (ref 55–135)
ALT SERPL W/O P-5'-P-CCNC: 28 U/L (ref 10–44)
ANION GAP SERPL CALC-SCNC: 9 MMOL/L (ref 8–16)
AST SERPL-CCNC: 21 U/L (ref 10–40)
BASOPHILS # BLD AUTO: 0.03 K/UL (ref 0–0.2)
BASOPHILS NFR BLD: 0.5 % (ref 0–1.9)
BILIRUB SERPL-MCNC: 1.5 MG/DL (ref 0.1–1)
BUN SERPL-MCNC: 15 MG/DL (ref 6–20)
CALCIUM SERPL-MCNC: 9.6 MG/DL (ref 8.7–10.5)
CHLORIDE SERPL-SCNC: 105 MMOL/L (ref 95–110)
CO2 SERPL-SCNC: 30 MMOL/L (ref 23–29)
CREAT SERPL-MCNC: 1 MG/DL (ref 0.5–1.4)
CRP SERPL-MCNC: 0.15 MG/DL
DIFFERENTIAL METHOD: ABNORMAL
EOSINOPHIL # BLD AUTO: 0.2 K/UL (ref 0–0.5)
EOSINOPHIL NFR BLD: 2.9 % (ref 0–8)
ERYTHROCYTE [DISTWIDTH] IN BLOOD BY AUTOMATED COUNT: 13.1 % (ref 11.5–14.5)
EST. GFR  (AFRICAN AMERICAN): >60 ML/MIN/1.73 M^2
EST. GFR  (NON AFRICAN AMERICAN): >60 ML/MIN/1.73 M^2
GLUCOSE SERPL-MCNC: 89 MG/DL (ref 70–110)
HCT VFR BLD AUTO: 44.5 % (ref 40–54)
HGB BLD-MCNC: 15.1 G/DL (ref 14–18)
IMM GRANULOCYTES # BLD AUTO: 0.03 K/UL (ref 0–0.04)
IMM GRANULOCYTES NFR BLD AUTO: 0.5 % (ref 0–0.5)
LYMPHOCYTES # BLD AUTO: 1.4 K/UL (ref 1–4.8)
LYMPHOCYTES NFR BLD: 25.6 % (ref 18–48)
MCH RBC QN AUTO: 32.2 PG (ref 27–31)
MCHC RBC AUTO-ENTMCNC: 33.9 G/DL (ref 32–36)
MCV RBC AUTO: 95 FL (ref 82–98)
MONOCYTES # BLD AUTO: 0.6 K/UL (ref 0.3–1)
MONOCYTES NFR BLD: 10.4 % (ref 4–15)
NEUTROPHILS # BLD AUTO: 3.4 K/UL (ref 1.8–7.7)
NEUTROPHILS NFR BLD: 60.1 % (ref 38–73)
NRBC BLD-RTO: 0 /100 WBC
PLATELET # BLD AUTO: 196 K/UL (ref 150–450)
PMV BLD AUTO: 8.9 FL (ref 9.2–12.9)
POTASSIUM SERPL-SCNC: 4.1 MMOL/L (ref 3.5–5.1)
PROT SERPL-MCNC: 8 G/DL (ref 6–8.4)
RBC # BLD AUTO: 4.69 M/UL (ref 4.6–6.2)
SODIUM SERPL-SCNC: 144 MMOL/L (ref 136–145)
URATE SERPL-MCNC: 7 MG/DL (ref 3.4–7)
WBC # BLD AUTO: 5.59 K/UL (ref 3.9–12.7)

## 2021-11-08 PROCEDURE — 36415 COLL VENOUS BLD VENIPUNCTURE: CPT | Performed by: INTERNAL MEDICINE

## 2021-11-08 PROCEDURE — 80053 COMPREHEN METABOLIC PANEL: CPT | Performed by: INTERNAL MEDICINE

## 2021-11-08 PROCEDURE — 86140 C-REACTIVE PROTEIN: CPT | Performed by: INTERNAL MEDICINE

## 2021-11-08 PROCEDURE — 99214 PR OFFICE/OUTPT VISIT, EST, LEVL IV, 30-39 MIN: ICD-10-PCS | Mod: S$GLB,,, | Performed by: INTERNAL MEDICINE

## 2021-11-08 PROCEDURE — 85025 COMPLETE CBC W/AUTO DIFF WBC: CPT | Performed by: INTERNAL MEDICINE

## 2021-11-08 PROCEDURE — 84550 ASSAY OF BLOOD/URIC ACID: CPT | Performed by: INTERNAL MEDICINE

## 2021-11-08 PROCEDURE — 99214 OFFICE O/P EST MOD 30 MIN: CPT | Mod: S$GLB,,, | Performed by: INTERNAL MEDICINE

## 2021-11-08 RX ORDER — ALLOPURINOL 100 MG/1
TABLET ORAL
Qty: 90 TABLET | Refills: 1 | Status: SHIPPED | OUTPATIENT
Start: 2021-11-08 | End: 2022-05-12 | Stop reason: SDUPTHER

## 2021-12-02 RX ORDER — PANTOPRAZOLE SODIUM 40 MG/1
40 TABLET, DELAYED RELEASE ORAL DAILY
Qty: 30 TABLET | Refills: 0 | Status: SHIPPED | OUTPATIENT
Start: 2021-12-02 | End: 2022-01-20 | Stop reason: SDUPTHER

## 2021-12-02 RX ORDER — AMLODIPINE BESYLATE 5 MG/1
5 TABLET ORAL DAILY
Qty: 90 TABLET | Refills: 3 | Status: SHIPPED | OUTPATIENT
Start: 2021-12-02 | End: 2022-02-14 | Stop reason: SDUPTHER

## 2021-12-02 NOTE — TELEPHONE ENCOUNTER
----- Message from Darcie Lacy sent at 12/2/2021  7:25 AM CST -----  Contact: pt  Type: Needs Medical Advice         Who Called: Pt  Best Call Back Number:[phone]    Additional Information: Requesting a call back regarding  pt is upset that his appt for 12/29 needs to be rescheduled. Pt is wanting to see the dr. Pt is also needing a refills for his medication      Type:  RX Refill Request    Who Called: pt  Refill or New Rx:  refills  RX Name and Strength:    pantoprazole (PROTONIX) 40 MG tablet  amlodipine (norvasc) 5 mg tablet    How is the patient currently taking it? (ex. 1XDay):  as ordered  Is this a 30 day or 90 day RX:  90  Preferred Pharmacy with phone number:      [facility] [address] [place] [address] [facility]  [address] [facility] [address]  Phone: [phone] Fax: [phone]      Local or Mail Order:  local  Ordering Provider:  nataliia Jose Call Back Number:  [phone]  Additional Information:    pt took Promethazine for his cough and wondering if office can call some in. He had a little bit from last rx from [facility]   .       Please Advise- Thank you

## 2021-12-07 ENCOUNTER — HOSPITAL ENCOUNTER (EMERGENCY)
Facility: HOSPITAL | Age: 56
Discharge: HOME | End: 2021-12-07
Attending: EMERGENCY MEDICINE
Payer: MEDICAID

## 2021-12-07 VITALS
HEART RATE: 82 BPM | HEIGHT: 67 IN | BODY MASS INDEX: 35.31 KG/M2 | DIASTOLIC BLOOD PRESSURE: 89 MMHG | OXYGEN SATURATION: 100 % | RESPIRATION RATE: 18 BRPM | WEIGHT: 225 LBS | SYSTOLIC BLOOD PRESSURE: 158 MMHG | TEMPERATURE: 98 F

## 2021-12-07 DIAGNOSIS — S05.01XA ABRASION OF RIGHT CORNEA, INITIAL ENCOUNTER: Primary | ICD-10-CM

## 2021-12-07 PROCEDURE — 25000003 PHARM REV CODE 250: Performed by: NURSE PRACTITIONER

## 2021-12-07 PROCEDURE — 99283 EMERGENCY DEPT VISIT LOW MDM: CPT | Mod: 25

## 2021-12-07 RX ORDER — ERYTHROMYCIN 5 MG/G
OINTMENT OPHTHALMIC
Qty: 1 G | Refills: 0 | Status: SHIPPED | OUTPATIENT
Start: 2021-12-07 | End: 2022-09-21

## 2021-12-07 RX ORDER — TETRACAINE HYDROCHLORIDE 5 MG/ML
2 SOLUTION OPHTHALMIC
Status: COMPLETED | OUTPATIENT
Start: 2021-12-07 | End: 2021-12-07

## 2021-12-07 RX ADMIN — FLUORESCEIN SODIUM 1 EACH: 1 STRIP OPHTHALMIC at 10:12

## 2021-12-07 RX ADMIN — TETRACAINE HYDROCHLORIDE 2 DROP: 5 SOLUTION OPHTHALMIC at 09:12

## 2021-12-08 ENCOUNTER — TELEPHONE (OUTPATIENT)
Dept: OPHTHALMOLOGY | Facility: CLINIC | Age: 56
End: 2021-12-08
Payer: MEDICAID

## 2021-12-09 ENCOUNTER — OFFICE VISIT (OUTPATIENT)
Dept: OPHTHALMOLOGY | Facility: CLINIC | Age: 56
End: 2021-12-09
Payer: MEDICAID

## 2021-12-09 ENCOUNTER — PATIENT OUTREACH (OUTPATIENT)
Dept: ADMINISTRATIVE | Facility: OTHER | Age: 56
End: 2021-12-09
Payer: MEDICAID

## 2021-12-09 DIAGNOSIS — H17.9 CORNEAL SCARRING: ICD-10-CM

## 2021-12-09 DIAGNOSIS — H02.88A MEIBOMIAN GLAND DYSFUNCTION (MGD), BILATERAL, BOTH UPPER AND LOWER LIDS: Primary | ICD-10-CM

## 2021-12-09 DIAGNOSIS — H04.123 DRY EYE SYNDROME, BILATERAL: ICD-10-CM

## 2021-12-09 DIAGNOSIS — H02.88B MEIBOMIAN GLAND DYSFUNCTION (MGD), BILATERAL, BOTH UPPER AND LOWER LIDS: Primary | ICD-10-CM

## 2021-12-09 PROCEDURE — 99204 OFFICE O/P NEW MOD 45 MIN: CPT | Mod: S$PBB,,, | Performed by: OPHTHALMOLOGY

## 2021-12-09 PROCEDURE — 99204 PR OFFICE/OUTPT VISIT, NEW, LEVL IV, 45-59 MIN: ICD-10-PCS | Mod: S$PBB,,, | Performed by: OPHTHALMOLOGY

## 2021-12-09 PROCEDURE — 99212 OFFICE O/P EST SF 10 MIN: CPT | Mod: PBBFAC,PO | Performed by: OPHTHALMOLOGY

## 2021-12-09 PROCEDURE — 99999 PR PBB SHADOW E&M-EST. PATIENT-LVL II: ICD-10-PCS | Mod: PBBFAC,,, | Performed by: OPHTHALMOLOGY

## 2021-12-09 PROCEDURE — 99999 PR PBB SHADOW E&M-EST. PATIENT-LVL II: CPT | Mod: PBBFAC,,, | Performed by: OPHTHALMOLOGY

## 2021-12-13 ENCOUNTER — PATIENT OUTREACH (OUTPATIENT)
Dept: EMERGENCY MEDICINE | Facility: HOSPITAL | Age: 56
End: 2021-12-13
Payer: MEDICAID

## 2021-12-29 ENCOUNTER — OFFICE VISIT (OUTPATIENT)
Dept: CARDIOLOGY | Facility: CLINIC | Age: 56
End: 2021-12-29
Payer: MEDICAID

## 2021-12-29 ENCOUNTER — LAB VISIT (OUTPATIENT)
Dept: LAB | Facility: HOSPITAL | Age: 56
End: 2021-12-29
Attending: NURSE PRACTITIONER
Payer: MEDICAID

## 2021-12-29 VITALS
SYSTOLIC BLOOD PRESSURE: 150 MMHG | HEART RATE: 80 BPM | HEIGHT: 67 IN | WEIGHT: 221 LBS | BODY MASS INDEX: 34.69 KG/M2 | DIASTOLIC BLOOD PRESSURE: 108 MMHG

## 2021-12-29 DIAGNOSIS — I50.23 ACUTE ON CHRONIC SYSTOLIC CONGESTIVE HEART FAILURE: ICD-10-CM

## 2021-12-29 DIAGNOSIS — I44.7 LEFT BUNDLE BRANCH BLOCK: Primary | ICD-10-CM

## 2021-12-29 DIAGNOSIS — E78.2 MIXED HYPERLIPIDEMIA: ICD-10-CM

## 2021-12-29 DIAGNOSIS — I42.8 NONISCHEMIC CARDIOMYOPATHY: ICD-10-CM

## 2021-12-29 DIAGNOSIS — I16.0 HYPERTENSIVE URGENCY: ICD-10-CM

## 2021-12-29 DIAGNOSIS — I44.7 LEFT BUNDLE BRANCH BLOCK: ICD-10-CM

## 2021-12-29 LAB
ALBUMIN SERPL BCP-MCNC: 4.6 G/DL (ref 3.5–5.2)
ALP SERPL-CCNC: 101 U/L (ref 55–135)
ALT SERPL W/O P-5'-P-CCNC: 30 U/L (ref 10–44)
ANION GAP SERPL CALC-SCNC: 10 MMOL/L (ref 8–16)
AST SERPL-CCNC: 22 U/L (ref 10–40)
BASOPHILS # BLD AUTO: 0.03 K/UL (ref 0–0.2)
BASOPHILS NFR BLD: 0.5 % (ref 0–1.9)
BILIRUB SERPL-MCNC: 1.6 MG/DL (ref 0.1–1)
BNP SERPL-MCNC: 20 PG/ML (ref 0–99)
BUN SERPL-MCNC: 18 MG/DL (ref 6–20)
CALCIUM SERPL-MCNC: 9.4 MG/DL (ref 8.7–10.5)
CHLORIDE SERPL-SCNC: 105 MMOL/L (ref 95–110)
CO2 SERPL-SCNC: 28 MMOL/L (ref 23–29)
CREAT SERPL-MCNC: 1.1 MG/DL (ref 0.5–1.4)
DIFFERENTIAL METHOD: ABNORMAL
EOSINOPHIL # BLD AUTO: 0.2 K/UL (ref 0–0.5)
EOSINOPHIL NFR BLD: 2.3 % (ref 0–8)
ERYTHROCYTE [DISTWIDTH] IN BLOOD BY AUTOMATED COUNT: 12.5 % (ref 11.5–14.5)
EST. GFR  (AFRICAN AMERICAN): >60 ML/MIN/1.73 M^2
EST. GFR  (NON AFRICAN AMERICAN): >60 ML/MIN/1.73 M^2
GLUCOSE SERPL-MCNC: 97 MG/DL (ref 70–110)
HCT VFR BLD AUTO: 44.4 % (ref 40–54)
HGB BLD-MCNC: 15.1 G/DL (ref 14–18)
IMM GRANULOCYTES # BLD AUTO: 0.02 K/UL (ref 0–0.04)
IMM GRANULOCYTES NFR BLD AUTO: 0.3 % (ref 0–0.5)
LYMPHOCYTES # BLD AUTO: 1.8 K/UL (ref 1–4.8)
LYMPHOCYTES NFR BLD: 27.4 % (ref 18–48)
MCH RBC QN AUTO: 32 PG (ref 27–31)
MCHC RBC AUTO-ENTMCNC: 34 G/DL (ref 32–36)
MCV RBC AUTO: 94 FL (ref 82–98)
MONOCYTES # BLD AUTO: 0.6 K/UL (ref 0.3–1)
MONOCYTES NFR BLD: 8.4 % (ref 4–15)
NEUTROPHILS # BLD AUTO: 4.1 K/UL (ref 1.8–7.7)
NEUTROPHILS NFR BLD: 61.1 % (ref 38–73)
NRBC BLD-RTO: 0 /100 WBC
PLATELET # BLD AUTO: 196 K/UL (ref 150–450)
PMV BLD AUTO: 9 FL (ref 9.2–12.9)
POTASSIUM SERPL-SCNC: 4.2 MMOL/L (ref 3.5–5.1)
PROT SERPL-MCNC: 7.8 G/DL (ref 6–8.4)
RBC # BLD AUTO: 4.72 M/UL (ref 4.6–6.2)
SODIUM SERPL-SCNC: 143 MMOL/L (ref 136–145)
WBC # BLD AUTO: 6.65 K/UL (ref 3.9–12.7)

## 2021-12-29 PROCEDURE — 36415 COLL VENOUS BLD VENIPUNCTURE: CPT | Performed by: NURSE PRACTITIONER

## 2021-12-29 PROCEDURE — 1160F RVW MEDS BY RX/DR IN RCRD: CPT | Mod: CPTII,S$GLB,, | Performed by: NURSE PRACTITIONER

## 2021-12-29 PROCEDURE — 3008F PR BODY MASS INDEX (BMI) DOCUMENTED: ICD-10-PCS | Mod: CPTII,S$GLB,, | Performed by: NURSE PRACTITIONER

## 2021-12-29 PROCEDURE — 3080F PR MOST RECENT DIASTOLIC BLOOD PRESSURE >= 90 MM HG: ICD-10-PCS | Mod: CPTII,S$GLB,, | Performed by: NURSE PRACTITIONER

## 2021-12-29 PROCEDURE — 93000 EKG 12-LEAD: ICD-10-PCS | Mod: S$GLB,,, | Performed by: INTERNAL MEDICINE

## 2021-12-29 PROCEDURE — 3008F BODY MASS INDEX DOCD: CPT | Mod: CPTII,S$GLB,, | Performed by: NURSE PRACTITIONER

## 2021-12-29 PROCEDURE — 3077F SYST BP >= 140 MM HG: CPT | Mod: CPTII,S$GLB,, | Performed by: NURSE PRACTITIONER

## 2021-12-29 PROCEDURE — 1159F PR MEDICATION LIST DOCUMENTED IN MEDICAL RECORD: ICD-10-PCS | Mod: CPTII,S$GLB,, | Performed by: NURSE PRACTITIONER

## 2021-12-29 PROCEDURE — 80053 COMPREHEN METABOLIC PANEL: CPT | Performed by: NURSE PRACTITIONER

## 2021-12-29 PROCEDURE — 3077F PR MOST RECENT SYSTOLIC BLOOD PRESSURE >= 140 MM HG: ICD-10-PCS | Mod: CPTII,S$GLB,, | Performed by: NURSE PRACTITIONER

## 2021-12-29 PROCEDURE — 99214 PR OFFICE/OUTPT VISIT, EST, LEVL IV, 30-39 MIN: ICD-10-PCS | Mod: S$GLB,,, | Performed by: NURSE PRACTITIONER

## 2021-12-29 PROCEDURE — 99214 OFFICE O/P EST MOD 30 MIN: CPT | Mod: S$GLB,,, | Performed by: NURSE PRACTITIONER

## 2021-12-29 PROCEDURE — 85025 COMPLETE CBC W/AUTO DIFF WBC: CPT | Performed by: NURSE PRACTITIONER

## 2021-12-29 PROCEDURE — 83880 ASSAY OF NATRIURETIC PEPTIDE: CPT | Performed by: NURSE PRACTITIONER

## 2021-12-29 PROCEDURE — 1159F MED LIST DOCD IN RCRD: CPT | Mod: CPTII,S$GLB,, | Performed by: NURSE PRACTITIONER

## 2021-12-29 PROCEDURE — 93000 ELECTROCARDIOGRAM COMPLETE: CPT | Mod: S$GLB,,, | Performed by: INTERNAL MEDICINE

## 2021-12-29 PROCEDURE — 3080F DIAST BP >= 90 MM HG: CPT | Mod: CPTII,S$GLB,, | Performed by: NURSE PRACTITIONER

## 2021-12-29 PROCEDURE — 1160F PR REVIEW ALL MEDS BY PRESCRIBER/CLIN PHARMACIST DOCUMENTED: ICD-10-PCS | Mod: CPTII,S$GLB,, | Performed by: NURSE PRACTITIONER

## 2021-12-29 RX ORDER — CARVEDILOL 3.12 MG/1
3.12 TABLET ORAL 2 TIMES DAILY WITH MEALS
Qty: 60 TABLET | Refills: 11 | Status: SHIPPED | OUTPATIENT
Start: 2021-12-29 | End: 2022-03-04 | Stop reason: SDUPTHER

## 2021-12-29 RX ORDER — LOSARTAN POTASSIUM 50 MG/1
50 TABLET ORAL DAILY
Qty: 90 TABLET | Refills: 3 | Status: SHIPPED | OUTPATIENT
Start: 2021-12-29 | End: 2022-01-20

## 2022-01-02 ENCOUNTER — PATIENT MESSAGE (OUTPATIENT)
Dept: ADMINISTRATIVE | Facility: HOSPITAL | Age: 57
End: 2022-01-02
Payer: MEDICAID

## 2022-01-03 ENCOUNTER — TELEPHONE (OUTPATIENT)
Dept: CARDIOLOGY | Facility: CLINIC | Age: 57
End: 2022-01-03
Payer: MEDICAID

## 2022-01-03 NOTE — TELEPHONE ENCOUNTER
----- Message from Za Peralta NP sent at 12/30/2021  5:02 PM CST -----  Labs look ok  Ok to use diuretics only as needed for now  Weigh daily  Low salt diet  If gain greater than 3 pounds in 1 day  recommend to use lasix

## 2022-01-10 ENCOUNTER — TELEPHONE (OUTPATIENT)
Dept: CARDIOLOGY | Facility: CLINIC | Age: 57
End: 2022-01-10
Payer: MEDICAID

## 2022-01-10 DIAGNOSIS — I16.0 HYPERTENSIVE URGENCY: Primary | ICD-10-CM

## 2022-01-10 NOTE — TELEPHONE ENCOUNTER
----- Message from RT Hardy sent at 1/8/2022  8:46 AM CST -----  Regarding: EKG  Please put the EKG order in for the EKG performed on     12/29/21     ,  then attach the order to the EKG appointment or the MD appointment for the date performed.        Thank you

## 2022-01-20 ENCOUNTER — OFFICE VISIT (OUTPATIENT)
Dept: CARDIOLOGY | Facility: CLINIC | Age: 57
End: 2022-01-20
Payer: MEDICAID

## 2022-01-20 VITALS
SYSTOLIC BLOOD PRESSURE: 158 MMHG | WEIGHT: 224 LBS | BODY MASS INDEX: 33.18 KG/M2 | OXYGEN SATURATION: 95 % | HEIGHT: 69 IN | DIASTOLIC BLOOD PRESSURE: 78 MMHG | HEART RATE: 74 BPM | RESPIRATION RATE: 16 BRPM

## 2022-01-20 DIAGNOSIS — E78.2 MIXED HYPERLIPIDEMIA: ICD-10-CM

## 2022-01-20 DIAGNOSIS — I50.23 ACUTE ON CHRONIC SYSTOLIC CONGESTIVE HEART FAILURE: ICD-10-CM

## 2022-01-20 DIAGNOSIS — I16.0 HYPERTENSIVE URGENCY: Chronic | ICD-10-CM

## 2022-01-20 DIAGNOSIS — I44.7 LEFT BUNDLE BRANCH BLOCK: ICD-10-CM

## 2022-01-20 DIAGNOSIS — K21.9 GASTROESOPHAGEAL REFLUX DISEASE, UNSPECIFIED WHETHER ESOPHAGITIS PRESENT: ICD-10-CM

## 2022-01-20 PROCEDURE — 99214 PR OFFICE/OUTPT VISIT, EST, LEVL IV, 30-39 MIN: ICD-10-PCS | Mod: S$GLB,,,

## 2022-01-20 PROCEDURE — 1159F MED LIST DOCD IN RCRD: CPT | Mod: CPTII,S$GLB,,

## 2022-01-20 PROCEDURE — 3078F DIAST BP <80 MM HG: CPT | Mod: CPTII,S$GLB,,

## 2022-01-20 PROCEDURE — 3008F PR BODY MASS INDEX (BMI) DOCUMENTED: ICD-10-PCS | Mod: CPTII,S$GLB,,

## 2022-01-20 PROCEDURE — 1159F PR MEDICATION LIST DOCUMENTED IN MEDICAL RECORD: ICD-10-PCS | Mod: CPTII,S$GLB,,

## 2022-01-20 PROCEDURE — 1160F PR REVIEW ALL MEDS BY PRESCRIBER/CLIN PHARMACIST DOCUMENTED: ICD-10-PCS | Mod: CPTII,S$GLB,,

## 2022-01-20 PROCEDURE — 3008F BODY MASS INDEX DOCD: CPT | Mod: CPTII,S$GLB,,

## 2022-01-20 PROCEDURE — 3077F SYST BP >= 140 MM HG: CPT | Mod: CPTII,S$GLB,,

## 2022-01-20 PROCEDURE — 1160F RVW MEDS BY RX/DR IN RCRD: CPT | Mod: CPTII,S$GLB,,

## 2022-01-20 PROCEDURE — 3078F PR MOST RECENT DIASTOLIC BLOOD PRESSURE < 80 MM HG: ICD-10-PCS | Mod: CPTII,S$GLB,,

## 2022-01-20 PROCEDURE — 3077F PR MOST RECENT SYSTOLIC BLOOD PRESSURE >= 140 MM HG: ICD-10-PCS | Mod: CPTII,S$GLB,,

## 2022-01-20 PROCEDURE — 99214 OFFICE O/P EST MOD 30 MIN: CPT | Mod: S$GLB,,,

## 2022-01-20 RX ORDER — LOSARTAN POTASSIUM 100 MG/1
100 TABLET ORAL DAILY
Qty: 90 TABLET | Refills: 3 | Status: SHIPPED | OUTPATIENT
Start: 2022-01-20 | End: 2022-01-24 | Stop reason: SDUPTHER

## 2022-01-20 RX ORDER — PANTOPRAZOLE SODIUM 40 MG/1
40 TABLET, DELAYED RELEASE ORAL DAILY
Qty: 90 TABLET | Refills: 3 | Status: SHIPPED | OUTPATIENT
Start: 2022-01-20 | End: 2022-02-03 | Stop reason: SDUPTHER

## 2022-01-20 NOTE — ASSESSMENT & PLAN NOTE
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

## 2022-01-20 NOTE — ASSESSMENT & PLAN NOTE
Patient is identified as having Systolic (HFrEF) heart failure that is Chronic. CHF is currently controlled. Latest ECHO performed and demonstrates- Results for orders placed during the hospital encounter of 01/14/22    Echo    Interpretation Summary  · The left ventricle is normal in size with mild concentric hypertrophy and mildly decreased systolic function.  · The estimated ejection fraction is 45%.  · Grade I left ventricular diastolic dysfunction.  · Normal right ventricular size with normal right ventricular systolic function.  · There are segmental left ventricular wall motion abnormalities.  · Mild left atrial enlargement.  · Normal central venous pressure (3 mmHg).  · The estimated PA systolic pressure is 18 mmHg.  . Continue Beta Blocker and ACE/ARB and monitor clinical status closely. Monitor on telemetry. Patient is off CHF pathway.  Monitor strict Is&Os and daily weights.  Place on fluid restriction of 2 L. Continue to stress to patient importance of self efficacy and  on diet for CHF.

## 2022-01-20 NOTE — ASSESSMENT & PLAN NOTE
Last LDL 47.8   Will cut lipitor to 20 mg as he is having muscle cramps  He does have fibromyalgia   Will also add coq10 over the counter

## 2022-01-20 NOTE — PROGRESS NOTES
Subjective:    Patient ID:  Jarod Pierce is a 56 y.o. male patient here for evaluation Results (Echo and labs)      History of Present Illness:   Patient is here today for the results of his echo and labs. He is not having any chest pain, dizziness, SOB, syncope, palpitations, blood in urine or stool.   He has a history of nonischemic cardiomyopathy.     He has been dealing with allergies the past few weeks as he was [place] time in [place] and came back down here and his allergies flared up.     He states the Protonix has been helping a lot with his GERD but occassionally he will cough after he eats and white phlegm will come up.     Review of patient's allergies indicates:   Allergen Reactions    Hydralazine analogues Other (See Comments)     drowsiness    Codeine      Other reaction(s): Hives    Codeine phosphate      Other reaction(s): Unknown  Other reaction(s): Unknown    Lisinopril      Dry coughing    Phenytoin sodium extended      Other reaction(s): leg cramps       Past Medical History:   Diagnosis Date    Allergic rhinitis due to allergen     Arthritis     Cardiomyopathy     Gout     Hypertension     Left bundle branch block      Past Surgical History:   Procedure Laterality Date    WISDOM TOOTH EXTRACTION       Social History     Tobacco Use    Smoking status: Never Smoker    Smokeless tobacco: Never Used   Substance Use Topics    Alcohol use: No    Drug use: No        Review of Systems:    As noted in HPI in addition      REVIEW OF SYSTEMS  CARDIOVASCULAR: No recent chest pain, palpitations, arm, neck, or jaw pain  RESPIRATORY: No recent fever, cough chills, SOB or congestion  : No blood in the urine  GI: No Nausea, vomiting, constipation, diarrhea, blood, or reflux.  MUSCULOSKELETAL: No myalgias  NEURO: No lightheadedness or dizziness  EYES: No Double vision, blurry, vision or headache          Objective        Vitals:    01/20/22 1409   BP: (!) 158/78   Pulse: 74   Resp: 16        LIPIDS - LAST 2   Lab Results   Component Value Date    CHOL 140 01/20/2021    CHOL 242 (H) 01/13/2020    HDL 31 (L) 01/20/2021    HDL 34 (L) 01/13/2020    LDLCALC 47.8 (L) 01/20/2021    LDLCALC 141.4 01/13/2020    TRIG 306 (H) 01/20/2021    TRIG 333 (H) 01/13/2020    CHOLHDL 22.1 01/20/2021    CHOLHDL 14.0 (L) 01/13/2020       CBC - LAST 2  Lab Results   Component Value Date    WBC 6.65 12/29/2021    WBC 5.59 11/08/2021    RBC 4.72 12/29/2021    RBC 4.69 11/08/2021    HGB 15.1 12/29/2021    HGB 15.1 11/08/2021    HCT 44.4 12/29/2021    HCT 44.5 11/08/2021    MCV 94 12/29/2021    MCV 95 11/08/2021    MCH 32.0 (H) 12/29/2021    MCH 32.2 (H) 11/08/2021    MCHC 34.0 12/29/2021    MCHC 33.9 11/08/2021    RDW 12.5 12/29/2021    RDW 13.1 11/08/2021     12/29/2021     11/08/2021    MPV 9.0 (L) 12/29/2021    MPV 8.9 (L) 11/08/2021    GRAN 4.1 12/29/2021    GRAN 61.1 12/29/2021    LYMPH 1.8 12/29/2021    LYMPH 27.4 12/29/2021    MONO 0.6 12/29/2021    MONO 8.4 12/29/2021    BASO 0.03 12/29/2021    BASO 0.03 11/08/2021    NRBC 0 12/29/2021    NRBC 0 11/08/2021       CHEMISTRY & LIVER FUNCTION - LAST 2  Lab Results   Component Value Date     12/29/2021     11/08/2021    K 4.2 12/29/2021    K 4.1 11/08/2021     12/29/2021     11/08/2021    CO2 28 12/29/2021    CO2 30 (H) 11/08/2021    ANIONGAP 10 12/29/2021    ANIONGAP 9 11/08/2021    BUN 18 12/29/2021    BUN 15 11/08/2021    CREATININE 1.1 12/29/2021    CREATININE 1.0 11/08/2021    GLU 97 12/29/2021    GLU 89 11/08/2021    CALCIUM 9.4 12/29/2021    CALCIUM 9.6 11/08/2021    MG 2.0 05/09/2020    MG 2.0 05/08/2020    ALBUMIN 4.6 12/29/2021    ALBUMIN 4.5 11/08/2021    PROT 7.8 12/29/2021    PROT 8.0 11/08/2021    ALKPHOS 101 12/29/2021    ALKPHOS 97 11/08/2021    ALT 30 12/29/2021    ALT 28 11/08/2021    AST 22 12/29/2021    AST 21 11/08/2021    BILITOT 1.6 (H) 12/29/2021    BILITOT 1.5 (H) 11/08/2021        CARDIAC PROFILE - LAST  2  Lab Results   Component Value Date    BNP 20 12/29/2021    BNP 34 01/13/2020    CPK 73 05/07/2020    CPK 79 01/14/2020    CPKMB 0.7 01/14/2020    TROPONINI <0.030 05/09/2020    TROPONINI <0.030 05/07/2020        COAGULATION - LAST 2  Lab Results   Component Value Date    INR 1.0 01/14/2020    INR 1.0 01/13/2020    APTT 28.7 01/14/2020    APTT 25.5 07/21/2014       ENDOCRINE & PSA - LAST 2  Lab Results   Component Value Date    HGBA1C 5.5 01/13/2020    HGBA1C 5.3 09/08/2017    TSH 1.935 01/13/2020    TSH 2.41 09/22/2011    PSA 0.45 09/22/2011    PSA 0.34 10/03/2008        ECHOCARDIOGRAM RESULTS  Results for orders placed during the hospital encounter of 01/14/22    Echo    Interpretation Summary  · The left ventricle is normal in size with mild concentric hypertrophy and mildly decreased systolic function.  · The estimated ejection fraction is 45%.  · Grade I left ventricular diastolic dysfunction.  · Normal right ventricular size with normal right ventricular systolic function.  · There are segmental left ventricular wall motion abnormalities.  · Mild left atrial enlargement.  · Normal central venous pressure (3 mmHg).  · The estimated PA systolic pressure is 18 mmHg.      CURRENT/PREVIOUS VISIT EKG  Results for orders placed or performed in visit on 12/29/21   IN OFFICE EKG 12-LEAD (to [place])    Collection Time: 12/29/21  2:57 PM    Narrative    Test Reason : I16.0,    Vent. Rate : 073 BPM     Atrial Rate : 073 BPM     P-R Int : 152 ms          QRS Dur : 158 ms      QT Int : 434 ms       P-R-T Axes : 023 -20 211 degrees     QTc Int : 478 ms    Normal sinus rhythm  Left bundle branch block  Abnormal ECG  When compared with ECG of 18-AUG-2021 19:30,  T wave inversion now evident in Inferior leads  Confirmed by Juan Cr MD ([id number]) on 1/14/2022 7:57:09 PM    Referred By: LUIZ   SELF           Confirmed By:Juan Cr MD       PHYSICAL EXAM  CONSTITUTIONAL: Well built, well nourished in no apparent  distress  NECK: no carotid bruit, no JVD  LUNGS: CTA  CHEST WALL: no tenderness  HEART: regular rate and rhythm, S1, S2 normal, no murmur, click, rub or gallop   ABDOMEN: soft, non-tender; bowel sounds normal; no masses,  no organomegaly  EXTREMITIES: Extremities normal, no edema, no calf tenderness noted  NEURO: AAO X 3    I HAVE REVIEWED :    The vital signs, nurses notes, and all the pertinent radiology and labs.        Current Outpatient Medications   Medication Instructions    albuterol (PROVENTIL/VENTOLIN HFA) 90 mcg/actuation inhaler 1-2 puffs, Inhalation, Every 6 hours PRN    allopurinoL (ZYLOPRIM) 100 MG tablet Take 2 tablets once a day    amitriptyline (ELAVIL) 10 MG tablet 1 tablet at bedtime    amLODIPine (NORVASC) 5 mg, Oral, Daily    aspirin 81 mg, Oral, Daily    atorvastatin (LIPITOR) 40 mg, Oral, Daily    butalbital-acetaminophen-caff -40 mg Cap prn    carvediloL (COREG) 3.125 mg, Oral, 2 times daily with meals    erythromycin (ROMYCIN) ophthalmic ointment Place a 1/2 inch ribbon of ointment into the lower eyelid.   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 CHF is currently controlled. Latest ECHO performed and demonstrates- Results for orders placed during the hospital encounter of  01/14/22    Echo    Interpretation Summary  · The left ventricle is normal in size with mild concentric hypertrophy and mildly decreased systolic function.  · The estimated ejection fraction is 45%.  · Grade I left ventricular diastolic dysfunction.  · Normal right ventricular size with normal right ventricular systolic function.  · There are segmental left ventricular wall motion abnormalities.  · Mild left atrial enlargement.  · Normal central venous pressure (3 mmHg).  · The estimated PA systolic pressure is 18 mmHg.  . Continue Beta Blocker and ACE/ARB and monitor clinical status closely. Monitor on telemetry. Patient is off CHF pathway.  Monitor strict Is&Os and daily weights.  Place on fluid restriction of 2 L. Continue to stress to patient importance of self efficacy and  on diet for CHF.      GERD (gastroesophageal reflux disease)  Continue protonix 40 mg           Follow up in about 3 months (around 4/20/2022).

## 2022-01-20 NOTE — PATIENT INSTRUCTIONS
Increase losartan to 100 mg for blood pressure   Cut Lipitor in half to 20 mg and add CoQ10 to aid in muscle cramps     Also obtain an antihistamine, like Zyrtec, and nasal spray, like Flonase to control allergies     CoQ10 is also over the counter

## 2022-01-24 RX ORDER — LOSARTAN POTASSIUM 100 MG/1
100 TABLET ORAL DAILY
Qty: 90 TABLET | Refills: 3 | Status: SHIPPED | OUTPATIENT
Start: 2022-01-24 | End: 2022-03-04 | Stop reason: SDUPTHER

## 2022-02-03 RX ORDER — PANTOPRAZOLE SODIUM 40 MG/1
40 TABLET, DELAYED RELEASE ORAL DAILY
Qty: 90 TABLET | Refills: 3 | Status: SHIPPED | OUTPATIENT
Start: 2022-02-03 | End: 2022-10-31 | Stop reason: SDUPTHER

## 2022-02-10 ENCOUNTER — PATIENT OUTREACH (OUTPATIENT)
Dept: ADMINISTRATIVE | Facility: OTHER | Age: 57
End: 2022-02-10
Payer: MEDICAID

## 2022-02-10 NOTE — PROGRESS NOTES
Chart was reviewed for overdue [facility] Encounters ([facility])  topics  Updates were requested from care everywhere  Health Maintenance has been updated  LINKS immunization registry triggered  Referral to gastro located in chart

## 2022-02-14 RX ORDER — AMLODIPINE BESYLATE 5 MG/1
5 TABLET ORAL DAILY
Qty: 90 TABLET | Refills: 3 | Status: SHIPPED | OUTPATIENT
Start: 2022-02-14 | End: 2022-02-15 | Stop reason: SDUPTHER

## 2022-02-14 NOTE — TELEPHONE ENCOUNTER
----- Message from Doretha Betts sent at 2/14/2022  9:53 AM CST -----  Regarding: refill  Type:  RX Refill Request    Who Called: Jarod Parrish or New Rx:refill  RX Name and Strength:amLODIPine (NORVASC) 5 MG tablet  How is the patient currently taking it? (ex. 1XDay):1x  Is this a 30 day or 90 day RX:30  [phone]  Local or Mail Order:local  Ordering Provider:Fabian Mendenhall  Would the patient rather a call back or a response via [facility]? Call back  Best Call Back Number:[phone]  Additional Information: Patient would like to have a refill

## 2022-02-15 RX ORDER — AMLODIPINE BESYLATE 5 MG/1
5 TABLET ORAL DAILY
Qty: 90 TABLET | Refills: 3 | Status: SHIPPED | OUTPATIENT
Start: 2022-02-15 | End: 2022-02-17 | Stop reason: SDUPTHER

## 2022-02-15 NOTE — TELEPHONE ENCOUNTER
----- Message from Cassie Calderón sent at 2/15/2022  4:37 PM CST -----  Regarding: Refill  Contact: [phone]  RX Refill Request    Who Called: Jarod    Refill or New Rx: Refills    RX Name and Strength: amLODIPine (NORVASC) 5 MG tablet    How is the patient currently taking it? (ex. 1XDay):Sig - Route: Take 1 tablet (5 mg total) by mouth once daily. - Oral    Preferred Pharmacy with phone number:  [facility] - [place], [place] - [address] [address] [address]  [phone]    Local or Mail Order: Local    Ordering Provider: Lanie Sen PA-C    Would the patient rather a call back or a response via [facility]? Call Back    Best Call Back Number:[phone]

## 2022-02-21 ENCOUNTER — PATIENT OUTREACH (OUTPATIENT)
Dept: EMERGENCY MEDICINE | Facility: HOSPITAL | Age: 57
End: 2022-02-21
Payer: MEDICAID

## 2022-02-21 RX ORDER — AMLODIPINE BESYLATE 5 MG/1
5 TABLET ORAL DAILY
Qty: 90 TABLET | Refills: 3 | Status: SHIPPED | OUTPATIENT
Start: 2022-02-21 | End: 2022-03-04 | Stop reason: SDUPTHER

## 2022-03-04 NOTE — TELEPHONE ENCOUNTER
----- Message from Rob Heath sent at 3/4/2022 10:25 AM CST -----  Type: Needs Medical Advice  Who Called: Patient  Symptoms (please be specific):   How long has patient had these symptoms:    Pharmacy name and phone #:  [facility]  [place] - [address] [address]  Phone:  [phone]  Fax:  [phone]  Best Call Back Number: [phone]  Additional Information: Pt requesting a call back concerning loosing his script for Rx amLODIPine (NORVASC) 5 MG tablet 90 tablet , and for Rx losartan (COZAAR) 100 MG tablet 90 tablet pt wants to know if he should be taking both medications at the same time.And also has questions concerning Rx carvediloL (COREG) 3.125 MG tablet 60 tablet

## 2022-03-07 DIAGNOSIS — E78.2 MIXED HYPERLIPIDEMIA: ICD-10-CM

## 2022-03-07 RX ORDER — LOSARTAN POTASSIUM 100 MG/1
100 TABLET ORAL DAILY
Qty: 90 TABLET | Refills: 3 | Status: SHIPPED | OUTPATIENT
Start: 2022-03-07 | End: 2022-09-21

## 2022-03-07 RX ORDER — ATORVASTATIN CALCIUM 40 MG/1
40 TABLET, FILM COATED ORAL DAILY
Qty: 90 TABLET | Refills: 3 | Status: SHIPPED | OUTPATIENT
Start: 2022-03-07 | End: 2022-12-27

## 2022-03-07 RX ORDER — AMLODIPINE BESYLATE 5 MG/1
5 TABLET ORAL DAILY
Qty: 90 TABLET | Refills: 3 | Status: SHIPPED | OUTPATIENT
Start: 2022-03-07 | End: 2022-03-07 | Stop reason: SDUPTHER

## 2022-03-07 RX ORDER — AMLODIPINE BESYLATE 5 MG/1
5 TABLET ORAL DAILY
Qty: 90 TABLET | Refills: 3 | Status: ON HOLD | OUTPATIENT
Start: 2022-03-07 | End: 2022-09-23 | Stop reason: SDUPTHER

## 2022-03-07 RX ORDER — CARVEDILOL 3.12 MG/1
3.12 TABLET ORAL 2 TIMES DAILY WITH MEALS
Qty: 60 TABLET | Refills: 11 | Status: SHIPPED | OUTPATIENT
Start: 2022-03-07 | End: 2022-09-21

## 2022-03-07 RX ORDER — LOSARTAN POTASSIUM 100 MG/1
100 TABLET ORAL DAILY
Qty: 90 TABLET | Refills: 3 | Status: SHIPPED | OUTPATIENT
Start: 2022-03-07 | End: 2022-03-07 | Stop reason: SDUPTHER

## 2022-04-14 ENCOUNTER — TELEPHONE (OUTPATIENT)
Dept: CARDIOLOGY | Facility: CLINIC | Age: 57
End: 2022-04-14
Payer: MEDICAID

## 2022-04-14 NOTE — TELEPHONE ENCOUNTER
----- Message from Jacky Benton sent at 4/14/2022  9:39 AM CDT -----  Type:  Sooner Apoointment Request    Caller is requesting a sooner appointment.  Caller declined first available appointment listed below.  Caller will not accept being placed on the waitlist and is requesting a message be sent to doctor.  Name of Caller: Jarod  When is the first available appointment? 8-  Symptoms: 3 month f/u visit, medication refill  Would the patient rather a call back or a response via [facility]? no  Best Call Back Number: [phone]  Additional Information: patient had an appointment on 4- at 1:20pm but had to cancel that appointment

## 2022-04-18 NOTE — TELEPHONE ENCOUNTER
Spoke with pt, let him know we will put him on a wait list. Pt verbalized understanding and confirmed.

## 2022-04-25 ENCOUNTER — PATIENT OUTREACH (OUTPATIENT)
Dept: EMERGENCY MEDICINE | Facility: HOSPITAL | Age: 57
End: 2022-04-25
Payer: MEDICAID

## 2022-05-12 ENCOUNTER — OFFICE VISIT (OUTPATIENT)
Dept: RHEUMATOLOGY | Facility: CLINIC | Age: 57
End: 2022-05-12
Payer: MEDICAID

## 2022-05-12 ENCOUNTER — LAB VISIT (OUTPATIENT)
Dept: LAB | Facility: HOSPITAL | Age: 57
End: 2022-05-12
Attending: INTERNAL MEDICINE
Payer: MEDICAID

## 2022-05-12 VITALS
BODY MASS INDEX: 32.99 KG/M2 | DIASTOLIC BLOOD PRESSURE: 88 MMHG | SYSTOLIC BLOOD PRESSURE: 154 MMHG | WEIGHT: 223.38 LBS

## 2022-05-12 DIAGNOSIS — E79.0 HYPERURICEMIA: ICD-10-CM

## 2022-05-12 DIAGNOSIS — M15.9 OSTEOARTHRITIS, GENERALIZED: Primary | ICD-10-CM

## 2022-05-12 DIAGNOSIS — M15.9 OSTEOARTHRITIS, GENERALIZED: ICD-10-CM

## 2022-05-12 LAB
ALBUMIN SERPL BCP-MCNC: 4.5 G/DL (ref 3.5–5.2)
ALP SERPL-CCNC: 119 U/L (ref 55–135)
ALT SERPL W/O P-5'-P-CCNC: 23 U/L (ref 10–44)
ANION GAP SERPL CALC-SCNC: 11 MMOL/L (ref 8–16)
AST SERPL-CCNC: 20 U/L (ref 10–40)
BACTERIA #/AREA URNS HPF: NEGATIVE /HPF
BASOPHILS # BLD AUTO: 0.03 K/UL (ref 0–0.2)
BASOPHILS NFR BLD: 0.6 % (ref 0–1.9)
BILIRUB SERPL-MCNC: 1.6 MG/DL (ref 0.1–1)
BILIRUB UR QL STRIP: NEGATIVE
BUN SERPL-MCNC: 15 MG/DL (ref 6–20)
CALCIUM SERPL-MCNC: 8.9 MG/DL (ref 8.7–10.5)
CHLORIDE SERPL-SCNC: 104 MMOL/L (ref 95–110)
CLARITY UR: CLEAR
CO2 SERPL-SCNC: 24 MMOL/L (ref 23–29)
COLOR UR: YELLOW
CREAT SERPL-MCNC: 1 MG/DL (ref 0.5–1.4)
CRP SERPL-MCNC: 0.08 MG/DL
DIFFERENTIAL METHOD: ABNORMAL
EOSINOPHIL # BLD AUTO: 0.2 K/UL (ref 0–0.5)
EOSINOPHIL NFR BLD: 3.4 % (ref 0–8)
ERYTHROCYTE [DISTWIDTH] IN BLOOD BY AUTOMATED COUNT: 12.5 % (ref 11.5–14.5)
EST. GFR  (AFRICAN AMERICAN): >60 ML/MIN/1.73 M^2
EST. GFR  (NON AFRICAN AMERICAN): >60 ML/MIN/1.73 M^2
GLUCOSE SERPL-MCNC: 103 MG/DL (ref 70–110)
GLUCOSE UR QL STRIP: NEGATIVE
HCT VFR BLD AUTO: 41.7 % (ref 40–54)
HGB BLD-MCNC: 14.5 G/DL (ref 14–18)
HGB UR QL STRIP: ABNORMAL
HYALINE CASTS #/AREA URNS LPF: 3 /LPF
IMM GRANULOCYTES # BLD AUTO: 0.01 K/UL (ref 0–0.04)
IMM GRANULOCYTES NFR BLD AUTO: 0.2 % (ref 0–0.5)
KETONES UR QL STRIP: NEGATIVE
LEUKOCYTE ESTERASE UR QL STRIP: NEGATIVE
LYMPHOCYTES # BLD AUTO: 1.6 K/UL (ref 1–4.8)
LYMPHOCYTES NFR BLD: 29 % (ref 18–48)
MCH RBC QN AUTO: 32.2 PG (ref 27–31)
MCHC RBC AUTO-ENTMCNC: 34.8 G/DL (ref 32–36)
MCV RBC AUTO: 93 FL (ref 82–98)
MICROSCOPIC COMMENT: ABNORMAL
MONOCYTES # BLD AUTO: 0.5 K/UL (ref 0.3–1)
MONOCYTES NFR BLD: 9 % (ref 4–15)
NEUTROPHILS # BLD AUTO: 3.1 K/UL (ref 1.8–7.7)
NEUTROPHILS NFR BLD: 57.8 % (ref 38–73)
NITRITE UR QL STRIP: NEGATIVE
NRBC BLD-RTO: 0 /100 WBC
PH UR STRIP: 5 [PH] (ref 5–8)
PLATELET # BLD AUTO: 191 K/UL (ref 150–450)
PMV BLD AUTO: 8.8 FL (ref 9.2–12.9)
POTASSIUM SERPL-SCNC: 3.7 MMOL/L (ref 3.5–5.1)
PROT SERPL-MCNC: 7.7 G/DL (ref 6–8.4)
PROT UR QL STRIP: NEGATIVE
RBC # BLD AUTO: 4.51 M/UL (ref 4.6–6.2)
RBC #/AREA URNS HPF: 1 /HPF (ref 0–4)
SODIUM SERPL-SCNC: 139 MMOL/L (ref 136–145)
SP GR UR STRIP: 1.02 (ref 1–1.03)
SQUAMOUS #/AREA URNS HPF: 0 /HPF
URATE SERPL-MCNC: 6.8 MG/DL (ref 3.4–7)
URN SPEC COLLECT METH UR: ABNORMAL
UROBILINOGEN UR STRIP-ACNC: NEGATIVE EU/DL
WBC # BLD AUTO: 5.35 K/UL (ref 3.9–12.7)
WBC #/AREA URNS HPF: 1 /HPF (ref 0–5)

## 2022-05-12 PROCEDURE — 81001 URINALYSIS AUTO W/SCOPE: CPT | Performed by: INTERNAL MEDICINE

## 2022-05-12 PROCEDURE — 84550 ASSAY OF BLOOD/URIC ACID: CPT | Performed by: INTERNAL MEDICINE

## 2022-05-12 PROCEDURE — 86140 C-REACTIVE PROTEIN: CPT | Performed by: INTERNAL MEDICINE

## 2022-05-12 PROCEDURE — 3008F PR BODY MASS INDEX (BMI) DOCUMENTED: ICD-10-PCS | Mod: CPTII,S$GLB,, | Performed by: INTERNAL MEDICINE

## 2022-05-12 PROCEDURE — 99213 OFFICE O/P EST LOW 20 MIN: CPT | Mod: S$GLB,,, | Performed by: INTERNAL MEDICINE

## 2022-05-12 PROCEDURE — 3077F PR MOST RECENT SYSTOLIC BLOOD PRESSURE >= 140 MM HG: ICD-10-PCS | Mod: CPTII,S$GLB,, | Performed by: INTERNAL MEDICINE

## 2022-05-12 PROCEDURE — 99213 PR OFFICE/OUTPT VISIT, EST, LEVL III, 20-29 MIN: ICD-10-PCS | Mod: S$GLB,,, | Performed by: INTERNAL MEDICINE

## 2022-05-12 PROCEDURE — 85025 COMPLETE CBC W/AUTO DIFF WBC: CPT | Performed by: INTERNAL MEDICINE

## 2022-05-12 PROCEDURE — 36415 COLL VENOUS BLD VENIPUNCTURE: CPT | Performed by: INTERNAL MEDICINE

## 2022-05-12 PROCEDURE — 1159F MED LIST DOCD IN RCRD: CPT | Mod: CPTII,S$GLB,, | Performed by: INTERNAL MEDICINE

## 2022-05-12 PROCEDURE — 3079F DIAST BP 80-89 MM HG: CPT | Mod: CPTII,S$GLB,, | Performed by: INTERNAL MEDICINE

## 2022-05-12 PROCEDURE — 3077F SYST BP >= 140 MM HG: CPT | Mod: CPTII,S$GLB,, | Performed by: INTERNAL MEDICINE

## 2022-05-12 PROCEDURE — 3008F BODY MASS INDEX DOCD: CPT | Mod: CPTII,S$GLB,, | Performed by: INTERNAL MEDICINE

## 2022-05-12 PROCEDURE — 1159F PR MEDICATION LIST DOCUMENTED IN MEDICAL RECORD: ICD-10-PCS | Mod: CPTII,S$GLB,, | Performed by: INTERNAL MEDICINE

## 2022-05-12 PROCEDURE — 4010F PR ACE/ARB THEARPY RXD/TAKEN: ICD-10-PCS | Mod: CPTII,S$GLB,, | Performed by: INTERNAL MEDICINE

## 2022-05-12 PROCEDURE — 80053 COMPREHEN METABOLIC PANEL: CPT | Performed by: INTERNAL MEDICINE

## 2022-05-12 PROCEDURE — 3079F PR MOST RECENT DIASTOLIC BLOOD PRESSURE 80-89 MM HG: ICD-10-PCS | Mod: CPTII,S$GLB,, | Performed by: INTERNAL MEDICINE

## 2022-05-12 PROCEDURE — 4010F ACE/ARB THERAPY RXD/TAKEN: CPT | Mod: CPTII,S$GLB,, | Performed by: INTERNAL MEDICINE

## 2022-05-12 RX ORDER — HYDROCODONE BITARTRATE AND ACETAMINOPHEN 5; 325 MG/1; MG/1
TABLET ORAL
COMMUNITY
Start: 2022-04-22 | End: 2022-09-21

## 2022-05-12 RX ORDER — ALLOPURINOL 100 MG/1
TABLET ORAL
Qty: 90 TABLET | Refills: 1 | Status: SHIPPED | OUTPATIENT
Start: 2022-05-12 | End: 2022-10-26 | Stop reason: SDUPTHER

## 2022-05-16 ENCOUNTER — TELEPHONE (OUTPATIENT)
Dept: RHEUMATOLOGY | Facility: CLINIC | Age: 57
End: 2022-05-16

## 2022-05-17 ENCOUNTER — HOSPITAL ENCOUNTER (EMERGENCY)
Facility: HOSPITAL | Age: 57
Discharge: HOME | End: 2022-05-17
Attending: EMERGENCY MEDICINE
Payer: MEDICAID

## 2022-05-17 VITALS
RESPIRATION RATE: 20 BRPM | HEART RATE: 80 BPM | WEIGHT: 225 LBS | BODY MASS INDEX: 33.33 KG/M2 | OXYGEN SATURATION: 94 % | SYSTOLIC BLOOD PRESSURE: 170 MMHG | TEMPERATURE: 99 F | DIASTOLIC BLOOD PRESSURE: 90 MMHG | HEIGHT: 69 IN

## 2022-05-17 DIAGNOSIS — U07.1 COVID-19 VIRUS DETECTED: ICD-10-CM

## 2022-05-17 DIAGNOSIS — I10 HYPERTENSION: ICD-10-CM

## 2022-05-17 DIAGNOSIS — U07.1 COVID-19 VIRUS INFECTION: Primary | ICD-10-CM

## 2022-05-17 LAB
ALBUMIN SERPL BCP-MCNC: 4.4 G/DL (ref 3.5–5.2)
ALP SERPL-CCNC: 105 U/L (ref 55–135)
ALT SERPL W/O P-5'-P-CCNC: 18 U/L (ref 10–44)
ANION GAP SERPL CALC-SCNC: 14 MMOL/L (ref 8–16)
AST SERPL-CCNC: 19 U/L (ref 10–40)
BACTERIA #/AREA URNS HPF: NEGATIVE /HPF
BASOPHILS # BLD AUTO: 0.02 K/UL (ref 0–0.2)
BASOPHILS NFR BLD: 0.3 % (ref 0–1.9)
BILIRUB SERPL-MCNC: 2.7 MG/DL (ref 0.1–1)
BILIRUB UR QL STRIP: NEGATIVE
BNP SERPL-MCNC: 50 PG/ML (ref 0–99)
BUN SERPL-MCNC: 19 MG/DL (ref 6–20)
CALCIUM SERPL-MCNC: 8.7 MG/DL (ref 8.7–10.5)
CHLORIDE SERPL-SCNC: 96 MMOL/L (ref 95–110)
CK MB SERPL-MCNC: 0.8 NG/ML (ref 0.1–6.5)
CK SERPL-CCNC: 157 U/L (ref 20–200)
CLARITY UR: CLEAR
CO2 SERPL-SCNC: 26 MMOL/L (ref 23–29)
COLOR UR: YELLOW
CREAT SERPL-MCNC: 1.3 MG/DL (ref 0.5–1.4)
DIFFERENTIAL METHOD: ABNORMAL
EOSINOPHIL # BLD AUTO: 0 K/UL (ref 0–0.5)
EOSINOPHIL NFR BLD: 0.2 % (ref 0–8)
ERYTHROCYTE [DISTWIDTH] IN BLOOD BY AUTOMATED COUNT: 12.7 % (ref 11.5–14.5)
EST. GFR  (AFRICAN AMERICAN): >60 ML/MIN/1.73 M^2
EST. GFR  (NON AFRICAN AMERICAN): >60 ML/MIN/1.73 M^2
GLUCOSE SERPL-MCNC: 130 MG/DL (ref 70–110)
GLUCOSE UR QL STRIP: NEGATIVE
GROUP A STREP, MOLECULAR: NEGATIVE
HCT VFR BLD AUTO: 42 % (ref 40–54)
HGB BLD-MCNC: 14.7 G/DL (ref 14–18)
HGB UR QL STRIP: ABNORMAL
HYALINE CASTS #/AREA URNS LPF: 6 /LPF
IMM GRANULOCYTES # BLD AUTO: 0.02 K/UL (ref 0–0.04)
IMM GRANULOCYTES NFR BLD AUTO: 0.3 % (ref 0–0.5)
INFLUENZA A, MOLECULAR: NEGATIVE
INFLUENZA B, MOLECULAR: NEGATIVE
KETONES UR QL STRIP: NEGATIVE
LEUKOCYTE ESTERASE UR QL STRIP: NEGATIVE
LYMPHOCYTES # BLD AUTO: 1 K/UL (ref 1–4.8)
LYMPHOCYTES NFR BLD: 14.9 % (ref 18–48)
MAGNESIUM SERPL-MCNC: 1.9 MG/DL (ref 1.6–2.6)
MCH RBC QN AUTO: 31.8 PG (ref 27–31)
MCHC RBC AUTO-ENTMCNC: 35 G/DL (ref 32–36)
MCV RBC AUTO: 91 FL (ref 82–98)
MICROSCOPIC COMMENT: ABNORMAL
MONOCYTES # BLD AUTO: 0.9 K/UL (ref 0.3–1)
MONOCYTES NFR BLD: 13.1 % (ref 4–15)
NEUTROPHILS # BLD AUTO: 4.7 K/UL (ref 1.8–7.7)
NEUTROPHILS NFR BLD: 71.2 % (ref 38–73)
NITRITE UR QL STRIP: NEGATIVE
NRBC BLD-RTO: 0 /100 WBC
PH UR STRIP: 6 [PH] (ref 5–8)
PLATELET # BLD AUTO: 168 K/UL (ref 150–450)
PMV BLD AUTO: 8.9 FL (ref 9.2–12.9)
POTASSIUM SERPL-SCNC: 3.3 MMOL/L (ref 3.5–5.1)
PROT SERPL-MCNC: 7.7 G/DL (ref 6–8.4)
PROT UR QL STRIP: ABNORMAL
RBC # BLD AUTO: 4.62 M/UL (ref 4.6–6.2)
RBC #/AREA URNS HPF: 5 /HPF (ref 0–4)
SARS-COV-2 RDRP RESP QL NAA+PROBE: POSITIVE
SODIUM SERPL-SCNC: 136 MMOL/L (ref 136–145)
SP GR UR STRIP: 1.02 (ref 1–1.03)
SPECIMEN SOURCE: NORMAL
SQUAMOUS #/AREA URNS HPF: 2 /HPF
TROPONIN I SERPL DL<=0.01 NG/ML-MCNC: <0.03 NG/ML
URN SPEC COLLECT METH UR: ABNORMAL
UROBILINOGEN UR STRIP-ACNC: ABNORMAL EU/DL
WBC # BLD AUTO: 6.64 K/UL (ref 3.9–12.7)
WBC #/AREA URNS HPF: 1 /HPF (ref 0–5)

## 2022-05-17 PROCEDURE — 82550 ASSAY OF CK (CPK): CPT | Performed by: EMERGENCY MEDICINE

## 2022-05-17 PROCEDURE — 83735 ASSAY OF MAGNESIUM: CPT | Performed by: EMERGENCY MEDICINE

## 2022-05-17 PROCEDURE — 81001 URINALYSIS AUTO W/SCOPE: CPT | Performed by: EMERGENCY MEDICINE

## 2022-05-17 PROCEDURE — 80053 COMPREHEN METABOLIC PANEL: CPT | Performed by: EMERGENCY MEDICINE

## 2022-05-17 PROCEDURE — 87651 STREP A DNA AMP PROBE: CPT | Performed by: EMERGENCY MEDICINE

## 2022-05-17 PROCEDURE — U0002 COVID-19 LAB TEST NON-CDC: HCPCS | Performed by: EMERGENCY MEDICINE

## 2022-05-17 PROCEDURE — 93010 ELECTROCARDIOGRAM REPORT: CPT | Mod: ,,, | Performed by: SPECIALIST

## 2022-05-17 PROCEDURE — 25000003 PHARM REV CODE 250: Performed by: EMERGENCY MEDICINE

## 2022-05-17 PROCEDURE — 84484 ASSAY OF TROPONIN QUANT: CPT | Performed by: EMERGENCY MEDICINE

## 2022-05-17 PROCEDURE — 93005 ELECTROCARDIOGRAM TRACING: CPT | Performed by: SPECIALIST

## 2022-05-17 PROCEDURE — 99285 EMERGENCY DEPT VISIT HI MDM: CPT | Mod: 25

## 2022-05-17 PROCEDURE — 93010 EKG 12-LEAD: ICD-10-PCS | Mod: ,,, | Performed by: SPECIALIST

## 2022-05-17 PROCEDURE — 83880 ASSAY OF NATRIURETIC PEPTIDE: CPT | Performed by: EMERGENCY MEDICINE

## 2022-05-17 PROCEDURE — 82553 CREATINE MB FRACTION: CPT | Performed by: EMERGENCY MEDICINE

## 2022-05-17 PROCEDURE — 85025 COMPLETE CBC W/AUTO DIFF WBC: CPT | Performed by: EMERGENCY MEDICINE

## 2022-05-17 PROCEDURE — 87502 INFLUENZA DNA AMP PROBE: CPT | Performed by: EMERGENCY MEDICINE

## 2022-05-17 RX ORDER — ACETAMINOPHEN 325 MG/1
650 TABLET ORAL
Status: COMPLETED | OUTPATIENT
Start: 2022-05-17 | End: 2022-05-17

## 2022-05-17 RX ORDER — BENZONATATE 100 MG/1
100 CAPSULE ORAL ONCE
Status: COMPLETED | OUTPATIENT
Start: 2022-05-17 | End: 2022-05-17

## 2022-05-17 RX ORDER — BENZONATATE 100 MG/1
100 CAPSULE ORAL 3 TIMES DAILY PRN
Qty: 12 CAPSULE | Refills: 0 | Status: SHIPPED | OUTPATIENT
Start: 2022-05-17 | End: 2022-05-27

## 2022-05-17 RX ORDER — LOSARTAN POTASSIUM 25 MG/1
100 TABLET ORAL ONCE
Status: COMPLETED | OUTPATIENT
Start: 2022-05-17 | End: 2022-05-17

## 2022-05-17 RX ORDER — DEXAMETHASONE SODIUM PHOSPHATE 4 MG/ML
6 INJECTION, SOLUTION INTRA-ARTICULAR; INTRALESIONAL; INTRAMUSCULAR; INTRAVENOUS; SOFT TISSUE
Status: DISCONTINUED | OUTPATIENT
Start: 2022-05-17 | End: 2022-05-17 | Stop reason: HOSPADM

## 2022-05-17 RX ORDER — POTASSIUM CHLORIDE 20 MEQ/1
40 TABLET, EXTENDED RELEASE ORAL ONCE
Status: COMPLETED | OUTPATIENT
Start: 2022-05-17 | End: 2022-05-17

## 2022-05-17 RX ADMIN — LOSARTAN POTASSIUM 100 MG: 25 TABLET, FILM COATED ORAL at 08:05

## 2022-05-17 RX ADMIN — BENZONATATE 100 MG: 100 CAPSULE ORAL at 09:05

## 2022-05-17 RX ADMIN — POTASSIUM CHLORIDE 40 MEQ: 20 TABLET, EXTENDED RELEASE ORAL at 08:05

## 2022-05-17 RX ADMIN — ACETAMINOPHEN 650 MG: 325 TABLET ORAL at 09:05

## 2022-05-17 NOTE — Clinical Note
"Jarod Jordan" Stan was seen and treated in our emergency department on 5/17/2022.  He may return to work on 05/25/2022.       If you have any questions or concerns, please don't hesitate to call.      ALLEN Gutierrez RN    "

## 2022-05-17 NOTE — Clinical Note
"Jarod"Trisha Pierce was seen and treated in our emergency department on 5/17/2022.     COVID-19 is present in our communities across the state. There is limited testing for COVID at this time, so not all patients can be tested. In this situation, your employee meets the following criteria:    Jarod Pierce has not been tested for COVID-19. He can return to work when the following conditions are met:  · 24 hours fever free without fever-reducing medications AND  · Improved symptoms  · If they are fully vaccinated or have not had close contact with someone with COVID-19 (within 6 feet for 15 minutes or more)    If they are fully vaccinated and had a close contact, there is no need for quarantine, unless they develop symptoms.      If they are not fully vaccinated and had a close contact:  · Retest at 5 to 7 days post-exposure  · If possible, it is recommended that them quarantine for 5 days from the time of contact regardless of their test status.  · A mask should be worn indoors post quarantine for 5 days.    Infection control policies of the employer should be followed, as well as good hand hygiene.        If you have any questions or concerns, or if I can be of further assistance, please do not hesitate to contact me.    Sincerely,              RN"

## 2022-05-17 NOTE — Clinical Note
"Jarod"Trisha Pierce was seen and treated in our emergency department on 5/17/2022.     COVID-19 is present in our communities across the state. There is limited testing for COVID at this time, so not all patients can be tested. In this situation, your employee meets the following criteria:    Jarod Pierce has met the criteria for COVID-19 testing and has a POSITIVE result. He can return to work once they are asymptomatic for 24 hours without the use of fever reducing medications AND at least five days from the first positive result. A mask is recommended for 5 days post quarantine.     If you have any questions or concerns, or if I can be of further assistance, please do not hesitate to contact me.    Sincerely,              RN"

## 2022-05-18 ENCOUNTER — PATIENT MESSAGE (OUTPATIENT)
Dept: ADMINISTRATIVE | Facility: CLINIC | Age: 57
End: 2022-05-18
Payer: MEDICAID

## 2022-05-18 ENCOUNTER — NURSE TRIAGE (OUTPATIENT)
Dept: ADMINISTRATIVE | Facility: CLINIC | Age: 57
End: 2022-05-18
Payer: MEDICAID

## 2022-05-18 NOTE — ED PROVIDER NOTES
"Encounter Date: 5/17/2022       History     Chief Complaint   Patient presents with    Cough     Pt states, "I was coughing, coughing, coughing and it's getting worse".  Pt says his blood pressure is higher than normal.  Pt states he last took prescribed amlodipine before arrival to ER.  Pt did not take morning dose of Losartan.      Nasal Congestion     Patient with history of cardiomyopathy.  He ran out of his diuretic/furosemide approximately 1 month ago.  Patient reports 1 day history of increasing congestion with nonproductive cough.  Mild dyspnea on exertion.  No chest pain.  No pleurisy hemoptysis.  Patient thinks he had some chills earlier with no definite fever.  No abdominal pain, dysuria.        Review of patient's allergies indicates:   Allergen Reactions    Hydralazine analogues Other (See Comments)     drowsiness    Codeine      Other reaction(s): Hives    Codeine phosphate      Other reaction(s): Unknown  Other reaction(s): Unknown    Lisinopril      Dry coughing    Phenytoin sodium extended      Other reaction(s): leg cramps     Past Medical History:   Diagnosis Date    Allergic rhinitis due to allergen     Arthritis     Cardiomyopathy     Gout     Hypertension     Left bundle branch block      Past Surgical History:   Procedure Laterality Date    WISDOM TOOTH EXTRACTION       Family History   Problem Relation Age of Onset    Heart disease Father     Hypertension Father     Gout Father     Cancer Sister         lung    Heart attack Neg Hx      Social History     Tobacco Use    Smoking status: Never Smoker    Smokeless tobacco: Never Used   Substance Use Topics    Alcohol use: No    Drug use: No     Review of Systems   Constitutional: Negative for chills and fever.   HENT: Negative for sore throat.    Eyes: Negative for photophobia and visual disturbance.   Respiratory: Positive for cough. Negative for wheezing.    Cardiovascular: Negative for chest pain.   Gastrointestinal: " Negative for abdominal pain and vomiting.   Genitourinary: Negative for dysuria.   Musculoskeletal: Negative for joint swelling.   Skin: Negative for rash.   Neurological: Negative for weakness and headaches.   Psychiatric/Behavioral: Negative for confusion.       Physical Exam     Initial Vitals [05/17/22 1936]   BP Pulse Resp Temp SpO2   (!) 163/101 98 18 99.4 °F (37.4 °C) 96 %      MAP       --         Physical Exam    Nursing note and vitals reviewed.  Constitutional: He is not diaphoretic. No distress.   HENT:   Head: Normocephalic and atraumatic.   Eyes: Conjunctivae are normal.   Neck:   Normal range of motion.  Cardiovascular: Regular rhythm.   Pulmonary/Chest: Breath sounds normal.   Abdominal: Abdomen is soft. Bowel sounds are normal. There is no abdominal tenderness.   Musculoskeletal:         General: Normal range of motion.      Cervical back: Normal range of motion.     Neurological: He is alert and oriented to person, place, and time. He has normal strength. No cranial nerve deficit or sensory deficit.   Skin: No rash noted.   Psychiatric: He has a normal mood and affect.         ED Course   Procedures  Labs Reviewed   SARS-COV-2 RNA AMPLIFICATION, QUAL - Abnormal; Notable for the following components:       Result Value    SARS-CoV-2 RNA, Amplification, Qual Positive (*)     All other components within normal limits   URINALYSIS, REFLEX TO URINE CULTURE - Abnormal; Notable for the following components:    Protein, UA Trace (*)     Occult Blood UA 2+ (*)     Urobilinogen, UA 2.0-3.0 (*)     All other components within normal limits    Narrative:     Specimen Source->Urine   CBC W/ AUTO DIFFERENTIAL - Abnormal; Notable for the following components:    MCH 31.8 (*)     MPV 8.9 (*)     Lymph % 14.9 (*)     All other components within normal limits   COMPREHENSIVE METABOLIC PANEL - Abnormal; Notable for the following components:    Potassium 3.3 (*)     Glucose 130 (*)     Total Bilirubin 2.7 (*)     All  other components within normal limits   URINALYSIS MICROSCOPIC - Abnormal; Notable for the following components:    RBC, UA 5 (*)     Hyaline Casts, UA 6 (*)     All other components within normal limits    Narrative:     Specimen Source->Urine   GROUP A STREP, MOLECULAR   INFLUENZA A AND B ANTIGEN    Narrative:     Specimen Source->Nasopharyngeal Swab   CK-MB   CK   MAGNESIUM   TROPONIN I   B-TYPE NATRIURETIC PEPTIDE          Imaging Results          X-Ray Chest AP Portable (Preliminary result)  Result time 05/17/22 21:23:55    ED Interpretation by Aristeo Castro MD (05/17/22 21:23:55, [facility] - Emergency Dept, Emergency Medicine)    No significant pneumonitis in this patient with COVID virus                               Medications   dexamethasone injection 6 mg (has no administration in time range)   benzonatate capsule 100 mg (has no administration in time range)   acetaminophen tablet 650 mg (has no administration in time range)   losartan tablet 100 mg (100 mg Oral Given 5/17/22 2038)   potassium chloride SA CR tablet 40 mEq (40 mEq Oral Given 5/17/22 2052)     Medical Decision Making:   History:   Old Medical Records: I decided to obtain old medical records.  Clinical Tests:   Lab Tests: Reviewed  Radiological Study: Reviewed  Medical Tests: Reviewed  ED Management:  Patient presents with cough and some mild dyspnea on exertion.  Positive chills.  Chest x-ray with no significant pneumonitis.  Patient is COVID positive.  O2 saturation 95-96% on room air at rest.  I briskly ambulated patient.  Patient was able to walk at a brisk pace with no problems.  Post exertion pulse oximetry was 94%.  Pulse rate 86. Currently patient is stable for outpatient treatment.  Strict return precautions given.  Will enroll in COVID surveillance program.  Will treat with Tessalon for symptomatic cough.  I discussed options of emergency use medications with patient.  At this time patient does not desire anti  viral therapy.                      Clinical Impression:   Final diagnoses:  [I10] Hypertension  [U07.1] COVID-19 virus infection (Primary)          ED Disposition Condition    Discharge Stable        ED Prescriptions     Medication Sig Dispense Start Date End Date Auth. Provider    pulse oximeter (PULSE OXIMETER) device by Apply Externally route 2 (two) times a day. Use twice daily at 8 AM and 3 PM and record the value in MyChart as directed. 1 each 5/17/2022  Aristeo Castro MD    benzonatate (TESSALON) 100 MG capsule Take 1 capsule (100 mg total) by mouth 3 (three) times daily as needed for Cough. 12 capsule 5/17/2022 5/27/2022 Aristeo Castro MD        Follow-up Information     Follow up With Specialties Details Why Contact Info Additional Information    [facility] - Emergency Dept Emergency Medicine  If symptoms worsen [address] [address]  [phone] 1st floor    Idris Fuller MD Family Medicine Schedule an appointment as soon as possible for a visit   [address]  [facility] [facility] [address]  [phone]              Aristeo Castro MD  05/17/22 1569

## 2022-05-18 NOTE — TELEPHONE ENCOUNTER
1st enrollment call attempted with contact made. Pt reports that he does not have pulse ox, and would like to get pulse ox before enrolling. Pt reports that heart has been beating faster. Pt also reports having some worsened SOB with activity. Pt advised to be seen in ED now. Pt verbalized understanding    Reason for Disposition   New or worsened shortness of breath with activity (dyspnea on exertion)    Additional Information   Negative: Passed out (i.e., lost consciousness, collapsed and was not responding)   Negative: Shock suspected (e.g., cold/pale/clammy skin, too weak to stand, low BP, rapid pulse)   Negative: Difficult to awaken or acting confused (e.g., disoriented, slurred speech)   Negative: Visible sweat on face or sweat dripping down face   Negative: Unable to walk, or can only walk with assistance (e.g., requires support)   Negative: Received SHOCK from implantable cardiac defibrillator and has persisting symptoms (i.e., palpitations, lightheadedness)   Negative: Dizziness, lightheadedness, or weakness and heart beating very rapidly (e.g., > 140 / minute)   Negative: Dizziness, lightheadedness, or weakness and heart beating very slowly (e.g., < 50 / minute)   Negative: Sounds like a life-threatening emergency to the triager   Negative: Difficulty breathing   Negative: Dizziness, lightheadedness, or weakness   Negative: Heart beating very rapidly (e.g., > 140 / minute) and present now (Exception: during exercise)   Negative: Heart beating very slowly (e.g., < 50 / minute) (Exception: athlete and heart rate normal for caller)    Protocols used: HEART RATE AND HEARTBEAT [id number]

## 2022-05-18 NOTE — DISCHARGE INSTRUCTIONS
You had been rolled and a COVID surveillance program.   your pulse oximeter tomorrow.  Return immediately for any shortness of breath, O2 saturations under 92%, or any other problems.

## 2022-05-19 ENCOUNTER — NURSE TRIAGE (OUTPATIENT)
Dept: ADMINISTRATIVE | Facility: CLINIC | Age: 57
End: 2022-05-19
Payer: MEDICAID

## 2022-05-19 NOTE — TELEPHONE ENCOUNTER
Pt called for covid surveillance enrollment and he opts for [facility] will place order and he will call us back. Pt said that he doesn't have a car to  device   Reason for Disposition   Information only question and nurse able to answer    Protocols used: INFORMATION ONLY CALL - NO TRIAGE-A-OH

## 2022-05-23 ENCOUNTER — PATIENT OUTREACH (OUTPATIENT)
Dept: EMERGENCY MEDICINE | Facility: HOSPITAL | Age: 57
End: 2022-05-23
Payer: MEDICAID

## 2022-05-26 ENCOUNTER — HOSPITAL ENCOUNTER (EMERGENCY)
Facility: HOSPITAL | Age: 57
Discharge: HOME | End: 2022-05-26
Attending: EMERGENCY MEDICINE
Payer: MEDICAID

## 2022-05-26 VITALS
WEIGHT: 220 LBS | TEMPERATURE: 97 F | OXYGEN SATURATION: 96 % | SYSTOLIC BLOOD PRESSURE: 157 MMHG | BODY MASS INDEX: 32.58 KG/M2 | HEIGHT: 69 IN | DIASTOLIC BLOOD PRESSURE: 89 MMHG | RESPIRATION RATE: 12 BRPM | HEART RATE: 73 BPM

## 2022-05-26 DIAGNOSIS — U07.1 COVID: Primary | ICD-10-CM

## 2022-05-26 DIAGNOSIS — R06.02 SHORTNESS OF BREATH: ICD-10-CM

## 2022-05-26 LAB
ALBUMIN SERPL BCP-MCNC: 4.1 G/DL (ref 3.5–5.2)
ALP SERPL-CCNC: 99 U/L (ref 55–135)
ALT SERPL W/O P-5'-P-CCNC: 28 U/L (ref 10–44)
ANION GAP SERPL CALC-SCNC: 10 MMOL/L (ref 8–16)
AST SERPL-CCNC: 21 U/L (ref 10–40)
BASOPHILS # BLD AUTO: 0.03 K/UL (ref 0–0.2)
BASOPHILS NFR BLD: 0.5 % (ref 0–1.9)
BILIRUB SERPL-MCNC: 0.9 MG/DL (ref 0.1–1)
BNP SERPL-MCNC: 28 PG/ML (ref 0–99)
BUN SERPL-MCNC: 15 MG/DL (ref 6–20)
CALCIUM SERPL-MCNC: 8.9 MG/DL (ref 8.7–10.5)
CHLORIDE SERPL-SCNC: 101 MMOL/L (ref 95–110)
CO2 SERPL-SCNC: 26 MMOL/L (ref 23–29)
CREAT SERPL-MCNC: 1 MG/DL (ref 0.5–1.4)
DIFFERENTIAL METHOD: ABNORMAL
EOSINOPHIL # BLD AUTO: 0.2 K/UL (ref 0–0.5)
EOSINOPHIL NFR BLD: 2.9 % (ref 0–8)
ERYTHROCYTE [DISTWIDTH] IN BLOOD BY AUTOMATED COUNT: 12.2 % (ref 11.5–14.5)
EST. GFR  (AFRICAN AMERICAN): >60 ML/MIN/1.73 M^2
EST. GFR  (NON AFRICAN AMERICAN): >60 ML/MIN/1.73 M^2
GLUCOSE SERPL-MCNC: 137 MG/DL (ref 70–110)
HCT VFR BLD AUTO: 41.9 % (ref 40–54)
HGB BLD-MCNC: 14.5 G/DL (ref 14–18)
IMM GRANULOCYTES # BLD AUTO: 0.04 K/UL (ref 0–0.04)
IMM GRANULOCYTES NFR BLD AUTO: 0.7 % (ref 0–0.5)
LYMPHOCYTES # BLD AUTO: 1.6 K/UL (ref 1–4.8)
LYMPHOCYTES NFR BLD: 26.3 % (ref 18–48)
MCH RBC QN AUTO: 31.8 PG (ref 27–31)
MCHC RBC AUTO-ENTMCNC: 34.6 G/DL (ref 32–36)
MCV RBC AUTO: 92 FL (ref 82–98)
MONOCYTES # BLD AUTO: 0.5 K/UL (ref 0.3–1)
MONOCYTES NFR BLD: 7.4 % (ref 4–15)
NEUTROPHILS # BLD AUTO: 3.8 K/UL (ref 1.8–7.7)
NEUTROPHILS NFR BLD: 62.2 % (ref 38–73)
NRBC BLD-RTO: 0 /100 WBC
PLATELET # BLD AUTO: 222 K/UL (ref 150–450)
PMV BLD AUTO: 8.8 FL (ref 9.2–12.9)
POTASSIUM SERPL-SCNC: 3.7 MMOL/L (ref 3.5–5.1)
PROT SERPL-MCNC: 7.5 G/DL (ref 6–8.4)
RBC # BLD AUTO: 4.56 M/UL (ref 4.6–6.2)
SARS-COV-2 RDRP RESP QL NAA+PROBE: NEGATIVE
SODIUM SERPL-SCNC: 137 MMOL/L (ref 136–145)
TROPONIN I SERPL DL<=0.01 NG/ML-MCNC: <0.03 NG/ML
WBC # BLD AUTO: 6.12 K/UL (ref 3.9–12.7)

## 2022-05-26 PROCEDURE — 93010 ELECTROCARDIOGRAM REPORT: CPT | Mod: ,,, | Performed by: SPECIALIST

## 2022-05-26 PROCEDURE — U0002 COVID-19 LAB TEST NON-CDC: HCPCS | Performed by: EMERGENCY MEDICINE

## 2022-05-26 PROCEDURE — 93005 ELECTROCARDIOGRAM TRACING: CPT | Performed by: SPECIALIST

## 2022-05-26 PROCEDURE — 84484 ASSAY OF TROPONIN QUANT: CPT | Performed by: EMERGENCY MEDICINE

## 2022-05-26 PROCEDURE — 85025 COMPLETE CBC W/AUTO DIFF WBC: CPT | Performed by: EMERGENCY MEDICINE

## 2022-05-26 PROCEDURE — 80053 COMPREHEN METABOLIC PANEL: CPT | Performed by: EMERGENCY MEDICINE

## 2022-05-26 PROCEDURE — 83880 ASSAY OF NATRIURETIC PEPTIDE: CPT | Performed by: EMERGENCY MEDICINE

## 2022-05-26 PROCEDURE — 99284 EMERGENCY DEPT VISIT MOD MDM: CPT | Mod: 25

## 2022-05-26 PROCEDURE — 93010 EKG 12-LEAD: ICD-10-PCS | Mod: ,,, | Performed by: SPECIALIST

## 2022-05-26 NOTE — ED PROVIDER NOTES
Encounter Date: 5/26/2022       History     Chief Complaint   Patient presents with    COVID-19 Concerns     Pt here for blood pressure concerns, also states he was here last week and was diagnosed with covid and wants to be retested so he can go back to work.      HPI     Seen and evaluated.  Presents today after recent positive COVID test.  Also notes he has hypertension and some lower extremity swelling sometimes.  He denies active chest pain.  Has no associated fever chills nausea vomiting or diarrhea today.  This is an acute episodic process.  Symptoms moderate    Review of patient's allergies indicates:   Allergen Reactions    Hydralazine analogues Other (See Comments)     drowsiness    Codeine      Other reaction(s): Hives    Codeine phosphate      Other reaction(s): Unknown  Other reaction(s): Unknown    Lisinopril      Dry coughing    Phenytoin sodium extended      Other reaction(s): leg cramps     Past Medical History:   Diagnosis Date    Allergic rhinitis due to allergen     Arthritis     Cardiomyopathy     Gout     Hypertension     Left bundle branch block      Past Surgical History:   Procedure Laterality Date    WISDOM TOOTH EXTRACTION       Family History   Problem Relation Age of Onset    Heart disease Father     Hypertension Father     Gout Father     Cancer Sister         lung    Heart attack Neg Hx      Social History     Tobacco Use    Smoking status: Never Smoker    Smokeless tobacco: Never Used   Substance Use Topics    Alcohol use: No    Drug use: No     Review of Systems   Constitutional: Negative for fever.   HENT: Negative for sore throat.    Respiratory: Negative for shortness of breath.    Cardiovascular: Positive for leg swelling (Mild). Negative for chest pain.   Gastrointestinal: Negative for nausea.   Genitourinary: Negative for dysuria.   Musculoskeletal: Negative for back pain.   Skin: Negative for rash.   Neurological: Negative for weakness.    Psychiatric/Behavioral: Negative for confusion.   All other systems reviewed and are negative.      Physical Exam     Initial Vitals [05/26/22 1300]   BP Pulse Resp Temp SpO2   (!) 148/97 83 18 97.7 °F (36.5 °C) 98 %      MAP       --         Physical Exam    Nursing note and vitals reviewed.  Constitutional: He appears well-developed and well-nourished.   HENT:   Head: Normocephalic and atraumatic.   Eyes: Conjunctivae are normal.   Cardiovascular: Normal rate and regular rhythm.   Abdominal: Abdomen is soft.   Musculoskeletal:         General: Normal range of motion.     Neurological: He is alert and oriented to person, place, and time.   Skin: Skin is warm and dry.   Nail fungus   Psychiatric: He has a normal mood and affect. His speech is normal.         ED Course   Procedures  Labs Reviewed   CBC W/ AUTO DIFFERENTIAL - Abnormal; Notable for the following components:       Result Value    RBC 4.56 (*)     MCH 31.8 (*)     MPV 8.8 (*)     Immature Granulocytes 0.7 (*)     All other components within normal limits   COMPREHENSIVE METABOLIC PANEL - Abnormal; Notable for the following components:    Glucose 137 (*)     All other components within normal limits   TROPONIN I   B-TYPE NATRIURETIC PEPTIDE   SARS-COV-2 RNA AMPLIFICATION, QUAL     EKG Readings: (Independently Interpreted)   Normal sinus rhythm, left axis deviation with left bundle-branch block 72 beats per minute no ST elevation or depressions     ECG Results          EKG 12-lead (In process)  Result time 05/26/22 13:42:34    In process by Interface, Lab In [facility] (05/26/22 13:42:34)                 Narrative:    Test Reason : R06.02,    Vent. Rate : 072 BPM     Atrial Rate : 072 BPM     P-R Int : 164 ms          QRS Dur : 162 ms      QT Int : 448 ms       P-R-T Axes : 033 -41 183 degrees     QTc Int : 490 ms    Normal sinus rhythm  Left axis deviation  Left bundle branch block  Abnormal ECG  When compared with ECG of 17-MAY-2022 20:02,  Nonspecific T  wave abnormality now evident in Inferior leads    Referred By: [email or web address]   SELF           Confirmed By:                             Imaging Results          X-Ray Chest AP Portable (Final result)  Result time 05/26/22 14:00:33    Final result by Elbert Maddox MD (05/26/22 14:00:33)                 Narrative:    XR CHEST 1 VIEW    CLINICAL HISTORY:  56 years Male CHF    COMPARISON: May 17, 2022    FINDINGS: Cardiac silhouette size is within normal limits for portable technique. No confluent airspace disease. No large pleural effusion or pneumothorax. No acute osseous abnormality.    IMPRESSION: No acute pulmonary process.    Electronically signed by:  Elbert Maddox MD  5/26/2022 2:00 PM CDT Workstation: [id number]                               Medications - No data to display  Medical Decision Making:   Initial Assessment:   Seen and evaluated.  Requesting evaluation with repeat COVID test and blood pressure check.  Laboratory evaluation is stable he is not particularly hypertensive, but does have a history of hypertension.  Will encourage medication compliance.  Will recommend outpatient follow-up for foot fungus with Podiatry.  Repeat COVID test was negative                      Clinical Impression:   Final diagnoses:  [R06.02] Shortness of breath                 Dayo France Jr., MD  05/26/22 6317

## 2022-05-26 NOTE — ED NOTES
"STATES WANTS TO GO BACK TO WORK. AIRBORNE AND DROPLET ISOLATION AT ROOM ARRIVAL. MASK IN PLACE.  PRIVATE ROOM. EVEN AND NON LABORED RESPIRATIONS.  AIRWAY CLEAR.  PULSES REGULAR.  < 3" CAPILLARY REFILL. SKIN WDI.  SOME LE EDEMA AT R ANKLE. MAEW.  NON DISTENDED ABDOMEN. ALERT, ORIENTED AND AMBULATORY. NAD AT THIS TIME.  CALL LIGHT IN REACH.  "

## 2022-05-27 ENCOUNTER — TELEPHONE (OUTPATIENT)
Dept: PODIATRY | Facility: CLINIC | Age: 57
End: 2022-05-27
Payer: MEDICAID

## 2022-05-27 NOTE — TELEPHONE ENCOUNTER
----- Message from Eli Linares sent at 5/27/2022 11:02 AM CDT -----  Contact: pt  Type: Needs Medical Advice    Who Called: [phone]  Best Call Back Number: [phone]  Inquiry/Question: pt trying to schedule for foot swelling and knot on left foot, unable to pull up provider scheduled at this time, please advise pt  Thank you~

## 2022-05-30 ENCOUNTER — TELEPHONE (OUTPATIENT)
Dept: FAMILY MEDICINE | Facility: CLINIC | Age: 57
End: 2022-05-30
Payer: MEDICAID

## 2022-05-30 ENCOUNTER — TELEPHONE (OUTPATIENT)
Dept: PODIATRY | Facility: CLINIC | Age: 57
End: 2022-05-30
Payer: MEDICAID

## 2022-05-30 NOTE — TELEPHONE ENCOUNTER
----- Message from Liam Durham sent at 5/30/2022  8:53 AM CDT -----  Regarding: returning call  Contact: patient  Type:  Patient Returning Call    Who Called:  Patient   Who Left Message for Patient:  notsure  Does the patient know what this is regarding?:  no  Best Call Back Number:  [phone] (home)

## 2022-05-30 NOTE — TELEPHONE ENCOUNTER
Advised Pt he has to  to get a referral for Podiatry in order for me to make an appointment . Pt verbalized understanding and stated that he has an appointment with Dr. Fuller tomorrow  and would ask for a referral at that time.

## 2022-05-30 NOTE — TELEPHONE ENCOUNTER
Patient tested positive for Covid 5-26-22. C/O post nasal drip and sore throat. Requesting appointment as patient believes he has a sinus infection. Last quarantine date 5-31-22. Appointment scheduled for 5-31-22. Patient agreed to appointment date, time, and location.

## 2022-05-30 NOTE — TELEPHONE ENCOUNTER
----- Message from Liam Durham sent at 5/30/2022  8:51 AM CDT -----  Regarding: sameday appointment  Contact: patient  Type:  Same Day Appointment Request    Caller is requesting a same day appointment.  Caller declined first available appointment listed below.      Name of Caller:  patient   When is the first available appointment?  No availability   Symptoms:  sinus infection  Best Call Back Number:  [phone] (home)

## 2022-08-08 ENCOUNTER — TELEPHONE (OUTPATIENT)
Dept: CARDIOLOGY | Facility: CLINIC | Age: 57
End: 2022-08-08
Payer: MEDICAID

## 2022-08-08 ENCOUNTER — TELEPHONE (OUTPATIENT)
Dept: FAMILY MEDICINE | Facility: CLINIC | Age: 57
End: 2022-08-08
Payer: MEDICAID

## 2022-08-08 NOTE — TELEPHONE ENCOUNTER
----- Message from Ana Jon MA sent at 8/8/2022  3:41 PM CDT -----  Contact: GARCÍA BOUCHER [[id number]]  Type: Needs Medical Advice    Who Called:GARCÍA BOUCHER [[id number]]  Best Call Back Number: [phone]  Inquiry/Question: Please call GARCÍA BOUCHER [[id number]] regarding appt concerns for October      Thank you~

## 2022-09-21 ENCOUNTER — CLINICAL SUPPORT (OUTPATIENT)
Dept: CARDIOLOGY | Facility: CLINIC | Age: 57
End: 2022-09-21
Payer: MEDICAID

## 2022-09-21 ENCOUNTER — HOSPITAL ENCOUNTER (OUTPATIENT)
Facility: HOSPITAL | Age: 57
Discharge: HOME | End: 2022-09-23
Attending: EMERGENCY MEDICINE | Admitting: STUDENT IN AN ORGANIZED HEALTH CARE EDUCATION/TRAINING PROGRAM
Payer: MEDICAID

## 2022-09-21 VITALS
DIASTOLIC BLOOD PRESSURE: 80 MMHG | HEIGHT: 69 IN | HEART RATE: 79 BPM | SYSTOLIC BLOOD PRESSURE: 156 MMHG | WEIGHT: 220 LBS | BODY MASS INDEX: 32.58 KG/M2 | OXYGEN SATURATION: 98 %

## 2022-09-21 DIAGNOSIS — R07.9 CHEST PAIN: ICD-10-CM

## 2022-09-21 DIAGNOSIS — R07.89 CHEST DISCOMFORT: Primary | ICD-10-CM

## 2022-09-21 LAB
ALBUMIN SERPL BCP-MCNC: 4.4 G/DL (ref 3.5–5.2)
ALP SERPL-CCNC: 111 U/L (ref 55–135)
ALT SERPL W/O P-5'-P-CCNC: 29 U/L (ref 10–44)
ANION GAP SERPL CALC-SCNC: 6 MMOL/L (ref 8–16)
AST SERPL-CCNC: 26 U/L (ref 10–40)
BASOPHILS # BLD AUTO: 0.04 K/UL (ref 0–0.2)
BASOPHILS NFR BLD: 0.8 % (ref 0–1.9)
BILIRUB SERPL-MCNC: 1.3 MG/DL (ref 0.1–1)
BNP SERPL-MCNC: 25 PG/ML (ref 0–99)
BUN SERPL-MCNC: 15 MG/DL (ref 6–20)
CALCIUM SERPL-MCNC: 8.6 MG/DL (ref 8.7–10.5)
CHLORIDE SERPL-SCNC: 106 MMOL/L (ref 95–110)
CK MB SERPL-MCNC: 0.6 NG/ML (ref 0.1–6.5)
CK SERPL-CCNC: 55 U/L (ref 20–200)
CO2 SERPL-SCNC: 23 MMOL/L (ref 23–29)
CREAT SERPL-MCNC: 1.1 MG/DL (ref 0.5–1.4)
DIFFERENTIAL METHOD: ABNORMAL
EOSINOPHIL # BLD AUTO: 0.2 K/UL (ref 0–0.5)
EOSINOPHIL NFR BLD: 2.9 % (ref 0–8)
ERYTHROCYTE [DISTWIDTH] IN BLOOD BY AUTOMATED COUNT: 12.5 % (ref 11.5–14.5)
EST. GFR  (NO RACE VARIABLE): >60 ML/MIN/1.73 M^2
GLUCOSE SERPL-MCNC: 178 MG/DL (ref 70–110)
HCT VFR BLD AUTO: 42.2 % (ref 40–54)
HGB BLD-MCNC: 14.8 G/DL (ref 14–18)
IMM GRANULOCYTES # BLD AUTO: 0.01 K/UL (ref 0–0.04)
IMM GRANULOCYTES NFR BLD AUTO: 0.2 % (ref 0–0.5)
INR PPP: 1.1
LYMPHOCYTES # BLD AUTO: 1.8 K/UL (ref 1–4.8)
LYMPHOCYTES NFR BLD: 34.7 % (ref 18–48)
MCH RBC QN AUTO: 32.5 PG (ref 27–31)
MCHC RBC AUTO-ENTMCNC: 35.1 G/DL (ref 32–36)
MCV RBC AUTO: 93 FL (ref 82–98)
MONOCYTES # BLD AUTO: 0.4 K/UL (ref 0.3–1)
MONOCYTES NFR BLD: 7.8 % (ref 4–15)
NEUTROPHILS # BLD AUTO: 2.7 K/UL (ref 1.8–7.7)
NEUTROPHILS NFR BLD: 53.6 % (ref 38–73)
NRBC BLD-RTO: 0 /100 WBC
PLATELET # BLD AUTO: 189 K/UL (ref 150–450)
PMV BLD AUTO: 9.3 FL (ref 9.2–12.9)
POTASSIUM SERPL-SCNC: 3.9 MMOL/L (ref 3.5–5.1)
PROT SERPL-MCNC: 7.5 G/DL (ref 6–8.4)
PROTHROMBIN TIME: 13.2 SEC (ref 11.4–13.7)
RBC # BLD AUTO: 4.55 M/UL (ref 4.6–6.2)
SARS-COV-2 RDRP RESP QL NAA+PROBE: NEGATIVE
SODIUM SERPL-SCNC: 135 MMOL/L (ref 136–145)
TROPONIN I SERPL DL<=0.01 NG/ML-MCNC: <0.03 NG/ML
TROPONIN I SERPL DL<=0.01 NG/ML-MCNC: <0.03 NG/ML
TSH SERPL DL<=0.005 MIU/L-ACNC: 3.56 UIU/ML (ref 0.34–5.6)
WBC # BLD AUTO: 5.1 K/UL (ref 3.9–12.7)

## 2022-09-21 PROCEDURE — U0002 COVID-19 LAB TEST NON-CDC: HCPCS | Performed by: EMERGENCY MEDICINE

## 2022-09-21 PROCEDURE — 36415 COLL VENOUS BLD VENIPUNCTURE: CPT | Performed by: NURSE PRACTITIONER

## 2022-09-21 PROCEDURE — 99285 EMERGENCY DEPT VISIT HI MDM: CPT | Mod: 25

## 2022-09-21 PROCEDURE — 96372 THER/PROPH/DIAG INJ SC/IM: CPT | Performed by: NURSE PRACTITIONER

## 2022-09-21 PROCEDURE — G0378 HOSPITAL OBSERVATION PER HR: HCPCS

## 2022-09-21 PROCEDURE — 93000 ELECTROCARDIOGRAM COMPLETE: CPT | Mod: S$GLB,,, | Performed by: INTERNAL MEDICINE

## 2022-09-21 PROCEDURE — 80053 COMPREHEN METABOLIC PANEL: CPT | Performed by: NURSE PRACTITIONER

## 2022-09-21 PROCEDURE — 93010 EKG 12-LEAD: ICD-10-PCS | Mod: ,,, | Performed by: SPECIALIST

## 2022-09-21 PROCEDURE — 93005 ELECTROCARDIOGRAM TRACING: CPT | Performed by: SPECIALIST

## 2022-09-21 PROCEDURE — 25000003 PHARM REV CODE 250: Performed by: NURSE PRACTITIONER

## 2022-09-21 PROCEDURE — 83880 ASSAY OF NATRIURETIC PEPTIDE: CPT | Performed by: NURSE PRACTITIONER

## 2022-09-21 PROCEDURE — 85025 COMPLETE CBC W/AUTO DIFF WBC: CPT | Performed by: NURSE PRACTITIONER

## 2022-09-21 PROCEDURE — 82553 CREATINE MB FRACTION: CPT | Performed by: NURSE PRACTITIONER

## 2022-09-21 PROCEDURE — 85610 PROTHROMBIN TIME: CPT | Performed by: NURSE PRACTITIONER

## 2022-09-21 PROCEDURE — 84484 ASSAY OF TROPONIN QUANT: CPT | Performed by: NURSE PRACTITIONER

## 2022-09-21 PROCEDURE — 93000 EKG 12-LEAD: ICD-10-PCS | Mod: S$GLB,,, | Performed by: INTERNAL MEDICINE

## 2022-09-21 PROCEDURE — 82550 ASSAY OF CK (CPK): CPT | Performed by: NURSE PRACTITIONER

## 2022-09-21 PROCEDURE — 25000003 PHARM REV CODE 250: Performed by: EMERGENCY MEDICINE

## 2022-09-21 PROCEDURE — 93010 ELECTROCARDIOGRAM REPORT: CPT | Mod: ,,, | Performed by: SPECIALIST

## 2022-09-21 PROCEDURE — 84443 ASSAY THYROID STIM HORMONE: CPT | Performed by: NURSE PRACTITIONER

## 2022-09-21 PROCEDURE — 63600175 PHARM REV CODE 636 W HCPCS: Performed by: NURSE PRACTITIONER

## 2022-09-21 PROCEDURE — 84484 ASSAY OF TROPONIN QUANT: CPT | Mod: 91 | Performed by: NURSE PRACTITIONER

## 2022-09-21 RX ORDER — ACETAMINOPHEN 325 MG/1
650 TABLET ORAL EVERY 4 HOURS PRN
Status: DISCONTINUED | OUTPATIENT
Start: 2022-09-21 | End: 2022-09-23 | Stop reason: HOSPADM

## 2022-09-21 RX ORDER — CALCIUM CARBONATE 200(500)MG
1000 TABLET,CHEWABLE ORAL 2 TIMES DAILY
Status: DISCONTINUED | OUTPATIENT
Start: 2022-09-21 | End: 2022-09-23 | Stop reason: HOSPADM

## 2022-09-21 RX ORDER — AMLODIPINE BESYLATE 5 MG/1
5 TABLET ORAL NIGHTLY
Status: DISCONTINUED | OUTPATIENT
Start: 2022-09-21 | End: 2022-09-23 | Stop reason: HOSPADM

## 2022-09-21 RX ORDER — MAGNESIUM SULFATE HEPTAHYDRATE 40 MG/ML
2 INJECTION, SOLUTION INTRAVENOUS
Status: DISCONTINUED | OUTPATIENT
Start: 2022-09-21 | End: 2022-09-23 | Stop reason: HOSPADM

## 2022-09-21 RX ORDER — LOSARTAN POTASSIUM 50 MG/1
50 TABLET ORAL DAILY
Status: ON HOLD | COMMUNITY
Start: 2022-06-02 | End: 2022-09-23 | Stop reason: SDUPTHER

## 2022-09-21 RX ORDER — ASPIRIN 325 MG
325 TABLET ORAL
Status: ACTIVE | OUTPATIENT
Start: 2022-09-21 | End: 2022-09-22

## 2022-09-21 RX ORDER — ATORVASTATIN CALCIUM 40 MG/1
40 TABLET, FILM COATED ORAL DAILY
Status: DISCONTINUED | OUTPATIENT
Start: 2022-09-22 | End: 2022-09-23 | Stop reason: HOSPADM

## 2022-09-21 RX ORDER — NAPROXEN SODIUM 220 MG/1
324 TABLET, FILM COATED ORAL ONCE
Status: COMPLETED | OUTPATIENT
Start: 2022-09-21 | End: 2022-09-21

## 2022-09-21 RX ORDER — POTASSIUM CHLORIDE 20 MEQ/1
40 TABLET, EXTENDED RELEASE ORAL
Status: DISCONTINUED | OUTPATIENT
Start: 2022-09-21 | End: 2022-09-23 | Stop reason: HOSPADM

## 2022-09-21 RX ORDER — ENOXAPARIN SODIUM 100 MG/ML
40 INJECTION SUBCUTANEOUS EVERY 24 HOURS
Status: DISCONTINUED | OUTPATIENT
Start: 2022-09-21 | End: 2022-09-23 | Stop reason: HOSPADM

## 2022-09-21 RX ORDER — KETOROLAC TROMETHAMINE 30 MG/ML
15 INJECTION, SOLUTION INTRAMUSCULAR; INTRAVENOUS EVERY 6 HOURS PRN
Status: DISCONTINUED | OUTPATIENT
Start: 2022-09-21 | End: 2022-09-23 | Stop reason: HOSPADM

## 2022-09-21 RX ORDER — NAPROXEN SODIUM 220 MG/1
81 TABLET, FILM COATED ORAL DAILY
Status: DISCONTINUED | OUTPATIENT
Start: 2022-09-22 | End: 2022-09-23 | Stop reason: HOSPADM

## 2022-09-21 RX ORDER — IPRATROPIUM BROMIDE AND ALBUTEROL SULFATE 2.5; .5 MG/3ML; MG/3ML
3 SOLUTION RESPIRATORY (INHALATION) EVERY 6 HOURS PRN
Status: DISCONTINUED | OUTPATIENT
Start: 2022-09-21 | End: 2022-09-23 | Stop reason: HOSPADM

## 2022-09-21 RX ORDER — FLUTICASONE PROPIONATE 50 MCG
2 SPRAY, SUSPENSION (ML) NASAL DAILY
Status: DISCONTINUED | OUTPATIENT
Start: 2022-09-22 | End: 2022-09-23 | Stop reason: HOSPADM

## 2022-09-21 RX ORDER — MAGNESIUM SULFATE HEPTAHYDRATE 40 MG/ML
4 INJECTION, SOLUTION INTRAVENOUS
Status: DISCONTINUED | OUTPATIENT
Start: 2022-09-21 | End: 2022-09-23 | Stop reason: HOSPADM

## 2022-09-21 RX ORDER — POTASSIUM CHLORIDE 20 MEQ/1
20 TABLET, EXTENDED RELEASE ORAL
Status: DISCONTINUED | OUTPATIENT
Start: 2022-09-21 | End: 2022-09-23 | Stop reason: HOSPADM

## 2022-09-21 RX ORDER — PANTOPRAZOLE SODIUM 40 MG/1
40 TABLET, DELAYED RELEASE ORAL DAILY
Status: DISCONTINUED | OUTPATIENT
Start: 2022-09-22 | End: 2022-09-23 | Stop reason: HOSPADM

## 2022-09-21 RX ORDER — MAGNESIUM SULFATE 1 G/100ML
1 INJECTION INTRAVENOUS
Status: DISCONTINUED | OUTPATIENT
Start: 2022-09-21 | End: 2022-09-23 | Stop reason: HOSPADM

## 2022-09-21 RX ORDER — LANOLIN ALCOHOL/MO/W.PET/CERES
800 CREAM (GRAM) TOPICAL
Status: DISCONTINUED | OUTPATIENT
Start: 2022-09-21 | End: 2022-09-23 | Stop reason: HOSPADM

## 2022-09-21 RX ORDER — LOSARTAN POTASSIUM 50 MG/1
50 TABLET ORAL DAILY
Status: DISCONTINUED | OUTPATIENT
Start: 2022-09-22 | End: 2022-09-23

## 2022-09-21 RX ORDER — SODIUM CHLORIDE 0.9 % (FLUSH) 0.9 %
10 SYRINGE (ML) INJECTION
Status: DISCONTINUED | OUTPATIENT
Start: 2022-09-21 | End: 2022-09-23 | Stop reason: HOSPADM

## 2022-09-21 RX ORDER — AMITRIPTYLINE HYDROCHLORIDE 10 MG/1
10 TABLET, FILM COATED ORAL NIGHTLY
Status: DISCONTINUED | OUTPATIENT
Start: 2022-09-21 | End: 2022-09-23 | Stop reason: HOSPADM

## 2022-09-21 RX ADMIN — NITROGLYCERIN 1 INCH: 20 OINTMENT TOPICAL at 04:09

## 2022-09-21 RX ADMIN — AMITRIPTYLINE HYDROCHLORIDE 10 MG: 10 TABLET, FILM COATED ORAL at 09:09

## 2022-09-21 RX ADMIN — ENOXAPARIN SODIUM 40 MG: 100 INJECTION SUBCUTANEOUS at 09:09

## 2022-09-21 RX ADMIN — AMLODIPINE BESYLATE 5 MG: 5 TABLET ORAL at 09:09

## 2022-09-21 RX ADMIN — ASPIRIN 81 MG CHEWABLE TABLET 324 MG: 81 TABLET CHEWABLE at 12:09

## 2022-09-21 RX ADMIN — CALCIUM CARBONATE (ANTACID) CHEW TAB 500 MG 1000 MG: 500 CHEW TAB at 09:09

## 2022-09-21 NOTE — PROGRESS NOTES
Patient has had chest pain for 3 months . Fluid in hands and feet. Patient feels fluid maybe in his chest . He is also getting headaches. His blood pressure 156/80 . Per Dr Shaye Kirkland's nurse. Please send to [facility] ER . His chest pain every day is 6/10 .

## 2022-09-21 NOTE — FIRST PROVIDER EVALUATION
Emergency Department TeleTriage Encounter Note      CHIEF COMPLAINT    Chief Complaint   Patient presents with    Hypertension     Pt sent here by Dr. Cr for HTN and chest discomfort.        VITAL SIGNS   Initial Vitals [09/21/22 1155]   BP Pulse Resp Temp SpO2   (!) 138/98 85 15 98.1 °F (36.7 °C) 96 %      MAP       --            ALLERGIES    Review of patient's allergies indicates:   Allergen Reactions    Hydralazine analogues Other (See Comments)     drowsiness    Codeine      Other reaction(s): Hives    Codeine phosphate      Other reaction(s): Unknown  Other reaction(s): Unknown    Lisinopril      Dry coughing    Phenytoin sodium extended      Other reaction(s): leg cramps       PROVIDER TRIAGE NOTE  This is a teletriage evaluation of a 56 y.o. male presenting to the ED complaining of chest pain/pressure for the last several weeks.     Initial orders will be placed and care will be transferred to an alternate provider when patient is roomed for a full evaluation. Any additional orders and the final disposition will be determined by that provider.         ORDERS  Labs Reviewed   CBC W/ AUTO DIFFERENTIAL   COMPREHENSIVE METABOLIC PANEL   B-TYPE NATRIURETIC PEPTIDE   TROPONIN I   PROTIME-INR       ED Orders (720h ago, onward)      Start Ordered     Status Ordering Provider    09/21/22 1300 09/21/22 1158  aspirin chewable tablet 324 mg  Once         Acknowledged PAUL FUNG    09/21/22 1158 09/21/22 1158  Pulse Oximetry Continuous  Continuous         Acknowledged PAUL FUNG    09/21/22 1158 09/21/22 1158  EKG 12-lead  Once         Acknowledged PAUL FUNG    09/21/22 1158 09/21/22 1158  CBC auto differential  STAT         Acknowledged PAUL FUNG    09/21/22 1158 09/21/22 1158  Comprehensive metabolic panel  STAT         Acknowledged PAUL FUNG    09/21/22 1158 09/21/22 1158  Brain natriuretic peptide  STAT         Acknowledged PAUL FUNG    09/21/22 1158 09/21/22  1158  Troponin I  STAT         Acknowledged PAUL FUNG REGGIE    09/21/22 1158 09/21/22 1158  Protime-INR  STAT         Acknowledged KENTON PAUL REGGIE    09/21/22 1158 09/21/22 1158  Saline lock IV  Once         Acknowledged KENTON PAUL REGGIE    09/21/22 1158 09/21/22 1158  X-Ray Chest AP Portable  1 time imaging         Acknowledged FUNG, PAUL L    09/21/22 1158 09/21/22 1158  Notify physician for persistent pain  Once         Acknowledged FUNG, PAUL L    09/21/22 1158 09/21/22 1158  Cardiac Monitoring - Adult  Continuous        Comments: Notify Physician If:    Acknowledged KENTON PAUL L              Virtual Visit Note: The provider triage portion of this emergency department evaluation and documentation was performed via TAPP, a HIPAA-compliant telemedicine application, in concert with a tele-presenter in the room. A face to face patient evaluation with one of my colleagues will occur once the patient is placed in an emergency department room.      DISCLAIMER: This note was prepared with Ventive voice recognition transcription software. Garbled syntax, mangled pronouns, and other bizarre constructions may be attributed to that software system.

## 2022-09-21 NOTE — ED PROVIDER NOTES
Encounter Date: 9/21/2022       History     Chief Complaint   Patient presents with    Hypertension     Pt sent here by Dr. Cr for HTN and chest discomfort.      Patient presents emergency department with reported chest discomfort symptoms have been ongoing for some weeks but of worsened describes a bandlike sensation across strep 2nd stretching with shortness of breath he does report postnasal drip and a cough that is productive of whitish sputum he denies any fever chills patient reports that he had COVID twice in since the 2nd bout of COVID he has had persistent congestion feels like he has brain fog secondary to the COVID      Review of patient's allergies indicates:   Allergen Reactions    Hydralazine analogues Other (See Comments)     drowsiness    Codeine      Other reaction(s): Hives    Codeine phosphate      Other reaction(s): Unknown  Other reaction(s): Unknown    Lisinopril      Dry coughing    Phenytoin sodium extended      Other reaction(s): leg cramps     Past Medical History:   Diagnosis Date    Allergic rhinitis due to allergen     Arthritis     Cardiomyopathy     Gout     Hypertension     Left bundle branch block      Past Surgical History:   Procedure Laterality Date    WISDOM TOOTH EXTRACTION       Family History   Problem Relation Age of Onset    Heart disease Father     Hypertension Father     Gout Father     Cancer Sister         lung    Heart attack Neg Hx      Social History     Tobacco Use    Smoking status: Never    Smokeless tobacco: Never   Substance Use Topics    Alcohol use: No    Drug use: No     Review of Systems   Constitutional:  Positive for fatigue. Negative for chills and fever.   HENT:  Positive for congestion and sore throat.    Respiratory:  Positive for cough and shortness of breath.    Cardiovascular:  Positive for chest pain and leg swelling. Negative for palpitations.   Gastrointestinal:  Negative for abdominal pain, diarrhea, nausea and vomiting.   Genitourinary:   Negative for dysuria and flank pain.   Musculoskeletal:  Positive for joint swelling.   All other systems reviewed and are negative.    Physical Exam     Initial Vitals [09/21/22 1155]   BP Pulse Resp Temp SpO2   (!) 138/98 85 15 98.1 °F (36.7 °C) 96 %      MAP       --         Physical Exam    Constitutional: He appears well-developed and well-nourished. No distress.   HENT:   Head: Normocephalic and atraumatic.   Right Ear: External ear normal.   Left Ear: External ear normal.   Mouth/Throat: Oropharynx is clear and moist.   Eyes: EOM are normal. Pupils are equal, round, and reactive to light.   Neck: Neck supple.   Normal range of motion.  Cardiovascular:  Normal rate, regular rhythm, S1 normal, S2 normal, normal heart sounds and intact distal pulses.           Pulmonary/Chest: Breath sounds normal.   Abdominal: Abdomen is soft. Bowel sounds are normal. There is no abdominal tenderness.   Musculoskeletal:         General: Normal range of motion.      Cervical back: Normal range of motion and neck supple.     Neurological: He is alert and oriented to person, place, and time. He has normal strength. GCS score is 15. GCS eye subscore is 4. GCS verbal subscore is 5. GCS motor subscore is 6.   Skin: Skin is warm and dry. Capillary refill takes less than 2 seconds. No rash noted.   Psychiatric: He has a normal mood and affect. His behavior is normal.       ED Course   Procedures  Labs Reviewed   CBC W/ AUTO DIFFERENTIAL - Abnormal; Notable for the following components:       Result Value    RBC 4.55 (*)     MCH 32.5 (*)     All other components within normal limits   COMPREHENSIVE METABOLIC PANEL - Abnormal; Notable for the following components:    Sodium 135 (*)     Glucose 178 (*)     Calcium 8.6 (*)     Total Bilirubin 1.3 (*)     Anion Gap 6 (*)     All other components within normal limits   B-TYPE NATRIURETIC PEPTIDE   TROPONIN I   PROTIME-INR   SARS-COV-2 RNA AMPLIFICATION, QUAL        ECG Results               EKG 12-lead (In process)  Result time 09/21/22 12:40:20      In process by Interface, Lab In [facility] (09/21/22 12:40:20)                   Narrative:    Test Reason : R07.9,    Vent. Rate : 079 BPM     Atrial Rate : 079 BPM     P-R Int : 162 ms          QRS Dur : 164 ms      QT Int : 434 ms       P-R-T Axes : 047 -11 201 degrees     QTc Int : 497 ms    Normal sinus rhythm  Left bundle branch block  Abnormal ECG  When compared with ECG of 21-SEP-2022 10:33,  The axis Shifted right  T wave inversion now evident in Inferior leads  T wave inversion more evident in Lateral leads    Referred By: [email or web address]   SELF           Confirmed By:                                   Imaging Results              X-Ray Chest PA And Lateral (Final result)  Result time 09/21/22 13:01:41   Procedure changed from X-Ray Chest AP Portable     Final result by Ludwin Lange MD (09/21/22 13:01:41)                   Narrative:    Reason: chest pain Hypertension (Pt sent here by Dr. Cr for HTN and chest discomfort. )    FINDINGS:  PA and lateral chest compared with 5/26/2022 show normal cardiomediastinal silhouette.    Nipple shadows are unchanged and bilateral lower lungs, lungs are clear. Pulmonary vasculature is normal. Changes of diffuse idiopathic skeletal hyperostosis affecting the spine.    IMPRESSION:  No acute cardiopulmonary abnormality.    Electronically signed by:  Ludwin Lange MD  9/21/2022 1:01 PM CDT Workstation: [id number]                                     Medications   aspirin tablet 325 mg (325 mg Oral Not Given 9/21/22 1245)   aspirin chewable tablet 324 mg (324 mg Oral Given 9/21/22 1206)   nitroGLYCERIN 2% TD oint ointment 1 inch (1 inch Transdermal Given 9/21/22 1645)     Medical Decision Making:   ED Management:  Laboratory evaluation reviewed initial troponin is negative patient blood pressures are persistently elevated emergency department I have given him nitroglycerin aspirin in the ER discussed patient's findings  with [facility] for evaluation                        Clinical Impression:   Final diagnoses:  [R07.9] Chest pain        ED Disposition Condition    Admit Stable                Xu Laurent MD  09/21/22 7286

## 2022-09-21 NOTE — ED NOTES
LOC: The patient is awake, alert and aware of environment with an appropriate affect, the patient is oriented x 3 and speaking appropriately.  APPEARANCE: Patient resting comfortably and in no acute distress, patient is clean and well groomed, patient's clothing properly fastened.  SKIN: The skin is warm and dry, color consistent with ethnicity, patient has normal skin turgor and moist mucus membranes, skin intact, no breakdown or brusing noted.  MUSKULOSKELETAL: Patient moving all extremities well, no obvious swelling or deformities noted.  RESPIRATORY: Airway is open and patent, respirations are spontaneous, patient has a normal effort and rate, no accessory muscle use noted.  CARDIAC: Patient has a normal rate and rhythm, no peripheral edema noted, capillary refill < 3 seconds. Pt reports chest pain along with high blood pressure readings at home.   ABDOMEN: Soft and non tender to palpation, no distention noted.  NEUROLOGIC: PERRL, 3mm bilaterally, eyes open spontaneously, behavior appropriate to situation, follows commands, facial expression symmetrical, bilateral hand grasp equal and even, purposeful motor response noted, normal sensation in all extremities when touched with a finger.
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
## 2022-09-22 ENCOUNTER — CLINICAL SUPPORT (OUTPATIENT)
Dept: CARDIOLOGY | Facility: HOSPITAL | Age: 57
End: 2022-09-22
Payer: MEDICAID

## 2022-09-22 ENCOUNTER — HOSPITAL ENCOUNTER (OUTPATIENT)
Dept: RADIOLOGY | Facility: HOSPITAL | Age: 57
Discharge: HOME | End: 2022-09-22
Payer: MEDICAID

## 2022-09-22 VITALS — WEIGHT: 220 LBS | HEIGHT: 69 IN | BODY MASS INDEX: 32.58 KG/M2

## 2022-09-22 LAB
ALBUMIN SERPL BCP-MCNC: 4 G/DL (ref 3.5–5.2)
ALP SERPL-CCNC: 97 U/L (ref 55–135)
ALT SERPL W/O P-5'-P-CCNC: 27 U/L (ref 10–44)
ANION GAP SERPL CALC-SCNC: 9 MMOL/L (ref 8–16)
AORTIC ROOT ANNULUS: 3.07 CM
AST SERPL-CCNC: 22 U/L (ref 10–40)
AV INDEX (PROSTH): 0.8
AV MEAN GRADIENT: 3 MMHG
AV VALVE AREA: 3.05 CM2
BASOPHILS # BLD AUTO: 0.04 K/UL (ref 0–0.2)
BASOPHILS NFR BLD: 0.5 % (ref 0–1.9)
BILIRUB SERPL-MCNC: 1.4 MG/DL (ref 0.1–1)
BSA FOR ECHO PROCEDURE: 2.2 M2
BUN SERPL-MCNC: 15 MG/DL (ref 6–20)
CALCIUM SERPL-MCNC: 9 MG/DL (ref 8.7–10.5)
CHLORIDE SERPL-SCNC: 101 MMOL/L (ref 95–110)
CHOLEST SERPL-MCNC: 146 MG/DL (ref 120–199)
CHOLEST/HDLC SERPL: 5.6 {RATIO} (ref 2–5)
CO2 SERPL-SCNC: 27 MMOL/L (ref 23–29)
CREAT SERPL-MCNC: 1.2 MG/DL (ref 0.5–1.4)
CV ECHO LV RWT: 0.46 CM
CV PHARM DOSE: 0.4 MG
CV STRESS BASE HR: 74 BPM
DIASTOLIC BLOOD PRESSURE: 93 MMHG
DIFFERENTIAL METHOD: ABNORMAL
DOP CALC AO VTI: 25.61 CM
DOP CALC LVOT AREA: 3.8 CM2
DOP CALC LVOT DIAMETER: 2.21 CM
DOP CALC LVOT PEAK VEL: 91.91 M/S
DOP CALC LVOT STROKE VOLUME: 78.18 CM3
DOP CALCLVOT PEAK VEL VTI: 20.39 CM
E WAVE DECELERATION TIME: 227.91 MSEC
E/A RATIO: 0.75
E/E' RATIO: 8.24 M/S
ECHO LV POSTERIOR WALL: 1.35 CM (ref 0.6–1.1)
EJECTION FRACTION: 35 %
EOSINOPHIL # BLD AUTO: 0.2 K/UL (ref 0–0.5)
EOSINOPHIL NFR BLD: 3.1 % (ref 0–8)
ERYTHROCYTE [DISTWIDTH] IN BLOOD BY AUTOMATED COUNT: 12.6 % (ref 11.5–14.5)
EST. GFR  (NO RACE VARIABLE): >60 ML/MIN/1.73 M^2
FRACTIONAL SHORTENING: 23 % (ref 28–44)
GLUCOSE SERPL-MCNC: 95 MG/DL (ref 70–110)
HCT VFR BLD AUTO: 42.4 % (ref 40–54)
HDLC SERPL-MCNC: 26 MG/DL (ref 40–75)
HDLC SERPL: 17.8 % (ref 20–50)
HGB BLD-MCNC: 14.7 G/DL (ref 14–18)
IMM GRANULOCYTES # BLD AUTO: 0.03 K/UL (ref 0–0.04)
IMM GRANULOCYTES NFR BLD AUTO: 0.4 % (ref 0–0.5)
INTERVENTRICULAR SEPTUM: 1.24 CM (ref 0.6–1.1)
LDLC SERPL CALC-MCNC: 62.6 MG/DL (ref 63–159)
LEFT ATRIUM SIZE: 4.18 CM
LEFT ATRIUM VOLUME INDEX MOD: 23.6 ML/M2
LEFT ATRIUM VOLUME MOD: 50.77 CM3
LEFT INTERNAL DIMENSION IN SYSTOLE: 4.51 CM (ref 2.1–4)
LEFT VENTRICLE DIASTOLIC VOLUME INDEX: 94.79 ML/M2
LEFT VENTRICLE DIASTOLIC VOLUME: 203.79 ML
LEFT VENTRICLE MASS INDEX: 157 G/M2
LEFT VENTRICLE SYSTOLIC VOLUME INDEX: 42.8 ML/M2
LEFT VENTRICLE SYSTOLIC VOLUME: 92.01 ML
LEFT VENTRICULAR INTERNAL DIMENSION IN DIASTOLE: 5.88 CM (ref 3.5–6)
LEFT VENTRICULAR MASS: 337.02 G
LV LATERAL E/E' RATIO: 7.78 M/S
LV SEPTAL E/E' RATIO: 8.75 M/S
LYMPHOCYTES # BLD AUTO: 1.9 K/UL (ref 1–4.8)
LYMPHOCYTES NFR BLD: 25 % (ref 18–48)
MAGNESIUM SERPL-MCNC: 2 MG/DL (ref 1.6–2.6)
MCH RBC QN AUTO: 32.4 PG (ref 27–31)
MCHC RBC AUTO-ENTMCNC: 34.7 G/DL (ref 32–36)
MCV RBC AUTO: 93 FL (ref 82–98)
MONOCYTES # BLD AUTO: 0.6 K/UL (ref 0.3–1)
MONOCYTES NFR BLD: 7.7 % (ref 4–15)
MV PEAK A VEL: 0.93 M/S
MV PEAK E VEL: 0.7 M/S
NEUTROPHILS # BLD AUTO: 4.7 K/UL (ref 1.8–7.7)
NEUTROPHILS NFR BLD: 63.3 % (ref 38–73)
NONHDLC SERPL-MCNC: 120 MG/DL
NRBC BLD-RTO: 0 /100 WBC
OHS CV CPX 1 MINUTE RECOVERY HEART RATE: 105 BPM
OHS CV CPX 85 PERCENT MAX PREDICTED HEART RATE MALE: 139
OHS CV CPX MAX PREDICTED HEART RATE: 163
OHS CV CPX PATIENT IS FEMALE: 0
OHS CV CPX PATIENT IS MALE: 1
OHS CV CPX PEAK DIASTOLIC BLOOD PRESSURE: 97 MMHG
OHS CV CPX PEAK HEAR RATE: 105 BPM
OHS CV CPX PEAK RATE PRESSURE PRODUCT: NORMAL
OHS CV CPX PEAK SYSTOLIC BLOOD PRESSURE: 161 MMHG
OHS CV CPX PERCENT MAX PREDICTED HEART RATE ACHIEVED: 64
OHS CV CPX RATE PRESSURE PRODUCT PRESENTING: NORMAL
PHOSPHATE SERPL-MCNC: 4.3 MG/DL (ref 2.7–4.5)
PLATELET # BLD AUTO: 179 K/UL (ref 150–450)
PMV BLD AUTO: 9 FL (ref 9.2–12.9)
POTASSIUM SERPL-SCNC: 4 MMOL/L (ref 3.5–5.1)
PROT SERPL-MCNC: 7.1 G/DL (ref 6–8.4)
RA PRESSURE: 3 MMHG
RBC # BLD AUTO: 4.54 M/UL (ref 4.6–6.2)
RIGHT VENTRICULAR END-DIASTOLIC DIMENSION: 2.11 CM
SODIUM SERPL-SCNC: 137 MMOL/L (ref 136–145)
SYSTOLIC BLOOD PRESSURE: 155 MMHG
TDI LATERAL: 0.09 M/S
TDI SEPTAL: 0.08 M/S
TDI: 0.09 M/S
TRICUSPID ANNULAR PLANE SYSTOLIC EXCURSION: 2.27 CM
TRIGL SERPL-MCNC: 287 MG/DL (ref 30–150)
TROPONIN I SERPL DL<=0.01 NG/ML-MCNC: <0.03 NG/ML
WBC # BLD AUTO: 7.43 K/UL (ref 3.9–12.7)

## 2022-09-22 PROCEDURE — 93018 CV STRESS TEST I&R ONLY: CPT | Mod: ,,, | Performed by: INTERNAL MEDICINE

## 2022-09-22 PROCEDURE — 85025 COMPLETE CBC W/AUTO DIFF WBC: CPT | Performed by: NURSE PRACTITIONER

## 2022-09-22 PROCEDURE — 63600175 PHARM REV CODE 636 W HCPCS: Performed by: NURSE PRACTITIONER

## 2022-09-22 PROCEDURE — 84484 ASSAY OF TROPONIN QUANT: CPT | Performed by: NURSE PRACTITIONER

## 2022-09-22 PROCEDURE — 36415 COLL VENOUS BLD VENIPUNCTURE: CPT | Performed by: NURSE PRACTITIONER

## 2022-09-22 PROCEDURE — 83036 HEMOGLOBIN GLYCOSYLATED A1C: CPT | Performed by: NURSE PRACTITIONER

## 2022-09-22 PROCEDURE — 93016 NUCLEAR STRESS TEST (CUPID ONLY): ICD-10-PCS | Mod: ,,, | Performed by: INTERNAL MEDICINE

## 2022-09-22 PROCEDURE — 93018 NUCLEAR STRESS TEST (CUPID ONLY): ICD-10-PCS | Mod: ,,, | Performed by: INTERNAL MEDICINE

## 2022-09-22 PROCEDURE — 94799 UNLISTED PULMONARY SVC/PX: CPT

## 2022-09-22 PROCEDURE — 93017 CV STRESS TEST TRACING ONLY: CPT

## 2022-09-22 PROCEDURE — G0378 HOSPITAL OBSERVATION PER HR: HCPCS

## 2022-09-22 PROCEDURE — 93306 TTE W/DOPPLER COMPLETE: CPT | Mod: 26,,, | Performed by: INTERNAL MEDICINE

## 2022-09-22 PROCEDURE — 84100 ASSAY OF PHOSPHORUS: CPT | Performed by: NURSE PRACTITIONER

## 2022-09-22 PROCEDURE — 25000003 PHARM REV CODE 250: Performed by: NURSE PRACTITIONER

## 2022-09-22 PROCEDURE — A9502 TC99M TETROFOSMIN: HCPCS

## 2022-09-22 PROCEDURE — 99226 PR SUBSEQUENT OBSERVATION CARE,LEVEL III: ICD-10-PCS | Mod: 25,,, | Performed by: INTERNAL MEDICINE

## 2022-09-22 PROCEDURE — 80053 COMPREHEN METABOLIC PANEL: CPT | Performed by: NURSE PRACTITIONER

## 2022-09-22 PROCEDURE — 99900031 HC PATIENT EDUCATION (STAT)

## 2022-09-22 PROCEDURE — 93306 ECHO (CUPID ONLY): ICD-10-PCS | Mod: 26,,, | Performed by: INTERNAL MEDICINE

## 2022-09-22 PROCEDURE — 83735 ASSAY OF MAGNESIUM: CPT | Performed by: NURSE PRACTITIONER

## 2022-09-22 PROCEDURE — 99226 PR SUBSEQUENT OBSERVATION CARE,LEVEL III: CPT | Mod: 25,,, | Performed by: INTERNAL MEDICINE

## 2022-09-22 PROCEDURE — 93306 TTE W/DOPPLER COMPLETE: CPT

## 2022-09-22 PROCEDURE — 93016 CV STRESS TEST SUPVJ ONLY: CPT | Mod: ,,, | Performed by: INTERNAL MEDICINE

## 2022-09-22 PROCEDURE — 94761 N-INVAS EAR/PLS OXIMETRY MLT: CPT

## 2022-09-22 PROCEDURE — 80061 LIPID PANEL: CPT | Performed by: NURSE PRACTITIONER

## 2022-09-22 PROCEDURE — 99900035 HC TECH TIME PER 15 MIN (STAT)

## 2022-09-22 PROCEDURE — 96372 THER/PROPH/DIAG INJ SC/IM: CPT | Mod: 59 | Performed by: NURSE PRACTITIONER

## 2022-09-22 PROCEDURE — 63600175 PHARM REV CODE 636 W HCPCS: Performed by: INTERNAL MEDICINE

## 2022-09-22 RX ORDER — REGADENOSON 0.08 MG/ML
0.4 INJECTION, SOLUTION INTRAVENOUS
Status: COMPLETED | OUTPATIENT
Start: 2022-09-22 | End: 2022-09-22

## 2022-09-22 RX ORDER — CARVEDILOL 3.12 MG/1
3.12 TABLET ORAL 2 TIMES DAILY
Status: DISCONTINUED | OUTPATIENT
Start: 2022-09-22 | End: 2022-09-23

## 2022-09-22 RX ADMIN — PANTOPRAZOLE SODIUM 40 MG: 40 TABLET, DELAYED RELEASE ORAL at 05:09

## 2022-09-22 RX ADMIN — REGADENOSON 0.4 MG: 0.08 INJECTION, SOLUTION INTRAVENOUS at 09:09

## 2022-09-22 RX ADMIN — CALCIUM CARBONATE (ANTACID) CHEW TAB 500 MG 1000 MG: 500 CHEW TAB at 10:09

## 2022-09-22 RX ADMIN — ATORVASTATIN CALCIUM 40 MG: 40 TABLET, FILM COATED ORAL at 10:09

## 2022-09-22 RX ADMIN — ENOXAPARIN SODIUM 40 MG: 100 INJECTION SUBCUTANEOUS at 06:09

## 2022-09-22 RX ADMIN — CALCIUM CARBONATE (ANTACID) CHEW TAB 500 MG 1000 MG: 500 CHEW TAB at 08:09

## 2022-09-22 RX ADMIN — ASPIRIN 81 MG 81 MG: 81 TABLET ORAL at 10:09

## 2022-09-22 RX ADMIN — AMLODIPINE BESYLATE 5 MG: 5 TABLET ORAL at 08:09

## 2022-09-22 RX ADMIN — ACETAMINOPHEN 650 MG: 325 TABLET ORAL at 05:09

## 2022-09-22 RX ADMIN — LOSARTAN POTASSIUM 50 MG: 50 TABLET, FILM COATED ORAL at 10:09

## 2022-09-22 RX ADMIN — AMITRIPTYLINE HYDROCHLORIDE 10 MG: 10 TABLET, FILM COATED ORAL at 08:09

## 2022-09-22 NOTE — SUBJECTIVE & OBJECTIVE
Past Medical History:   Diagnosis Date    Allergic rhinitis due to allergen     Arthritis     Cardiomyopathy     Gout     Hypertension     Left bundle branch block        Past Surgical History:   Procedure Laterality Date    WISDOM TOOTH EXTRACTION         Review of patient's allergies indicates:   Allergen Reactions    Hydralazine analogues Other (See Comments)     drowsiness    Codeine      Other reaction(s): Hives    Codeine phosphate      Other reaction(s): Unknown  Other reaction(s): Unknown    Lisinopril      Dry coughing    Phenytoin sodium extended      Other reaction(s): leg cramps       No current facility-administered medications on file prior to encounter.     Current Outpatient Medications on File Prior to Encounter   Medication Sig    allopurinoL (ZYLOPRIM) 100 MG tablet Take 2 tablets once a day (Patient taking differently: Take 200 mg by mouth Daily. Take 2 tablets once a day)    amitriptyline (ELAVIL) 10 MG tablet Take 10 mg by mouth every evening.    amLODIPine (NORVASC) 5 MG tablet Take 1 tablet (5 mg total) by mouth once daily. (Patient taking differently: Take 5 mg by mouth every evening.)    aspirin 81 MG Chew Take 1 tablet (81 mg total) by mouth once daily. (Patient taking differently: 81 mg once daily.)    atorvastatin (LIPITOR) 40 MG tablet Take 1 tablet (40 mg total) by mouth once daily.    losartan (COZAAR) 50 MG tablet Take 50 mg by mouth once daily.    pantoprazole (PROTONIX) 40 MG tablet Take 1 tablet (40 mg total) by mouth once daily.    albuterol (PROVENTIL/VENTOLIN HFA) 90 mcg/actuation inhaler Inhale 1-2 puffs into the lungs every 6 (six) hours as needed for Wheezing or Shortness of Breath. (Patient taking differently: 1-2 puffs every 6 (six) hours as needed for Wheezing or Shortness of Breath.)    [DISCONTINUED] butalbital-acetaminophen-caff -40 mg Cap prn    [DISCONTINUED] carvediloL (COREG) 3.125 MG tablet Take 1 tablet (3.125 mg total) by mouth 2 (two) times daily with  meals. (Patient not taking: No sig reported)    [DISCONTINUED] erythromycin (ROMYCIN) ophthalmic ointment Place a 1/2 inch ribbon of ointment into the lower eyelid. (Patient not taking: No sig reported)    [DISCONTINUED] fluticasone propionate (FLONASE) 50 mcg/actuation nasal spray 1 spray (50 mcg total) by Each Nostril route once daily. (Patient not taking: No sig reported)    [DISCONTINUED] HYDROcodone-acetaminophen (NORCO) 5-325 mg per tablet TABLET BY MOUTH EVERY 4 TO 6 HOURS AS NEEDED FOR PAIN    [DISCONTINUED] losartan (COZAAR) 100 MG tablet Take 1 tablet (100 mg total) by mouth once daily.    [DISCONTINUED] pulse oximeter (PULSE OXIMETER) device by Apply Externally route 2 (two) times a day. Use twice daily at 8 AM and 3 PM and record the value in MyChart as directed.     Family History       Problem Relation (Age of Onset)    Cancer Sister    Gout Father    Heart disease Father    Hypertension Father          Tobacco Use    Smoking status: Never    Smokeless tobacco: Never   Substance and Sexual Activity    Alcohol use: No    Drug use: No    Sexual activity: Not on file     Review of Systems   Constitutional:  Negative for chills, diaphoresis and fever.   HENT:  Positive for congestion, postnasal drip and sore throat. Negative for sinus pressure.    Eyes:  Negative for visual disturbance.   Respiratory:  Positive for cough and shortness of breath. Negative for chest tightness and wheezing.    Cardiovascular:  Positive for chest pain and leg swelling. Negative for palpitations.   Gastrointestinal:  Negative for abdominal distention, abdominal pain, blood in stool, constipation, diarrhea, nausea and vomiting.   Endocrine: Negative.    Genitourinary:  Negative for dysuria.   Musculoskeletal:  Positive for back pain and neck pain.   Skin: Negative.    Allergic/Immunologic: Negative.    Neurological:  Negative for dizziness, weakness, numbness and headaches.   Hematological: Negative.    Psychiatric/Behavioral:  Negative.     Objective:     Vital Signs (Most Recent):  Temp: 98.1 °F (36.7 °C) (09/21/22 1155)  Pulse: 62 (09/21/22 1801)  Resp: (!) 27 (09/21/22 1801)  BP: (!) 156/90 (09/21/22 1801)  SpO2: 97 % (09/21/22 1801)   Vital Signs (24h Range):  Temp:  [98.1 °F (36.7 °C)] 98.1 °F (36.7 °C)  Pulse:  [60-85] 62  Resp:  [15-27] 27  SpO2:  [96 %-98 %] 97 %  BP: (138-181)/(80-98) 156/90     Weight: 99.8 kg (220 lb)  Body mass index is 32.49 kg/m².    Physical Exam  Constitutional:       General: He is not in acute distress.     Appearance: Normal appearance. He is well-developed. He is not toxic-appearing or diaphoretic.   HENT:      Head: Normocephalic and atraumatic.   Eyes:      General: Lids are normal.      Conjunctiva/sclera: Conjunctivae normal.      Pupils: Pupils are equal, round, and reactive to light.   Neck:      Thyroid: No thyroid mass or thyromegaly.      Vascular: Normal carotid pulses. No JVD.      Trachea: Trachea normal. No tracheal deviation.   Cardiovascular:      Rate and Rhythm: Normal rate and regular rhythm.      Pulses: Normal pulses.      Heart sounds: Normal heart sounds, S1 normal and S2 normal.   Pulmonary:      Effort: Pulmonary effort is normal.      Breath sounds: Normal breath sounds. No stridor.   Abdominal:      General: Bowel sounds are normal.      Palpations: Abdomen is soft.      Tenderness: There is no abdominal tenderness.   Musculoskeletal:         General: Normal range of motion.      Cervical back: Full passive range of motion without pain, normal range of motion and neck supple.   Skin:     General: Skin is warm and dry.      Nails: There is no clubbing.   Neurological:      Mental Status: He is alert and oriented to person, place, and time.      Cranial Nerves: No cranial nerve deficit.      Sensory: No sensory deficit.   Psychiatric:         Speech: Speech normal.         Behavior: Behavior normal. Behavior is cooperative.         Thought Content: Thought content normal.          Judgment: Judgment normal.         CRANIAL NERVES     CN III, IV, VI   Pupils are equal, round, and reactive to light.     Significant Labs: All pertinent labs within the past 24 hours have been reviewed.  Bilirubin:   Recent Labs   Lab 09/21/22  1203   BILITOT 1.3*     BMP:   Recent Labs   Lab 09/21/22  1203   *   *   K 3.9      CO2 23   BUN 15   CREATININE 1.1   CALCIUM 8.6*     CBC:   Recent Labs   Lab 09/21/22  1203   WBC 5.10   HGB 14.8   HCT 42.2        CMP:   Recent Labs   Lab 09/21/22  1203   *   K 3.9      CO2 23   *   BUN 15   CREATININE 1.1   CALCIUM 8.6*   PROT 7.5   ALBUMIN 4.4   BILITOT 1.3*   ALKPHOS 111   AST 26   ALT 29   ANIONGAP 6*     Cardiac Markers:   Recent Labs   Lab 09/21/22  1203   BNP 25     Coagulation:   Recent Labs   Lab 09/21/22  1203   INR 1.1     Troponin:   Recent Labs   Lab 09/21/22  1203   TROPONINI <0.030       Significant Imaging: I have reviewed all pertinent imaging results/findings within the past 24 hours.  X-Ray Chest PA And Lateral    Result Date: 9/21/2022  Reason: chest pain Hypertension (Pt sent here by Dr. Cr for HTN and chest discomfort. ) FINDINGS: PA and lateral chest compared with 5/26/2022 show normal cardiomediastinal silhouette. Nipple shadows are unchanged and bilateral lower lungs, lungs are clear. Pulmonary vasculature is normal. Changes of diffuse idiopathic skeletal hyperostosis affecting the spine. IMPRESSION: No acute cardiopulmonary abnormality. Electronically signed by:  Ludwin Lange MD  9/21/2022 1:01 PM CDT Workstation: [id number]

## 2022-09-22 NOTE — ASSESSMENT & PLAN NOTE
Admitted for rule out ACS.  Ischemic work-up (-) to date. EKG is non-ischemic. Initial troponin level negative. BNP wnl. Plan for continuous cardiac monitoring. Continue to trend serial troponins q6 hours X 2. ASA/NTG 0.4 mg SL PRN CP. Echo and NST in AM. NPO post midnight.  Cardiology consult appreciated. ?GI component +/- MSK component. Will add PPI and toradol prn

## 2022-09-22 NOTE — PROGRESS NOTES
@  09:03  PATIENT INJECTED WITH MYOVIEW FOR STRESS IMAGES.    LEXISCAN STRESS    RELEASE NPO STATUS FROM NUCLEAR MEDICINE.

## 2022-09-22 NOTE — PLAN OF CARE
[facility]  Initial Discharge Assessment       Primary Care Provider: Primary Doctor No    Admission Diagnosis: Chest pain [R07.9]    Admission Date: 9/21/2022  Expected Discharge Date: 9/22/2022    Discharge Barriers Identified: (P) None    Assessment completed at bedside with Pt. Pt lives alone and plans to return home at discharge. Pt reports being independent in his ADLs and is not using any DME at home. Pt is not requesting DME for discharge. Pt reports his vehicle is in the parking lot and he will drive himself home. Pt does not have a POA/AD and is not requesting any information.     Payor: MEDICAID / Plan: [email or web address] [facility] / Product Type: Managed Medicaid /     Extended Emergency Contact Information  Primary Emergency Contact: Soheila Montilla  Address: [place] [address] [facility]  Home Phone: [phone]  Relation: Sister  Preferred language: English   needed? No  Secondary Emergency Contact: Keshawn Gonzales  Address: [address] [place] [address] [place]  Mobile Phone: [phone]  Relation: Relative  Preferred language: English   needed? No    Discharge Plan A: (P) Home  Discharge Plan B: (P) Home      [facility] - [place], [place] - [address]  [address] [facility] [address]  Phone: [phone] Fax: [phone]    [id number] - [place], [place] - [address]  [address]  [facility] [address]  Phone: [phone] Fax: [phone]      Initial Assessment (most recent)       Adult Discharge Assessment - 09/22/22 1438          Discharge Assessment    Assessment Type Discharge Planning Assessment (P)      Confirmed/corrected address, phone number and insurance Yes (P)      Confirmed Demographics Correct on Facesheet (P)      Source of Information patient (P)      When was your last doctors appointment? -- (P)    about 3 months ago, ER    Communicated HOWARD with  patient/caregiver Date not available/Unable to determine (P)      Reason For Admission chest pain (P)      Lives With alone (P)      Facility Arrived From: home (P)      Do you expect to return to your current living situation? Yes (P)      Do you have help at home or someone to help you manage your care at home? No (P)      Prior to hospitilization cognitive status: No Deficits (P)      Current cognitive status: No Deficits (P)      Walking or Climbing Stairs Difficulty none (P)      Dressing/Bathing Difficulty none (P)      Equipment Currently Used at Home none (P)      Readmission within 30 days? No (P)      Patient currently being followed by outpatient case management? No (P)      Do you currently have service(s) that help you manage your care at home? No (P)      Do you take prescription medications? Yes (P)      Do you have prescription coverage? Yes (P)      Coverage Medicaid (P)      Do you have any problems affording any of your prescribed medications? No (P)      Is the patient taking medications as prescribed? yes (P)      Who is going to help you get home at discharge? self; car is in parking lot (P)      How do you get to doctors appointments? car, drives self (P)      Are you on dialysis? No (P)      Do you take coumadin? No (P)      Discharge Plan A Home (P)      Discharge Plan B Home (P)      DME Needed Upon Discharge  none (P)      Discharge Plan discussed with: Patient (P)      Discharge Barriers Identified None (P)         Physical Activity    On average, how many days per week do you engage in moderate to strenuous exercise (like a brisk walk)? 2 days (P)      On average, how many minutes do you engage in exercise at this level? 20 min (P)         Financial Resource Strain    How hard is it for you to pay for the very basics like food, housing, medical care, and heating? Somewhat hard (P)         Housing Stability    In the last 12 months, was there a time when you were not able to pay the  mortgage or rent on time? Yes (P)      In the last 12 months, how many places have you lived? 1 (P)      In the last 12 months, was there a time when you did not have a steady place to sleep or slept in a shelter (including now)? No (P)         Transportation Needs    In the past 12 months, has lack of transportation kept you from medical appointments or from getting medications? No (P)      In the past 12 months, has lack of transportation kept you from meetings, work, or from getting things needed for daily living? No (P)         Food Insecurity    Within the past 12 months, you worried that your food would run out before you got the money to buy more. Sometimes true (P)      Within the past 12 months, the food you bought just didn't last and you didn't have money to get more. Sometimes true (P)         Stress    Do you feel stress - tense, restless, nervous, or anxious, or unable to sleep at night because your mind is troubled all the time - these days? To some extent (P)         Social Connections    In a typical week, how many times do you talk on the phone with family, friends, or neighbors? More than three times a week (P)      How often do you get together with friends or relatives? Once a week (P)      How often do you attend Judaism or Voodoo services? More than 4 times per year (P)      Do you belong to any clubs or organizations such as Judaism groups, unions, fraternal or athletic groups, or school groups? No (P)      How often do you attend meetings of the clubs or organizations you belong to? Never (P)      Are you , , , , never , or living with a partner? Never  (P)         Alcohol Use    Q1: How often do you have a drink containing alcohol? Patient refused (P)      Q2: How many drinks containing alcohol do you have on a typical day when you are drinking? Patient refused (P)      Q3: How often do you have six or more drinks on one occasion? Patient  refused (P)
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
## 2022-09-22 NOTE — CONSULTS
[facility]  Department of Cardiology  Consult Note      PATIENT NAME: Jarod Pierce  MRN: [id number]  TODAY'S DATE: 09/22/2022  ADMIT DATE: 9/21/2022                          CONSULT REQUESTED BY: Tyson Mari MD    SUBJECTIVE     PRINCIPAL PROBLEM: Chest pain      REASON FOR CONSULT:  Chest Pain; referred from office      HPI:  Pt is 57 yo male referred to ER after presenting to cardiology office 9/21/22 with 10/10 chest pain; stress test completed today w/o any chest pain  Reports pain resolved now; states has eased over past 24 hours; reports mild tenderness when he touches left sternal region; no shortness of breath; ate post stress and states he wants more to eat;     PMH: HTN, HLD, Cardiomyopathy;  2014- Heart cath-nonobstructive disease; medical management recommended  2022-Jan: Echo: Efx 45%, Grade I LV diastolic dysfunction          Review of patient's allergies indicates:   Allergen Reactions    Hydralazine analogues Other (See Comments)     drowsiness    Codeine      Other reaction(s): Hives    Codeine phosphate      Other reaction(s): Unknown  Other reaction(s): Unknown    Lisinopril      Dry coughing    Phenytoin sodium extended      Other reaction(s): leg cramps       Past Medical History:   Diagnosis Date    Allergic rhinitis due to allergen     Arthritis     Cardiomyopathy     Gout     Hypertension     Left bundle branch block      Past Surgical History:   Procedure Laterality Date    WISDOM TOOTH EXTRACTION       Social History     Tobacco Use    Smoking status: Never    Smokeless tobacco: Never   Substance Use Topics    Alcohol use: No    Drug use: No        REVIEW OF SYSTEMS  CONSTITUTIONAL: Negative for chills, fatigue and fever.   EYES: No double vision, No blurred vision  NEURO: No headaches, No dizziness  RESPIRATORY: Negative for cough, shortness of breath and wheezing.    CARDIOVASCULAR: positive for trivial pain on palpation left sternal border. Negative for  palpitations and leg swelling.   GI: Negative for abdominal pain, No melena, diarrhea, nausea and vomiting.   : Negative for dysuria and frequency, Negative for hematuria  SKIN: Negative for bruising, Negative for edema or discoloration noted.   ENDOCRINE: Negative for polyphagia, Negative for heat intolerance, Negative for cold intolerance  PSYCHIATRIC: Negative for depression, Negative for anxiety, Negative for memory loss  MUSCULOSKELETAL: Negative for neck pain, Negative for muscle weakness, Negative for back pain     OBJECTIVE     VITAL SIGNS (Most Recent)  Temp: 98.2 °F (36.8 °C) (09/22/22 0347)  Pulse: 62 (09/22/22 0347)  Resp: 18 (09/22/22 0347)  BP: (!) 158/90 (09/22/22 0347)  SpO2: 96 % (09/22/22 0347)    VENTILATION STATUS  Resp: 18 (09/22/22 0347)  SpO2: 96 % (09/22/22 0347)       I & O (Last 24H):No intake or output data in the 24 hours ending 09/22/22 0911    WEIGHTS  Wt Readings from Last 3 Encounters:   09/21/22 1955 100 kg (220 lb 7.4 oz)   09/21/22 1155 99.8 kg (220 lb)   09/21/22 1021 99.8 kg (220 lb)   05/26/22 1300 99.8 kg (220 lb)       PHYSICAL EXAM  GENERAL: well built, well nourished, well-developed in no apparent distress alert and oriented.   HEENT: Normocephalic. Pupils normal and conjunctivae normal.  Mucous membranes normal, no cyanosis or icterus, trachea central,no pallor or icterus is noted..   NECK: No JVD. No bruit..   THYROID: Thyroid not enlarged. No nodules present..   CARDIAC: Regular rate and rhythm. S1 is normal.S2 is normal.No gallops, clicks or murmurs noted at this time.  CHEST ANATOMY: normal.   LUNGS: Clear to auscultation. No wheezing or rhonchi..   ABDOMEN: Soft no masses or organomegaly.  No abdomen pulsations or bruits.  Normal bowel sounds. No pulsations and no masses felt, No guarding or rebound.   URINARY: No pedroza catheter   EXTREMITIES: No cyanosis, clubbing or edema noted at this time., no calf tenderness bilaterally.   PERIPHERAL VASCULAR SYSTEM: Good  palpable distal pulses.   CENTRAL NERVOUS SYSTEM: No focal motor or sensory deficits noted.   SKIN: Skin without lesions, moist, well perfused.   MUSCLE STRENGTH & TONE: No noteable weakness, atrophy or abnormal movement.     HOME MEDICATIONS:  No current facility-administered medications on file prior to encounter.     Current Outpatient Medications on File Prior to Encounter   Medication Sig Dispense Refill    allopurinoL (ZYLOPRIM) 100 MG tablet Take 2 tablets once a day (Patient taking differently: Take 200 mg by mouth Daily. Take 2 tablets once a day) 90 tablet 1    amitriptyline (ELAVIL) 10 MG tablet Take 10 mg by mouth every evening.      amLODIPine (NORVASC) 5 MG tablet Take 1 tablet (5 mg total) by mouth once daily. (Patient taking differently: Take 5 mg by mouth every evening.) 90 tablet 3    aspirin 81 MG Chew Take 1 tablet (81 mg total) by mouth once daily. (Patient taking differently: 81 mg once daily.) 90 tablet 3    atorvastatin (LIPITOR) 40 MG tablet Take 1 tablet (40 mg total) by mouth once daily. 90 tablet 3    losartan (COZAAR) 50 MG tablet Take 50 mg by mouth once daily.      pantoprazole (PROTONIX) 40 MG tablet Take 1 tablet (40 mg total) by mouth once daily. 90 tablet 3    albuterol (PROVENTIL/VENTOLIN HFA) 90 mcg/actuation inhaler Inhale 1-2 puffs into the lungs every 6 (six) hours as needed for Wheezing or Shortness of Breath. (Patient taking differently: 1-2 puffs every 6 (six) hours as needed for Wheezing or Shortness of Breath.) 6.7 g 0       SCHEDULED MEDS:   amitriptyline  10 mg Oral QHS    amLODIPine  5 mg Oral QHS    aspirin  81 mg Oral Daily    atorvastatin  40 mg Oral Daily    calcium carbonate  1,000 mg Oral BID    enoxparin  40 mg Subcutaneous Daily    fluticasone propionate  2 spray Each Nostril Daily    losartan  50 mg Oral Daily    pantoprazole  40 mg Oral Daily       CONTINUOUS INFUSIONS:    PRN MEDS:acetaminophen, albuterol-ipratropium, calcium chloride IVPB, calcium chloride  IVPB, calcium chloride IVPB, ketorolac, magnesium oxide, magnesium sulfate IVPB, magnesium sulfate IVPB, magnesium sulfate IVPB, magnesium sulfate IVPB, potassium chloride, potassium chloride, potassium chloride, potassium chloride, sodium chloride 0.9%, sodium phosphate IVPB, sodium phosphate IVPB, sodium phosphate IVPB, sodium phosphate IVPB, sodium phosphate IVPB    LABS AND DIAGNOSTICS     CBC LAST 3 DAYS  Recent Labs   Lab 09/21/22  1203 09/22/22  0507   WBC 5.10 7.43   RBC 4.55* 4.54*   HGB 14.8 14.7   HCT 42.2 42.4   MCV 93 93   MCH 32.5* 32.4*   MCHC 35.1 34.7   RDW 12.5 12.6    179   MPV 9.3 9.0*   GRAN 53.6  2.7 63.3  4.7   LYMPH 34.7  1.8 25.0  1.9   MONO 7.8  0.4 7.7  0.6   BASO 0.04 0.04   NRBC 0 0       COAGULATION LAST 3 DAYS  Recent Labs   Lab 09/21/22  1203   LABPT 13.2   INR 1.1       CHEMISTRY LAST 3 DAYS  Recent Labs   Lab 09/21/22  1203 09/22/22  0507   * 137   K 3.9 4.0    101   CO2 23 27   ANIONGAP 6* 9   BUN 15 15   CREATININE 1.1 1.2   * 95   CALCIUM 8.6* 9.0   MG  --  2.0   ALBUMIN 4.4 4.0   PROT 7.5 7.1   ALKPHOS 111 97   ALT 29 27   AST 26 22   BILITOT 1.3* 1.4*       CARDIAC PROFILE LAST 3 DAYS  Recent Labs   Lab 09/21/22  0745 09/21/22  1203 09/22/22  0048   BNP  --  25  --    CPK  --  55  --    CPKMB  --  0.6  --    TROPONINI <0.030 <0.030 <0.030       ENDOCRINE LAST 3 DAYS  Recent Labs   Lab 09/21/22  0745   TSH 3.560       LAST ARTERIAL BLOOD GAS  ABG  No results for input(s): PH, PO2, PCO2, HCO3, BE in the last 168 hours.    LAST 7 DAYS MICROBIOLOGY   Microbiology Results (last 7 days)       ** No results found for the last 168 hours. **            MOST RECENT IMAGING  X-Ray Chest PA And Lateral  Reason: chest pain Hypertension (Pt sent here by Dr. Cr for HTN and chest discomfort. )    FINDINGS:  PA and lateral chest compared with 5/26/2022 show normal cardiomediastinal silhouette.    Nipple shadows are unchanged and bilateral lower lungs, lungs  are clear. Pulmonary vasculature is normal. Changes of diffuse idiopathic skeletal hyperostosis affecting the spine.    IMPRESSION:  No acute cardiopulmonary abnormality.    Electronically signed by:  Ludwin Lange MD  9/21/2022 1:01 PM CDT Workstation: [id number]      ECHOCARDIOGRAM RESULTS (last 5)  Results for orders placed during the hospital encounter of 01/14/22    Echo    Interpretation Summary  · The left ventricle is normal in size with mild concentric hypertrophy and mildly decreased systolic function.  · The estimated ejection fraction is 45%.  · Grade I left ventricular diastolic dysfunction.  · Normal right ventricular size with normal right ventricular systolic function.  · There are segmental left ventricular wall motion abnormalities.  · Mild left atrial enlargement.  · Normal central venous pressure (3 mmHg).  · The estimated PA systolic pressure is 18 mmHg.      Results for orders placed in visit on 02/05/21    Echo Color Flow Doppler? Yes    Interpretation Summary  · The left ventricle is mildly enlarged with mild concentric hypertrophy andThe estimated ejection fraction is 40%  · Grade I left ventricular diastolic dysfunction.  · There are segmental left ventricular wall motion abnormalities. Intraventicular septum appears hypokinetic.  · Normal right ventricular size with normal right ventricular systolic function.  · Mild left atrial enlargement.  · Mild tricuspid regurgitation.  · There is no pulmonary hypertension.      Results for orders placed during the hospital encounter of 01/13/20    Echo Color Flow Doppler? Yes; Bubble Contrast? Yes    Interpretation Summary  · Concentric left ventricular hypertrophy.Moderately decreased left ventricular systolic function. The estimated ejection fraction is 30%. Anteroseptum akinesis. Grade 1 diastolic dysfunction.  · Normal right ventricular systolic function.  · Mild mitral regurgitation.  · The estimated PA systolic pressure is 29 mmHg.  · Bubble  study inconclusive due to poor quality.      CURRENT/PREVIOUS VISIT EKG  Results for orders placed or performed during the hospital encounter of 09/21/22   EKG 12-lead    Collection Time: 09/21/22 12:12 PM    Narrative    Test Reason : R07.9,    Vent. Rate : 079 BPM     Atrial Rate : 079 BPM     P-R Int : 162 ms          QRS Dur : 164 ms      QT Int : 434 ms       P-R-T Axes : 047 -11 201 degrees     QTc Int : 497 ms    Normal sinus rhythm  Left bundle branch block  Abnormal ECG  When compared with ECG of 21-SEP-2022 10:33,  The axis Shifted right  T wave inversion now evident in Inferior leads  T wave inversion more evident in Lateral leads    Referred By: [email or web address]   SELF           Confirmed By:            ASSESSMENT/PLAN:     Active Hospital Problems    Diagnosis    *Chest pain    GERD (gastroesophageal reflux disease)    Hypocalcemia    Essential hypertension    Non compliance w medication regimen    Mixed hyperlipidemia, baseline     LVH (left ventricular hypertrophy)    Gout, arthritis    Left bundle branch block,  msec       ASSESSMENT & PLAN:   Chest pain- EKG-NSR BBB, Troponin - x 3;   Stress test - no evidence of reversible ischemia; EFx diminished on nuclear stress, noted decreased from prior stress;   HTN- continue amlodipine, losartan daily  HLD- continue atorvastatin      RECOMMENDATIONS:  No evidence of ischemia on stress test.  Symptoms could be related to uncontrolled hypertension.  Continue with current therapy.  Will increase losartan back to 100 mg tomorrow if blood pressure still high.      Monika Mesa NP  Count includes the [facility]  Department of Cardiology  Date of Service: 09/22/2022
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
## 2022-09-22 NOTE — NURSING
ADMITTED TO ROOM 3012 FROM ER VIA WHEELCHAIR. AWAKE AND ALERT. RESP EVEN AND UNLABORED ON ROOM AIR. SKIN WARM AND DRY. SALINE LOCK TO RIGHT A/C WITHOUT REDNESS OR EDEMA. ORIENTED TO ROOM AND CALL LIGHT SYSTEM. DENIES ANY COMPLAINTS AT THIS TIME. NO DISTRESS NOTED.

## 2022-09-22 NOTE — PLAN OF CARE
09/22/22 0930   Patient Assessment/Suction   Level of Consciousness (AVPU) alert   Respiratory Effort Unlabored;Mild   Rhythm/Pattern, Respiratory depth regular;pattern regular;unlabored   Cough Frequency infrequent   PRE-TX-O2   O2 Device (Oxygen Therapy) room air   SpO2 96 %   Pulse Oximetry Type Intermittent   $ Pulse Oximetry - Multiple Charge Pulse Oximetry - Multiple   Pulse 62   Resp 18   Aerosol Therapy   $ Aerosol Therapy Charges PRN treatment not required   Education   $ Education 15 min;Bronchodilator   Respiratory Evaluation   $ Care Plan Tech Time 15 min   $ Eval/Re-eval Charges Re-evaluation   Evaluation For New Orders

## 2022-09-23 VITALS
TEMPERATURE: 98 F | RESPIRATION RATE: 18 BRPM | HEART RATE: 84 BPM | WEIGHT: 218.25 LBS | OXYGEN SATURATION: 98 % | DIASTOLIC BLOOD PRESSURE: 77 MMHG | SYSTOLIC BLOOD PRESSURE: 100 MMHG | HEIGHT: 69 IN | BODY MASS INDEX: 32.33 KG/M2

## 2022-09-23 LAB
ALBUMIN SERPL BCP-MCNC: 4.4 G/DL (ref 3.5–5.2)
ALP SERPL-CCNC: 104 U/L (ref 55–135)
ALT SERPL W/O P-5'-P-CCNC: 26 U/L (ref 10–44)
ANION GAP SERPL CALC-SCNC: 9 MMOL/L (ref 8–16)
AST SERPL-CCNC: 21 U/L (ref 10–40)
BILIRUB SERPL-MCNC: 1.6 MG/DL (ref 0.1–1)
BUN SERPL-MCNC: 22 MG/DL (ref 6–20)
CALCIUM SERPL-MCNC: 8.9 MG/DL (ref 8.7–10.5)
CHLORIDE SERPL-SCNC: 102 MMOL/L (ref 95–110)
CO2 SERPL-SCNC: 27 MMOL/L (ref 23–29)
CREAT SERPL-MCNC: 1.1 MG/DL (ref 0.5–1.4)
EST. GFR  (NO RACE VARIABLE): >60 ML/MIN/1.73 M^2
ESTIMATED AVG GLUCOSE: 120 MG/DL (ref 68–131)
GLUCOSE SERPL-MCNC: 104 MG/DL (ref 70–110)
HBA1C MFR BLD: 5.8 % (ref 4.5–6.2)
MAGNESIUM SERPL-MCNC: 2.2 MG/DL (ref 1.6–2.6)
PHOSPHATE SERPL-MCNC: 4.6 MG/DL (ref 2.7–4.5)
POTASSIUM SERPL-SCNC: 3.9 MMOL/L (ref 3.5–5.1)
PROT SERPL-MCNC: 7.8 G/DL (ref 6–8.4)
SODIUM SERPL-SCNC: 138 MMOL/L (ref 136–145)

## 2022-09-23 PROCEDURE — 25000003 PHARM REV CODE 250: Performed by: INTERNAL MEDICINE

## 2022-09-23 PROCEDURE — 25000003 PHARM REV CODE 250: Performed by: NURSE PRACTITIONER

## 2022-09-23 PROCEDURE — 83735 ASSAY OF MAGNESIUM: CPT | Performed by: NURSE PRACTITIONER

## 2022-09-23 PROCEDURE — 25000003 PHARM REV CODE 250: Performed by: STUDENT IN AN ORGANIZED HEALTH CARE EDUCATION/TRAINING PROGRAM

## 2022-09-23 PROCEDURE — 80053 COMPREHEN METABOLIC PANEL: CPT | Performed by: NURSE PRACTITIONER

## 2022-09-23 PROCEDURE — 84100 ASSAY OF PHOSPHORUS: CPT | Performed by: NURSE PRACTITIONER

## 2022-09-23 PROCEDURE — 25000242 PHARM REV CODE 250 ALT 637 W/ HCPCS: Performed by: NURSE PRACTITIONER

## 2022-09-23 PROCEDURE — 36415 COLL VENOUS BLD VENIPUNCTURE: CPT | Performed by: NURSE PRACTITIONER

## 2022-09-23 PROCEDURE — G0378 HOSPITAL OBSERVATION PER HR: HCPCS

## 2022-09-23 RX ORDER — AMLODIPINE BESYLATE 5 MG/1
5 TABLET ORAL DAILY
Qty: 90 TABLET | Refills: 3
Start: 2022-09-23 | End: 2023-02-09

## 2022-09-23 RX ORDER — METOPROLOL SUCCINATE 25 MG/1
25 TABLET, EXTENDED RELEASE ORAL DAILY
Qty: 90 TABLET | Refills: 0 | Status: SHIPPED | OUTPATIENT
Start: 2022-09-24 | End: 2022-11-30 | Stop reason: SDUPTHER

## 2022-09-23 RX ORDER — METOPROLOL SUCCINATE 25 MG/1
25 TABLET, EXTENDED RELEASE ORAL DAILY
Status: DISCONTINUED | OUTPATIENT
Start: 2022-09-23 | End: 2022-09-23 | Stop reason: HOSPADM

## 2022-09-23 RX ORDER — SPIRONOLACTONE 25 MG/1
25 TABLET ORAL DAILY
Status: DISCONTINUED | OUTPATIENT
Start: 2022-09-23 | End: 2022-09-23

## 2022-09-23 RX ORDER — NAPROXEN SODIUM 220 MG/1
81 TABLET, FILM COATED ORAL DAILY
Qty: 90 TABLET | Refills: 3
Start: 2022-09-23

## 2022-09-23 RX ORDER — ESCITALOPRAM OXALATE 5 MG/1
5 TABLET ORAL DAILY
Qty: 90 TABLET | Refills: 0 | Status: SHIPPED | OUTPATIENT
Start: 2022-09-24 | End: 2023-02-09 | Stop reason: SDUPTHER

## 2022-09-23 RX ORDER — LOSARTAN POTASSIUM 50 MG/1
100 TABLET ORAL DAILY
Status: DISCONTINUED | OUTPATIENT
Start: 2022-09-23 | End: 2022-09-23 | Stop reason: HOSPADM

## 2022-09-23 RX ORDER — ESCITALOPRAM OXALATE 5 MG/1
5 TABLET ORAL DAILY
Status: DISCONTINUED | OUTPATIENT
Start: 2022-09-23 | End: 2022-09-23 | Stop reason: HOSPADM

## 2022-09-23 RX ORDER — LOSARTAN POTASSIUM 100 MG/1
100 TABLET ORAL DAILY
Qty: 90 TABLET | Refills: 0 | Status: SHIPPED | OUTPATIENT
Start: 2022-09-23 | End: 2023-01-25 | Stop reason: SDUPTHER

## 2022-09-23 RX ORDER — TERAZOSIN 1 MG/1
1 CAPSULE ORAL NIGHTLY
Qty: 90 CAPSULE | Refills: 0 | Status: SHIPPED | OUTPATIENT
Start: 2022-09-23 | End: 2023-01-25

## 2022-09-23 RX ORDER — PRAZOSIN HYDROCHLORIDE 1 MG/1
1 CAPSULE ORAL 2 TIMES DAILY
Status: DISCONTINUED | OUTPATIENT
Start: 2022-09-23 | End: 2022-09-23

## 2022-09-23 RX ORDER — ALBUTEROL SULFATE 90 UG/1
1-2 AEROSOL, METERED RESPIRATORY (INHALATION) EVERY 6 HOURS PRN
Qty: 6.7 G | Refills: 0
Start: 2022-09-23 | End: 2023-02-09

## 2022-09-23 RX ORDER — PRAZOSIN HYDROCHLORIDE 1 MG/1
1 CAPSULE ORAL 2 TIMES DAILY
Status: DISCONTINUED | OUTPATIENT
Start: 2022-09-23 | End: 2022-09-23 | Stop reason: HOSPADM

## 2022-09-23 RX ADMIN — LOSARTAN POTASSIUM 100 MG: 50 TABLET, FILM COATED ORAL at 09:09

## 2022-09-23 RX ADMIN — ESCITALOPRAM 5 MG: 5 TABLET, FILM COATED ORAL at 11:09

## 2022-09-23 RX ADMIN — FLUTICASONE PROPIONATE 100 MCG: 50 SPRAY, METERED NASAL at 11:09

## 2022-09-23 RX ADMIN — ASPIRIN 81 MG 81 MG: 81 TABLET ORAL at 09:09

## 2022-09-23 RX ADMIN — METOPROLOL SUCCINATE 25 MG: 25 TABLET, FILM COATED, EXTENDED RELEASE ORAL at 09:09

## 2022-09-23 RX ADMIN — PRAZOSIN HYDROCHLORIDE 1 MG: 1 CAPSULE ORAL at 12:09

## 2022-09-23 RX ADMIN — ATORVASTATIN CALCIUM 40 MG: 40 TABLET, FILM COATED ORAL at 09:09

## 2022-09-23 RX ADMIN — PANTOPRAZOLE SODIUM 40 MG: 40 TABLET, DELAYED RELEASE ORAL at 05:09

## 2022-09-23 NOTE — PLAN OF CARE
Patient discharged home. Order placed for blood pressure cuff.  confirmed with [facility] and DME that it was not a covered piece of equipment and would have to be purchased cash price. Pharmacy cash price is $35.  also provided prices for [employer] over the counter ($19) and [employer] ($17). Per nurse, patient elects to purchase one over the counter when he is discharged. MD notified. No additional needs.        09/23/22 1554   Final Note   Assessment Type Final Discharge Note   Anticipated Discharge Disposition Home   What phone number can be called within the next 1-3 days to see how you are doing after discharge? [phone]   Post-Acute Status   Discharge Delays None known at this time
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
## 2022-09-23 NOTE — PLAN OF CARE
Per Dr Mari\, cancel [facility] order.  spoke to Shiela / [facility] and asked her to disregard referral.

## 2022-09-23 NOTE — DISCHARGE INSTRUCTIONS
It is important you per per establish care with a PCP. Pls get a blood pressure machine, keep a blood pressure log and follow up with your PCP for further fine tuning of your medications. Pls follow up as recommended with cardiology outpatient

## 2022-09-23 NOTE — NURSING
Iv d/c'd w/ cath tip intact. D/c instructions given. 3rx brought to room per our pharmacy. New teaching given,pt verb. Understanding. States he will purchase a blood pressure monitor today or tomorrow. Ivy still at pharmacy, pt states he has plenty to last until Monday when he will pick it up. Transported via wc safely to private auto for d/c home. Driving himself.

## 2022-09-23 NOTE — PLAN OF CARE
Problem: Adult Inpatient Plan of Care  Goal: Plan of Care Review  Outcome: Ongoing, Progressing  Goal: Absence of Hospital-Acquired Illness or Injury  Outcome: Ongoing, Progressing  Goal: Optimal Comfort and Wellbeing  Outcome: Ongoing, Progressing  Goal: Readiness for Transition of Care  Outcome: Ongoing, Progressing     Problem: Fall Injury Risk  Goal: Absence of Fall and Fall-Related Injury  Outcome: Ongoing, Progressing
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
## 2022-09-27 ENCOUNTER — LAB VISIT (OUTPATIENT)
Dept: LAB | Facility: HOSPITAL | Age: 57
End: 2022-09-27
Attending: NURSE PRACTITIONER
Payer: MEDICAID

## 2022-09-27 DIAGNOSIS — R41.3 MEMORY LOSS: Primary | ICD-10-CM

## 2022-09-27 DIAGNOSIS — G62.9 PERIPHERAL NERVE DISORDER: ICD-10-CM

## 2022-09-27 LAB
ALBUMIN SERPL BCP-MCNC: 4.5 G/DL (ref 3.5–5.2)
ALP SERPL-CCNC: 116 U/L (ref 55–135)
ALT SERPL W/O P-5'-P-CCNC: 37 U/L (ref 10–44)
AMMONIA PLAS-SCNC: 32 UMOL/L (ref 10–50)
ANION GAP SERPL CALC-SCNC: 11 MMOL/L (ref 8–16)
AST SERPL-CCNC: 30 U/L (ref 10–40)
BILIRUB SERPL-MCNC: 1.8 MG/DL (ref 0.1–1)
BUN SERPL-MCNC: 19 MG/DL (ref 6–20)
CALCIUM SERPL-MCNC: 9.6 MG/DL (ref 8.7–10.5)
CHLORIDE SERPL-SCNC: 103 MMOL/L (ref 95–110)
CO2 SERPL-SCNC: 28 MMOL/L (ref 23–29)
CREAT SERPL-MCNC: 1.1 MG/DL (ref 0.5–1.4)
CRP SERPL-MCNC: 0.07 MG/DL
ERYTHROCYTE [SEDIMENTATION RATE] IN BLOOD BY WESTERGREN METHOD: 5 MM/HR (ref 0–10)
EST. GFR  (NO RACE VARIABLE): >60 ML/MIN/1.73 M^2
ESTIMATED AVG GLUCOSE: 120 MG/DL (ref 68–131)
FOLATE SERPL-MCNC: 8.6 NG/ML (ref 4–24)
GLUCOSE SERPL-MCNC: 110 MG/DL (ref 70–110)
HBA1C MFR BLD: 5.8 % (ref 4.5–6.2)
POTASSIUM SERPL-SCNC: 4.5 MMOL/L (ref 3.5–5.1)
PROT SERPL-MCNC: 8.2 G/DL (ref 6–8.4)
SODIUM SERPL-SCNC: 142 MMOL/L (ref 136–145)
TSH SERPL DL<=0.005 MIU/L-ACNC: 3.49 UIU/ML (ref 0.34–5.6)
VIT B12 SERPL-MCNC: 182 PG/ML (ref 210–950)

## 2022-09-27 PROCEDURE — 82607 VITAMIN B-12: CPT | Performed by: NURSE PRACTITIONER

## 2022-09-27 PROCEDURE — 85651 RBC SED RATE NONAUTOMATED: CPT | Performed by: NURSE PRACTITIONER

## 2022-09-27 PROCEDURE — 83921 ORGANIC ACID SINGLE QUANT: CPT | Performed by: NURSE PRACTITIONER

## 2022-09-27 PROCEDURE — 82140 ASSAY OF AMMONIA: CPT | Performed by: NURSE PRACTITIONER

## 2022-09-27 PROCEDURE — 86803 HEPATITIS C AB TEST: CPT | Performed by: NURSE PRACTITIONER

## 2022-09-27 PROCEDURE — 84443 ASSAY THYROID STIM HORMONE: CPT | Performed by: NURSE PRACTITIONER

## 2022-09-27 PROCEDURE — 86160 COMPLEMENT ANTIGEN: CPT | Mod: 59 | Performed by: NURSE PRACTITIONER

## 2022-09-27 PROCEDURE — 86038 ANTINUCLEAR ANTIBODIES: CPT | Performed by: NURSE PRACTITIONER

## 2022-09-27 PROCEDURE — 82746 ASSAY OF FOLIC ACID SERUM: CPT | Performed by: NURSE PRACTITIONER

## 2022-09-27 PROCEDURE — 84207 ASSAY OF VITAMIN B-6: CPT | Performed by: NURSE PRACTITIONER

## 2022-09-27 PROCEDURE — 84425 ASSAY OF VITAMIN B-1: CPT | Performed by: NURSE PRACTITIONER

## 2022-09-27 PROCEDURE — 83036 HEMOGLOBIN GLYCOSYLATED A1C: CPT | Performed by: NURSE PRACTITIONER

## 2022-09-27 PROCEDURE — 80053 COMPREHEN METABOLIC PANEL: CPT | Performed by: NURSE PRACTITIONER

## 2022-09-27 PROCEDURE — 86140 C-REACTIVE PROTEIN: CPT | Performed by: NURSE PRACTITIONER

## 2022-09-27 PROCEDURE — 86592 SYPHILIS TEST NON-TREP QUAL: CPT | Performed by: NURSE PRACTITIONER

## 2022-09-27 PROCEDURE — 86160 COMPLEMENT ANTIGEN: CPT | Performed by: NURSE PRACTITIONER

## 2022-09-28 LAB — RPR SER QL: NORMAL

## 2022-09-29 DIAGNOSIS — G44.209 TENSION HEADACHE: ICD-10-CM

## 2022-09-29 DIAGNOSIS — G43.019 MIGRAINE WITHOUT AURA, INTRACTABLE, WITHOUT STATUS MIGRAINOSUS: Primary | ICD-10-CM

## 2022-09-29 LAB
ANA TITR SER IF: NEGATIVE {TITER}
C3 SERPL-MCNC: 133 MG/DL (ref 82–167)
C4 SERPL-MCNC: 28 MG/DL (ref 12–38)
HCV AB S/CO SERPL IA: <0.1 S/CO RATIO (ref 0–0.9)

## 2022-10-01 LAB — VIT B6 SERPL-MCNC: 10.1 UG/L (ref 3.4–65.2)

## 2022-10-03 LAB
METHLYMALONIC ACID: 493 NMOL/L (ref 0–378)
VIT B1 BLD-SCNC: 157.3 NMOL/L (ref 66.5–200)

## 2022-10-11 ENCOUNTER — TELEPHONE (OUTPATIENT)
Dept: CARDIOLOGY | Facility: CLINIC | Age: 57
End: 2022-10-11
Payer: MEDICAID

## 2022-10-11 DIAGNOSIS — R60.0 EDEMA, LOWER EXTREMITY: Primary | ICD-10-CM

## 2022-10-11 NOTE — TELEPHONE ENCOUNTER
He has noticed some swelling in his lower extremities. He is on amlodipine. His bp has been a little elevated into the 140's

## 2022-10-11 NOTE — TELEPHONE ENCOUNTER
----- Message from Pelon Sprague sent at 10/11/2022  3:25 PM CDT -----  Type:  Patient Returning Call    Who Called:  Pt  Who Left Message for Patient:  Darcie  Does the patient know what this is regarding?:  DARCIE  Best Call Back Number:  [phone]   Additional Information:  Doesn't know who called or why.  Please advise -- Thank you

## 2022-10-26 DIAGNOSIS — E79.0 HYPERURICEMIA: Primary | ICD-10-CM

## 2022-10-26 RX ORDER — ALLOPURINOL 100 MG/1
TABLET ORAL
Qty: 60 TABLET | Refills: 0 | Status: SHIPPED | OUTPATIENT
Start: 2022-10-26 | End: 2023-04-25 | Stop reason: SDUPTHER

## 2022-10-26 NOTE — TELEPHONE ENCOUNTER
----- Message from Courtney Mccluer sent at 10/26/2022 11:20 AM CDT -----  Regarding: Refill on Medication  Please refill: allopurinoL (ZYLOPRIM) 100 MG tablet. Please send to pharmacy on file thank you

## 2022-10-27 DIAGNOSIS — M10.9 ARTHRITIS DUE TO GOUT: ICD-10-CM

## 2022-10-27 DIAGNOSIS — M10.9 GOUT, ARTHRITIS: Primary | ICD-10-CM

## 2022-10-27 NOTE — TELEPHONE ENCOUNTER
Patient advised to follow up with  PCP for continued refills of allopurinol. Pt verbalized understanding of all.

## 2022-10-31 ENCOUNTER — HOSPITAL ENCOUNTER (OUTPATIENT)
Dept: RADIOLOGY | Facility: HOSPITAL | Age: 57
Discharge: HOME | End: 2022-10-31
Attending: NURSE PRACTITIONER
Payer: MEDICAID

## 2022-10-31 ENCOUNTER — OFFICE VISIT (OUTPATIENT)
Dept: FAMILY MEDICINE | Facility: CLINIC | Age: 57
End: 2022-10-31
Payer: MEDICAID

## 2022-10-31 VITALS
HEIGHT: 69 IN | BODY MASS INDEX: 33.8 KG/M2 | HEART RATE: 63 BPM | WEIGHT: 228.19 LBS | SYSTOLIC BLOOD PRESSURE: 140 MMHG | OXYGEN SATURATION: 98 % | DIASTOLIC BLOOD PRESSURE: 80 MMHG | TEMPERATURE: 98 F

## 2022-10-31 DIAGNOSIS — I16.0 HYPERTENSIVE URGENCY: Primary | Chronic | ICD-10-CM

## 2022-10-31 DIAGNOSIS — I42.8 NONISCHEMIC CARDIOMYOPATHY: ICD-10-CM

## 2022-10-31 DIAGNOSIS — Z12.11 COLON CANCER SCREENING: ICD-10-CM

## 2022-10-31 DIAGNOSIS — G44.209 TENSION HEADACHE: ICD-10-CM

## 2022-10-31 DIAGNOSIS — Z23 NEED FOR INFLUENZA VACCINATION: ICD-10-CM

## 2022-10-31 DIAGNOSIS — R09.81 NASAL CONGESTION: ICD-10-CM

## 2022-10-31 DIAGNOSIS — G43.019 MIGRAINE WITHOUT AURA, INTRACTABLE, WITHOUT STATUS MIGRAINOSUS: ICD-10-CM

## 2022-10-31 DIAGNOSIS — E78.2 MIXED HYPERLIPIDEMIA: ICD-10-CM

## 2022-10-31 DIAGNOSIS — E53.8 VITAMIN B 12 DEFICIENCY: ICD-10-CM

## 2022-10-31 DIAGNOSIS — I44.7 LEFT BUNDLE BRANCH BLOCK: ICD-10-CM

## 2022-10-31 DIAGNOSIS — K21.9 GASTROESOPHAGEAL REFLUX DISEASE, UNSPECIFIED WHETHER ESOPHAGITIS PRESENT: ICD-10-CM

## 2022-10-31 PROCEDURE — 99213 OFFICE O/P EST LOW 20 MIN: CPT | Mod: S$PBB,,, | Performed by: NURSE PRACTITIONER

## 2022-10-31 PROCEDURE — 3079F PR MOST RECENT DIASTOLIC BLOOD PRESSURE 80-89 MM HG: ICD-10-PCS | Mod: CPTII,,, | Performed by: NURSE PRACTITIONER

## 2022-10-31 PROCEDURE — 3079F DIAST BP 80-89 MM HG: CPT | Mod: CPTII,,, | Performed by: NURSE PRACTITIONER

## 2022-10-31 PROCEDURE — 4010F PR ACE/ARB THEARPY RXD/TAKEN: ICD-10-PCS | Mod: CPTII,,, | Performed by: NURSE PRACTITIONER

## 2022-10-31 PROCEDURE — 3077F SYST BP >= 140 MM HG: CPT | Mod: CPTII,,, | Performed by: NURSE PRACTITIONER

## 2022-10-31 PROCEDURE — 1160F RVW MEDS BY RX/DR IN RCRD: CPT | Mod: CPTII,,, | Performed by: NURSE PRACTITIONER

## 2022-10-31 PROCEDURE — 3044F HG A1C LEVEL LT 7.0%: CPT | Mod: CPTII,,, | Performed by: NURSE PRACTITIONER

## 2022-10-31 PROCEDURE — 99214 OFFICE O/P EST MOD 30 MIN: CPT | Mod: PBBFAC,PO | Performed by: NURSE PRACTITIONER

## 2022-10-31 PROCEDURE — 99999 PR PBB SHADOW E&M-EST. PATIENT-LVL IV: ICD-10-PCS | Mod: PBBFAC,,, | Performed by: NURSE PRACTITIONER

## 2022-10-31 PROCEDURE — 99999 PR PBB SHADOW E&M-EST. PATIENT-LVL IV: CPT | Mod: PBBFAC,,, | Performed by: NURSE PRACTITIONER

## 2022-10-31 PROCEDURE — 1159F MED LIST DOCD IN RCRD: CPT | Mod: CPTII,,, | Performed by: NURSE PRACTITIONER

## 2022-10-31 PROCEDURE — 4010F ACE/ARB THERAPY RXD/TAKEN: CPT | Mod: CPTII,,, | Performed by: NURSE PRACTITIONER

## 2022-10-31 PROCEDURE — 3044F PR MOST RECENT HEMOGLOBIN A1C LEVEL <7.0%: ICD-10-PCS | Mod: CPTII,,, | Performed by: NURSE PRACTITIONER

## 2022-10-31 PROCEDURE — 1160F PR REVIEW ALL MEDS BY PRESCRIBER/CLIN PHARMACIST DOCUMENTED: ICD-10-PCS | Mod: CPTII,,, | Performed by: NURSE PRACTITIONER

## 2022-10-31 PROCEDURE — 99213 PR OFFICE/OUTPT VISIT, EST, LEVL III, 20-29 MIN: ICD-10-PCS | Mod: S$PBB,,, | Performed by: NURSE PRACTITIONER

## 2022-10-31 PROCEDURE — 3077F PR MOST RECENT SYSTOLIC BLOOD PRESSURE >= 140 MM HG: ICD-10-PCS | Mod: CPTII,,, | Performed by: NURSE PRACTITIONER

## 2022-10-31 PROCEDURE — 1159F PR MEDICATION LIST DOCUMENTED IN MEDICAL RECORD: ICD-10-PCS | Mod: CPTII,,, | Performed by: NURSE PRACTITIONER

## 2022-10-31 PROCEDURE — 70551 MRI BRAIN STEM W/O DYE: CPT | Mod: TC,PO

## 2022-10-31 RX ORDER — NYSTATIN 100000 [USP'U]/ML
4 SUSPENSION ORAL 4 TIMES DAILY
Qty: 160 ML | Refills: 0 | Status: CANCELLED | OUTPATIENT
Start: 2022-10-31 | End: 2022-11-10

## 2022-10-31 RX ORDER — PANTOPRAZOLE SODIUM 40 MG/1
40 TABLET, DELAYED RELEASE ORAL DAILY
Qty: 90 TABLET | Refills: 3 | Status: SHIPPED | OUTPATIENT
Start: 2022-10-31 | End: 2023-01-25

## 2022-10-31 RX ORDER — LANOLIN ALCOHOL/MO/W.PET/CERES
1000 CREAM (GRAM) TOPICAL DAILY
Qty: 90 TABLET | Refills: 0 | Status: SHIPPED | OUTPATIENT
Start: 2022-10-31

## 2022-10-31 NOTE — PROGRESS NOTES
Subjective:       Patient ID: Jarod Pierce is a 57 y.o. male.    Chief Complaint: Sinus Problem and Annual Exam     HPI   56 y/o male patient with Cardiomyopathy, LBBB, HTN, Gout presents for annual exam. Of note, patient last seen in the clinic was in 1/2020. Patient complains of nasal congestion, sinus dripping, and dry cough since yesterday. Denies headache, dizziness, sore throat, chest pain, sob, nausea, abdominal pain, fever, chills, generalized body ache. Patient used flonase nasal spray yesterday with relief. Noted BP in clinic was 140/80 and states took bp medications 30 minutes before coming in to clinic. States has been adherent with medication regimen.     Patient is followed in cardiology Dr. Cr for cardiomyopathy and LBBB  Patient is followed in rheum Dr. Nolasco for arthritis    Echo 9/21/2022  The left ventricle is normal in size with mild concentric hypertrophy and moderately decreased systolic function. LV is poorly visualized. EF could be moderately decreased to mildly decreased or low normal. Recommend alternative cardiac imaging for further evaluation.  Mild left atrial enlargement.  The estimated ejection fraction is 35%.  Grade I left ventricular diastolic dysfunction.  Normal central venous pressure (3 mmHg).    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    Essential hypertension    Bilateral foot pain    Uncontrolled  hypertension    Non compliance w medication regimen    Obesity (BMI 30-39.9)    Hypertensive retinopathy of left eye    Hypertriglyceridemia    Other male erectile dysfunction    Essential hypertension    Oral thrush    History of TIA    Neck pain likely due to spasm    Headache    Hypocalcemia    Acute on chronic systolic congestive heart failure    GERD (gastroesophageal reflux disease)    Chest pain        Review of patient's allergies indicates:   Allergen Reactions    Hydralazine analogues Other (See Comments)     drowsiness    Codeine      Other reaction(s): Hives    Codeine phosphate      Other reaction(s): Unknown  Other reaction(s): Unknown    Lisinopril      Dry coughing    Phenytoin sodium extended      Other reaction(s): leg cramps       Past Surgical History:   Procedure Laterality Date    WISDOM TOOTH EXTRACTION            Current Outpatient Medications:     albuterol (PROVENTIL/VENTOLIN HFA) 90 mcg/actuation inhaler, Inhale 1-2 puffs into the lungs every 6 (six) hours as needed for Wheezing or Shortness of Breath., Disp: 6.7 g, Rfl: 0    allopurinoL (ZYLOPRIM) 100 MG tablet, Take 2 tablets once a day, Disp: 60 tablet, Rfl: 0    amitriptyline (ELAVIL) 10 MG tablet, Take 10 mg by mouth every evening., Disp: , Rfl:     amLODIPine (NORVASC) 5 MG tablet, Take 1 tablet (5 mg total) by mouth once daily., Disp: 90 tablet, Rfl: 3    aspirin 81 MG Chew, Take 1 tablet (81 mg total) by mouth once daily., Disp: 90 tablet, Rfl: 3    atorvastatin (LIPITOR) 40 MG tablet, Take 1 tablet (40 mg total) by mouth once daily., Disp: 90 tablet, Rfl: 3    EScitalopram oxalate (LEXAPRO) 5 MG Tab, Take 1 tablet (5 mg total) by mouth once daily., Disp: 90 tablet, Rfl: 0    losartan (COZAAR) 100 MG tablet, Take 1 tablet (100 mg total) by mouth once daily., Disp: 90 tablet, Rfl: 0    metoprolol succinate (TOPROL-XL) 25 MG 24 hr tablet, Take 1 tablet (25 mg total) by mouth once daily., Disp: 90 tablet, Rfl: 0    terazosin (HYTRIN)  "1 MG capsule, Take 1 capsule (1 mg total) by mouth every evening., Disp: 90 capsule, Rfl: 0    cyanocobalamin (VITAMIN B-12) 1000 MCG tablet, Take 1 tablet (1,000 mcg total) by mouth once daily., Disp: 90 tablet, Rfl: 0    pantoprazole (PROTONIX) 40 MG tablet, Take 1 tablet (40 mg total) by mouth once daily., Disp: 90 tablet, Rfl: 3    Lab Results   Component Value Date    WBC 7.43 09/22/2022    HGB 14.7 09/22/2022    HCT 42.4 09/22/2022     09/22/2022    CHOL 146 09/22/2022    TRIG 287 (H) 09/22/2022    HDL 26 (L) 09/22/2022    ALT 37 09/27/2022    AST 30 09/27/2022     09/27/2022    K 4.5 09/27/2022     09/27/2022    CREATININE 1.1 09/27/2022    BUN 19 09/27/2022    CO2 28 09/27/2022    TSH 3.490 09/27/2022    PSA 0.45 09/22/2011    INR 1.1 09/21/2022    HGBA1C 5.8 09/27/2022     Above labs reviewed    Review of Systems   Constitutional:  Negative for chills and fever.   HENT:  Positive for congestion and postnasal drip.    Respiratory:  Negative for cough, chest tightness and shortness of breath.    Cardiovascular:  Negative for chest pain and palpitations.   Gastrointestinal:  Negative for abdominal pain.   Neurological:  Negative for dizziness and headaches.       Objective:   BP (!) 140/80 (BP Location: Left arm, Patient Position: Sitting, BP Method: Medium (Manual))   Pulse 63   Temp 97.9 °F (36.6 °C) (Oral)   Ht 5' 9" (1.753 m)   Wt 103.5 kg (228 lb 2.8 oz)   SpO2 98%   BMI 33.70 kg/m²       Physical Exam  Vitals reviewed.   Constitutional:       General: He is not in acute distress.     Appearance: Normal appearance.   Cardiovascular:      Rate and Rhythm: Normal rate and regular rhythm.      Pulses: Normal pulses.      Heart sounds: Normal heart sounds.   Pulmonary:      Effort: Pulmonary effort is normal.      Breath sounds: Normal breath sounds.   Chest:      Chest wall: No deformity, swelling or edema.   Abdominal:      General: Abdomen is flat. Bowel sounds are normal.      " Palpations: Abdomen is soft.   Musculoskeletal:      Cervical back: Normal range of motion.   Skin:     General: Skin is warm and dry.   Neurological:      General: No focal deficit present.      Mental Status: He is alert.   Psychiatric:         Mood and Affect: Mood normal.         Behavior: Behavior normal.       Assessment:       1. Essential hypertension    2. Mixed hyperlipidemia, baseline     3. Colon cancer screening    4. Vitamin B 12 deficiency    5. Gastroesophageal reflux disease, unspecified whether esophagitis present    6. Left bundle branch block,  msec    7. Nonischemic cardiomyopathy, , EF 30% to 35%    8. Nasal congestion    9. Need for influenza vaccination        Plan:       1. Essential hypertension  - Continue with current regimen  - CBC Auto Differential; Future  - Comprehensive Metabolic Panel; Future  - TSH; Future    2. Mixed hyperlipidemia, baseline   - continue with statin 40mg  - Hemoglobin A1C; Future  - Lipid Panel; Future    3. Colon cancer screening  - Case Request Endoscopy: COLONOSCOPY    4. Vitamin B 12 deficiency  - cyanocobalamin (VITAMIN B-12) 1000 MCG tablet; Take 1 tablet (1,000 mcg total) by mouth once daily.  Dispense: 90 tablet; Refill: 0  - Vitamin B12; Future    5. Gastroesophageal reflux disease, unspecified whether esophagitis present  - pantoprazole (PROTONIX) 40 MG tablet; Take 1 tablet (40 mg total) by mouth once daily.  Dispense: 90 tablet; Refill: 3    6. Left bundle branch block,  msec  - continue with current regimen  - continue to follow with cardio    7. Nonischemic cardiomyopathy, , EF 30% to 35%  - continue with cardio    8. Nasal congestion  - advised to use saline nasal spray and flonase nasal spray    9. Need for influenza vaccination  - offered flu vaccine but patient refused    Patient with be reevaluated in 3 months or sooner randy Rivas NP

## 2022-11-01 ENCOUNTER — TELEPHONE (OUTPATIENT)
Dept: GASTROENTEROLOGY | Facility: CLINIC | Age: 57
End: 2022-11-01
Payer: MEDICAID

## 2022-11-01 NOTE — TELEPHONE ENCOUNTER
Colonoscopy 12/29 at 930am arrive for 830am instructions reviewed and patient states understanding.  Copy mailed and address verified

## 2022-11-18 ENCOUNTER — TELEPHONE (OUTPATIENT)
Dept: FAMILY MEDICINE | Facility: CLINIC | Age: 57
End: 2022-11-18

## 2022-11-18 ENCOUNTER — TELEPHONE (OUTPATIENT)
Dept: FAMILY MEDICINE | Facility: CLINIC | Age: 57
End: 2022-11-18
Payer: MEDICAID

## 2022-11-18 NOTE — TELEPHONE ENCOUNTER
----- Message from Camilo Jackson sent at 11/18/2022 12:11 PM CST -----  Contact: pt at [phone]  Type: Needs Medical Advice  Who Called:  pt    Best Call Back Number: [phone]    Additional Information: pt is calling the office to see if he can get B12 shot today. Please call back and advise.         Critical Care

## 2022-11-18 NOTE — TELEPHONE ENCOUNTER
----- Message from Camilo Jackson sent at 11/18/2022 12:11 PM CST -----  Contact: pt at [phone]  Type: Needs Medical Advice  Who Called:  pt    Best Call Back Number: [phone]    Additional Information: pt is calling the office to see if he can get B12 shot today. Please call back and advise.

## 2022-11-18 NOTE — TELEPHONE ENCOUNTER
Called patient twice, someone answered the phone twice but no response from the other person on the line.

## 2022-11-23 DIAGNOSIS — R93.89 ABNORMAL MRI: Primary | ICD-10-CM

## 2022-11-25 DIAGNOSIS — R20.2 PARESTHESIA OF SKIN: ICD-10-CM

## 2022-11-25 DIAGNOSIS — R20.0 ANESTHESIA OF SKIN: ICD-10-CM

## 2022-11-25 DIAGNOSIS — M51.36 DEGENERATION OF LUMBAR INTERVERTEBRAL DISC: Primary | ICD-10-CM

## 2022-11-30 ENCOUNTER — OFFICE VISIT (OUTPATIENT)
Dept: CARDIOLOGY | Facility: CLINIC | Age: 57
End: 2022-11-30
Payer: MEDICAID

## 2022-11-30 VITALS
BODY MASS INDEX: 33.63 KG/M2 | SYSTOLIC BLOOD PRESSURE: 138 MMHG | HEART RATE: 72 BPM | HEIGHT: 69 IN | WEIGHT: 227.06 LBS | DIASTOLIC BLOOD PRESSURE: 90 MMHG

## 2022-11-30 DIAGNOSIS — E78.2 MIXED HYPERLIPIDEMIA: ICD-10-CM

## 2022-11-30 DIAGNOSIS — I42.8 NONISCHEMIC CARDIOMYOPATHY: ICD-10-CM

## 2022-11-30 DIAGNOSIS — I10 ESSENTIAL HYPERTENSION: Primary | ICD-10-CM

## 2022-11-30 DIAGNOSIS — E66.9 OBESITY (BMI 30-39.9): ICD-10-CM

## 2022-11-30 PROCEDURE — 3080F PR MOST RECENT DIASTOLIC BLOOD PRESSURE >= 90 MM HG: ICD-10-PCS | Mod: CPTII,,, | Performed by: NURSE PRACTITIONER

## 2022-11-30 PROCEDURE — 3044F PR MOST RECENT HEMOGLOBIN A1C LEVEL <7.0%: ICD-10-PCS | Mod: CPTII,,, | Performed by: NURSE PRACTITIONER

## 2022-11-30 PROCEDURE — 3075F SYST BP GE 130 - 139MM HG: CPT | Mod: CPTII,,, | Performed by: NURSE PRACTITIONER

## 2022-11-30 PROCEDURE — 99213 OFFICE O/P EST LOW 20 MIN: CPT | Mod: PBBFAC,PN | Performed by: NURSE PRACTITIONER

## 2022-11-30 PROCEDURE — 3075F PR MOST RECENT SYSTOLIC BLOOD PRESS GE 130-139MM HG: ICD-10-PCS | Mod: CPTII,,, | Performed by: NURSE PRACTITIONER

## 2022-11-30 PROCEDURE — 99999 PR PBB SHADOW E&M-EST. PATIENT-LVL III: CPT | Mod: PBBFAC,,, | Performed by: NURSE PRACTITIONER

## 2022-11-30 PROCEDURE — 99214 OFFICE O/P EST MOD 30 MIN: CPT | Mod: S$PBB,,, | Performed by: NURSE PRACTITIONER

## 2022-11-30 PROCEDURE — 3044F HG A1C LEVEL LT 7.0%: CPT | Mod: CPTII,,, | Performed by: NURSE PRACTITIONER

## 2022-11-30 PROCEDURE — 99999 PR PBB SHADOW E&M-EST. PATIENT-LVL III: ICD-10-PCS | Mod: PBBFAC,,, | Performed by: NURSE PRACTITIONER

## 2022-11-30 PROCEDURE — 4010F PR ACE/ARB THEARPY RXD/TAKEN: ICD-10-PCS | Mod: CPTII,,, | Performed by: NURSE PRACTITIONER

## 2022-11-30 PROCEDURE — 3080F DIAST BP >= 90 MM HG: CPT | Mod: CPTII,,, | Performed by: NURSE PRACTITIONER

## 2022-11-30 PROCEDURE — 1159F MED LIST DOCD IN RCRD: CPT | Mod: CPTII,,, | Performed by: NURSE PRACTITIONER

## 2022-11-30 PROCEDURE — 4010F ACE/ARB THERAPY RXD/TAKEN: CPT | Mod: CPTII,,, | Performed by: NURSE PRACTITIONER

## 2022-11-30 PROCEDURE — 1159F PR MEDICATION LIST DOCUMENTED IN MEDICAL RECORD: ICD-10-PCS | Mod: CPTII,,, | Performed by: NURSE PRACTITIONER

## 2022-11-30 PROCEDURE — 3008F PR BODY MASS INDEX (BMI) DOCUMENTED: ICD-10-PCS | Mod: CPTII,,, | Performed by: NURSE PRACTITIONER

## 2022-11-30 PROCEDURE — 3008F BODY MASS INDEX DOCD: CPT | Mod: CPTII,,, | Performed by: NURSE PRACTITIONER

## 2022-11-30 PROCEDURE — 99214 PR OFFICE/OUTPT VISIT, EST, LEVL IV, 30-39 MIN: ICD-10-PCS | Mod: S$PBB,,, | Performed by: NURSE PRACTITIONER

## 2022-11-30 RX ORDER — METOPROLOL SUCCINATE 25 MG/1
12.5 TABLET, EXTENDED RELEASE ORAL NIGHTLY
Qty: 45 TABLET | Refills: 3 | Status: SHIPPED | OUTPATIENT
Start: 2022-11-30 | End: 2023-01-25

## 2022-11-30 NOTE — ASSESSMENT & PLAN NOTE
Recommend low-fat low-cholesterol diet and regular exercise.  Would like to see him lose some weight.

## 2022-11-30 NOTE — PROGRESS NOTES
Subjective:    Patient ID:  Jarod Pierce is a 57 y.o. male   Chief Complaint   Patient presents with    Hypertension    Hyperlipidemia       HPI:  Patient presents today for follow-up appointment.  Patient has no complaints of chest pain, dizziness, shortness of breath, palpitations, or syncope.  Patient has been doing well and taking medications as ordered.  Denies any falls or head injuries.  Denies any blood in the stool or in the urine.      Review of patient's allergies indicates:   Allergen Reactions    Hydralazine analogues Other (See Comments)     drowsiness    Codeine      Other reaction(s): Hives    Codeine phosphate      Other reaction(s): Unknown  Other reaction(s): Unknown    Lisinopril      Dry coughing    Phenytoin sodium extended      Other reaction(s): leg cramps       Past Medical History:   Diagnosis Date    Allergic rhinitis due to allergen     Arthritis     Cardiomyopathy     Gout     Hypertension     Left bundle branch block      Past Surgical History:   Procedure Laterality Date    WISDOM TOOTH EXTRACTION       Social History     Tobacco Use    Smoking status: Never    Smokeless tobacco: Never   Substance Use Topics    Alcohol use: No    Drug use: No     Family History   Problem Relation Age of Onset    Heart disease Father     Hypertension Father     Gout Father     Cancer Sister         lung    Heart attack Neg Hx         Review of Systems:   Constitution: Negative for diaphoresis and fever.   HEENT: Negative for nosebleeds.    Cardiovascular: Negative for chest pain       No dyspnea on exertion       No leg swelling        No palpitations  Respiratory: Negative for shortness of breath and wheezing.    Hematologic/Lymphatic: Negative for bleeding problem. Does not bruise/bleed easily.   Skin: Negative for color change and rash.   Musculoskeletal: Negative for falls and myalgias.   Gastrointestinal: Negative for hematemesis and hematochezia.   Genitourinary: Negative for hematuria.    Neurological: Negative for dizziness and light-headedness.   Psychiatric/Behavioral: Negative for altered mental status and memory loss.          Objective:        Vitals:    11/30/22 0836   BP: (!) 138/90   Pulse: 72       Lab Results   Component Value Date    WBC 7.43 09/22/2022    HGB 14.7 09/22/2022    HCT 42.4 09/22/2022     09/22/2022    CHOL 146 09/22/2022    TRIG 287 (H) 09/22/2022    HDL 26 (L) 09/22/2022    ALT 37 09/27/2022    AST 30 09/27/2022     09/27/2022    K 4.5 09/27/2022     09/27/2022    CREATININE 1.1 09/27/2022    BUN 19 09/27/2022    CO2 28 09/27/2022    TSH 3.490 09/27/2022    PSA 0.45 09/22/2011    INR 1.1 09/21/2022    HGBA1C 5.8 09/27/2022        ECHOCARDIOGRAM RESULTS  Results for orders placed during the hospital encounter of 09/21/22    Echo    Interpretation Summary  · The left ventricle is normal in size with mild concentric hypertrophy and moderately decreased systolic function. LV is poorly visualized. EF could be moderately decreased to mildly decreased or low normal. Recommend alternative cardiac imaging for further evaluation.  · Mild left atrial enlargement.  · The estimated ejection fraction is 35%.  · Grade I left ventricular diastolic dysfunction.  · Normal central venous pressure (3 mmHg).        CURRENT/PREVIOUS VISIT EKG  Results for orders placed or performed during the hospital encounter of 09/21/22   EKG 12-lead    Collection Time: 09/21/22 12:12 PM    Narrative    Test Reason : R07.9,    Vent. Rate : 079 BPM     Atrial Rate : 079 BPM     P-R Int : 162 ms          QRS Dur : 164 ms      QT Int : 434 ms       P-R-T Axes : 047 -11 201 degrees     QTc Int : 497 ms    Normal sinus rhythm  Left bundle branch block  Abnormal ECG  When compared with ECG of 21-SEP-2022 10:33,  The axis Shifted right  T wave inversion now evident in Inferior leads  T wave inversion more evident in Lateral leads  Confirmed by Elvis Gomes MD ([id number]) on 9/23/2022 7:04:08  PM    Referred By: LUIZ   SELF           Confirmed By:Elvis Gomes MD     No valid procedures specified.   Results for orders placed during the hospital encounter of 09/21/22    Nuclear Stress Test    Interpretation Summary    The EKG portion of this study is uninterpretable.    The patient reported no chest pain during the stress test.    There were no arrhythmias during stress.    The nuclear portion of this study will be reported separately.      Physical Exam:  CONSTITUTIONAL: No fever, no chills  HEENT: Normocephalic, atraumatic,pupils reactive to light                 NECK:  No JVD no carotid bruit  CVS: S1S2+, RRR, no murmurs,   LUNGS: Clear  ABDOMEN: Soft, NT, BS+  EXTREMITIES: No cyanosis, edema  : No pedroza catheter  NEURO: AAO X 3  PSY: Normal affect      Medication List with Changes/Refills   Current Medications    ALBUTEROL (PROVENTIL/VENTOLIN HFA) 90 MCG/ACTUATION INHALER    Inhale 1-2 puffs into the lungs every 6 (six) hours as needed for Wheezing or Shortness of Breath.    ALLOPURINOL (ZYLOPRIM) 100 MG TABLET    Take 2 tablets once a day    AMITRIPTYLINE (ELAVIL) 10 MG TABLET    Take 10 mg by mouth every evening.    AMLODIPINE (NORVASC) 5 MG TABLET    Take 1 tablet (5 mg total) by mouth once daily.    ASPIRIN 81 MG CHEW    Take 1 tablet (81 mg total) by mouth once daily.    ATORVASTATIN (LIPITOR) 40 MG TABLET    Take 1 tablet (40 mg total) by mouth once daily.    CYANOCOBALAMIN (VITAMIN B-12) 1000 MCG TABLET    Take 1 tablet (1,000 mcg total) by mouth once daily.    ESCITALOPRAM OXALATE (LEXAPRO) 5 MG TAB    Take 1 tablet (5 mg total) by mouth once daily.    LOSARTAN (COZAAR) 100 MG TABLET    Take 1 tablet (100 mg total) by mouth once daily.    PANTOPRAZOLE (PROTONIX) 40 MG TABLET    Take 1 tablet (40 mg total) by mouth once daily.    TERAZOSIN (HYTRIN) 1 MG CAPSULE    Take 1 capsule (1 mg total) by mouth every evening.   Changed and/or Refilled Medications    Modified Medication Previous  Medication    METOPROLOL SUCCINATE (TOPROL-XL) 25 MG 24 HR TABLET metoprolol succinate (TOPROL-XL) 25 MG 24 hr tablet       Take 0.5 tablets (12.5 mg total) by mouth every evening.    Take 1 tablet (25 mg total) by mouth once daily.             Assessment:       1. Essential hypertension    2. Nonischemic cardiomyopathy, , EF 30% to 35%    3. Obesity (BMI 30-39.9)    4. Mixed hyperlipidemia, baseline          Plan:     Problem List Items Addressed This Visit          Unprioritized    Nonischemic cardiomyopathy, , EF 30% to 35%    Current Assessment & Plan     Patient was hospitalized in September with chest pain.  He underwent nuclear stress test which was negative for ischemia.  He also had an echocardiogram which showed ejection fraction about 35% which is chronic and stable for patient.  Will continue with his current medications and regimen for the time being.         Mixed hyperlipidemia, baseline     Current Assessment & Plan     Recent labs show LDL less than 70.  He is at goal.  Continue atorvastatin 40 mg p.o. daily.  Recommend low-fat low-cholesterol diet and regular exercise.         Essential hypertension - Primary    Current Assessment & Plan     Continue amlodipine 5 mg p.o. daily, losartan 100 mg p.o. daily, metoprolol succinate 12.5 mg p.o. daily,  and terazosin 1 mg p.o. q.h.s..  Would like the patient to monitor his blood pressure at home and notify us if he has any consistent readings greater than 130/80.         Obesity (BMI 30-39.9)    Current Assessment & Plan     Recommend low-fat low-cholesterol diet and regular exercise.  Would like to see him lose some weight.            Follow up in about 12 weeks (around 2/22/2023).

## 2022-11-30 NOTE — ASSESSMENT & PLAN NOTE
Continue amlodipine 5 mg p.o. daily, losartan 100 mg p.o. daily, metoprolol succinate 12.5 mg p.o. daily,  and terazosin 1 mg p.o. q.h.s..  Would like the patient to monitor his blood pressure at home and notify us if he has any consistent readings greater than 130/80.

## 2022-11-30 NOTE — ASSESSMENT & PLAN NOTE
Recent labs show LDL less than 70.  He is at goal.  Continue atorvastatin 40 mg p.o. daily.  Recommend low-fat low-cholesterol diet and regular exercise.

## 2022-11-30 NOTE — ASSESSMENT & PLAN NOTE
Patient was hospitalized in September with chest pain.  He underwent nuclear stress test which was negative for ischemia.  He also had an echocardiogram which showed ejection fraction about 35% which is chronic and stable for patient.  Will continue with his current medications and regimen for the time being.

## 2022-12-02 NOTE — PROGRESS NOTES
Patient's chart was reviewed.   Requested updates within Care Everywhere.  Immunizations were not able to be reconciled. Patient was not found in LINKS.   Health Maintenance was updated.      
7.86

## 2022-12-03 ENCOUNTER — HOSPITAL ENCOUNTER (OUTPATIENT)
Dept: RADIOLOGY | Facility: HOSPITAL | Age: 57
Discharge: HOME | End: 2022-12-03
Payer: MEDICAID

## 2022-12-03 DIAGNOSIS — R20.2 PARESTHESIA OF SKIN: ICD-10-CM

## 2022-12-03 DIAGNOSIS — R20.0 ANESTHESIA OF SKIN: ICD-10-CM

## 2022-12-03 DIAGNOSIS — M51.36 DEGENERATION OF LUMBAR INTERVERTEBRAL DISC: ICD-10-CM

## 2022-12-03 DIAGNOSIS — R93.89 ABNORMAL MRI: ICD-10-CM

## 2022-12-03 PROCEDURE — 72146 MRI CHEST SPINE W/O DYE: CPT | Mod: TC,PO

## 2022-12-03 PROCEDURE — 70544 MR ANGIOGRAPHY HEAD W/O DYE: CPT | Mod: TC,PO

## 2022-12-03 PROCEDURE — 72148 MRI LUMBAR SPINE W/O DYE: CPT | Mod: TC,PO

## 2022-12-22 ENCOUNTER — TELEPHONE (OUTPATIENT)
Dept: GASTROENTEROLOGY | Facility: CLINIC | Age: 57
End: 2022-12-22
Payer: MEDICAID

## 2022-12-22 NOTE — TELEPHONE ENCOUNTER
----- Message from Alisha Fuller sent at 12/22/2022  2:32 PM CST -----  Contact: Self  Type:  Patient Returning Call    Who Called:  Patient  Who Left Message for Patient:  N/A - needs to speak to the office  Does the patient know what this is regarding?:  procedure  Best Call Back Number:  [phone]  Additional Information:  Thank You

## 2022-12-27 ENCOUNTER — TELEPHONE (OUTPATIENT)
Dept: GASTROENTEROLOGY | Facility: CLINIC | Age: 57
End: 2022-12-27
Payer: MEDICAID

## 2022-12-27 NOTE — TELEPHONE ENCOUNTER
----- Message from Noe Kwok sent at 12/27/2022 11:00 AM CST -----  Type: Needs Medical Advice  Who Called:  pt  Symptoms (please be specific):  said he need to speak to someone about his procedure on 12/29--please call and advise  Best Call Back Number: [phone] (home)     Additional Information: thank you

## 2023-01-09 ENCOUNTER — TELEPHONE (OUTPATIENT)
Dept: CARDIOLOGY | Facility: CLINIC | Age: 58
End: 2023-01-09
Payer: MEDICAID

## 2023-01-09 NOTE — TELEPHONE ENCOUNTER
----- Message from Norberto Isidro sent at 1/9/2023  8:53 AM CST -----  Type:  Sooner Appointment Request    Caller is requesting a sooner appointment.  Caller declined first available appointment listed below.  Caller will not accept being placed on the waitlist and is requesting a message be sent to doctor.    Name of Caller:  Patient  When is the first available appointment?  Out of Template-- EP- Medicaid  Symptoms:  Follow Up  Best Call Back Number:  [phone]  Additional Information:

## 2023-01-25 ENCOUNTER — OFFICE VISIT (OUTPATIENT)
Dept: CARDIOLOGY | Facility: CLINIC | Age: 58
End: 2023-01-25
Payer: MEDICAID

## 2023-01-25 VITALS
HEIGHT: 69 IN | DIASTOLIC BLOOD PRESSURE: 100 MMHG | HEART RATE: 78 BPM | WEIGHT: 229 LBS | SYSTOLIC BLOOD PRESSURE: 170 MMHG | BODY MASS INDEX: 33.92 KG/M2 | OXYGEN SATURATION: 95 %

## 2023-01-25 DIAGNOSIS — I50.22 CHRONIC SYSTOLIC CONGESTIVE HEART FAILURE: Primary | ICD-10-CM

## 2023-01-25 DIAGNOSIS — K21.9 GASTROESOPHAGEAL REFLUX DISEASE, UNSPECIFIED WHETHER ESOPHAGITIS PRESENT: ICD-10-CM

## 2023-01-25 DIAGNOSIS — I16.0 HYPERTENSIVE URGENCY: Chronic | ICD-10-CM

## 2023-01-25 DIAGNOSIS — E78.2 MIXED HYPERLIPIDEMIA: ICD-10-CM

## 2023-01-25 DIAGNOSIS — R07.9 CHEST PAIN, UNSPECIFIED TYPE: ICD-10-CM

## 2023-01-25 DIAGNOSIS — I42.8 NONISCHEMIC CARDIOMYOPATHY: ICD-10-CM

## 2023-01-25 PROCEDURE — 1160F RVW MEDS BY RX/DR IN RCRD: CPT | Mod: CPTII,,, | Performed by: NURSE PRACTITIONER

## 2023-01-25 PROCEDURE — 3080F PR MOST RECENT DIASTOLIC BLOOD PRESSURE >= 90 MM HG: ICD-10-PCS | Mod: CPTII,,, | Performed by: NURSE PRACTITIONER

## 2023-01-25 PROCEDURE — 99213 OFFICE O/P EST LOW 20 MIN: CPT | Mod: PBBFAC,PN | Performed by: NURSE PRACTITIONER

## 2023-01-25 PROCEDURE — 3080F DIAST BP >= 90 MM HG: CPT | Mod: CPTII,,, | Performed by: NURSE PRACTITIONER

## 2023-01-25 PROCEDURE — 1160F PR REVIEW ALL MEDS BY PRESCRIBER/CLIN PHARMACIST DOCUMENTED: ICD-10-PCS | Mod: CPTII,,, | Performed by: NURSE PRACTITIONER

## 2023-01-25 PROCEDURE — 3077F SYST BP >= 140 MM HG: CPT | Mod: CPTII,,, | Performed by: NURSE PRACTITIONER

## 2023-01-25 PROCEDURE — 1159F PR MEDICATION LIST DOCUMENTED IN MEDICAL RECORD: ICD-10-PCS | Mod: CPTII,,, | Performed by: NURSE PRACTITIONER

## 2023-01-25 PROCEDURE — 1159F MED LIST DOCD IN RCRD: CPT | Mod: CPTII,,, | Performed by: NURSE PRACTITIONER

## 2023-01-25 PROCEDURE — 99214 PR OFFICE/OUTPT VISIT, EST, LEVL IV, 30-39 MIN: ICD-10-PCS | Mod: S$PBB,,, | Performed by: NURSE PRACTITIONER

## 2023-01-25 PROCEDURE — 99999 PR PBB SHADOW E&M-EST. PATIENT-LVL III: CPT | Mod: PBBFAC,,, | Performed by: NURSE PRACTITIONER

## 2023-01-25 PROCEDURE — 3077F PR MOST RECENT SYSTOLIC BLOOD PRESSURE >= 140 MM HG: ICD-10-PCS | Mod: CPTII,,, | Performed by: NURSE PRACTITIONER

## 2023-01-25 PROCEDURE — 3008F PR BODY MASS INDEX (BMI) DOCUMENTED: ICD-10-PCS | Mod: CPTII,,, | Performed by: NURSE PRACTITIONER

## 2023-01-25 PROCEDURE — 99999 PR PBB SHADOW E&M-EST. PATIENT-LVL III: ICD-10-PCS | Mod: PBBFAC,,, | Performed by: NURSE PRACTITIONER

## 2023-01-25 PROCEDURE — 3008F BODY MASS INDEX DOCD: CPT | Mod: CPTII,,, | Performed by: NURSE PRACTITIONER

## 2023-01-25 PROCEDURE — 99214 OFFICE O/P EST MOD 30 MIN: CPT | Mod: S$PBB,,, | Performed by: NURSE PRACTITIONER

## 2023-01-25 RX ORDER — PANTOPRAZOLE SODIUM 40 MG/1
40 TABLET, DELAYED RELEASE ORAL DAILY
Qty: 90 TABLET | Refills: 3 | Status: CANCELLED | OUTPATIENT
Start: 2023-01-25 | End: 2024-01-25

## 2023-01-25 RX ORDER — OMEPRAZOLE 40 MG/1
40 CAPSULE, DELAYED RELEASE ORAL DAILY
Qty: 90 CAPSULE | Refills: 3 | Status: SHIPPED | OUTPATIENT
Start: 2023-01-25 | End: 2023-11-02

## 2023-01-25 RX ORDER — LOSARTAN POTASSIUM 100 MG/1
100 TABLET ORAL DAILY
Qty: 90 TABLET | Refills: 3 | Status: SHIPPED | OUTPATIENT
Start: 2023-01-25 | End: 2023-03-24 | Stop reason: SDUPTHER

## 2023-01-25 RX ORDER — CARVEDILOL 3.12 MG/1
3.12 TABLET ORAL 2 TIMES DAILY WITH MEALS
Qty: 180 TABLET | Refills: 3 | Status: SHIPPED | OUTPATIENT
Start: 2023-01-25 | End: 2023-04-25

## 2023-01-25 NOTE — PROGRESS NOTES
Subjective:    Patient ID:  Jarod Pierce is a 57 y.o. male   Chief Complaint   Patient presents with    Follow-up       HPI:  Patient has been doing okay overall. He does have nonexertional chest discomfort on the left side that hurts when he turns left to right. The pain is reproducible with palpation. He is hypertensive but stopped his metoprolol due to a cough which he believes is related.     Review of patient's allergies indicates:   Allergen Reactions    Hydralazine analogues Other (See Comments)     drowsiness    Codeine      Other reaction(s): Hives    Codeine phosphate      Other reaction(s): Unknown  Other reaction(s): Unknown    Lisinopril      Dry coughing    Phenytoin sodium extended      Other reaction(s): leg cramps       Past Medical History:   Diagnosis Date    Allergic rhinitis due to allergen     Arthritis     Cardiomyopathy     Gout     Hypertension     Left bundle branch block      Past Surgical History:   Procedure Laterality Date    WISDOM TOOTH EXTRACTION       Social History     Tobacco Use    Smoking status: Never    Smokeless tobacco: Never   Substance Use Topics    Alcohol use: No    Drug use: No     Family History   Problem Relation Age of Onset    Heart disease Father     Hypertension Father     Gout Father     Cancer Sister         lung    Heart attack Neg Hx         Review of Systems:   Constitution: Negative for diaphoresis and fever.   HEENT: Negative for nosebleeds.    Cardiovascular: + for nonexertional chest pain that is reproducible with palpation       +  mild dyspnea on exertion       No leg swelling        No palpitations  Respiratory: Negative for shortness of breath and wheezing.    Hematologic/Lymphatic: Negative for bleeding problem. Does not bruise/bleed easily.   Skin: Negative for color change and rash.   Musculoskeletal: Negative for falls and myalgias.   Gastrointestinal: Negative for hematemesis and hematochezia.   Genitourinary: Negative for hematuria.    Neurological: Negative for dizziness and light-headedness.   Psychiatric/Behavioral: Negative for altered mental status and memory loss.          Objective:        Vitals:    01/25/23 0830   BP: (!) 170/100   Pulse: 78       Lab Results   Component Value Date    WBC 7.43 09/22/2022    HGB 14.7 09/22/2022    HCT 42.4 09/22/2022     09/22/2022    CHOL 146 09/22/2022    TRIG 287 (H) 09/22/2022    HDL 26 (L) 09/22/2022    ALT 37 09/27/2022    AST 30 09/27/2022     09/27/2022    K 4.5 09/27/2022     09/27/2022    CREATININE 1.1 09/27/2022    BUN 19 09/27/2022    CO2 28 09/27/2022    TSH 3.490 09/27/2022    PSA 0.45 09/22/2011    INR 1.1 09/21/2022    HGBA1C 5.8 09/27/2022        ECHOCARDIOGRAM RESULTS  Results for orders placed during the hospital encounter of 09/21/22    Echo    Interpretation Summary  · The left ventricle is normal in size with mild concentric hypertrophy and moderately decreased systolic function. LV is poorly visualized. EF could be moderately decreased to mildly decreased or low normal. Recommend alternative cardiac imaging for further evaluation.  · Mild left atrial enlargement.  · The estimated ejection fraction is 35%.  · Grade I left ventricular diastolic dysfunction.  · Normal central venous pressure (3 mmHg).        CURRENT/PREVIOUS VISIT EKG  Results for orders placed or performed during the hospital encounter of 09/21/22   EKG 12-lead    Collection Time: 09/21/22 12:12 PM    Narrative    Test Reason : R07.9,    Vent. Rate : 079 BPM     Atrial Rate : 079 BPM     P-R Int : 162 ms          QRS Dur : 164 ms      QT Int : 434 ms       P-R-T Axes : 047 -11 201 degrees     QTc Int : 497 ms    Normal sinus rhythm  Left bundle branch block  Abnormal ECG  When compared with ECG of 21-SEP-2022 10:33,  The axis Shifted right  T wave inversion now evident in Inferior leads  T wave inversion more evident in Lateral leads  Confirmed by Elvis Gomes MD ([id number]) on 9/23/2022 7:04:08  PM    Referred By: LUIZ   SELF           Confirmed By:Elvis Gomes MD     No valid procedures specified.   Results for orders placed during the hospital encounter of 09/21/22    Nuclear Stress Test    Interpretation Summary    The EKG portion of this study is uninterpretable.    The patient reported no chest pain during the stress test.    There were no arrhythmias during stress.    The nuclear portion of this study will be reported separately.      Physical Exam:  CONSTITUTIONAL: No fever, no chills  HEENT: Normocephalic, atraumatic,pupils reactive to light                 NECK:  No JVD no carotid bruit  CVS: S1S2+, RRR  LUNGS: Clear  ABDOMEN: Soft, NT, BS+  EXTREMITIES: No cyanosis, edema  : No pedroza catheter  NEURO: AAO X 3  PSY: Normal affect      Medication List with Changes/Refills   New Medications    CARVEDILOL (COREG) 3.125 MG TABLET    Take 1 tablet (3.125 mg total) by mouth 2 (two) times daily with meals.    OMEPRAZOLE (PRILOSEC) 40 MG CAPSULE    Take 1 capsule (40 mg total) by mouth once daily.   Current Medications    ALBUTEROL (PROVENTIL/VENTOLIN HFA) 90 MCG/ACTUATION INHALER    Inhale 1-2 puffs into the lungs every 6 (six) hours as needed for Wheezing or Shortness of Breath.    ALLOPURINOL (ZYLOPRIM) 100 MG TABLET    Take 2 tablets once a day    AMITRIPTYLINE (ELAVIL) 10 MG TABLET    Take 10 mg by mouth every evening.    AMLODIPINE (NORVASC) 5 MG TABLET    Take 1 tablet (5 mg total) by mouth once daily.    ASPIRIN 81 MG CHEW    Take 1 tablet (81 mg total) by mouth once daily.    ATORVASTATIN (LIPITOR) 40 MG TABLET    Take 1 tablet by mouth once daily    ATORVASTATIN (LIPITOR) 40 MG TABLET    Take 1 tablet by mouth once daily    CYANOCOBALAMIN (VITAMIN B-12) 1000 MCG TABLET    Take 1 tablet (1,000 mcg total) by mouth once daily.    ESCITALOPRAM OXALATE (LEXAPRO) 5 MG TAB    Take 1 tablet (5 mg total) by mouth once daily.   Changed and/or Refilled Medications    Modified Medication Previous  Medication    LOSARTAN (COZAAR) 100 MG TABLET losartan (COZAAR) 100 MG tablet       Take 1 tablet (100 mg total) by mouth once daily.    Take 1 tablet (100 mg total) by mouth once daily.   Discontinued Medications    ATORVASTATIN (LIPITOR) 40 MG TABLET    Take 1 tablet by mouth once daily    METOPROLOL SUCCINATE (TOPROL-XL) 25 MG 24 HR TABLET    Take 0.5 tablets (12.5 mg total) by mouth every evening.    OMEPRAZOLE (PRILOSEC) 20 MG CAPSULE    Take 1 capsule (20 mg total) by mouth once daily.    PANTOPRAZOLE (PROTONIX) 40 MG TABLET    Take 1 tablet (40 mg total) by mouth once daily.    TERAZOSIN (HYTRIN) 1 MG CAPSULE    Take 1 capsule (1 mg total) by mouth every evening.             Assessment:       1. Chronic systolic congestive heart failure    2. Gastroesophageal reflux disease, unspecified whether esophagitis present    3. Essential hypertension    4. Mixed hyperlipidemia, baseline     5. Nonischemic cardiomyopathy, , EF 30% to 35%    6. Chest pain, unspecified type         Plan:     Problem List Items Addressed This Visit          Unprioritized    Essential hypertension (Chronic)    Current Assessment & Plan     His BP is elevated today, but he has not been taking his metoprolol.   Continue losartan 100 mg po daily.   Start carvedilol 3.125 mg po BID.   Continue amlodipine 5 mg po daily.          Relevant Medications    losartan (COZAAR) 100 MG tablet    carvediloL (COREG) 3.125 MG tablet    Nonischemic cardiomyopathy, , EF 30% to 35%    Relevant Medications    losartan (COZAAR) 100 MG tablet    carvediloL (COREG) 3.125 MG tablet    Mixed hyperlipidemia, baseline     Current Assessment & Plan     Continue atorvastatin 40 mg po daily.   LDL was less than 70 on recent labs.   Recommend continuing low fat low cholesterol diet and regular exercise.          Chronic systolic congestive heart failure - Primary    Current Assessment & Plan     Appears euvolemic on exam. Continue current medications.           GERD (gastroesophageal reflux disease)    Relevant Medications    omeprazole (PRILOSEC) 40 MG capsule    Chest pain    Current Assessment & Plan     Chest pain is atypical and reproducible with palpation. Continue current medications.             No follow-ups on file.

## 2023-01-25 NOTE — ASSESSMENT & PLAN NOTE
Continue atorvastatin 40 mg po daily.   LDL was less than 70 on recent labs.   Recommend continuing low fat low cholesterol diet and regular exercise.

## 2023-01-25 NOTE — ASSESSMENT & PLAN NOTE
His BP is elevated today, but he has not been taking his metoprolol.   Continue losartan 100 mg po daily.   Start carvedilol 3.125 mg po BID.   Continue amlodipine 5 mg po daily.

## 2023-01-30 ENCOUNTER — LAB VISIT (OUTPATIENT)
Dept: LAB | Facility: HOSPITAL | Age: 58
End: 2023-01-30
Attending: NURSE PRACTITIONER
Payer: MEDICAID

## 2023-01-30 DIAGNOSIS — E78.2 MIXED HYPERLIPIDEMIA: ICD-10-CM

## 2023-01-30 DIAGNOSIS — I16.0 HYPERTENSIVE URGENCY: Chronic | ICD-10-CM

## 2023-01-30 DIAGNOSIS — E53.8 VITAMIN B 12 DEFICIENCY: ICD-10-CM

## 2023-01-30 LAB
ALBUMIN SERPL BCP-MCNC: 4.3 G/DL (ref 3.5–5.2)
ALP SERPL-CCNC: 101 U/L (ref 55–135)
ALT SERPL W/O P-5'-P-CCNC: 22 U/L (ref 10–44)
ANION GAP SERPL CALC-SCNC: 6 MMOL/L (ref 8–16)
AST SERPL-CCNC: 19 U/L (ref 10–40)
BASOPHILS # BLD AUTO: 0.04 K/UL (ref 0–0.2)
BASOPHILS NFR BLD: 0.8 % (ref 0–1.9)
BILIRUB SERPL-MCNC: 1 MG/DL (ref 0.1–1)
BUN SERPL-MCNC: 19 MG/DL (ref 6–20)
CALCIUM SERPL-MCNC: 8.9 MG/DL (ref 8.7–10.5)
CHLORIDE SERPL-SCNC: 104 MMOL/L (ref 95–110)
CHOLEST SERPL-MCNC: 136 MG/DL (ref 120–199)
CHOLEST/HDLC SERPL: 4.1 {RATIO} (ref 2–5)
CO2 SERPL-SCNC: 29 MMOL/L (ref 23–29)
CREAT SERPL-MCNC: 1.2 MG/DL (ref 0.5–1.4)
DIFFERENTIAL METHOD: ABNORMAL
EOSINOPHIL # BLD AUTO: 0.2 K/UL (ref 0–0.5)
EOSINOPHIL NFR BLD: 2.8 % (ref 0–8)
ERYTHROCYTE [DISTWIDTH] IN BLOOD BY AUTOMATED COUNT: 12.8 % (ref 11.5–14.5)
EST. GFR  (NO RACE VARIABLE): >60 ML/MIN/1.73 M^2
ESTIMATED AVG GLUCOSE: 123 MG/DL (ref 68–131)
GLUCOSE SERPL-MCNC: 95 MG/DL (ref 70–110)
HBA1C MFR BLD: 5.9 % (ref 4.5–6.2)
HCT VFR BLD AUTO: 41 % (ref 40–54)
HDLC SERPL-MCNC: 33 MG/DL (ref 40–75)
HDLC SERPL: 24.3 % (ref 20–50)
HGB BLD-MCNC: 14.1 G/DL (ref 14–18)
IMM GRANULOCYTES # BLD AUTO: 0.01 K/UL (ref 0–0.04)
IMM GRANULOCYTES NFR BLD AUTO: 0.2 % (ref 0–0.5)
LDLC SERPL CALC-MCNC: 78.4 MG/DL (ref 63–159)
LYMPHOCYTES # BLD AUTO: 1.4 K/UL (ref 1–4.8)
LYMPHOCYTES NFR BLD: 26.9 % (ref 18–48)
MCH RBC QN AUTO: 32.9 PG (ref 27–31)
MCHC RBC AUTO-ENTMCNC: 34.4 G/DL (ref 32–36)
MCV RBC AUTO: 96 FL (ref 82–98)
MONOCYTES # BLD AUTO: 0.6 K/UL (ref 0.3–1)
MONOCYTES NFR BLD: 11.8 % (ref 4–15)
NEUTROPHILS # BLD AUTO: 3.1 K/UL (ref 1.8–7.7)
NEUTROPHILS NFR BLD: 57.5 % (ref 38–73)
NONHDLC SERPL-MCNC: 103 MG/DL
NRBC BLD-RTO: 0 /100 WBC
PLATELET # BLD AUTO: 174 K/UL (ref 150–450)
PMV BLD AUTO: 8.9 FL (ref 9.2–12.9)
POTASSIUM SERPL-SCNC: 3.7 MMOL/L (ref 3.5–5.1)
PROT SERPL-MCNC: 7.8 G/DL (ref 6–8.4)
RBC # BLD AUTO: 4.28 M/UL (ref 4.6–6.2)
SODIUM SERPL-SCNC: 139 MMOL/L (ref 136–145)
TRIGL SERPL-MCNC: 123 MG/DL (ref 30–150)
TSH SERPL DL<=0.005 MIU/L-ACNC: 3 UIU/ML (ref 0.34–5.6)
VIT B12 SERPL-MCNC: 672 PG/ML (ref 210–950)
WBC # BLD AUTO: 5.32 K/UL (ref 3.9–12.7)

## 2023-01-30 PROCEDURE — 82607 VITAMIN B-12: CPT | Performed by: NURSE PRACTITIONER

## 2023-01-30 PROCEDURE — 83036 HEMOGLOBIN GLYCOSYLATED A1C: CPT | Performed by: NURSE PRACTITIONER

## 2023-01-30 PROCEDURE — 80053 COMPREHEN METABOLIC PANEL: CPT | Performed by: NURSE PRACTITIONER

## 2023-01-30 PROCEDURE — 84443 ASSAY THYROID STIM HORMONE: CPT | Performed by: NURSE PRACTITIONER

## 2023-01-30 PROCEDURE — 36415 COLL VENOUS BLD VENIPUNCTURE: CPT | Performed by: NURSE PRACTITIONER

## 2023-01-30 PROCEDURE — 80061 LIPID PANEL: CPT | Performed by: NURSE PRACTITIONER

## 2023-01-30 PROCEDURE — 85025 COMPLETE CBC W/AUTO DIFF WBC: CPT | Performed by: NURSE PRACTITIONER

## 2023-02-09 ENCOUNTER — OFFICE VISIT (OUTPATIENT)
Dept: FAMILY MEDICINE | Facility: CLINIC | Age: 58
End: 2023-02-09
Payer: MEDICAID

## 2023-02-09 VITALS
TEMPERATURE: 98 F | HEIGHT: 69 IN | WEIGHT: 231.5 LBS | HEART RATE: 70 BPM | OXYGEN SATURATION: 96 % | SYSTOLIC BLOOD PRESSURE: 128 MMHG | BODY MASS INDEX: 34.29 KG/M2 | DIASTOLIC BLOOD PRESSURE: 80 MMHG | RESPIRATION RATE: 16 BRPM

## 2023-02-09 DIAGNOSIS — H53.9 VISION CHANGES: ICD-10-CM

## 2023-02-09 DIAGNOSIS — J32.9 SINUSITIS, UNSPECIFIED CHRONICITY, UNSPECIFIED LOCATION: ICD-10-CM

## 2023-02-09 DIAGNOSIS — R21 RASH: ICD-10-CM

## 2023-02-09 DIAGNOSIS — J02.9 SORE THROAT: ICD-10-CM

## 2023-02-09 DIAGNOSIS — I16.0 HYPERTENSIVE URGENCY: Chronic | ICD-10-CM

## 2023-02-09 DIAGNOSIS — Z00.00 HEALTHCARE MAINTENANCE: Primary | ICD-10-CM

## 2023-02-09 DIAGNOSIS — K21.9 GASTROESOPHAGEAL REFLUX DISEASE, UNSPECIFIED WHETHER ESOPHAGITIS PRESENT: ICD-10-CM

## 2023-02-09 DIAGNOSIS — I50.22 CHRONIC SYSTOLIC CONGESTIVE HEART FAILURE: ICD-10-CM

## 2023-02-09 DIAGNOSIS — E53.8 B12 DEFICIENCY: ICD-10-CM

## 2023-02-09 PROCEDURE — 3074F SYST BP LT 130 MM HG: CPT | Mod: CPTII,,, | Performed by: STUDENT IN AN ORGANIZED HEALTH CARE EDUCATION/TRAINING PROGRAM

## 2023-02-09 PROCEDURE — 3079F PR MOST RECENT DIASTOLIC BLOOD PRESSURE 80-89 MM HG: ICD-10-PCS | Mod: CPTII,,, | Performed by: STUDENT IN AN ORGANIZED HEALTH CARE EDUCATION/TRAINING PROGRAM

## 2023-02-09 PROCEDURE — 3079F DIAST BP 80-89 MM HG: CPT | Mod: CPTII,,, | Performed by: STUDENT IN AN ORGANIZED HEALTH CARE EDUCATION/TRAINING PROGRAM

## 2023-02-09 PROCEDURE — 1159F MED LIST DOCD IN RCRD: CPT | Mod: CPTII,,, | Performed by: STUDENT IN AN ORGANIZED HEALTH CARE EDUCATION/TRAINING PROGRAM

## 2023-02-09 PROCEDURE — 3044F HG A1C LEVEL LT 7.0%: CPT | Mod: CPTII,,, | Performed by: STUDENT IN AN ORGANIZED HEALTH CARE EDUCATION/TRAINING PROGRAM

## 2023-02-09 PROCEDURE — 3074F PR MOST RECENT SYSTOLIC BLOOD PRESSURE < 130 MM HG: ICD-10-PCS | Mod: CPTII,,, | Performed by: STUDENT IN AN ORGANIZED HEALTH CARE EDUCATION/TRAINING PROGRAM

## 2023-02-09 PROCEDURE — 4010F PR ACE/ARB THEARPY RXD/TAKEN: ICD-10-PCS | Mod: CPTII,,, | Performed by: STUDENT IN AN ORGANIZED HEALTH CARE EDUCATION/TRAINING PROGRAM

## 2023-02-09 PROCEDURE — 3044F PR MOST RECENT HEMOGLOBIN A1C LEVEL <7.0%: ICD-10-PCS | Mod: CPTII,,, | Performed by: STUDENT IN AN ORGANIZED HEALTH CARE EDUCATION/TRAINING PROGRAM

## 2023-02-09 PROCEDURE — 99214 OFFICE O/P EST MOD 30 MIN: CPT | Mod: PBBFAC,PO | Performed by: STUDENT IN AN ORGANIZED HEALTH CARE EDUCATION/TRAINING PROGRAM

## 2023-02-09 PROCEDURE — 99999 PR PBB SHADOW E&M-EST. PATIENT-LVL IV: ICD-10-PCS | Mod: PBBFAC,,, | Performed by: STUDENT IN AN ORGANIZED HEALTH CARE EDUCATION/TRAINING PROGRAM

## 2023-02-09 PROCEDURE — 3008F BODY MASS INDEX DOCD: CPT | Mod: CPTII,,, | Performed by: STUDENT IN AN ORGANIZED HEALTH CARE EDUCATION/TRAINING PROGRAM

## 2023-02-09 PROCEDURE — 3008F PR BODY MASS INDEX (BMI) DOCUMENTED: ICD-10-PCS | Mod: CPTII,,, | Performed by: STUDENT IN AN ORGANIZED HEALTH CARE EDUCATION/TRAINING PROGRAM

## 2023-02-09 PROCEDURE — 99999 PR PBB SHADOW E&M-EST. PATIENT-LVL IV: CPT | Mod: PBBFAC,,, | Performed by: STUDENT IN AN ORGANIZED HEALTH CARE EDUCATION/TRAINING PROGRAM

## 2023-02-09 PROCEDURE — 99215 OFFICE O/P EST HI 40 MIN: CPT | Mod: S$PBB,,, | Performed by: STUDENT IN AN ORGANIZED HEALTH CARE EDUCATION/TRAINING PROGRAM

## 2023-02-09 PROCEDURE — 4010F ACE/ARB THERAPY RXD/TAKEN: CPT | Mod: CPTII,,, | Performed by: STUDENT IN AN ORGANIZED HEALTH CARE EDUCATION/TRAINING PROGRAM

## 2023-02-09 PROCEDURE — 1159F PR MEDICATION LIST DOCUMENTED IN MEDICAL RECORD: ICD-10-PCS | Mod: CPTII,,, | Performed by: STUDENT IN AN ORGANIZED HEALTH CARE EDUCATION/TRAINING PROGRAM

## 2023-02-09 PROCEDURE — 99215 PR OFFICE/OUTPT VISIT, EST, LEVL V, 40-54 MIN: ICD-10-PCS | Mod: S$PBB,,, | Performed by: STUDENT IN AN ORGANIZED HEALTH CARE EDUCATION/TRAINING PROGRAM

## 2023-02-09 RX ORDER — ESCITALOPRAM OXALATE 5 MG/1
5 TABLET ORAL DAILY
Qty: 90 TABLET | Refills: 3 | Status: SHIPPED | OUTPATIENT
Start: 2023-02-09 | End: 2023-11-02

## 2023-02-09 RX ORDER — CLOTRIMAZOLE AND BETAMETHASONE DIPROPIONATE 10; .64 MG/G; MG/G
CREAM TOPICAL 2 TIMES DAILY
Qty: 45 G | Refills: 1 | Status: SHIPPED | OUTPATIENT
Start: 2023-02-09 | End: 2023-02-16

## 2023-02-09 RX ORDER — AZELASTINE 1 MG/ML
2 SPRAY, METERED NASAL 2 TIMES DAILY
Qty: 30 ML | Refills: 5 | Status: SHIPPED | OUTPATIENT
Start: 2023-02-09

## 2023-02-09 RX ORDER — SPIRONOLACTONE 25 MG/1
12.5 TABLET ORAL DAILY
Qty: 45 TABLET | Refills: 1 | Status: SHIPPED | OUTPATIENT
Start: 2023-02-09 | End: 2023-11-02

## 2023-02-09 NOTE — PROGRESS NOTES
[facility] Note    Subjective:    The HPI and pertinent ROS is included in the Diagnostic Impression Remarks section at the end of the note. Please see below for further details. Chief complaint is at end of note.     Jarod is a pleasant intelligent patient who is here for evaluation.     Modified Medications    Modified Medication Previous Medication    ESCITALOPRAM OXALATE (LEXAPRO) 5 MG TAB EScitalopram oxalate (LEXAPRO) 5 MG Tab       Take 1 tablet (5 mg total) by mouth once daily.    Take 1 tablet (5 mg total) by mouth once daily.       Data reviewed 274}  Previous medical records reviewed and summarized in plan section at end of note.      If you are due for any health screening(s) below please notify me so we can arrange them to be ordered and scheduled. Most healthy patients at your age complete them, but you are free to accept or refuse. If you can't do it, I'll definitely understand. If you can, I'd certainly appreciate it!     Tests to Keep You Healthy    Colon Cancer Screening: SCHEDULED  Last Blood Pressure <= 139/89 (2/9/2023): NO      The following portions of the patient's history were reviewed and updated as appropriate: allergies, current medications, past family history, past medical history, past social history, past surgical history and problem list.    He  has a past medical history of Allergic rhinitis due to allergen, Arthritis, Cardiomyopathy, Gout, Hypertension, and Left bundle branch block.  He  has a past surgical history that includes [place] tooth extraction.    He  reports that he has never smoked. He has never been exposed to tobacco smoke. He has never used smokeless tobacco. He reports that he does not drink alcohol and does not use drugs.  He family history includes Cancer in his sister; Gout in his father; Heart disease in his father; Hypertension in his father.    Review of patient's allergies indicates:   Allergen Reactions    Hydralazine analogues Other (See  "Comments)     drowsiness    Codeine      Other reaction(s): Hives    Codeine phosphate      Other reaction(s): Unknown  Other reaction(s): Unknown    Lisinopril      Dry coughing    Phenytoin sodium extended      Other reaction(s): leg cramps       Tobacco Use: Low Risk     Smoking Tobacco Use: Never    Smokeless Tobacco Use: Never    Passive Exposure: Never     Physical Examination  General appearance: alert, cooperative, no distress  Neck: no thyromegaly, no neck stiffness  Lungs: clear to auscultation, no wheezes, rales or rhonchi, symmetric air entry  Heart: normal rate, regular rhythm, normal S1, S2, no murmurs, rubs, clicks or gallops  Abdomen: soft, nontender, nondistended, no rigidity, rebound, or guarding.   Back: no point tenderness over spine  Extremities: peripheral pulses normal, akbar led edema   Neurological:alert, oriented, normal speech, no new focal findings or movement disorder noted from baseline    BP Readings from Last 3 Encounters:   02/09/23 128/80   01/25/23 (!) 170/100   11/30/22 (!) 138/90     Wt Readings from Last 3 Encounters:   02/09/23 105 kg (231 lb 7.7 oz)   01/25/23 103.9 kg (229 lb)   11/30/22 103 kg (227 lb 1.2 oz)     /80 (BP Location: Right arm, Patient Position: Sitting, BP Method: Large (Manual))   Pulse 70   Temp 98.1 °F (36.7 °C) (Oral)   Resp 16   Ht 5' 9" (1.753 m)   Wt 105 kg (231 lb 7.7 oz)   SpO2 96%   BMI 34.18 kg/m²    274}  Laboratory: I have reviewed old labs below:    274}    Lab Results   Component Value Date    WBC 5.32 01/30/2023    HGB 14.1 01/30/2023    HCT 41.0 01/30/2023    MCV 96 01/30/2023     01/30/2023     01/30/2023    K 3.7 01/30/2023     01/30/2023    CALCIUM 8.9 01/30/2023    PHOS 4.6 (H) 09/23/2022    CO2 29 01/30/2023    GLU 95 01/30/2023    BUN 19 01/30/2023    CREATININE 1.2 01/30/2023    ANIONGAP 6 (L) 01/30/2023    PROT 7.8 01/30/2023    ALBUMIN 4.3 01/30/2023    BILITOT 1.0 01/30/2023    ALKPHOS 101 01/30/2023    " "ALT 22 01/30/2023    AST 19 01/30/2023    INR 1.1 09/21/2022    CHOL 136 01/30/2023    TRIG 123 01/30/2023    HDL 33 (L) 01/30/2023    LDLCALC 78.4 01/30/2023    TSH 3.000 01/30/2023    PSA 0.45 09/22/2011    HGBA1C 5.9 01/30/2023     Lab reviewed by me: Particular labs of significance that I will monitor, workup, or treat to improve are mentioned below in diagnostic impression remarks.    Imaging/EKG: I have reviewed the pertinent results and my findings are noted in remarks.  274}    CC:   Chief Complaint   Patient presents with    Hypertension    Establish Care    Sinus Problem        274}    Assessment/Plan  Jarod Pierce is a 57 y.o. male who presents to clinic with:  1. Healthcare maintenance    2. Essential hypertension    3. Gastroesophageal reflux disease, unspecified whether esophagitis present    4. B12 deficiency    5. Chronic systolic congestive heart failure    6. Vision changes    7. Sore throat    8. Rash    9. Sinusitis, unspecified chronicity, unspecified location       274}  Diagnostic Impression Remarks + HPI     Documentation entered by me for this encounter may have been done in part using speech-recognition technology. Although I have made an effort to ensure accuracy, "sound like" errors may exist and should be interpreted in context.     Hypertension well controlled but does have some leg swelling will stop amlodipine and start Aldactone be helpful for his CHF as well   Edema-has some bilateral leg swelling will replace amlodipine with Aldactone could consider adding on Jardiance as well later on  GERD stable continue current meds   CHF-stable no shortness of breaths does have some bilateral leg swelling will start Aldactone consider Jardiance   Sore throat-has some sore throat will reports that he has had for last year no smoking history did have some antifreeze which he had some exposure to he was trying to psych since the mouth and some got his mouth and he spit it out recommend for " him to see ENT damage     Vision changes-patient reports some floaters only very small amount infrequent recommend for him to see ophthalmology.  No sudden onset or large amount no shower.  Gout-well controlled wants to cut down on allopurinol to 50 daily bees had some upset stomach with it will continue diet modifications   Insomnia controlled on Lexapro will continue will avoid amitriptyline get the side effects  Sinusitis-will send as LC   B12 deficiency continue B12 monitor     Hyperlipidemia-stable continue statin recommend healthy diet exercise monitor  Altogether 42 minutes of total time spent on the encounter, which includes face to face time and non-face to face time preparing to see the patient (eg, review of tests), Obtaining and/or reviewing separately obtained history, Documenting clinical information in the electronic or other health record, Independently interpreting results (not separately reported) and communicating results to the patient/family/caregiver, or Care coordination (not separately reported). I also counseled him on common and the most usual side effect of prescribed medications. Risk and benefits of the medication were also discussed. I reviewed previous notes to better coordinate patient's care. He test results and lifestyle changes were also discussed. All questions were answered, and patient voiced understanding.       This is the extent of this pleasant patient's concerns at this present time. He did not feel chest pain upon exertion, dyspnea, nausea, vomiting, diaphoresis, or syncope. No pleuritic chest pain, unilateral leg swelling, calf tenderness, or calf pain. Negative for unintentional weight loss night sweats and fevers. Jarod will return to clinic in a few months for further workup and reassessment or sooner as needed. He was instructed to call the clinic or go to the emergency department or urgent care immediately if his symptoms do not improve, worsens, or if any new  symptoms develop. As we discussed that symptoms could worsen over the next 24 hours he was advised that if any increased swelling, pain, or numbness arise to go immediately to the ED. Patient knows to call any time if an emergency arises. Shared decision making occurred and he verbalized understanding in agreement with this plan. I discussed imaging findings, diagnosis, possibilities, treatment options, medications, risks, and benefits. He had many questions regarding the options and long-term effects. All questions were answered. He expressed understanding after counseling regarding the diagnosis and recommendations. He was capable and demonstrated competence with understanding of these options. Shared decision making was performed resulting in him choosing the current treatment plan. Patient handout was given with instructions and recommendations. Advised the patient that if they become pregnant to alert us immediately to assess for medication changes. I also discussed the importance of close follow up to discuss labs, change or modify his medications if needed, monitor side effects, and further evaluation of medical problems.     Additional workup planned: see labs ordered below.    See below for labs and meds ordered with associated diagnosis      1. Healthcare maintenance    2. Essential hypertension    3. Gastroesophageal reflux disease, unspecified whether esophagitis present    4. B12 deficiency    5. Chronic systolic congestive heart failure    6. Vision changes    7. Sore throat  - Ambulatory referral/consult to ENT; Future    8. Rash  - clotrimazole-betamethasone 1-0.05% (LOTRISONE) cream; Apply topically 2 (two) times daily. for 7 days  Dispense: 45 g; Refill: 1    9. Sinusitis, unspecified chronicity, unspecified location  - azelastine (ASTELIN) 137 mcg (0.1 %) nasal spray; 2 sprays (274 mcg total) by Nasal route 2 (two) times daily.  Dispense: 30 mL; Refill: 5      Vasu Rocha MD   274}    If you  are due for any health screening(s) below please notify me so we can arrange them to be ordered and scheduled. Most healthy patients at your age complete them, but you are free to accept or refuse.     If you can't do it, I'll definitely understand. If you can, I'd certainly appreciate it!   Tests to Keep You Healthy    Colon Cancer Screening: SCHEDULED  Last Blood Pressure <= 139/89 (2/9/2023): NO

## 2023-03-08 ENCOUNTER — TELEPHONE (OUTPATIENT)
Dept: GASTROENTEROLOGY | Facility: CLINIC | Age: 58
End: 2023-03-08
Payer: MEDICAID

## 2023-03-24 DIAGNOSIS — I42.8 NONISCHEMIC CARDIOMYOPATHY: ICD-10-CM

## 2023-03-24 DIAGNOSIS — I16.0 HYPERTENSIVE URGENCY: Chronic | ICD-10-CM

## 2023-03-24 RX ORDER — LOSARTAN POTASSIUM 100 MG/1
100 TABLET ORAL DAILY
Qty: 30 TABLET | Refills: 0 | Status: SHIPPED | OUTPATIENT
Start: 2023-03-24 | End: 2023-04-14 | Stop reason: SDUPTHER

## 2023-04-11 ENCOUNTER — PATIENT MESSAGE (OUTPATIENT)
Dept: ADMINISTRATIVE | Facility: HOSPITAL | Age: 58
End: 2023-04-11
Payer: MEDICAID

## 2023-04-13 DIAGNOSIS — I42.8 NONISCHEMIC CARDIOMYOPATHY: ICD-10-CM

## 2023-04-13 DIAGNOSIS — I16.0 HYPERTENSIVE URGENCY: Chronic | ICD-10-CM

## 2023-04-13 NOTE — TELEPHONE ENCOUNTER
----- Message from Noe Kwok sent at 4/13/2023  9:52 AM CDT -----  Type:  RX Refill Request    Who Called:  pt  Refill or New Rx:  refill  RX Name and Strength:  atorvastatin (LIPITOR) 40 MG tablet  amLODIPine (NORVASC) 10 MG tablet --said he want to start taking this instead of the other med--said the other make his chest hurt  How is the patient currently taking it? (ex. 1XDay):  as directed  Is this a 30 day or 90 day RX:  90  Preferred Pharmacy with phone number:    [facility] [address] [place] [address] - [address] [address]  [address] [facility] [address]  Phone: [phone] Fax: [phone]    Local or Mail Order:  local  Ordering Provider:  Jairo Jose Call Back Number:  [phone] (home)     Additional Information:  please call and advise--thank you

## 2023-04-14 RX ORDER — LOSARTAN POTASSIUM 100 MG/1
100 TABLET ORAL DAILY
Qty: 90 TABLET | Refills: 3 | Status: SHIPPED | OUTPATIENT
Start: 2023-04-14 | End: 2023-11-02

## 2023-04-14 NOTE — TELEPHONE ENCOUNTER
----- Message from Pelon Sprague sent at 4/14/2023  1:07 PM CDT -----  Regarding: Rx status  Type:  Needs Medical Advice    Who Called: PT  Symptoms (please be specific): update on RX    How long has patient had these symptoms:    Pharmacy name and phone #:    [facility] - [place], [place] - [address] [address]  [address] [address]  [facility] [address]  Phone: [phone] Fax: [phone]      Would the patient rather a call back or a response via [facility]? Call back  Best Call Back Number: [phone]    Additional Information: Sts he wanted to know what the hold up was on getting his RX.  Sts he is out of the meds atorvastatin (LIPITOR) 40 MG tablet  amLODIPine (NORVASC) 10 MG tablet  carvediloL (COREG) 3.125 MG tablet    Please advise -- Thank you

## 2023-04-20 NOTE — TELEPHONE ENCOUNTER
----- Message from Shu Ramos sent at 4/20/2023 10:13 AM CDT -----  Regarding: advice  Contact: Patient  Type:  RX Refill Request    Who Called:  Patient   Refill or New Rx:  refill   RX Name and Strength:  Amolodapene   How is the patient currently taking it? (ex. 1XDay):    Is this a 30 day or 90 day RX:    Preferred Pharmacy with phone number:    [facility] - [place], [place] - [address]  [address] [facility] [address]  Phone: [phone] Fax: [phone]      Local or Mail Order:  Local   Ordering Provider:    Best Call Back Number:  [phone] (home)     Additional Information:  Patient stated that he is out of this medication and is in need of it. Please contact patient to advise. Thanks!

## 2023-04-21 RX ORDER — AMLODIPINE BESYLATE 5 MG/1
5 TABLET ORAL DAILY
Qty: 30 TABLET | Refills: 11 | Status: SHIPPED | OUTPATIENT
Start: 2023-04-21 | End: 2023-11-02 | Stop reason: SDUPTHER

## 2023-04-21 NOTE — TELEPHONE ENCOUNTER
Patient was stopped on Norvasc and was started on Losartan however it caused indigestion  and he was even taking prilosec and it still didn't help. Patient stopped it and went back to Norvasc 5mg instead of 10mg and its been helping per pt. Pt wants refill. Patient said he wants extra 5mg incase his bp does go up he can take an extra dose.

## 2023-04-25 ENCOUNTER — OFFICE VISIT (OUTPATIENT)
Dept: CARDIOLOGY | Facility: CLINIC | Age: 58
End: 2023-04-25
Payer: MEDICAID

## 2023-04-25 DIAGNOSIS — E79.0 HYPERURICEMIA: ICD-10-CM

## 2023-04-25 DIAGNOSIS — E66.9 OBESITY (BMI 30-39.9): ICD-10-CM

## 2023-04-25 DIAGNOSIS — R06.02 SHORTNESS OF BREATH: ICD-10-CM

## 2023-04-25 DIAGNOSIS — I16.0 HYPERTENSIVE URGENCY: Chronic | ICD-10-CM

## 2023-04-25 DIAGNOSIS — I50.22 CHRONIC SYSTOLIC CONGESTIVE HEART FAILURE: ICD-10-CM

## 2023-04-25 DIAGNOSIS — I42.8 NONISCHEMIC CARDIOMYOPATHY: Primary | ICD-10-CM

## 2023-04-25 PROCEDURE — 4010F PR ACE/ARB THEARPY RXD/TAKEN: ICD-10-PCS | Mod: CPTII,95,, | Performed by: NURSE PRACTITIONER

## 2023-04-25 PROCEDURE — 99213 PR OFFICE/OUTPT VISIT, EST, LEVL III, 20-29 MIN: ICD-10-PCS | Mod: 95,,, | Performed by: NURSE PRACTITIONER

## 2023-04-25 PROCEDURE — 1159F PR MEDICATION LIST DOCUMENTED IN MEDICAL RECORD: ICD-10-PCS | Mod: CPTII,95,, | Performed by: NURSE PRACTITIONER

## 2023-04-25 PROCEDURE — 1159F MED LIST DOCD IN RCRD: CPT | Mod: CPTII,95,, | Performed by: NURSE PRACTITIONER

## 2023-04-25 PROCEDURE — 4010F ACE/ARB THERAPY RXD/TAKEN: CPT | Mod: CPTII,95,, | Performed by: NURSE PRACTITIONER

## 2023-04-25 PROCEDURE — 99213 OFFICE O/P EST LOW 20 MIN: CPT | Mod: 95,,, | Performed by: NURSE PRACTITIONER

## 2023-04-25 PROCEDURE — 3044F PR MOST RECENT HEMOGLOBIN A1C LEVEL <7.0%: ICD-10-PCS | Mod: CPTII,95,, | Performed by: NURSE PRACTITIONER

## 2023-04-25 PROCEDURE — 1160F PR REVIEW ALL MEDS BY PRESCRIBER/CLIN PHARMACIST DOCUMENTED: ICD-10-PCS | Mod: CPTII,95,, | Performed by: NURSE PRACTITIONER

## 2023-04-25 PROCEDURE — 1160F RVW MEDS BY RX/DR IN RCRD: CPT | Mod: CPTII,95,, | Performed by: NURSE PRACTITIONER

## 2023-04-25 PROCEDURE — 3044F HG A1C LEVEL LT 7.0%: CPT | Mod: CPTII,95,, | Performed by: NURSE PRACTITIONER

## 2023-04-25 RX ORDER — ALBUTEROL SULFATE 90 UG/1
2 AEROSOL, METERED RESPIRATORY (INHALATION) EVERY 6 HOURS PRN
Qty: 1 G | Refills: 0 | Status: SHIPPED | OUTPATIENT
Start: 2023-04-25 | End: 2024-02-21 | Stop reason: SDUPTHER

## 2023-04-25 RX ORDER — ALLOPURINOL 100 MG/1
TABLET ORAL
Qty: 60 TABLET | Refills: 0 | Status: SHIPPED | OUTPATIENT
Start: 2023-04-25 | End: 2023-06-29

## 2023-04-25 NOTE — PROGRESS NOTES
Subjective:    Patient ID:  Jarod Pierce is a 57 y.o. male       HPI:  Patient unable to come in for appt and unable to do virtual via video. Telephone call made. He denies any current issues, but is requesting a refill for albuterol inhaler and allopurinol because he has been unable to get in with his PCP.    Review of patient's allergies indicates:   Allergen Reactions    Hydralazine analogues Other (See Comments)     drowsiness    Codeine      Other reaction(s): Hives    Codeine phosphate      Other reaction(s): Unknown  Other reaction(s): Unknown    Lisinopril      Dry coughing    Phenytoin sodium extended      Other reaction(s): leg cramps       Past Medical History:   Diagnosis Date    Allergic rhinitis due to allergen     Arthritis     Cardiomyopathy     Gout     Hypertension     Left bundle branch block      Past Surgical History:   Procedure Laterality Date    WISDOM TOOTH EXTRACTION       Social History     Tobacco Use    Smoking status: Never     Passive exposure: Never    Smokeless tobacco: Never   Substance Use Topics    Alcohol use: No    Drug use: No     Family History   Problem Relation Age of Onset    Heart disease Father     Hypertension Father     Gout Father     Cancer Sister         lung    Heart attack Neg Hx         Review of Systems:   Constitution: Negative for diaphoresis and fever.   HEENT: Negative for nosebleeds.    Cardiovascular: Negative for chest pain       No dyspnea on exertion       No leg swelling        No palpitations  Respiratory: Negative for shortness of breath and wheezing.    Hematologic/Lymphatic: Negative for bleeding problem. Does not bruise/bleed easily.   Skin: Negative for color change and rash.   Musculoskeletal: Negative for falls and myalgias.   Gastrointestinal: Negative for hematemesis and hematochezia.   Genitourinary: Negative for hematuria.   Neurological: Negative for dizziness and light-headedness.   Psychiatric/Behavioral: Negative for altered mental  status and memory loss.          Objective:        There were no vitals filed for this visit.    Lab Results   Component Value Date    WBC 5.32 01/30/2023    HGB 14.1 01/30/2023    HCT 41.0 01/30/2023     01/30/2023    CHOL 136 01/30/2023    TRIG 123 01/30/2023    HDL 33 (L) 01/30/2023    ALT 22 01/30/2023    AST 19 01/30/2023     01/30/2023    K 3.7 01/30/2023     01/30/2023    CREATININE 1.2 01/30/2023    BUN 19 01/30/2023    CO2 29 01/30/2023    TSH 3.000 01/30/2023    PSA 0.45 09/22/2011    INR 1.1 09/21/2022    HGBA1C 5.9 01/30/2023        ECHOCARDIOGRAM RESULTS  Results for orders placed during the hospital encounter of 09/21/22    Echo    Interpretation Summary  · The left ventricle is normal in size with mild concentric hypertrophy and moderately decreased systolic function. LV is poorly visualized. EF could be moderately decreased to mildly decreased or low normal. Recommend alternative cardiac imaging for further evaluation.  · Mild left atrial enlargement.  · The estimated ejection fraction is 35%.  · Grade I left ventricular diastolic dysfunction.  · Normal central venous pressure (3 mmHg).        CURRENT/PREVIOUS VISIT EKG  Results for orders placed or performed during the hospital encounter of 09/21/22   EKG 12-lead    Collection Time: 09/21/22 12:12 PM    Narrative    Test Reason : R07.9,    Vent. Rate : 079 BPM     Atrial Rate : 079 BPM     P-R Int : 162 ms          QRS Dur : 164 ms      QT Int : 434 ms       P-R-T Axes : 047 -11 201 degrees     QTc Int : 497 ms    Normal sinus rhythm  Left bundle branch block  Abnormal ECG  When compared with ECG of 21-SEP-2022 10:33,  The axis Shifted right  T wave inversion now evident in Inferior leads  T wave inversion more evident in Lateral leads  Confirmed by Eliseo ESPINOZA, Elvis SMYTH ([id number]) on 9/23/2022 7:04:08 PM    Referred By: [email or web address]   SELF           Confirmed By:Elvis Gomes MD     No valid procedures specified.   Results for orders  placed during the hospital encounter of 09/21/22    Nuclear Stress Test    Interpretation Summary    The EKG portion of this study is uninterpretable.    The patient reported no chest pain during the stress test.    There were no arrhythmias during stress.    The nuclear portion of this study will be reported separately.      Physical Exam:      Medication List with Changes/Refills   New Medications    ALBUTEROL (PROVENTIL HFA) 90 MCG/ACTUATION INHALER    Inhale 2 puffs into the lungs every 6 (six) hours as needed for Wheezing. Rescue   Current Medications    AMLODIPINE (NORVASC) 5 MG TABLET    Take 1 tablet (5 mg total) by mouth once daily.    ASPIRIN 81 MG CHEW    Take 1 tablet (81 mg total) by mouth once daily.    ATORVASTATIN (LIPITOR) 40 MG TABLET    Take 1 tablet by mouth once daily    AZELASTINE (ASTELIN) 137 MCG (0.1 %) NASAL SPRAY    2 sprays (274 mcg total) by Nasal route 2 (two) times daily.    CYANOCOBALAMIN (VITAMIN B-12) 1000 MCG TABLET    Take 1 tablet (1,000 mcg total) by mouth once daily.    ESCITALOPRAM OXALATE (LEXAPRO) 5 MG TAB    Take 1 tablet (5 mg total) by mouth once daily.    LOSARTAN (COZAAR) 100 MG TABLET    Take 1 tablet (100 mg total) by mouth once daily.    OMEPRAZOLE (PRILOSEC) 40 MG CAPSULE    Take 1 capsule (40 mg total) by mouth once daily.    SPIRONOLACTONE (ALDACTONE) 25 MG TABLET    Take 0.5 tablets (12.5 mg total) by mouth once daily.   Changed and/or Refilled Medications    Modified Medication Previous Medication    ALLOPURINOL (ZYLOPRIM) 100 MG TABLET allopurinoL (ZYLOPRIM) 100 MG tablet       Take 2 tablets once a day    Take 2 tablets once a day   Discontinued Medications    ALBUTEROL (PROAIR HFA) 90 MCG/ACTUATION INHALER    Inhale 2 puffs into the lungs every 6 (six) hours as needed for Wheezing. Rescue    CARVEDILOL (COREG) 3.125 MG TABLET    Take 1 tablet (3.125 mg total) by mouth 2 (two) times daily with meals.             Assessment:       1. Nonischemic cardiomyopathy     2. Hyperuricemia    3. Shortness of breath    4. Chronic systolic congestive heart failure    5. Essential hypertension    6. Obesity (BMI 30-39.9)         Plan:     Problem List Items Addressed This Visit          Unprioritized    Essential hypertension (Chronic)    Current Assessment & Plan     BP is stable on current regimen. He reports his BP has been in the 140/80s.           Nonischemic cardiomyopathy, , EF 30% to 35% - Primary    Obesity (BMI 30-39.9)    Current Assessment & Plan     Recommend low fat low cholesterol diet and regular exercise.            Chronic systolic congestive heart failure    Current Assessment & Plan     Asymptomatic. Continue current regimen.             Other Visit Diagnoses       Hyperuricemia        Relevant Medications    allopurinoL (ZYLOPRIM) 100 MG tablet    Shortness of breath        Relevant Medications    albuterol (PROVENTIL HFA) 90 mcg/actuation inhaler            Follow up in about 6 months (around 10/25/2023).

## 2023-06-28 ENCOUNTER — TELEPHONE (OUTPATIENT)
Dept: CARDIOLOGY | Facility: CLINIC | Age: 58
End: 2023-06-28
Payer: MEDICAID

## 2023-06-28 NOTE — TELEPHONE ENCOUNTER
----- Message from Major Benitez sent at 6/28/2023  8:32 AM CDT -----  Regarding: Appointment  Contact: patient  Type:  Sooner Apoointment Request    Caller is requesting a sooner appointment.  Caller declined first available appointment listed below.  Caller will not accept being placed on the waitlist and is requesting a message be sent to doctor.  Name of Caller:patient  When is the first available appointment?08/26/23  Symptoms:problem/concern  Would the patient rather a call back or a response via [facility]? call  Best Call Back Number:[phone]  Additional Information: please call patient to advise/schedule

## 2023-06-28 NOTE — TELEPHONE ENCOUNTER
----- Message from Feng Pack sent at 6/28/2023 11:22 AM CDT -----  Regarding: ret call  Contact: siva at [phone]  Type:  Patient Returning Call    Who Called:  Siva    Who Left Message for Patient:  Khloe    Does the patient know what this is regarding?:  yes    Best Call Back Number:  [phone]    Additional Information:

## 2023-06-29 DIAGNOSIS — E79.0 HYPERURICEMIA: ICD-10-CM

## 2023-06-29 RX ORDER — ALLOPURINOL 100 MG/1
TABLET ORAL
Qty: 60 TABLET | Refills: 0 | Status: SHIPPED | OUTPATIENT
Start: 2023-06-29 | End: 2023-10-04

## 2023-08-23 ENCOUNTER — PATIENT MESSAGE (OUTPATIENT)
Dept: FAMILY MEDICINE | Facility: CLINIC | Age: 58
End: 2023-08-23
Payer: MEDICAID

## 2023-09-12 ENCOUNTER — TELEPHONE (OUTPATIENT)
Dept: CARDIOLOGY | Facility: CLINIC | Age: 58
End: 2023-09-12
Payer: MEDICAID

## 2023-09-12 NOTE — TELEPHONE ENCOUNTER
----- Message from Bryce Steven sent at 9/12/2023 12:06 PM CDT -----  Type: Need Medical Advice   Who Called:  Patient   Best callback number: [phone]  Patient refused first available appointment on: No available time for patient availability   Symptoms: coughing  Additional Information: Patient called to schedule an appointment to see the Dr before Oct 4th maybe Mon 2nd or Tue 3rd  Please call to further assist with scheduling, Thanks.        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  Cristobal Gleason was evaluated in a small therapy room, where his mom was present throughout  He played with a variety of presented toys, though he had difficulty attending to formal testing  Safety Measures: N/A    Start Time: 1400  Stop Time: 1500  Total time in clinic (min): 60 minutes    Reason for Referral:Decreased language skills and Decreased speech intelligibility  Prior Functional Status:Developmental delay/disorder  Medical History significant for:   Past Medical History:   Diagnosis Date   •  fever 2019     Weeks Gestation: 40    Delivery via:C Section  Pregnancy/ birth complications: preeclampsia  Birth weight: 7lbs 0oz  Birth length: 19 5 inches  NICU following birth:No   O2 requirement at birth:None  Developmental Milestones: Delayed  Clinically Complex Situations:N/A    Hearing:Other mild hearing loss in right ear, possibly due to obstruction  Vision:Other concerned; interested in opthamological eval when accessible  Medication List:   Current Outpatient Medications   Medication Sig Dispense Refill   • MELATONIN GUMMIES PO Take by mouth     • methylphenidate (Ritalin) 5 mg tablet Take 0 5 tablets (2 5 mg total) by mouth 2 (two) times a day before breakfast and lunch Max Daily Amount: 5 mg 30 tablet 0   • Pediatric Multiple Vitamins (Multivitamin Childrens) CHEW Chew       No current facility-administered medications for this visit       Allergies: No Known Allergies  Primary Language: English  Preferred Language: Kang Environment/ Lifestyle: Lives at home with mom, dad, 2 sisters (8 and 12 y/o)  Interacts with 5 and 10 y/o cousins 2-3x/mo  Current Education status: [employer] and [employer]    Current / Prior Services being received: Speech Therapy School system Gets speech through [employer]    Mental Status: Alert  Behavior Status:Cooperative  Communication Modalities: Verbal and Sign-lanuage uses 'baby sign' and some finger-spelling/phonics    Rehabilitation Prognosis:Fair rehab potential to reach the established goals      Assessments:Speech/Language  Speech Developmental Milestones:Babbling, First words and Puts words together  Assistive Technology:Other none  Intelligibility rating: 15% to unfamiliar listeners, >75% to familiar listeners    Expressive language/Speech comments: According to mom, pt has demonstrated increased verbal output since beginning ADHD medication, recovering from middle ear obstruction in the right ear, and attending to peer models at [facility] and pre  Mom reports that pt is a great communicator and uses gestures and baby signs consistently and with intention/meaning  As of recent, his expressive vocabulary has increased, demonstrating the ability to verbally approximate labels of common objects, animals, body parts, etc  Based on caregiver report and clinical observations/partial testing, pt is primarily unintelligible and is observed to delete final syllables or reduce multi-syllabic words to 1-syllable through phonemic simplification or omission  He demonstrates inconsistent speech sound errors including substitutions, distortions, and omissions, vowel distortions, groping movements, etc      Receptive language comments: Mom reports that pt's ability to follow directives and attend to task has greatly increased since beginning ADHD medication, recovering from middle ear obstruction in the right ear, and attending to peer models at [facility] and pre   During the evaluation, pt demonstrated fleeting attention to task and had difficulty following directives due to aversive behaviors  However, pt appeared to comprehend directives and simple questions based on appropriate responses without full execution of instructions  Standardized Testing: Testing could not be completed due to pt having difficulty attending  Treating ST should complete testing and update POC/eval report based on results  Alpha Ara Test of Articulation-3rd Edition (GFTA-3)   The Alpha Ara 3 Test of Articulation (OESX-9) is a systematic means of assessing an individual’s articulation of the consonant sounds of Standard American English  It provides a wide range of information by sampling both spontaneous and imitative sound production, including single words and conversational speech  The following scores were obtained:  GFTA-3 Sounds-in-Words Score Summary   Total Raw Score Standard Score Confidence Interval 90% Test Age Equivalent   TBD TBD TBD TBD     The following errors were observed and are not developmentally appropriate:     TBD following completion of testing  Goals  Short Term Goals:  1  Cristian Mixon will produce all age-appropriate phonemes in CORE words with various syllable shapes (e g , VC, CV, CVC) in 80% of opps  2  Complete articulation testing (GFTA-3)  3  Complete comprehensive language testing (e g , CELF-P)  Long Term Goals:  1  Cristian Mixon will increase his overall speech intelligibility to be >90% by time of discharge  2  Cristian Mixon will increase his overall language expressive language skills to be [facility] by time of discharge  3  Cristian Mixon will increase his overall language receptive language skills to be [facility] by time of discharge  Impressions/ Recommendations  Impressions: Cristian Mixon presents with a severe speech sound disorder  He has been diagnosed with childhood apraxia of speech by Dr Herb Ribeiro   His speech sound disorder includes some characteristics consistent with this dx, including groping movement, vowel distortions, inconsistent errors (disotrtions, substitutions, omissions, etc ); however, a sufficient speech and language sample was unable to be obtained due to reduced attention/motivation  Further analysis to be determined following completion of formal testing measures and more clinical observations  Recommendations:Speech/ language therapy  Frequency: 1-3x weekly   Duration:Other 4 month    Intervention certification from: 9/1/1950  Intervention certification BY:7/8/0485  Intervention Comments: Monday thru Friday 2pm or later  As frequent as possible based on pt's tolerance and needs  Treating ST to add/adjust goals based on clincial judgement and completion of formal testing  Speech Treatment Note   Pt was reluctant to engage directly with [address] [place] [address] modeled signs and verbalizations for "more" and "all done"  When mom facilitated activities, pt observed to sign and verbally approximate "more" and "open" while playing with 3-car garage  Mom was provided with handouts to target speech sounds in various syllable shapes at home  ST provided education on apraxia dx and methods for tx  Mom was compliant and expressed motivation for pt to receive intervention as frequently as is appropriate and practice at-home using provided handouts, including exercises, education, and basic signs

## 2023-09-12 NOTE — TELEPHONE ENCOUNTER
----- Message from Orville Dover sent at 9/12/2023  3:18 PM CDT -----  Contact: pt at [phone]  Type:  Patient Returning Call    Who Called:  pt  Who Left Message for Patient:  Elise  Does the patient know what this is regarding?:  yes  Best Call Back Number:  [phone]  Additional Information:  pt is calling the office to return a call back to Elise.

## 2023-10-02 DIAGNOSIS — E79.0 HYPERURICEMIA: ICD-10-CM

## 2023-10-04 RX ORDER — ALLOPURINOL 100 MG/1
200 TABLET ORAL DAILY
Qty: 180 TABLET | Refills: 2 | Status: SHIPPED | OUTPATIENT
Start: 2023-10-04 | End: 2024-01-02

## 2023-10-09 ENCOUNTER — TELEPHONE (OUTPATIENT)
Dept: CARDIOLOGY | Facility: CLINIC | Age: 58
End: 2023-10-09
Payer: MEDICAID

## 2023-10-09 NOTE — TELEPHONE ENCOUNTER
----- Message from Michelle Anderson MA sent at 10/9/2023 10:57 AM CDT -----  Regarding: appointment  Contact: patient    ----- Message -----  From: Tania Robbins  Sent: 10/9/2023  10:43 AM CDT  To: [facility]    Type: Needs Medical Advice  Who Called:  patient  Best Call Back Number: [phone] (home)   Additional Information: patient needs to reschedule appt for tomorrow. Please advise.

## 2023-11-01 NOTE — PROGRESS NOTES
Subjective:    Patient ID:  Jarod Pierce is a 58 y.o. male patient here for evaluation Follow-up      History of Present Illness:         Jarod Pierce is here for follow-up visit. Denies chest pain or shortness of breath. Denies recent fever cough chills or congestion. Denies blood in the urine or blood in the stool.  Denies myalgias.  Denies nausea vomiting or dyspepsia. No recent arm neck or jaw pain. No lightheadedness or dizziness.       Review of patient's allergies indicates:   Allergen Reactions    Hydralazine analogues Other (See Comments)     drowsiness    Codeine      Other reaction(s): Hives    Codeine phosphate      Other reaction(s): Unknown  Other reaction(s): Unknown    Lisinopril      Dry coughing    Phenytoin sodium extended      Other reaction(s): leg cramps       Past Medical History:   Diagnosis Date    Allergic rhinitis due to allergen     Arthritis     Cardiomyopathy     Gout     Hypertension     Left bundle branch block      Past Surgical History:   Procedure Laterality Date    WISDOM TOOTH EXTRACTION       Social History     Tobacco Use    Smoking status: Never     Passive exposure: Never    Smokeless tobacco: Never   Substance Use Topics    Alcohol use: No    Drug use: No        Review of Systems:    As noted in HPI              Objective        Vitals:    11/02/23 0831   BP: (!) 150/86       LIPIDS - LAST 2   Lab Results   Component Value Date    CHOL 136 01/30/2023    CHOL 146 09/22/2022    HDL 33 (L) 01/30/2023    HDL 26 (L) 09/22/2022    LDLCALC 78.4 01/30/2023    LDLCALC 62.6 (L) 09/22/2022    TRIG 123 01/30/2023    TRIG 287 (H) 09/22/2022    CHOLHDL 24.3 01/30/2023    CHOLHDL 17.8 (L) 09/22/2022       CBC - LAST 2  Lab Results   Component Value Date    WBC 5.32 01/30/2023    WBC 7.43 09/22/2022    RBC 4.28 (L) 01/30/2023    RBC 4.54 (L) 09/22/2022    HGB 14.1 01/30/2023    HGB 14.7 09/22/2022    HCT 41.0 01/30/2023    HCT 42.4 09/22/2022    MCV 96  01/30/2023    MCV 93 09/22/2022    MCH 32.9 (H) 01/30/2023    MCH 32.4 (H) 09/22/2022    MCHC 34.4 01/30/2023    MCHC 34.7 09/22/2022    RDW 12.8 01/30/2023    RDW 12.6 09/22/2022     01/30/2023     09/22/2022    MPV 8.9 (L) 01/30/2023    MPV 9.0 (L) 09/22/2022    GRAN 3.1 01/30/2023    GRAN 57.5 01/30/2023    LYMPH 1.4 01/30/2023    LYMPH 26.9 01/30/2023    MONO 0.6 01/30/2023    MONO 11.8 01/30/2023    BASO 0.04 01/30/2023    BASO 0.04 09/22/2022    NRBC 0 01/30/2023    NRBC 0 09/22/2022       CHEMISTRY & LIVER FUNCTION - LAST 2  Lab Results   Component Value Date     01/30/2023     09/27/2022    K 3.7 01/30/2023    K 4.5 09/27/2022     01/30/2023     09/27/2022    CO2 29 01/30/2023    CO2 28 09/27/2022    ANIONGAP 6 (L) 01/30/2023    ANIONGAP 11 09/27/2022    BUN 19 01/30/2023    BUN 19 09/27/2022    CREATININE 1.2 01/30/2023    CREATININE 1.1 09/27/2022    GLU 95 01/30/2023     09/27/2022    CALCIUM 8.9 01/30/2023    CALCIUM 9.6 09/27/2022    MG 2.2 09/23/2022    MG 2.0 09/22/2022    ALBUMIN 4.3 01/30/2023    ALBUMIN 4.5 09/27/2022    PROT 7.8 01/30/2023    PROT 8.2 09/27/2022    ALKPHOS 101 01/30/2023    ALKPHOS 116 09/27/2022    ALT 22 01/30/2023    ALT 37 09/27/2022    AST 19 01/30/2023    AST 30 09/27/2022    BILITOT 1.0 01/30/2023    BILITOT 1.8 (H) 09/27/2022        CARDIAC PROFILE - LAST 2  Lab Results   Component Value Date    BNP 25 09/21/2022    BNP 28 05/26/2022    CPK 55 09/21/2022     05/17/2022    CPKMB 0.6 09/21/2022    CPKMB 0.8 05/17/2022    TROPONINI <0.030 09/22/2022    TROPONINI <0.030 09/21/2022        COAGULATION - LAST 2  Lab Results   Component Value Date    LABPT 13.2 09/21/2022    INR 1.1 09/21/2022    INR 1.0 01/14/2020    APTT 28.7 01/14/2020    APTT 25.5 07/21/2014       ENDOCRINE & PSA - LAST 2  Lab Results   Component Value Date    HGBA1C 5.9 01/30/2023    HGBA1C 5.8 09/27/2022    TSH 3.000 01/30/2023    TSH 3.490 09/27/2022     PSA 0.45 09/22/2011    PSA 0.34 10/03/2008        ECHOCARDIOGRAM RESULTS  Results for orders placed during the hospital encounter of 09/21/22    Echo    Interpretation Summary  · The left ventricle is normal in size with mild concentric hypertrophy and moderately decreased systolic function. LV is poorly visualized. EF could be moderately decreased to mildly decreased or low normal. Recommend alternative cardiac imaging for further evaluation.  · Mild left atrial enlargement.  · The estimated ejection fraction is 35%.  · Grade I left ventricular diastolic dysfunction.  · Normal central venous pressure (3 mmHg).      CURRENT/PREVIOUS VISIT EKG  Results for orders placed or performed during the hospital encounter of 09/21/22   EKG 12-lead    Collection Time: 09/21/22 12:12 PM    Narrative    Test Reason : R07.9,    Vent. Rate : 079 BPM     Atrial Rate : 079 BPM     P-R Int : 162 ms          QRS Dur : 164 ms      QT Int : 434 ms       P-R-T Axes : 047 -11 201 degrees     QTc Int : 497 ms    Normal sinus rhythm  Left bundle branch block  Abnormal ECG  When compared with ECG of 21-SEP-2022 10:33,  The axis Shifted right  T wave inversion now evident in Inferior leads  T wave inversion more evident in Lateral leads  Confirmed by Eliseo ESPINOZA, Elvis SMYTH ([id number]) on 9/23/2022 7:04:08 PM    Referred By: [email or web address]   SELF           Confirmed By:Elvis Gomes MD     No valid procedures specified.   Results for orders placed during the hospital encounter of 09/21/22    Nuclear Stress Test    Interpretation Summary    The EKG portion of this study is uninterpretable.    The patient reported no chest pain during the stress test.    There were no arrhythmias during stress.    The nuclear portion of this study will be reported separately.      PHYSICAL EXAM  CONSTITUTIONAL: Well built, well nourished in no apparent distress  NECK: no carotid bruit, no JVD  LUNGS: CTA  CHEST WALL: no tenderness  HEART: regular rate and rhythm, S1, S2  normal, no murmur, click, rub or gallop   ABDOMEN: soft, non-tender; bowel sounds normal; no masses,  no organomegaly  EXTREMITIES: Extremities normal, no edema, no calf tenderness noted  NEURO: AAO X 3    I HAVE REVIEWED :    The vital signs, nurses notes, and all the pertinent radiology and labs.        Current Outpatient Medications   Medication Instructions    albuterol (PROVENTIL HFA) 90 mcg/actuation inhaler 2 puffs, Inhalation, Every 6 hours PRN, Rescue    allopurinoL (ZYLOPRIM) 200 mg, Oral, Daily, Take 2 tablets by mouth once daily    amLODIPine (NORVASC) 5 mg, Oral, Daily    aspirin 81 mg, Oral, Daily    atorvastatin (LIPITOR) 40 MG tablet Take 1 tablet by mouth once daily    azelastine (ASTELIN) 274 mcg, Nasal, 2 times daily    carvediloL (COREG) 3.125 mg, Oral, 2 times daily    cyanocobalamin (VITAMIN B-12) 1,000 mcg, Oral, Daily    sacubitriL-valsartan (ENTRESTO) 24-26 mg per tablet 1 tablet, Oral, 2 times daily          Assessment & Plan     No problem-specific Assessment & Plan notes found for this encounter.          No follow-ups on file.     Subjective:      Jarod Pierce is a 58 y.o. male who presents for evaluation of elevated blood pressures. Age at onset of elevated blood pressure:  ***. Cardiac symptoms: {symptoms:05952}. Patient denies: {symptoms; cardiac:01658}. Cardiovascular risk factors: {risk factors:510}. Use of agents associated with hypertension: {meds:511}. History of target organ damage: {diagnoses:2340519510}.    Review of Systems  {ros; complete:41063}     Objective:      {exam; complete:69551}    Cardiographics  ECG: {rhythm:00048}      Assessment:      Hypertension, {control:70923} ***. Evidence of target organ damage: {target organ:1290808365}.      Plan:      {Plan:1332887438}     Subjective:      Jarod Pierce is a 58 y.o. male who presents for evaluation of elevated blood pressures. Age at onset of elevated blood pressure:  ***. Cardiac symptoms:  {symptoms:93364}. Patient denies: {symptoms; cardiac:99047}. Cardiovascular risk factors: {risk factors:510}. Use of agents associated with hypertension: {meds:511}. History of target organ damage: {diagnoses:5317545776}.    Review of Systems  {ros; complete:27866}     Objective:      {exam; complete:11636}    Cardiographics  ECG: {rhythm:60502}      Assessment:      Hypertension, {control:29097} ***. Evidence of target organ damage: {target organ:9903472710}.      Plan:      {Plan:0682872135}

## 2023-11-02 ENCOUNTER — LAB VISIT (OUTPATIENT)
Dept: LAB | Facility: HOSPITAL | Age: 58
End: 2023-11-02
Attending: NURSE PRACTITIONER
Payer: MEDICAID

## 2023-11-02 ENCOUNTER — OFFICE VISIT (OUTPATIENT)
Dept: CARDIOLOGY | Facility: CLINIC | Age: 58
End: 2023-11-02
Payer: MEDICAID

## 2023-11-02 VITALS
WEIGHT: 229 LBS | HEIGHT: 69 IN | DIASTOLIC BLOOD PRESSURE: 86 MMHG | SYSTOLIC BLOOD PRESSURE: 150 MMHG | BODY MASS INDEX: 33.92 KG/M2

## 2023-11-02 DIAGNOSIS — E78.2 MIXED HYPERLIPIDEMIA: ICD-10-CM

## 2023-11-02 DIAGNOSIS — M06.9 RHEUMATOID ARTHRITIS FLARE: ICD-10-CM

## 2023-11-02 DIAGNOSIS — I44.7 LEFT BUNDLE BRANCH BLOCK: ICD-10-CM

## 2023-11-02 DIAGNOSIS — Z00.00 ANNUAL PHYSICAL EXAM: ICD-10-CM

## 2023-11-02 DIAGNOSIS — I50.22 CHRONIC SYSTOLIC CONGESTIVE HEART FAILURE: ICD-10-CM

## 2023-11-02 DIAGNOSIS — Z00.00 ANNUAL PHYSICAL EXAM: Primary | ICD-10-CM

## 2023-11-02 DIAGNOSIS — I42.8 NONISCHEMIC CARDIOMYOPATHY: ICD-10-CM

## 2023-11-02 DIAGNOSIS — K21.9 GASTROESOPHAGEAL REFLUX DISEASE, UNSPECIFIED WHETHER ESOPHAGITIS PRESENT: ICD-10-CM

## 2023-11-02 LAB
ALBUMIN SERPL BCP-MCNC: 4.5 G/DL (ref 3.5–5.2)
ALP SERPL-CCNC: 97 U/L (ref 55–135)
ALT SERPL W/O P-5'-P-CCNC: 20 U/L (ref 10–44)
ANION GAP SERPL CALC-SCNC: 6 MMOL/L (ref 8–16)
AST SERPL-CCNC: 22 U/L (ref 10–40)
BASOPHILS # BLD AUTO: 0.04 K/UL (ref 0–0.2)
BASOPHILS NFR BLD: 0.7 % (ref 0–1.9)
BILIRUB SERPL-MCNC: 1 MG/DL (ref 0.1–1)
BNP SERPL-MCNC: 13 PG/ML (ref 0–99)
BUN SERPL-MCNC: 15 MG/DL (ref 6–20)
CALCIUM SERPL-MCNC: 9.4 MG/DL (ref 8.7–10.5)
CHLORIDE SERPL-SCNC: 102 MMOL/L (ref 95–110)
CHOLEST SERPL-MCNC: 218 MG/DL (ref 120–199)
CHOLEST/HDLC SERPL: 5.9 {RATIO} (ref 2–5)
CO2 SERPL-SCNC: 30 MMOL/L (ref 23–29)
CREAT SERPL-MCNC: 1.4 MG/DL (ref 0.5–1.4)
DIFFERENTIAL METHOD: ABNORMAL
EOSINOPHIL # BLD AUTO: 0.2 K/UL (ref 0–0.5)
EOSINOPHIL NFR BLD: 2.6 % (ref 0–8)
ERYTHROCYTE [DISTWIDTH] IN BLOOD BY AUTOMATED COUNT: 12.5 % (ref 11.5–14.5)
EST. GFR  (NO RACE VARIABLE): 57.9 ML/MIN/1.73 M^2
GLUCOSE SERPL-MCNC: 103 MG/DL (ref 70–110)
HCT VFR BLD AUTO: 44.1 % (ref 40–54)
HDLC SERPL-MCNC: 37 MG/DL (ref 40–75)
HDLC SERPL: 17 % (ref 20–50)
HGB BLD-MCNC: 15.7 G/DL (ref 14–18)
IMM GRANULOCYTES # BLD AUTO: 0.01 K/UL (ref 0–0.04)
IMM GRANULOCYTES NFR BLD AUTO: 0.2 % (ref 0–0.5)
LDLC SERPL CALC-MCNC: 114.2 MG/DL (ref 63–159)
LYMPHOCYTES # BLD AUTO: 1.5 K/UL (ref 1–4.8)
LYMPHOCYTES NFR BLD: 26.2 % (ref 18–48)
MCH RBC QN AUTO: 33.2 PG (ref 27–31)
MCHC RBC AUTO-ENTMCNC: 35.6 G/DL (ref 32–36)
MCV RBC AUTO: 93 FL (ref 82–98)
MONOCYTES # BLD AUTO: 0.6 K/UL (ref 0.3–1)
MONOCYTES NFR BLD: 9.8 % (ref 4–15)
NEUTROPHILS # BLD AUTO: 3.5 K/UL (ref 1.8–7.7)
NEUTROPHILS NFR BLD: 60.5 % (ref 38–73)
NONHDLC SERPL-MCNC: 181 MG/DL
NRBC BLD-RTO: 0 /100 WBC
PLATELET # BLD AUTO: 188 K/UL (ref 150–450)
PMV BLD AUTO: 9 FL (ref 9.2–12.9)
POTASSIUM SERPL-SCNC: 3.6 MMOL/L (ref 3.5–5.1)
PROT SERPL-MCNC: 7.8 G/DL (ref 6–8.4)
RBC # BLD AUTO: 4.73 M/UL (ref 4.6–6.2)
SODIUM SERPL-SCNC: 138 MMOL/L (ref 136–145)
TRIGL SERPL-MCNC: 334 MG/DL (ref 30–150)
TSH SERPL DL<=0.005 MIU/L-ACNC: 4.47 UIU/ML (ref 0.34–5.6)
WBC # BLD AUTO: 5.8 K/UL (ref 3.9–12.7)

## 2023-11-02 PROCEDURE — 4010F ACE/ARB THERAPY RXD/TAKEN: CPT | Mod: CPTII,,, | Performed by: NURSE PRACTITIONER

## 2023-11-02 PROCEDURE — 84443 ASSAY THYROID STIM HORMONE: CPT | Performed by: NURSE PRACTITIONER

## 2023-11-02 PROCEDURE — 3008F BODY MASS INDEX DOCD: CPT | Mod: CPTII,,, | Performed by: NURSE PRACTITIONER

## 2023-11-02 PROCEDURE — 3077F SYST BP >= 140 MM HG: CPT | Mod: CPTII,,, | Performed by: NURSE PRACTITIONER

## 2023-11-02 PROCEDURE — 93010 ELECTROCARDIOGRAM REPORT: CPT | Mod: S$PBB,,, | Performed by: INTERNAL MEDICINE

## 2023-11-02 PROCEDURE — 99213 OFFICE O/P EST LOW 20 MIN: CPT | Mod: PBBFAC,PN | Performed by: NURSE PRACTITIONER

## 2023-11-02 PROCEDURE — 3079F DIAST BP 80-89 MM HG: CPT | Mod: CPTII,,, | Performed by: NURSE PRACTITIONER

## 2023-11-02 PROCEDURE — 1160F RVW MEDS BY RX/DR IN RCRD: CPT | Mod: CPTII,,, | Performed by: NURSE PRACTITIONER

## 2023-11-02 PROCEDURE — 1159F MED LIST DOCD IN RCRD: CPT | Mod: CPTII,,, | Performed by: NURSE PRACTITIONER

## 2023-11-02 PROCEDURE — 80053 COMPREHEN METABOLIC PANEL: CPT | Performed by: NURSE PRACTITIONER

## 2023-11-02 PROCEDURE — 99999 PR PBB SHADOW E&M-EST. PATIENT-LVL III: CPT | Mod: PBBFAC,,, | Performed by: NURSE PRACTITIONER

## 2023-11-02 PROCEDURE — 99999 PR PBB SHADOW E&M-EST. PATIENT-LVL III: ICD-10-PCS | Mod: PBBFAC,,, | Performed by: NURSE PRACTITIONER

## 2023-11-02 PROCEDURE — 99214 OFFICE O/P EST MOD 30 MIN: CPT | Mod: S$PBB,,, | Performed by: NURSE PRACTITIONER

## 2023-11-02 PROCEDURE — 4010F PR ACE/ARB THEARPY RXD/TAKEN: ICD-10-PCS | Mod: CPTII,,, | Performed by: NURSE PRACTITIONER

## 2023-11-02 PROCEDURE — 93005 ELECTROCARDIOGRAM TRACING: CPT | Mod: PBBFAC,PN | Performed by: INTERNAL MEDICINE

## 2023-11-02 PROCEDURE — 85025 COMPLETE CBC W/AUTO DIFF WBC: CPT | Performed by: NURSE PRACTITIONER

## 2023-11-02 PROCEDURE — 36415 COLL VENOUS BLD VENIPUNCTURE: CPT | Performed by: NURSE PRACTITIONER

## 2023-11-02 PROCEDURE — 3008F PR BODY MASS INDEX (BMI) DOCUMENTED: ICD-10-PCS | Mod: CPTII,,, | Performed by: NURSE PRACTITIONER

## 2023-11-02 PROCEDURE — 3077F PR MOST RECENT SYSTOLIC BLOOD PRESSURE >= 140 MM HG: ICD-10-PCS | Mod: CPTII,,, | Performed by: NURSE PRACTITIONER

## 2023-11-02 PROCEDURE — 83880 ASSAY OF NATRIURETIC PEPTIDE: CPT | Performed by: NURSE PRACTITIONER

## 2023-11-02 PROCEDURE — 1159F PR MEDICATION LIST DOCUMENTED IN MEDICAL RECORD: ICD-10-PCS | Mod: CPTII,,, | Performed by: NURSE PRACTITIONER

## 2023-11-02 PROCEDURE — 1160F PR REVIEW ALL MEDS BY PRESCRIBER/CLIN PHARMACIST DOCUMENTED: ICD-10-PCS | Mod: CPTII,,, | Performed by: NURSE PRACTITIONER

## 2023-11-02 PROCEDURE — 3044F PR MOST RECENT HEMOGLOBIN A1C LEVEL <7.0%: ICD-10-PCS | Mod: CPTII,,, | Performed by: NURSE PRACTITIONER

## 2023-11-02 PROCEDURE — 3079F PR MOST RECENT DIASTOLIC BLOOD PRESSURE 80-89 MM HG: ICD-10-PCS | Mod: CPTII,,, | Performed by: NURSE PRACTITIONER

## 2023-11-02 PROCEDURE — 3044F HG A1C LEVEL LT 7.0%: CPT | Mod: CPTII,,, | Performed by: NURSE PRACTITIONER

## 2023-11-02 PROCEDURE — 99214 PR OFFICE/OUTPT VISIT, EST, LEVL IV, 30-39 MIN: ICD-10-PCS | Mod: S$PBB,,, | Performed by: NURSE PRACTITIONER

## 2023-11-02 PROCEDURE — 80061 LIPID PANEL: CPT | Performed by: NURSE PRACTITIONER

## 2023-11-02 PROCEDURE — 93010 EKG 12-LEAD: ICD-10-PCS | Mod: S$PBB,,, | Performed by: INTERNAL MEDICINE

## 2023-11-02 RX ORDER — AMLODIPINE BESYLATE 5 MG/1
5 TABLET ORAL 2 TIMES DAILY
Qty: 60 TABLET | Refills: 11 | Status: SHIPPED | OUTPATIENT
Start: 2023-11-02 | End: 2023-11-02

## 2023-11-02 RX ORDER — AMLODIPINE BESYLATE 5 MG/1
5 TABLET ORAL DAILY
Qty: 30 TABLET | Refills: 11 | Status: SHIPPED | OUTPATIENT
Start: 2023-11-02 | End: 2024-02-14 | Stop reason: SDUPTHER

## 2023-11-02 RX ORDER — CARVEDILOL 3.12 MG/1
3.12 TABLET ORAL 2 TIMES DAILY
COMMUNITY
Start: 2023-10-10 | End: 2024-02-14 | Stop reason: SDUPTHER

## 2023-11-02 RX ORDER — SACUBITRIL AND VALSARTAN 24; 26 MG/1; MG/1
1 TABLET, FILM COATED ORAL 2 TIMES DAILY
Qty: 60 TABLET | Refills: 3 | Status: SHIPPED | OUTPATIENT
Start: 2023-11-02 | End: 2023-11-10 | Stop reason: SDUPTHER

## 2023-11-02 NOTE — PROGRESS NOTES
Subjective:    Patient ID:  Jarod Pierce is a 58 y.o. male patient here for evaluation Follow-up      History of Present Illness:       Jarod Pierce is here for follow-up visit. Denies chest pain or shortness of breath. Denies recent fever cough chills or congestion. Denies blood in the urine or blood in the stool.  Denies myalgias. Denies orthopnea or peripheral edema. Denies nausea vomiting or dyspepsia. No recent arm neck or jaw pain. No lightheadedness or dizziness. Complain of RA. Complains of some SOB at times and feeling fulll.        Review of patient's allergies indicates:   Allergen Reactions    Hydralazine analogues Other (See Comments)     drowsiness    Codeine      Other reaction(s): Hives    Codeine phosphate      Other reaction(s): Unknown  Other reaction(s): Unknown    Lisinopril      Dry coughing    Phenytoin sodium extended      Other reaction(s): leg cramps       Past Medical History:   Diagnosis Date    Allergic rhinitis due to allergen     Arthritis     Cardiomyopathy     Gout     Hypertension     Left bundle branch block      Past Surgical History:   Procedure Laterality Date    WISDOM TOOTH EXTRACTION       Social History     Tobacco Use    Smoking status: Never     Passive exposure: Never    Smokeless tobacco: Never   Substance Use Topics    Alcohol use: No    Drug use: No        Review of Systems:    As noted in HPI             Objective        Vitals:    11/02/23 0831   BP: (!) 150/86       LIPIDS - LAST 2   Lab Results   Component Value Date    CHOL 136 01/30/2023    CHOL 146 09/22/2022    HDL 33 (L) 01/30/2023    HDL 26 (L) 09/22/2022    LDLCALC 78.4 01/30/2023    LDLCALC 62.6 (L) 09/22/2022    TRIG 123 01/30/2023    TRIG 287 (H) 09/22/2022    CHOLHDL 24.3 01/30/2023    CHOLHDL 17.8 (L) 09/22/2022       CBC - LAST 2  Lab Results   Component Value Date    WBC 5.80 11/02/2023    WBC 5.32 01/30/2023    RBC 4.73 11/02/2023    RBC 4.28 (L) 01/30/2023    HGB 15.7 11/02/2023    HGB  14.1 01/30/2023    HCT 44.1 11/02/2023    HCT 41.0 01/30/2023    MCV 93 11/02/2023    MCV 96 01/30/2023    MCH 33.2 (H) 11/02/2023    MCH 32.9 (H) 01/30/2023    MCHC 35.6 11/02/2023    MCHC 34.4 01/30/2023    RDW 12.5 11/02/2023    RDW 12.8 01/30/2023     11/02/2023     01/30/2023    MPV 9.0 (L) 11/02/2023    MPV 8.9 (L) 01/30/2023    GRAN 3.5 11/02/2023    GRAN 60.5 11/02/2023    LYMPH 1.5 11/02/2023    LYMPH 26.2 11/02/2023    MONO 0.6 11/02/2023    MONO 9.8 11/02/2023    BASO 0.04 11/02/2023    BASO 0.04 01/30/2023    NRBC 0 11/02/2023    NRBC 0 01/30/2023       CHEMISTRY & LIVER FUNCTION - LAST 2  Lab Results   Component Value Date     01/30/2023     09/27/2022    K 3.7 01/30/2023    K 4.5 09/27/2022     01/30/2023     09/27/2022    CO2 29 01/30/2023    CO2 28 09/27/2022    ANIONGAP 6 (L) 01/30/2023    ANIONGAP 11 09/27/2022    BUN 19 01/30/2023    BUN 19 09/27/2022    CREATININE 1.2 01/30/2023    CREATININE 1.1 09/27/2022    GLU 95 01/30/2023     09/27/2022    CALCIUM 8.9 01/30/2023    CALCIUM 9.6 09/27/2022    MG 2.2 09/23/2022    MG 2.0 09/22/2022    ALBUMIN 4.3 01/30/2023    ALBUMIN 4.5 09/27/2022    PROT 7.8 01/30/2023    PROT 8.2 09/27/2022    ALKPHOS 101 01/30/2023    ALKPHOS 116 09/27/2022    ALT 22 01/30/2023    ALT 37 09/27/2022    AST 19 01/30/2023    AST 30 09/27/2022    BILITOT 1.0 01/30/2023    BILITOT 1.8 (H) 09/27/2022        CARDIAC PROFILE - LAST 2  Lab Results   Component Value Date    BNP 25 09/21/2022    BNP 28 05/26/2022    CPK 55 09/21/2022     05/17/2022    CPKMB 0.6 09/21/2022    CPKMB 0.8 05/17/2022    TROPONINI <0.030 09/22/2022    TROPONINI <0.030 09/21/2022        COAGULATION - LAST 2  Lab Results   Component Value Date    LABPT 13.2 09/21/2022    INR 1.1 09/21/2022    INR 1.0 01/14/2020    APTT 28.7 01/14/2020    APTT 25.5 07/21/2014       ENDOCRINE & PSA - LAST 2  Lab Results   Component Value Date    HGBA1C 5.9 01/30/2023     HGBA1C 5.8 09/27/2022    TSH 3.000 01/30/2023    TSH 3.490 09/27/2022    PSA 0.45 09/22/2011    PSA 0.34 10/03/2008        ECHOCARDIOGRAM RESULTS  Results for orders placed during the hospital encounter of 09/21/22    Echo    Interpretation Summary  · The left ventricle is normal in size with mild concentric hypertrophy and moderately decreased systolic function. LV is poorly visualized. EF could be moderately decreased to mildly decreased or low normal. Recommend alternative cardiac imaging for further evaluation.  · Mild left atrial enlargement.  · The estimated ejection fraction is 35%.  · Grade I left ventricular diastolic dysfunction.  · Normal central venous pressure (3 mmHg).      CURRENT/PREVIOUS VISIT EKG  Results for orders placed or performed during the hospital encounter of 09/21/22   EKG 12-lead    Collection Time: 09/21/22 12:12 PM    Narrative    Test Reason : R07.9,    Vent. Rate : 079 BPM     Atrial Rate : 079 BPM     P-R Int : 162 ms          QRS Dur : 164 ms      QT Int : 434 ms       P-R-T Axes : 047 -11 201 degrees     QTc Int : 497 ms    Normal sinus rhythm  Left bundle branch block  Abnormal ECG  When compared with ECG of 21-SEP-2022 10:33,  The axis Shifted right  T wave inversion now evident in Inferior leads  T wave inversion more evident in Lateral leads  Confirmed by Eliseo ESPINOZA, Elvis SMYTH ([id number]) on 9/23/2022 7:04:08 PM    Referred By: LUIZ   SELF           Confirmed By:Elvis Gomes MD     No valid procedures specified.   Results for orders placed during the hospital encounter of 09/21/22    Nuclear Stress Test    Interpretation Summary    The EKG portion of this study is uninterpretable.    The patient reported no chest pain during the stress test.    There were no arrhythmias during stress.    The nuclear portion of this study will be reported separately.    No valid procedures specified.    PHYSICAL EXAM  CONSTITUTIONAL: Well built, well nourished in no apparent distress  NECK: no  carotid bruit, no JVD  LUNGS: CTA  CHEST WALL: no tenderness  HEART: regular rate and rhythm, S1, S2 normal, no murmur, click, rub or gallop   ABDOMEN: soft, non-tender; bowel sounds normal; no masses,  no organomegaly  EXTREMITIES: Extremities normal, no edema, no calf tenderness noted  NEURO: AAO X 3    I HAVE REVIEWED :    The vital signs, nurses notes, and all the pertinent radiology and labs.        Current Outpatient Medications   Medication Instructions    albuterol (PROVENTIL HFA) 90 mcg/actuation inhaler 2 puffs, Inhalation, Every 6 hours PRN, Rescue    allopurinoL (ZYLOPRIM) 200 mg, Oral, Daily, Take 2 tablets by mouth once daily    amLODIPine (NORVASC) 5 mg, Oral, Daily    aspirin 81 mg, Oral, Daily    atorvastatin (LIPITOR) 40 MG tablet Take 1 tablet by mouth once daily    azelastine (ASTELIN) 274 mcg, Nasal, 2 times daily    carvediloL (COREG) 3.125 mg, Oral, 2 times daily    cyanocobalamin (VITAMIN B-12) 1,000 mcg, Oral, Daily    sacubitriL-valsartan (ENTRESTO) 24-26 mg per tablet 1 tablet, Oral, 2 times daily          Assessment & Plan     Problem List Items Addressed This Visit          Cardiac/Vascular    Left bundle branch block,  msec    Relevant Orders    Echo    Nonischemic cardiomyopathy, , EF 30% to 35%    Mixed hyperlipidemia, baseline     Chronic systolic congestive heart failure       GI    Gastroesophageal reflux disease       Other    Annual physical exam - Primary    Relevant Medications    sacubitriL-valsartan (ENTRESTO) 24-26 mg per tablet    Other Relevant Orders    IN OFFICE EKG 12-LEAD (to [place])    Comprehensive Metabolic Panel    Lipid Panel    CBC Auto Differential (Completed)    TSH    BNP     Other Visit Diagnoses       Rheumatoid arthritis flare        Relevant Orders    Ambulatory referral/consult to Rheumatology           Need tighter BP control and has LV Dysfunction  DC Losartan  Use Entresto 24/26-watch for cough if happens will DC  Check labs to include CMP  BNP  Enroll Digital Medicine  Follow up in 4 weeks with repeat labs.

## 2023-11-10 DIAGNOSIS — Z00.00 ANNUAL PHYSICAL EXAM: ICD-10-CM

## 2023-11-10 RX ORDER — SACUBITRIL AND VALSARTAN 24; 26 MG/1; MG/1
1 TABLET, FILM COATED ORAL 2 TIMES DAILY
Qty: 60 TABLET | Refills: 3 | Status: SHIPPED | OUTPATIENT
Start: 2023-11-10 | End: 2024-01-04 | Stop reason: SDUPTHER

## 2023-11-10 NOTE — TELEPHONE ENCOUNTER
----- Message from Anthony Fisher sent at 11/10/2023  2:10 PM CST -----  Type:  Pharmacy Calling to Clarify an RX    Name of Caller:  Jarod Pierce  Pharmacy Name:    [facility] [address] [place], [place] [address], [place] - [address] [address]  [id number]  [facility] [address]  Phone: [phone] Fax: [phone]  Prescription Name:  sacubitriL-valsartan (ENTRESTO) 24-26 mg per tablet  What do they need to clarify?:  prescription authorization code  Best Call Back Number:  [phone]  Additional Information:  pt is calling to get his prescription authorized, Pharmacy is asking for  a code and pt is not sure what code it is. Please call back and advise. Thanks!

## 2023-11-29 ENCOUNTER — HOSPITAL ENCOUNTER (OUTPATIENT)
Dept: CARDIOLOGY | Facility: HOSPITAL | Age: 58
Discharge: HOME | End: 2023-11-29
Attending: NURSE PRACTITIONER
Payer: MEDICAID

## 2023-11-29 VITALS — BODY MASS INDEX: 33.93 KG/M2 | HEIGHT: 69 IN | WEIGHT: 229.06 LBS

## 2023-11-29 DIAGNOSIS — I44.7 LEFT BUNDLE BRANCH BLOCK: ICD-10-CM

## 2023-11-29 PROCEDURE — 93306 TTE W/DOPPLER COMPLETE: CPT

## 2023-11-29 PROCEDURE — 93306 TTE W/DOPPLER COMPLETE: CPT | Mod: 26,,, | Performed by: GENERAL PRACTICE

## 2023-11-29 PROCEDURE — 93306 ECHO (CUPID ONLY): ICD-10-PCS | Mod: 26,,, | Performed by: GENERAL PRACTICE

## 2023-11-30 LAB
BSA FOR ECHO PROCEDURE: 2.25 M2
RA PRESSURE ESTIMATED: 3 MMHG

## 2023-12-26 ENCOUNTER — TELEPHONE (OUTPATIENT)
Dept: CARDIOLOGY | Facility: CLINIC | Age: 58
End: 2023-12-26
Payer: MEDICAID

## 2023-12-26 DIAGNOSIS — I50.22 CHRONIC SYSTOLIC CONGESTIVE HEART FAILURE: Primary | ICD-10-CM

## 2023-12-26 NOTE — TELEPHONE ENCOUNTER
----- Message from Noe Kwok sent at 12/26/2023  9:48 AM CST -----  Type: Needs Medical Advice  Who Called:  pt  Symptoms (please be specific):  pt said he need to speak to the NP--said he left a msg about his sacubitriL-valsartan (ENTRESTO) 24-26 mg per tablet and something did not go right--said the insurance co would not pay for it--said he need to speak to the nurse to explain his situation--please call and advise    Pharmacy name and phone #:    [facility] [address] [place] [address] [address]  [address] [address]  [facility] [address]  Phone: [phone] Fax: [phone]  Best Call Back Number: [phone] (home)     Additional Information: thank you

## 2023-12-27 ENCOUNTER — TELEPHONE (OUTPATIENT)
Dept: PHARMACY | Facility: CLINIC | Age: 58
End: 2023-12-27
Payer: MEDICAID

## 2023-12-27 NOTE — TELEPHONE ENCOUNTER
Unfortunately, The Pharmacy Patient Assistance Team is unable to assist Mr. Pierce at this time due to the following reasons      Patient has Medicaid/Government Insurance   The Pharmacy Patient Assistance Team does not assist with prior authorizations. Please send a new prescription to [facility] to initiate the prior authorization and follow up with the Prior Authorization Team         Pharmacy Patient Assistance Team

## 2023-12-29 DIAGNOSIS — Z00.00 ANNUAL PHYSICAL EXAM: ICD-10-CM

## 2023-12-29 DIAGNOSIS — I50.22 CHRONIC SYSTOLIC CONGESTIVE HEART FAILURE: Primary | ICD-10-CM

## 2023-12-29 NOTE — TELEPHONE ENCOUNTER
----- Message from Noe Kwok sent at 12/29/2023  1:10 PM CST -----  Type: Needs Medical Advice  Who Called:  Aneudy from [facility] pharmacy  Symptoms (please be specific):  said the office keep sending over pt's sacubitriL-valsartan (ENTRESTO) 24-26 mg per tablet he said he need a PA or a new dx code--there's nothing he can do with it-please call and advise  Pharmacy name and phone #:    [facility]  [place], [place] - [address] [address]  [id number]  [facility] [address]  Phone: [phone] Fax: [phone]    Additional Information: thank you

## 2024-01-04 RX ORDER — SACUBITRIL AND VALSARTAN 24; 26 MG/1; MG/1
1 TABLET, FILM COATED ORAL 2 TIMES DAILY
Qty: 180 TABLET | Refills: 3 | Status: SHIPPED | OUTPATIENT
Start: 2024-01-04 | End: 2024-02-14 | Stop reason: ALTCHOICE

## 2024-01-24 ENCOUNTER — PATIENT MESSAGE (OUTPATIENT)
Dept: FAMILY MEDICINE | Facility: CLINIC | Age: 59
End: 2024-01-24
Payer: MEDICAID

## 2024-01-24 DIAGNOSIS — I16.0 HYPERTENSIVE URGENCY: Primary | Chronic | ICD-10-CM

## 2024-02-05 PROBLEM — Z00.00 ANNUAL PHYSICAL EXAM: Status: RESOLVED | Noted: 2023-11-02 | Resolved: 2024-02-05

## 2024-02-06 ENCOUNTER — TELEPHONE (OUTPATIENT)
Dept: CARDIOLOGY | Facility: CLINIC | Age: 59
End: 2024-02-06
Payer: MEDICAID

## 2024-02-06 NOTE — TELEPHONE ENCOUNTER
----- Message from Ethan Gallegos sent at 2/6/2024 11:51 AM CST -----  Contact: Self  Type:  Sooner Appointment Request    Caller is requesting a sooner appointment.  Caller declined first available appointment listed below.  Caller will not accept being placed on the waitlist and is requesting a message be sent to doctor.    Name of Caller:  Patient  When is the first available appointment?  N/a  Symptoms:  recall  Would the patient rather a call back or a response via [facility]? Call  Best Call Back Number:  [phone]   Additional Information:

## 2024-02-07 NOTE — TELEPHONE ENCOUNTER
----- Message from Noe Kwok sent at 2/7/2024  9:41 AM CST -----  Type: Needs Medical Advice  Who Called:  pt  Symptoms (please be specific):  pt said he need to speak to the NP about his meds--please call and advise    Best Call Back Number: [phone] (home)     Additional Information: thank you

## 2024-02-14 ENCOUNTER — LAB VISIT (OUTPATIENT)
Dept: LAB | Facility: HOSPITAL | Age: 59
End: 2024-02-14
Payer: MEDICAID

## 2024-02-14 ENCOUNTER — OFFICE VISIT (OUTPATIENT)
Dept: CARDIOLOGY | Facility: CLINIC | Age: 59
End: 2024-02-14
Payer: MEDICAID

## 2024-02-14 VITALS
BODY MASS INDEX: 33.18 KG/M2 | HEART RATE: 76 BPM | OXYGEN SATURATION: 92 % | SYSTOLIC BLOOD PRESSURE: 180 MMHG | DIASTOLIC BLOOD PRESSURE: 90 MMHG | WEIGHT: 224 LBS | HEIGHT: 69 IN

## 2024-02-14 DIAGNOSIS — E78.2 MIXED HYPERLIPIDEMIA: ICD-10-CM

## 2024-02-14 DIAGNOSIS — E66.9 OBESITY (BMI 30-39.9): ICD-10-CM

## 2024-02-14 DIAGNOSIS — I16.0 HYPERTENSIVE URGENCY: Primary | Chronic | ICD-10-CM

## 2024-02-14 DIAGNOSIS — I42.8 NONISCHEMIC CARDIOMYOPATHY: ICD-10-CM

## 2024-02-14 DIAGNOSIS — R07.9 CHEST PAIN, UNSPECIFIED TYPE: ICD-10-CM

## 2024-02-14 LAB
ANION GAP SERPL CALC-SCNC: 9 MMOL/L (ref 8–16)
BUN SERPL-MCNC: 14 MG/DL (ref 6–20)
CALCIUM SERPL-MCNC: 9.2 MG/DL (ref 8.7–10.5)
CHLORIDE SERPL-SCNC: 104 MMOL/L (ref 95–110)
CHOLEST SERPL-MCNC: 210 MG/DL (ref 120–199)
CHOLEST/HDLC SERPL: 6.4 {RATIO} (ref 2–5)
CO2 SERPL-SCNC: 25 MMOL/L (ref 23–29)
CREAT SERPL-MCNC: 1.2 MG/DL (ref 0.5–1.4)
EST. GFR  (NO RACE VARIABLE): >60 ML/MIN/1.73 M^2
GLUCOSE SERPL-MCNC: 90 MG/DL (ref 70–110)
HDLC SERPL-MCNC: 33 MG/DL (ref 40–75)
HDLC SERPL: 15.7 % (ref 20–50)
LDLC SERPL CALC-MCNC: 152 MG/DL (ref 63–159)
NONHDLC SERPL-MCNC: 177 MG/DL
POTASSIUM SERPL-SCNC: 4.2 MMOL/L (ref 3.5–5.1)
SODIUM SERPL-SCNC: 138 MMOL/L (ref 136–145)
TRIGL SERPL-MCNC: 125 MG/DL (ref 30–150)

## 2024-02-14 PROCEDURE — 3080F DIAST BP >= 90 MM HG: CPT | Mod: CPTII,,,

## 2024-02-14 PROCEDURE — 1160F RVW MEDS BY RX/DR IN RCRD: CPT | Mod: CPTII,,,

## 2024-02-14 PROCEDURE — 36415 COLL VENOUS BLD VENIPUNCTURE: CPT

## 2024-02-14 PROCEDURE — 3077F SYST BP >= 140 MM HG: CPT | Mod: CPTII,,,

## 2024-02-14 PROCEDURE — 99214 OFFICE O/P EST MOD 30 MIN: CPT | Mod: PBBFAC,PN

## 2024-02-14 PROCEDURE — 99214 OFFICE O/P EST MOD 30 MIN: CPT | Mod: S$PBB,,,

## 2024-02-14 PROCEDURE — 3008F BODY MASS INDEX DOCD: CPT | Mod: CPTII,,,

## 2024-02-14 PROCEDURE — 80048 BASIC METABOLIC PNL TOTAL CA: CPT

## 2024-02-14 PROCEDURE — 1159F MED LIST DOCD IN RCRD: CPT | Mod: CPTII,,,

## 2024-02-14 PROCEDURE — 4010F ACE/ARB THERAPY RXD/TAKEN: CPT | Mod: CPTII,,,

## 2024-02-14 PROCEDURE — 80061 LIPID PANEL: CPT

## 2024-02-14 PROCEDURE — 99999 PR PBB SHADOW E&M-EST. PATIENT-LVL IV: CPT | Mod: PBBFAC,,,

## 2024-02-14 RX ORDER — AMLODIPINE BESYLATE 5 MG/1
5 TABLET ORAL DAILY
Qty: 90 TABLET | Refills: 3 | Status: SHIPPED | OUTPATIENT
Start: 2024-02-14 | End: 2025-02-13

## 2024-02-14 RX ORDER — LOSARTAN POTASSIUM 25 MG/1
25 TABLET ORAL DAILY
Qty: 90 TABLET | Refills: 3 | Status: SHIPPED | OUTPATIENT
Start: 2024-02-14 | End: 2025-02-13

## 2024-02-14 RX ORDER — FLUTICASONE PROPIONATE 50 MCG
2 SPRAY, SUSPENSION (ML) NASAL DAILY
Qty: 16 G | Refills: 1 | Status: SHIPPED | OUTPATIENT
Start: 2024-02-14 | End: 2024-02-21 | Stop reason: SDUPTHER

## 2024-02-14 RX ORDER — ATORVASTATIN CALCIUM 40 MG/1
40 TABLET, FILM COATED ORAL DAILY
Qty: 90 TABLET | Refills: 3 | Status: SHIPPED | OUTPATIENT
Start: 2024-02-14

## 2024-02-14 RX ORDER — CARVEDILOL 3.12 MG/1
3.12 TABLET ORAL 2 TIMES DAILY
Qty: 180 TABLET | Refills: 3 | Status: SHIPPED | OUTPATIENT
Start: 2024-02-14

## 2024-02-15 ENCOUNTER — HOSPITAL ENCOUNTER (EMERGENCY)
Facility: HOSPITAL | Age: 59
Discharge: HOME | End: 2024-02-15
Attending: EMERGENCY MEDICINE
Payer: MEDICAID

## 2024-02-15 ENCOUNTER — TELEPHONE (OUTPATIENT)
Dept: FAMILY MEDICINE | Facility: CLINIC | Age: 59
End: 2024-02-15
Payer: MEDICAID

## 2024-02-15 VITALS
OXYGEN SATURATION: 100 % | RESPIRATION RATE: 18 BRPM | WEIGHT: 225 LBS | SYSTOLIC BLOOD PRESSURE: 131 MMHG | BODY MASS INDEX: 33.33 KG/M2 | HEART RATE: 83 BPM | DIASTOLIC BLOOD PRESSURE: 78 MMHG | HEIGHT: 69 IN | TEMPERATURE: 98 F

## 2024-02-15 DIAGNOSIS — S05.02XA ABRASION OF LEFT CORNEA, INITIAL ENCOUNTER: Primary | ICD-10-CM

## 2024-02-15 PROCEDURE — 25000003 PHARM REV CODE 250: Performed by: PHYSICIAN ASSISTANT

## 2024-02-15 PROCEDURE — 99283 EMERGENCY DEPT VISIT LOW MDM: CPT

## 2024-02-15 PROCEDURE — 25000003 PHARM REV CODE 250: Performed by: EMERGENCY MEDICINE

## 2024-02-15 RX ORDER — PROPARACAINE HYDROCHLORIDE 5 MG/ML
2 SOLUTION/ DROPS OPHTHALMIC
Status: COMPLETED | OUTPATIENT
Start: 2024-02-15 | End: 2024-02-15

## 2024-02-15 RX ORDER — GENTAMICIN SULFATE 3 MG/ML
2 SOLUTION/ DROPS OPHTHALMIC EVERY 4 HOURS
Qty: 15 ML | Refills: 0 | Status: SHIPPED | OUTPATIENT
Start: 2024-02-15

## 2024-02-15 RX ORDER — PROPARACAINE HYDROCHLORIDE 5 MG/ML
1 SOLUTION/ DROPS OPHTHALMIC
Status: DISCONTINUED | OUTPATIENT
Start: 2024-02-15 | End: 2024-02-15 | Stop reason: HOSPADM

## 2024-02-15 RX ADMIN — PROPARACAINE HYDROCHLORIDE 2 DROP: 5 SOLUTION/ DROPS OPHTHALMIC at 04:02

## 2024-02-15 RX ADMIN — FLUORESCEIN SODIUM 1 EACH: 1 STRIP OPHTHALMIC at 04:02

## 2024-02-15 NOTE — FIRST PROVIDER EVALUATION
Emergency Department TeleTriage Encounter Note      CHIEF COMPLAINT    Chief Complaint   Patient presents with    Eye Pain     Metal in left eye       VITAL SIGNS   Initial Vitals [02/15/24 1548]   BP Pulse Resp Temp SpO2   131/78 83 18 98.1 °F (36.7 °C) 100 %      MAP       --            ALLERGIES    Review of patient's allergies indicates:   Allergen Reactions    Hydralazine analogues Other (See Comments)     drowsiness    Codeine      Other reaction(s): Hives    Codeine phosphate      Other reaction(s): Unknown  Other reaction(s): Unknown    Lisinopril      Dry coughing    Phenytoin sodium extended      Other reaction(s): leg cramps       PROVIDER TRIAGE NOTE  Patient presents with complaint of piece of metal to the eye.  Reports no visual changes.      Phy:   Constitutional: well nourished, well developed, appearing stated age, NAD        Initial orders will be placed and care will be transferred to an alternate provider when patient is roomed for a full evaluation. Any additional orders and the final disposition will be determined by that provider.        ORDERS  Labs Reviewed - No data to display    ED Orders (720h ago, onward)      None              Virtual Visit Note: The provider triage portion of this emergency department evaluation and documentation was performed via [employer], a HIPAA-compliant telemedicine application, in concert with a tele-presenter in the room. A face to face patient evaluation with one of my colleagues will occur once the patient is placed in an emergency department room.      DISCLAIMER: This note was prepared with [employer] voice recognition transcription software. Garbled syntax, mangled pronouns, and other bizarre constructions may be attributed to that software system.

## 2024-02-15 NOTE — TELEPHONE ENCOUNTER
----- Message from Major Benitez sent at 2/15/2024  9:46 AM CST -----  Regarding: appointment  Contact: patient  Type:  Sooner Apoointment Request    Caller is requesting a sooner appointment.  Caller declined first available appointment listed below.  Caller will not accept being placed on the waitlist and is requesting a message be sent to doctor.  Name of Caller:patient  When is the first available appointment?unable to schedule  Symptoms:needs to be seen sooner/ check up  Would the patient rather a call back or a response via [facility]? Please call to schedule  Best Call Back Number:[phone]  Additional Information:

## 2024-02-15 NOTE — ASSESSMENT & PLAN NOTE
Stop Entresto. Restart losartan as he did not have cough with this in the past.  If cough persists, recommend avoiding all ace/arb/arni.  Restart coreg and amlodipine. Patient to increase losartan 50 mg daily if BP >130/80 after 1 week of restarting.  BMP in 1 week.

## 2024-02-15 NOTE — ED PROVIDER NOTES
Encounter Date: 2/15/2024       History     Chief Complaint   Patient presents with    Eye Pain     Metal in left eye     58-year-old male with a past medical history of cardiomyopathy, gout, and hypertension presents for left eye irritation.  The patient reports that he was grinding and thinks he got a piece of metal in it a few days ago.  He was not wearing any goggles.  He denies drainage, changes in vision, or redness to the eye.  There are no alleviating or aggravating factors.      Review of patient's allergies indicates:   Allergen Reactions    Hydralazine analogues Other (See Comments)     drowsiness    Codeine      Other reaction(s): Hives    Codeine phosphate      Other reaction(s): Unknown  Other reaction(s): Unknown    Lisinopril      Dry coughing    Phenytoin sodium extended      Other reaction(s): leg cramps     Past Medical History:   Diagnosis Date    Allergic rhinitis due to allergen     Arthritis     Cardiomyopathy     Gout     Hypertension     Left bundle branch block      Past Surgical History:   Procedure Laterality Date    WISDOM TOOTH EXTRACTION       Family History   Problem Relation Age of Onset    Heart disease Father     Hypertension Father     Gout Father     Cancer Sister         lung    Heart attack Neg Hx      Social History     Tobacco Use    Smoking status: Never     Passive exposure: Never    Smokeless tobacco: Never   Substance Use Topics    Alcohol use: No    Drug use: No     Review of Systems   Constitutional:  Negative for chills, diaphoresis, fatigue and fever.   HENT:  Negative for congestion and rhinorrhea.    Eyes:  Negative for photophobia, discharge and visual disturbance.        Eye irritation   Respiratory:  Negative for cough and shortness of breath.    Cardiovascular:  Negative for chest pain.   Gastrointestinal:  Negative for abdominal pain, diarrhea, nausea and vomiting.   Genitourinary:  Negative for dysuria, frequency and testicular pain.   Musculoskeletal:   Negative for gait problem.   Skin:  Negative for color change.   Neurological:  Negative for dizziness and numbness.   Psychiatric/Behavioral:  Negative for agitation and confusion.        Physical Exam     Initial Vitals [02/15/24 1548]   BP Pulse Resp Temp SpO2   131/78 83 18 98.1 °F (36.7 °C) 100 %      MAP       --         Physical Exam    Nursing note and vitals reviewed.  Constitutional: He appears well-developed and well-nourished.   HENT:   Head: Normocephalic and atraumatic.   Eyes: Conjunctivae, EOM and lids are normal. Pupils are equal, round, and reactive to light. Right eye exhibits no discharge and no exudate. No foreign body present in the right eye. Left eye exhibits no discharge and no exudate. No foreign body present in the left eye. Right conjunctiva is not injected. Right conjunctiva has no hemorrhage. Left conjunctiva is not injected. Left conjunctiva has no hemorrhage.   Slit lamp exam:       The right eye shows no corneal abrasion, no corneal ulcer, no foreign body and no fluorescein uptake.        The left eye shows corneal abrasion and fluorescein uptake. The left eye shows no corneal ulcer and no foreign body.   Neck: Neck supple.   Cardiovascular:  Normal rate and regular rhythm.           Pulmonary/Chest: Breath sounds normal.   Abdominal: Abdomen is soft. Bowel sounds are normal.   Musculoskeletal:         General: Normal range of motion.      Cervical back: Neck supple.     Neurological: He is alert and oriented to person, place, and time.   Skin: Skin is warm and dry.   Psychiatric: He has a normal mood and affect.         ED Course   Procedures  Labs Reviewed - No data to display       Imaging Results    None          Medications   fluorescein ophthalmic strip 1 each (has no administration in time range)   proparacaine 0.5 % ophthalmic solution 1 drop (has no administration in time range)   fluorescein ophthalmic strip 1 each (1 each Left Eye Given 2/15/24 1613)   proparacaine 0.5 %  ophthalmic solution 2 drop (2 drops Left Eye Given 2/15/24 3530)     Medical Decision Making  58-year-old male presents for eye irritation.    Initial differential diagnosis included but not limited to foreign body and corneal abrasion.    Risk  Prescription drug management.  Risk Details: The patient was emergently evaluated in the emergency department, his evaluation was significant for a middle-age male with a corneal abrasion noted to the left eye examination.  There was no evidence of foreign body noted to either eye.  Additionally the patient does not have a corneal abrasion noted to right eye.  The patient is stable for discharge home and does not require further care or workup at this time.  He will be discharged home with gentamicin eyedrops.  He is referred to Ophthalmology for follow-up.                                      Clinical Impression:  Final diagnoses:  [S05.02XA] Abrasion of left cornea, initial encounter (Primary)          ED Disposition Condition    Discharge Stable          ED Prescriptions       Medication Sig Dispense Start Date End Date Auth. Provider    gentamicin (GARAMYCIN) 0.3 % ophthalmic solution Place 2 drops into the left eye every 4 (four) hours. 15 mL 2/15/2024 -- Hector Young MD          Follow-up Information       Follow up With Specialties Details Why Contact Info    Hien Mcgill MD Ophthalmology Schedule an appointment as soon as possible for a visit   [address] [facility] [address]  [phone]               Hector Young MD  02/15/24 1369

## 2024-02-15 NOTE — PROGRESS NOTES
Subjective:    Patient ID:  Jarod Pierce is a 58 y.o. male patient here for evaluation Follow-up      History of Present Illness:     Patient is here for a check up.  Denies acute complaints. HE has noticed that his chronic cough returned since starting the Entresto but he wasn't sure if it was that or his other medications so he stopped all of them except Entresto. He denies cough with losartan in past. BP acutely elevated in clinic.  180/90.  He is not taking amlodipine, coreg or aspirin/statin.        Review of patient's allergies indicates:   Allergen Reactions    Hydralazine analogues Other (See Comments)     drowsiness    Codeine      Other reaction(s): Hives    Codeine phosphate      Other reaction(s): Unknown  Other reaction(s): Unknown    Lisinopril      Dry coughing    Phenytoin sodium extended      Other reaction(s): leg cramps       Past Medical History:   Diagnosis Date    Allergic rhinitis due to allergen     Arthritis     Cardiomyopathy     Gout     Hypertension     Left bundle branch block      Past Surgical History:   Procedure Laterality Date    WISDOM TOOTH EXTRACTION       Social History     Tobacco Use    Smoking status: Never     Passive exposure: Never    Smokeless tobacco: Never   Substance Use Topics    Alcohol use: No    Drug use: No        Review of Systems:    As noted in HPI in addition      REVIEW OF SYSTEMS  CARDIOVASCULAR: No recent chest pain, palpitations, arm, neck, or jaw pain  RESPIRATORY: No recent fever, cough chills, SOB or congestion  : No blood in the urine  GI: No Nausea, vomiting, constipation, diarrhea, blood, or reflux.  MUSCULOSKELETAL: No myalgias  NEURO: No lightheadedness or dizziness  EYES: No Double vision, blurry, vision or headache              Objective        Vitals:    02/14/24 1431   BP: (!) 180/90   Pulse: 76       LIPIDS - LAST 2   Lab Results   Component Value Date    CHOL 210 (H) 02/14/2024    CHOL 218 (H) 11/02/2023    HDL 33 (L) 02/14/2024     HDL 37 (L) 11/02/2023    LDLCALC 152.0 02/14/2024    LDLCALC 114.2 11/02/2023    TRIG 125 02/14/2024    TRIG 334 (H) 11/02/2023    CHOLHDL 15.7 (L) 02/14/2024    CHOLHDL 17.0 (L) 11/02/2023       CBC - LAST 2  Lab Results   Component Value Date    WBC 5.80 11/02/2023    WBC 5.32 01/30/2023    RBC 4.73 11/02/2023    RBC 4.28 (L) 01/30/2023    HGB 15.7 11/02/2023    HGB 14.1 01/30/2023    HCT 44.1 11/02/2023    HCT 41.0 01/30/2023    MCV 93 11/02/2023    MCV 96 01/30/2023    MCH 33.2 (H) 11/02/2023    MCH 32.9 (H) 01/30/2023    MCHC 35.6 11/02/2023    MCHC 34.4 01/30/2023    RDW 12.5 11/02/2023    RDW 12.8 01/30/2023     11/02/2023     01/30/2023    MPV 9.0 (L) 11/02/2023    MPV 8.9 (L) 01/30/2023    GRAN 3.5 11/02/2023    GRAN 60.5 11/02/2023    LYMPH 1.5 11/02/2023    LYMPH 26.2 11/02/2023    MONO 0.6 11/02/2023    MONO 9.8 11/02/2023    BASO 0.04 11/02/2023    BASO 0.04 01/30/2023    NRBC 0 11/02/2023    NRBC 0 01/30/2023       CHEMISTRY & LIVER FUNCTION - LAST 2  Lab Results   Component Value Date     02/14/2024     11/02/2023    K 4.2 02/14/2024    K 3.6 11/02/2023     02/14/2024     11/02/2023    CO2 25 02/14/2024    CO2 30 (H) 11/02/2023    ANIONGAP 9 02/14/2024    ANIONGAP 6 (L) 11/02/2023    BUN 14 02/14/2024    BUN 15 11/02/2023    CREATININE 1.2 02/14/2024    CREATININE 1.4 11/02/2023    GLU 90 02/14/2024     11/02/2023    CALCIUM 9.2 02/14/2024    CALCIUM 9.4 11/02/2023    MG 2.2 09/23/2022    MG 2.0 09/22/2022    ALBUMIN 4.5 11/02/2023    ALBUMIN 4.3 01/30/2023    PROT 7.8 11/02/2023    PROT 7.8 01/30/2023    ALKPHOS 97 11/02/2023    ALKPHOS 101 01/30/2023    ALT 20 11/02/2023    ALT 22 01/30/2023    AST 22 11/02/2023    AST 19 01/30/2023    BILITOT 1.0 11/02/2023    BILITOT 1.0 01/30/2023        CARDIAC PROFILE - LAST 2  Lab Results   Component Value Date    BNP 13 11/02/2023    BNP 25 09/21/2022    CPK 55 09/21/2022     05/17/2022    CPKMB 0.6  09/21/2022    CPKMB 0.8 05/17/2022    TROPONINI <0.030 09/22/2022    TROPONINI <0.030 09/21/2022        COAGULATION - LAST 2  Lab Results   Component Value Date    LABPT 13.2 09/21/2022    INR 1.1 09/21/2022    INR 1.0 01/14/2020    APTT 28.7 01/14/2020    APTT 25.5 07/21/2014       ENDOCRINE & PSA - LAST 2  Lab Results   Component Value Date    HGBA1C 5.9 01/30/2023    HGBA1C 5.8 09/27/2022    TSH 4.467 11/02/2023    TSH 3.000 01/30/2023    PSA 0.45 09/22/2011    PSA 0.34 10/03/2008        ECHOCARDIOGRAM RESULTS  Results for orders placed during the hospital encounter of 11/29/23    Echo    Interpretation Summary    Left Ventricle: The left ventricle is normal in size. Mildly increased wall thickness. There is concentric hypertrophy. Regional wall motion abnormalities present. See diagram for wall motion findings. Septal motion is consistent with bundle branch block. There is moderately reduced systolic function with a visually estimated ejection fraction of 35 - 40%. Grade II diastolic dysfunction. Elevated left ventricular filling pressure.    Right Ventricle: Normal right ventricular cavity size. Wall thickness is normal. Right ventricle wall motion  is normal. Systolic function is normal.    Left Atrium: Left atrium is moderately dilated.    Tricuspid Valve: There is mild regurgitation.    IVC/SVC: Normal venous pressure at 3 mmHg.    Overall the study quality was technically difficult. The study was difficult due to patient's poor endocardial visualization.      CURRENT/PREVIOUS VISIT EKG  Results for orders placed or performed in visit on 11/02/23   IN OFFICE EKG 12-LEAD (to [place])    Collection Time: 11/02/23  8:43 AM    Narrative    Test Reason : Z00.00,    Vent. Rate : 076 BPM     Atrial Rate : 076 BPM     P-R Int : 160 ms          QRS Dur : 158 ms      QT Int : 460 ms       P-R-T Axes : 033 103 -59 degrees     QTc Int : 517 ms    Normal sinus rhythm  Rightward axis  Left bundle branch block  Abnormal  ECG  When compared with ECG of 21-SEP-2022 12:12,  Questionable change in The axis  T wave inversion more evident in Inferior leads  T wave inversion less evident in Lateral leads  Confirmed by Juan Cr MD ([id number]) on 11/9/2023 8:32:03 PM    Referred By:             Confirmed By:Juan Cr MD     No valid procedures specified.   Results for orders placed during the hospital encounter of 09/21/22    Nuclear Stress Test    Interpretation Summary    The EKG portion of this study is uninterpretable.    The patient reported no chest pain during the stress test.    There were no arrhythmias during stress.    The nuclear portion of this study will be reported separately.    No valid procedures specified.    PHYSICAL EXAM  CONSTITUTIONAL: Well built, well nourished in no apparent distress  NECK: no carotid bruit, no JVD  LUNGS: CTA  CHEST WALL: no tenderness  HEART: regular rate and rhythm, S1, S2 normal, no murmur, click, rub or gallop   ABDOMEN: soft, non-tender; bowel sounds normal; no masses,  no organomegaly  EXTREMITIES: Extremities normal, no edema, no calf tenderness noted  NEURO: AAO X 3    I HAVE REVIEWED :    The vital signs, nurses notes, and all the pertinent radiology and labs.        Current Outpatient Medications   Medication Instructions    albuterol (PROVENTIL HFA) 90 mcg/actuation inhaler 2 puffs, Inhalation, Every 6 hours PRN, Rescue    amLODIPine (NORVASC) 5 mg, Oral, Daily    aspirin 81 mg, Oral, Daily    atorvastatin (LIPITOR) 40 mg, Oral, Daily    azelastine (ASTELIN) 274 mcg, Nasal, 2 times daily    carvediloL (COREG) 3.125 mg, Oral, 2 times daily    cyanocobalamin (VITAMIN B-12) 1,000 mcg, Oral, Daily    fluticasone propionate (FLONASE) 100 mcg, Each Nostril, Daily    losartan (COZAAR) 25 mg, Oral, Daily          Assessment & Plan     Essential hypertension  Stop Entresto. Restart losartan as he did not have cough with this in the past.  If cough persists, recommend avoiding all  ace/arb/arni.  Restart coreg and amlodipine. Patient to increase losartan 50 mg daily if BP >130/80 after 1 week of restarting.  BMP in 1 week.     Nonischemic cardiomyopathy, , EF 30% to 35%  Continue coreg, losartan, and low sodium heart healthy diet.  No significant edema on exam.  1.5L fluid restriction. 2 g salt restriction.  Daily weights.    Maintain BP of 130/80 or less.    Chest pain   Denies anginal equivalent symptoms at this time.  Recommend blood pressure control.  Blood pressure is 180/90 today in office.  Patient to restart Coreg and amlodipine.  Patient to stop Entresto and start losartan back at 25 mg daily and to increase to 50 mg if blood pressure remains elevated after 1 week.  BMP in 1 week    Mixed hyperlipidemia, baseline    Restart statin therapy.  Continue low-sodium heart healthy diet.    Obesity (BMI 30-39.9)    Recommend weight loss.  Calorie restricted diet.          No follow-ups on file.

## 2024-02-15 NOTE — ASSESSMENT & PLAN NOTE
Continue coreg, losartan, and low sodium heart healthy diet.  No significant edema on exam.  1.5L fluid restriction. 2 g salt restriction.  Daily weights.    Maintain BP of 130/80 or less.

## 2024-02-15 NOTE — ASSESSMENT & PLAN NOTE
Denies anginal equivalent symptoms at this time.  Recommend blood pressure control.  Blood pressure is 180/90 today in office.  Patient to restart Coreg and amlodipine.  Patient to stop Entresto and start losartan back at 25 mg daily and to increase to 50 mg if blood pressure remains elevated after 1 week.  BMP in 1 week

## 2024-02-19 ENCOUNTER — TELEPHONE (OUTPATIENT)
Dept: OPHTHALMOLOGY | Facility: CLINIC | Age: 59
End: 2024-02-19
Payer: MEDICAID

## 2024-02-19 NOTE — TELEPHONE ENCOUNTER
----- Message from Peggy Young sent at 2/19/2024  1:37 PM CST -----  Regarding: Appt  Contact: [phone]  Type:  Needs Appt    Who Called: Jarod  Symptoms (please be specific): ER f/u for steal in eye   Would the patient rather a call back or a response via [employer] [facility]? Call  Best Call Back Number: [phone]  Additional Information:

## 2024-02-21 ENCOUNTER — OFFICE VISIT (OUTPATIENT)
Dept: FAMILY MEDICINE | Facility: CLINIC | Age: 59
End: 2024-02-21
Payer: MEDICAID

## 2024-02-21 ENCOUNTER — HOSPITAL ENCOUNTER (OUTPATIENT)
Dept: RADIOLOGY | Facility: CLINIC | Age: 59
Discharge: HOME | End: 2024-02-21
Attending: STUDENT IN AN ORGANIZED HEALTH CARE EDUCATION/TRAINING PROGRAM
Payer: MEDICAID

## 2024-02-21 VITALS
WEIGHT: 224.88 LBS | SYSTOLIC BLOOD PRESSURE: 150 MMHG | BODY MASS INDEX: 33.31 KG/M2 | TEMPERATURE: 98 F | HEART RATE: 74 BPM | RESPIRATION RATE: 17 BRPM | HEIGHT: 69 IN | OXYGEN SATURATION: 96 % | DIASTOLIC BLOOD PRESSURE: 92 MMHG

## 2024-02-21 DIAGNOSIS — Z12.5 ENCOUNTER FOR PROSTATE CANCER SCREENING: ICD-10-CM

## 2024-02-21 DIAGNOSIS — R05.9 COUGH, UNSPECIFIED TYPE: ICD-10-CM

## 2024-02-21 DIAGNOSIS — J32.9 SINUSITIS, UNSPECIFIED CHRONICITY, UNSPECIFIED LOCATION: ICD-10-CM

## 2024-02-21 DIAGNOSIS — I16.0 HYPERTENSIVE URGENCY: Chronic | ICD-10-CM

## 2024-02-21 DIAGNOSIS — R06.2 WHEEZING: ICD-10-CM

## 2024-02-21 DIAGNOSIS — K21.9 GASTROESOPHAGEAL REFLUX DISEASE, UNSPECIFIED WHETHER ESOPHAGITIS PRESENT: ICD-10-CM

## 2024-02-21 DIAGNOSIS — E78.2 MIXED HYPERLIPIDEMIA: ICD-10-CM

## 2024-02-21 DIAGNOSIS — R59.0 LOCALIZED ENLARGED LYMPH NODES: ICD-10-CM

## 2024-02-21 DIAGNOSIS — Z12.11 COLON CANCER SCREENING: ICD-10-CM

## 2024-02-21 DIAGNOSIS — R79.9 ABNORMAL FINDING OF BLOOD CHEMISTRY, UNSPECIFIED: ICD-10-CM

## 2024-02-21 DIAGNOSIS — Z00.00 HEALTHCARE MAINTENANCE: Primary | ICD-10-CM

## 2024-02-21 DIAGNOSIS — R59.0 ENLARGED LYMPH NODE IN NECK: ICD-10-CM

## 2024-02-21 PROCEDURE — 71046 X-RAY EXAM CHEST 2 VIEWS: CPT | Mod: 26,,, | Performed by: RADIOLOGY

## 2024-02-21 PROCEDURE — 4010F ACE/ARB THERAPY RXD/TAKEN: CPT | Mod: CPTII,,, | Performed by: STUDENT IN AN ORGANIZED HEALTH CARE EDUCATION/TRAINING PROGRAM

## 2024-02-21 PROCEDURE — 99214 OFFICE O/P EST MOD 30 MIN: CPT | Mod: S$PBB,,, | Performed by: STUDENT IN AN ORGANIZED HEALTH CARE EDUCATION/TRAINING PROGRAM

## 2024-02-21 PROCEDURE — 1159F MED LIST DOCD IN RCRD: CPT | Mod: CPTII,,, | Performed by: STUDENT IN AN ORGANIZED HEALTH CARE EDUCATION/TRAINING PROGRAM

## 2024-02-21 PROCEDURE — 3077F SYST BP >= 140 MM HG: CPT | Mod: CPTII,,, | Performed by: STUDENT IN AN ORGANIZED HEALTH CARE EDUCATION/TRAINING PROGRAM

## 2024-02-21 PROCEDURE — 3008F BODY MASS INDEX DOCD: CPT | Mod: CPTII,,, | Performed by: STUDENT IN AN ORGANIZED HEALTH CARE EDUCATION/TRAINING PROGRAM

## 2024-02-21 PROCEDURE — 99999 PR PBB SHADOW E&M-EST. PATIENT-LVL V: CPT | Mod: PBBFAC,,, | Performed by: STUDENT IN AN ORGANIZED HEALTH CARE EDUCATION/TRAINING PROGRAM

## 2024-02-21 PROCEDURE — 3079F DIAST BP 80-89 MM HG: CPT | Mod: CPTII,,, | Performed by: STUDENT IN AN ORGANIZED HEALTH CARE EDUCATION/TRAINING PROGRAM

## 2024-02-21 PROCEDURE — 99215 OFFICE O/P EST HI 40 MIN: CPT | Mod: PBBFAC,PO,25 | Performed by: STUDENT IN AN ORGANIZED HEALTH CARE EDUCATION/TRAINING PROGRAM

## 2024-02-21 PROCEDURE — 71046 X-RAY EXAM CHEST 2 VIEWS: CPT | Mod: TC,FY,PO

## 2024-02-21 RX ORDER — PROMETHAZINE HYDROCHLORIDE AND DEXTROMETHORPHAN HYDROBROMIDE 6.25; 15 MG/5ML; MG/5ML
5 SYRUP ORAL EVERY 6 HOURS PRN
Qty: 118 ML | Refills: 3 | Status: SHIPPED | OUTPATIENT
Start: 2024-02-21 | End: 2024-03-02

## 2024-02-21 RX ORDER — ALBUTEROL SULFATE 90 UG/1
2 AEROSOL, METERED RESPIRATORY (INHALATION) EVERY 6 HOURS PRN
Qty: 1 G | Refills: 0 | Status: SHIPPED | OUTPATIENT
Start: 2024-02-21 | End: 2025-02-20

## 2024-02-21 RX ORDER — FLUTICASONE PROPIONATE 50 MCG
2 SPRAY, SUSPENSION (ML) NASAL DAILY
Qty: 16 G | Refills: 11 | Status: SHIPPED | OUTPATIENT
Start: 2024-02-21

## 2024-02-21 NOTE — PATIENT INSTRUCTIONS
Zbigniew Olivera, Please read below to learn more on healthy habits.  Most of my patients read it.     If you are due for any health screening(s) below please notify me so we can arrange them to be ordered and scheduled. Most healthy patients at your age complete them, but you are free to accept or refuse.     If you can't do it, I'll definitely understand. If you can, I'd certainly appreciate it!     Tests to Keep You Healthy    Colon Cancer Screening: DUE  Last Blood Pressure <= 139/89 (2/21/2024): NO      Its time for your colon cancer screening     Colorectal cancer is one of the leading causes of cancer death for men and women but it doesnt have to be. Screenings can prevent colorectal cancer or find it early enough to treat and cure the disease.     Our records indicate that you may be overdue for colon cancer screening. A colonoscopy or stool screening test can help identify patients at risk for developing colon cancer. Cancer screenings save lives, so schedule yours today to stay healthy.     A colonoscopy is the preferred test for detecting colon cancer. It is needed only once every 10 years if results are negative. While you are sedated, a flexible, lighted tube with a tiny camera is inserted into the rectum and advanced through the colon to look for cancers.     An alternative screening test that is used at home and returned to the lab may also be used. It detects hidden blood in bowel movements which could indicate cancer in the colon. If results are positive, you will need a colonoscopy to determine if the blood is a sign of cancer. This type of follow up (diagnostic) colonoscopy usually requires additional copays as required by your insurance provider.     If you recently had your colon cancer screening performed outside of [facility], please let your Health care team know so that they can update your health record. Please contact your PCP if you have any questions.    Lets manage your high  blood pressure     Your blood pressure was above 140/90 today during your visit. We recommend that you schedule a nurse visit in two weeks to check your blood pressure and discuss ways to support your health goals.     You can also manage your health and record your blood pressure from the comfort of home by keeping a daily blood pressure log. These results are shared with and reviewed by your provider. Please print this form (Daily Blood Pressure Log) to assist you in keeping track of your blood pressure at home.     Schedule your nurse visit in two weeks to learn more about how to track and manage high blood pressure.    Daily Blood Pressure Log    Name:__________________________________                  Date of Birth:_________    Average Blood Pressure:  __________      Date: Time  (a.m.) Blood  Pressure: Pulse  Rate: Time  (p.m.) Blood  Pressure : Pulse  Rate:   Sample 8:37 127/83 84                                                                                                                                                                                      Table of Contents:     Cancer prevention  Explanation on the different components of your blood work and interpretation  Frequently asked questions   Blood pressure log   E-Visits  E-Consults     Cancer Prevention    Why should you choose to get screened for cancer? One simple reason is because you are important. You matter and deserve to have the best health so you can fulfill your great potential.     Colon Cancer screening - Most colon cancers can be prevented by screening. In most cases a polyp in the colon can grow and is not cancerous at first, but it can become cancerous years later. A polyp is like a seed that can grow into a weed if it is left in the soil. If the seed is detected and removed, no weed sprouts. A FIT test/Cologuard test and colonoscopy can detect precancerous polyps and lead to the prevention of cancer. A polyp is just like a seed  "that can be removed before the roots take hold. A colonoscopy can remove these polyps and eliminate the chance that these polyps turn into cancer.     Cervical Cancer screening- A PAP smear can detect precancerous cells that can become cervical cancer and can lead to procedures to remove them. It is very important to get a PAP smear as investing these few minutes can help prevent a lot of trouble down the road. If you want to do the most you can do to spend more time with your family and friends', cancer screenings can help with that goal.     Breast Cancer screening- Mammograms can detect breast cancer before it has spread and early detection allows the ability to remove the lesion prior to any spread. It is similar to a small rotten spot on fruit. If the spoiled part is removed quickly it can preserve the rest of the fruit, but if it is left alone it will corrupt the whole peach.     Smoking Cessation- "Smoking is like a chimney. The tar builds up and causes a back draft which leads to a cough. Smoking is like sitting in a car with a blocked exhaust system." Smoking can increase your risk for lung, mouth, throat, nose, esophagus, bladder, kidney, ureter, pancreas, stomach, liver, cervix, ovary, bowel, and leukemia [2]. If you have smoked in the past, you might meet the criteria for a CT lung cancer screening.     Lung Cancer Screening - "The U.S. Preventive Services Task Force (USPSTF) recommendsexternal icon yearly lung cancer screening with LDCT for people who--have a 20 pack-year or more smoking history, and smoke now or have quit within the past 15 years, and are between 50 and 80 years old. A pack-year is smoking an average of one pack of cigarettes per day for one year. For example, a person could have a 20 pack-year history by smoking one pack a day for 20 years or two packs a day for 10 years." [3]    Hypertension - Common high blood pressure meds may lower colorectal cancer risk-[employer], July 6, 2020 - " "Hypertension Journal Report Medications commonly prescribed to treat high blood pressure may also reduce patients' colorectal cancer risk, according to new research published today in Hypertension, an American Heart Association journal." [4].     Low HbA1c - "Higher HbA1c levels (within both the non?diabetic and diabetic ranges) were associated with the risk of colorectal cancer (Model 2; P linear?=?0.009), especially for colon cancer." [5].    A healthy diet, exercise, limitation of alcohol use, certain infections, avoidance of radiation/environmental toxins, and staying lean lowers your cancer risk [6].     I want to empower you to make informed choices for your health. I have a listed below the most common blood components that we check for when you get blood work. I discuss what each component measures along with common reasons for why they can be abnormal. If you are interested in understanding your blood work better, please read the lab explanation attached below.     Explanation of Lab Results    Please note: This information is included as a reference to help you better understand your lab results and is not be used for diagnosis.     Lipid Panel:  Cholesterol is a measure of cardiac risk and stroke risk. A lipid panel measures total cholesterol and provides readings which are broken down into 3 subsections: Triglycerides, HDL and LDL.    Total Cholesterol: Your total blood cholesterol is a measure of LDL cholesterol, HDL cholesterol, and other lipid components.  If your individual lipid level components are in the normal range and you have an elevated total cholesterol level we are generally not to concerned since we primarily look at the LDL cholesterol which is considered the bad cholesterol which can lead to heart attacks and strokes.  Your calculated cardiac risk is the most important factor in deciding if you would benefit from a cholesterol-lowering medication that the total cholesterol " level.    Triglycerides are most diet and weight sensitive. They are affected easily by lifestyle changes. Ideally, this reading should be less than 150, although if the remainder of the cholesterol panel is within normal limits, we will tolerate upwards of 250-300. For the most part, if triglycerides are the only part of your cholesterol panel reading as 'abnormal', it is best to lose weight and modify your diet, including less saturated fat and less simple sugars. We only start medication to lower this is the level is greater than 500 since this can increase ones risk for pancreatitis. This is not the cholesterol type that leads to heart attacks.     HDL is your 'good cholesterol.' Ideally, the reading should be over 40 and is particularly helpful when it is greater than 60. Research indicates that high HDL is extremely protective; and if your HDL level is greater than 60, we tolerate far worse LDL or triglyceride levels. For the most part, HDL is responsive to aerobic activity and ideal weight. We do not have medicines that increase the HDL reading very easily.    LDL is your 'bad cholesterol.' Our goals depend on how many cardiac risk factors you have.     High LDL: If you are diabetic or have had a heart attack, we like the LDL level to be less than 100. If you have 2 cardiac risk factors (such as diabetes, hypertension, family history, a low HDL and smoking), we like your LDL to be less than 130. If you have 0-2 cardiac risk factors, we like your LDL level to be less than 160, but we may not necessarily initiate medications to 190 unless you cannot lower it. The latest guidelines recommend to consider taking a statin only if your ASCVD cardiac risk score is at least greater than 7.5% and a strong recommendation if your risk score is greater than 10%.     Low LDL: Normal or low LDL is considered good and having an LDL below the normal range is not of a concern.     Complete Blood Count (CBC):    White blood  cells: These are infection fighting cells. Mild fluctuations may represent minor illness at the time of blood draw. Marked fluctuations can represent immune diseases. This can also be elevated from smoking.     High WBC: Elevation in wbc can commonly be caused by an infection, steroid use, or any cause of inflammation such as many rheumatologic diseases, surgery, or trauma.      Low WBC: Many different things can cause this such as infections, medications, rheumatologic disorders, malignancies, nutritional deficiencies, and a normal variant in some individuals. If this occurs it is common practice to rule out a few viruses such as HIV, Hep C, B12, folate along with a a peripheral blood smear to look for abnormalities. If you are otherwise healthy with no concerning symptoms and the workup is negative we usually monitor it with yearly blood work.  If there are other abnormal cell line abnormalities sometimes we refer to hematology to further evaluate.    Hemoglobin: A key indicator for anemia. Ranges depend on age. If you are significantly out of this range, we may need to talk with you.    High Hemoglobin: This can be elevated in some blood disorders, sleep apnea, chronic lung disease, kidney disease, testosterone use, dehydration, smoking, along with some other rare causes.     Low Hemoglobin: Many things can lead to this but the most common cause is an iron deficiency in females who lose blood during their periods, decreased absorption due to antacids, poor diet, sickle cell, anemia of chronic disease, malignancy, bleeding from the gut, along with some other less common causes. If you are a young female who has periods it is usually assumed that this is from that blood loss unless other symptoms or abnormal blood work is present. If you are above 45 and have an iron deficiency it is always recommended to get a colonoscopy to ensure that there is no lesion causing blood loss and to exclude malignancy.      Hematocrit: Another way of looking at anemia, much like your hemoglobin. If you are significantly out of this range, we may need to talk with you.    MCV: This looks at the size of your red blood cells.     High MCV:  A large number may indicate a B-12 deficiency, folate deficiency, alcohol use, liver disease, medication-induced phenomenon, or a need for further workup.    Low MCV: A small number may imply iron anemia or genetic disorder such as alpha-thalassemia. We may check iron studies called to see if you are low.    MCH: Much like MCV above.    Platelets: These are clotting factors. We tolerate a broad range in this. If your numbers are less than 100, we may need to work it up.     High Platelet Count: If your numbers are elevated, this could represent ongoing inflammation within the body which can be caused by any infection, illness, iron deficiency, or other malignancy. We usually recheck it to monitor for improvement and if it does not resolve will work it up with more blood work.     Low Platelet Count: Many things can cause this such as infections, alcohol use, medications, liver disease, vitamin deficiencies, aspleenia, and some other rare blood disorders. If it persists many times we refer to hematology if a cause is not identified.     Iron:  This is not a reliable blood marker since this fluctuates throughout the day and if you are fasting many times your iron level in your blood is low but this does not necessarily mean you have a deficiency.  There are more reliable ways to measure your iron stores and these are discussed below.    Transferrin:  Many things can cause an abnormal result and this is not as important as some other labs mentioned below.    TIBC:  This is the total iron-binding capacity and this measures the total amount of iron that can be bound by proteins in the blood.  If you have an elevated TIBC this is a good marker that you could be low on iron and this is useful blood  marker.  A simple way to think of this is seats in a car. The TIBC is the number of open seats that iron can sit in. If you have a high TIBC (number of open seats) that means you have a low iron level.     Ferritin:  A low ferritin is almost always caused from an iron deficiency.  A normal ferritin level does not need necessarily exclude an iron deficiency since many inflammatory conditions such as kidney disease, diabetes, arthritis, and obesity can raise this level hiding an iron deficiency.  If you are healthy with no inflammatory conditions this is a very reliable test for detecting an iron deficiency since nothing else lowers your ferritin level except a low iron level. Keep in mind that you can have an iron deficiency if your ferritin level is normal but your TIBC is elevated.  If you have a low iron level the next step is always to identify why you have a low iron level.    CMP (Comprehensive Metabolic Panel):  Broadly, this is a test of organ function including the kidneys, liver, and electrolyte levels.    Glucose: This is primarily looking for diabetes. We like your blood glucose level to be less than 100 when fasting. Readings of 100-125 indicate what we call 'pre-diabetes' or 'glucose intolerance.' This does not necessarily indicate diabetes, but we may check another test called a hemoglobin A1C for confirmation. This level puts you at great risk for becoming a true diabetic and we would encourage the reduction of simple sugars and processed white flour as well as appropriate weight loss. If this number is greater than 125, it is likely you are diabetic; we will get an additional hemoglobin A1C test and will likely schedule you for an appointment. If you notice that your blood glucose is above 125 and we have not scheduled a follow-up appointment, please call us. Patients who are known diabetics can have readings greater than 125.    Urea Nitrogen: This is a kidney function and maybe elevated because of  mild dehydration or because of excessive muscle breakdown from aggressive exercise habits.    Creatinine: This also is a kidney function test. It may be mildly elevated if you have particularly large muscle bulk or taking a supplement like creatine. It is related to the GFR. It is a muscle product that we track to look at your kidney function. If this is elevated and new, we may need to talk with you. If you have had this mildly elevated in the past, it is likely that we will just track it to ensure that it does not worsen quickly. Some medications may gently worsen this, namely blood pressure pills called ace inhibitors. To some degree, we will permit levels of up to 1.6.    Sodium: This is essentially the concentration of salt within the body. This may be mildly low because of dehydration, overhydration, diuretics, .    Potassium: This is an electrolyte that can cause muscular cramping or cardiac difficulties. It is sometimes lowered by diuretic medications.    Chloride: For the most part, this is only relevant if the other electrolytes are abnormal.    CO2: This is a function of acid balance within the body. For the most part, mild abnormalities are not important and may represent a starvation or dehydration state when blood was drawn. Many medications can change this level as well such as diuretics, acetazolamide, calcium carbonate, laxatives, aspirin, and many others.    High CO2: Many things can cause this which includes dehydration, sleep apnea, and COPD.     Low CO2: Many things can lead to a low level and if you are generally healthy we are usually  not concerned. Diarrhea, renal disease, diabetes, and many medications can cause this.     Anion Gap: Only relevant if your CO2 is abnormal.    Calcium: This is not related to dietary intake of calcium. It may fluctuate gently based on the amount of protein within your body. If it is above 10.9, we may need to do additional testing. Many times if low the albumin  level is low and once the corrected calcium level is calculated the calcium is in a normal range.     Total protein: This looks at protein within the body. Markedly elevated levels can represent an immune response and may require further workup.    Albumin: This may be elevated because of particularly high level of fitness. If it is markedly suppressed, it may represent organ dysfunction, particularly in the liver or kidneys.    AST and ALT: These are enzymes in the liver and if elevated can indicate liver damage.     Elevated AST/ALT: Many things can caused elevated liver enzymes and the most common reason is viral infections, alcohol use, medications, supplements, autoimmune, along with some other rare causes. One of the most common reasons for a mild elevation in liver enzymes (less than 3 times the upper limit) is fatty liver disease. Many individuals who have extra fat in their diet store this in the liver and this can build up and cause a mild elevation. This is usually diagnosed with a liver ultrasound and exclusion of other causes. The only treatment for this is diet and exercise along with avoidance of liver toxic medications and alcohol. It is standard of care to rule our viral hepatitis and get imaging of the liver if elevated. This is monitored and if I feel concerned will refer to hepatology.     Alk Phos: Part of liver function or bones    High Alk Phos: elevated levels may indicate a liver injury or obstruction of bile flow. Elevated levels can also be seen in vitamin D deficiency, drug induced, or bone disorders.     Low Alk Phos: In most cases having a low Alkaline Phosphatase enzyme activity is not due to any disease and is simply a normal variant.  Having an elevated Alk Phos is more concerning and associated with many diseases and thus I would not be overly concerned with a low level which is seen in many health individuals. The reasons for a low serum alkaline phosphatase activity were reviewed  in a 1-yr retrospective study and in this study it was found that no cause was found in most cases.  Low activity values were recorded in several individuals in the absence of any obvious cause. This would suggest that the definition of the lower limit of the reference range for alkaline phosphatase is arbitrary, thus limiting the use of low serum activity as a marker of disease.  In some cases micronutrients like Zinc (Zn) and Magnesium (Mg) are causes of low ALP activity and you can take zinc or magnesium supplements. Unfortunately, the blood work to measure zinc and magnesium levels are unreliable and not very accurate since it does not test for the intracellular zinc or magnesium levels.     Rajan MOYAD, Donna JH, Gennaro BERNAL. Clinical significance of a low serum alkaline phosphatase. Net J Med. 1992 Feb;40(1-2):9-14. PMID: [id number].  Vinh SMYTH S, Nasir B, Dolores I, Behera S, Vinh S. Low Alkaline Phosphatase (ALP) In Adult Population an Indicator of Zinc (Zn) and Magnesium (Mg) Deficiency. Curr Res Nutr Food Sci 2017;5(3). doi : http://dx.doi.[email or web address]    Total Bilirubin: This is also part of liver function.    High T Bili: If you have right upper quadrant pain an elevation in total bilirubin can be caused by a gallbladder stone that is blocking the biliary tract that leads to your gastrointestinal tract. If you have fever and right upper quadrant pain this can sometimes be elevated when your gallbladder is infected and most individuals have nausea with vomiting associated with it. If you have no symptoms and are otherwise healthy this can be caused by Gilbert syndrome which is a benign normal variant. There are other causes such as some anemias but there would be abnormal blood counts.     Low T Bili: Nothing to be concerned about.     Thyroid Stimulating Hormone (TSH): This reading is an indicator of your thyroid function. The thyroid regulates energy levels throughout the entire body,  "affecting almost every organ system. This is an inverse relationship so a high number actually presents a low thyroid. If this is abnormal, we will often check an additional lab called a Free T4 to evaluate this more carefully. Borderline elevations, those of 5-10, can be watched or worked up further. Please do not take the supplement Biotin for at least a week prior to getting your TSH checked since this can lead to false measurement levels.     Prostate Specific Antigen (PSA): (Men only.) This is a prostate cancer screening test, and is no longer a routine screening test. Levels are truly a function of age. Being less than 4 is typical for someone more in their 60s. If you are young, it should probably be less than 3. A higher PSA result does not necessarily mean that you have cancer, but may indicate a need for a discussion with your provider. Options include observation to look at rate of rise or prostate biopsy. Not only is the absolute value important, but how much it has changed from previous years. Please ensure that there is not a dramatic rise from previous years.    Please Note: This information is included as a reference to help you better understand your lab results and is not be used for diagnosis.    Frequently asked questions    When should I take my blood pressure medication?    The latest studies show that taking your blood pressure medication at night is the best time. A recent study showed that this prevents more heart attacks and strokes. See the answer below from [employer].     "Q. I've taken blood pressure medicines every morning for many years, and they keep my pressure under control. Recently, my doctor recommended taking them at bedtime, instead. Does that make sense?    A. It actually does make sense -- based on recent research. For many years, there have been at least three theoretical reasons for taking blood pressure medicines before bedtime. First, a body system that strongly affects " "blood pressure, called the renin-angiotensin system, has its peak activity during sleep. Second, circadian rhythms cause differences in the body chemistry at night compared with daytime. Third, most heart attacks occur in the morning, before medicines taken in the morning have a chance to "kick in."" [6].     When should I take my cholesterol medication?    It used to be recommended to take your cholesterol medication at night since the original statins that lowered cholesterol did not last 24 hours and most cholesterol synthesis is done at night. The long acting statins such as atorvastatin and rosuvastatin last 24 hours so they can be taken any time during the day. Simvastatin, pravastatin, fluvastatin, and lovastatin are shorter acting and should be taken at bed time.     Can supplements affect my blood work?    Yes they can. A very important supplement to not take for at least a week prior to your blood work is biotin. "Biotin supplement use is common and can lead to the false measurement of thyroid hormone in commonly used assays." [8.]    What are conditions that should not be addressed during a virtual visit?    There are some conditions that should not be evaluated via a virtual visit since optimal care is impossible. Chest pain, shortness of breath, lung conditions, abdominal pain, and any neurological complaint such as headache, dizziness, numbness ect.         When will I get commentary of my blood work?    I review all blood work that you get and I will send out commentary on this via ehealthtracker within 72 hours. In most cases you will get a message from me sooner, but many times not all of the blood work is completed thus I usually wait until all results have returned. If there is a critical abnormality you should be contacted the same day you got blood work.     How frequently do I need to have visits to get controlled substances?    It is standard protocol to have a visit every 3 months if you are taking " scheduled substances such as ADHD medications, psychiatric medications, and pain medications. This is to ensure your safety and monitor for any side effects.     When should I bring prior to a visit if I want to lose weight?    I recommend that you make a food diary for a week and fill out what you ate each day. You can bring this form in to your visit and I can look over it to suggest changes that you can make.     Which over the counter medications should I avoid if I have decreased kidney function?    NSAIDs which includes ibuprofen (Advil, Motrin, Nuprin), naproxen (Aleve), meloxicam (Mobic) and diclofenac(Zorvolex, Voltaren) and ketorolac (Toradol) can damage your kidneys if you take this long term.Tylenol does not affect your kidney and thus is safe as long as you don't have liver disease.     Is there an [facility]?     Yes! The [facility] is located on the first floor of the [facility]. The address is listed below. You can get curbside pickup if you call their number at [phone]. One of the many benefits of using the [facility] pharmacy is that the pharmacists can contact me directly if a cheaper alternative is available to save you money. They also see your note to know more about what the medication was prescribed for. I recommend this pharmacy since communication with me is quick in case any confusion arises on your medications.     [address]  [address] [place] [id number]  Phone: [phone]    Hours:  Monday - Friday  8:00 a.m. - 5:30 a.m.    Why is it not in my best interest to call in order to get an antibiotic?    Medicine is a complex field and many times the correct diagnosis is critical in order to provide the correct care. One of the most important goals of a healthcare provider is to ensure that no dangerous condition is masquerading as a mild illness. Specific questions are very important to obtain during an examination that provide a wealth of information to understand  your illness. Health care providers are trained to investigate for signs that can be dangerous to your health. Messaging or calling the office in order to get an antibiotic can be very dangerous.     For example, many urinary tract infections can lead to an infection in the kidney that can result in a serious blood stream infection that can lead to hospitalization if not recognized. A cough can be caused by many different things and not necessarily an infection. It is not uncommon that one assumes a cough is from an infection when it is actually caused by a blood clot in the lungs. This can lead to death. Determining your risk can only be performed after a thorough history and examination. A few sentences through e-mail is not enough.     What are some common symptoms that should be evaluated by the emergency department and not by phone or e-mail?    This does not include every symptom, but common examples of symptoms that should prompt one to go to the emergency department are chest pain, chest pressure, shortness of breath, difficulty breathing, abdominal pain, weakness or numbness or an extremity, sudden weakness or drooping on one side of the body, speaking difficulty, unusual or bad headache (particularly if it started suddenly), head injury, confusion, seizure, passing out, lightheaded, pain in arm or jaw, suddenly not able to speak, see, walk, or move, dizziness, neck stiffness, suicidal thoughts, testicular pain, cuts and wounds, severe pain, along with many others. This is not an inclusive list.     Outside Records    It is common to have an colonoscopy, mammogram, PAP smear, or eye exam done outside the [facility] system. Many times we do not get the records automatically sent to us. Please call your provider's office to notify them to fax us your records so that we can have the most up to date information. Your provider will review your outside results in order to provide you with the complete care that you  deserve. We appreciate that you decided to choose us to be serving on your healthcare team and the more information we have about your health is essential.     If I have a psychiatric crisis what should I do?    If you ever feel that there is a risk of a harm to yourself we recommend to go to the emergency room. There is a National Suicide Prevention lifeline number 7-287-561-TALK which route you to the nearest crisis center. There is also a suicide hotline 2-293-PGKFDRK (1-635.312.2840).    988 has been designated as the new three-digit dialing code that will route callers to the National Suicide Prevention Lifeline (now known as the 988 Suicide & Crisis Lifeline), and is now active across the [place].    When people call, text, or chat 988, they will be connected to trained counselors that are part of the existing Lifeline network. These trained counselors will listen, understand how their problems are affecting them, provide support, and connect them to resources if necessary.    The previous Lifeline phone number (1-461.738.4256) will always remain available to people in emotional distress or suicidal crisis.    The [employer] network of over 200 crisis centers has been in operation since 2005, and has been proven to be effective. Its the counselors at these local crisis centers who answer the contacts the Lifeline receives every day. Numerous studies have shown that callers feel less suicidal, less depressed, less overwhelmed and more hopeful after speaking with a Lifeline counselor.     E-Visits    E-Visits are currently available for three conditions: Urinary tract infections, Sinus symptoms, and rashes.     If you have one of these infections or rash you can fill out a questionnaire that will be sent to your provider who can review and send an appropriate medication. No visit is needed.      What is an E-Visit?    An E-Visit is a way to get care for certain conditions without needing to schedule a  virtual visit or to come into the clinic. We'll ask you some clinically based questions about yourself and your symptoms and your provider will evaluate, make a diagnosis and respond with a care plan with recommendations including testing and medications as indicated, just as they would in an in-person setting.  If it becomes clear that you need to be seen virtually or in-person after the E-Visit, we can arrange that.         Should I use an E-Visit?    E-Visits should be used only for non-urgent medical conditions. If you need urgent medical care, please contact your clinic by phone, schedule a same-day appointment online or find a nearby [facility]. For serious medical emergencies, please call 911 immediately.         What to expect during an E-Visit   Once you have agreed to an E-Visit, you will be prompted to complete the E-Visit clinical questionnaire in [employer], which can take 10-20 minutes to complete. You may also be asked to confirm your medications.     Since this is a medical evaluation, it is billable to your insurance. Because this is a billable visit, you may also be asked for your insurance details and to enter your credit card information, just as you would for any other visit or co-pay. Much of this is stored in your account, so it should be easy to access or update if needed. You may receive a bill for this service, including any applicable copay amount, after the service is rendered.     Please allow up to 24 business hours for a reply. At any point in the E-Visit process, if you have not received a response within 72 hours, please call your clinic.        To initiate the E-Visit process in [facility], click here.       E-Consults    E-Consults are available when you need to have a specialists opinion on one thing and your PCP agrees to send an E-Consult to answer your question within 48 hours. This not only saves you time but money as well since a visit is not always needed.       "    Patient Education       Checking Your Blood Pressure at Home   The Basics   Written by the doctors and editors at [facility]   How is blood pressure measured? -- Blood pressure is usually measured with a device that goes around your upper arm. This is often done in a doctor's office. But some people also check their blood pressure themselves, at home or at work.  Blood pressure is explained with 2 numbers. For instance, your blood pressure might be "140 over 90." The first (top) number is the pressure inside your arteries when your heart is kelley. The second (bottom) number is the pressure inside your arteries when your heart is relaxed. The table shows how doctors and nurses define high and normal blood pressure (table 1).  If your blood pressure gets too high, it puts you at risk for heart attack, stroke, and kidney disease. High blood pressure does not usually cause symptoms. But it can be serious.  What is a home blood pressure meter? -- A home blood pressure meter (or "monitor") is a device you can use to check your blood pressure yourself. It has a cuff that goes around your upper arm (figure 1). Some devices have a cuff that goes around your wrist instead. But doctors aren't sure if these work as well. The meter also has a small screen, or dial, that shows your blood pressure numbers.  There are also special meters you can wear for a day or 2. These are different because they automatically check your blood pressure throughout the day and night, even while you are sleeping. If your doctor thinks you should use one of these devices, they will talk to you about how to wear it.  Why do I need to check my blood pressure at home? -- If your doctor knows or suspects that you have high blood pressure, they might want you to check it at home. There are a few reasons for this. Your doctor might want to look at:  Whether your blood pressure measures the same at home as it did in the doctor's office  How well " your blood pressure medicines are working  Changes in your blood pressure, for example, if it goes up and down  People who check their own blood pressure at home usually do better at keeping it low.  How do I choose a home blood pressure meter? -- When choosing a home blood pressure meter, you will probably want to think about:  Cost - Some devices cost more than others. You should also check to see if your insurance will help pay for your device.  Size - It's important to make sure the cuff fits your arm comfortably. Your doctor or nurse can help you with this.  How easy it is to use - You should make sure you understand how to use the device. You also need to be able to read the numbers on the screen.  You do not need a prescription to buy a home blood pressure meter. You can buy them at most pharmacies or over the internet. Your doctor or nurse can help you choose the right device for you.  How do I check my blood pressure at home? -- Once you have a home blood pressure meter, your doctor or nurse should check it to make sure it fits you and works correctly.  When it's time to check your blood pressure:  Go to the bathroom and empty your bladder first. Having a full bladder can temporarily increase your blood pressure, making the results inaccurate.  Sit in a chair with your feet flat on the ground.  Try to breathe normally and stay calm.  Attach the cuff to your arm. Place the cuff directly on your skin, not over your clothing. The cuff should be tight enough to not slip down, but not uncomfortably tight.  Sit and relax for about 3 to 5 minutes with the cuff on.  Follow the directions that came with your device to start measuring your blood pressure. This might involve squeezing the bulb at the end of the tube to inflate the cuff (fill it with air). With some monitors, you just need to press a button to inflate the cuff. When the cuff fills with air, it feels like someone is squeezing your arm, but it should not  hurt. Then you will slowly deflate the cuff (let the air out of it), or it will deflate by itself. The screen or dial will show your blood pressure numbers.  Stay seated and relax for 1 minute, then measure your blood pressure again.  How often should I check my blood pressure? -- It depends. Different people need to follow different schedules. Your doctor or nurse will tell you how often to check your blood pressure, and when. Some people need to check their blood pressure twice a day, in the morning and evening.  Your doctor or nurse will probably tell you to keep track of your blood pressure for at least a few days (table 2). Then they will look at the numbers. The reason for this is that it's normal for your blood pressure to change a bit from day to day. For example, the numbers might change depending on whether you recently had caffeine, just exercised, or feel stressed. Checking your blood pressure over several days - or longer - will give your doctor or nurse a better idea of what is average for you.  How should I keep track of my blood pressure? -- Some blood pressure meters will record your numbers for you, or send them to your computer or smartphone. If yours does not do this, you will need to write them down. Your doctor or nurse can help you figure out the best way to keep track of the numbers.  What if my blood pressure is high? -- Your doctor or nurse will tell you what to do if your blood pressure is high when you check it at home. If you get a number that is higher than normal, measure it again to see if it is still high. If it is very high (above a certain number, which your doctor or nurse will tell you to watch out for), you should call your doctor right away.  If your blood pressure is only a little high, your doctor or nurse might tell you to keep checking it for a few more days or weeks, and then call if it does not go back down. Then they can help you decide what to do next.  All topics are  "updated as new evidence becomes available and our peer review process is complete.  This topic retrieved from [employer] on: Sep 21, 2021.  Topic 792334 Version 4.0  Release: 29.4.2 - C29.263  © 2021 UpToDate, Inc. and/or its affiliates. All rights reserved.  table 1: Definition of normal and high blood pressure  Level  Top number  Bottom number    High 130 or above 80 or above   Elevated 120 to 129 79 or below   Normal 119 or below 79 or below   These definitions are from the American College of Cardiology/American Heart Association. Other expert groups might use slightly different definitions.  "Elevated blood pressure" is a term doctor or nurses use as a warning. It means you do not yet have high blood pressure, but your blood pressure is not as low as it should be for good health.  Graphic 93487 Version 6.0  figure 1: Using a home blood pressure meter     This is an example of a person using a home blood pressure meter.  Graphic 764285 Version 1.0    Consumer Information Use and Disclaimer   This information is not specific medical advice and does not replace information you receive from your health care provider. This is only a brief summary of general information. It does NOT include all information about conditions, illnesses, injuries, tests, procedures, treatments, therapies, discharge instructions or life-style choices that may apply to you. You must talk with your health care provider for complete information about your health and treatment options. This information should not be used to decide whether or not to accept your health care provider's advice, instructions or recommendations. Only your health care provider has the knowledge and training to provide advice that is right for you. The use of this information is governed by the [employer] User License Agreement, available at https://www.[employer].Beamr/en/solutions/[employer]/about/shanna.The use of [employer] content is governed by the [employer] Terms " of Use. ©2021 UpToDate, Inc. All rights reserved.  Copyright   © 2021 UpToDate, Inc. and/or its affiliates. All rights reserved.      Daily Blood Pressure Log    Please print this form to assist you in keeping track of your blood pressure at home.      Name:  Date of Birth:       Average Blood Pressure:           Date: Time  (a.m.) Blood  Pressure: Pulse  Rate: Time  (p.m.) Blood  Pressure : Pulse  Rate: Comments:   Sample 8:37 127/83 84    Stressful morning                                                                                                                                                                                                     Wishing you good health,     Vasu Rocha MD     References:  1-https://www.[employer]speakers/eva_vertes  2-https://www.[employer].com.au/news/scxvl-kxh-19-cancers-that-can-kq-hnwvhx-zm-smoking/  3-https://www.cdc.gov/cancer/lung/basic_info/screening.htm  4-https://newsroom.[employer].org/news/common-hypertension-medications-may-reduce-colorectal-cancer-risk  5-Goto A, Lakshmi M, Sawada N, et al. High hemoglobin A1c levels within the non-diabetic range are associated with the risk of all cancers. Int J Cancer. 2016;138(7):6438-2780. doi:10.1002/ijc.70089  6-https://www.health.[place].edu/newsletter_article/the-10-commandments-of-cancer-prevention  7-https://www.health.[place].edu/diseases-and-conditions/should-i-take-blood-pressure-medications-at-night  8-Kashif GUIDO et al 2018 Prevalence of biotin supplement usage in outpatients and plasma biotin concentrations in patients presenting to the emergency department. Clin Biochem. Epub 2018 Jul 20. PMID: [id number].

## 2024-02-21 NOTE — PROGRESS NOTES
[facility] Note    Subjective:    The HPI and pertinent ROS is included in the Diagnostic Impression Remarks section at the end of the note. Please see below for further details. Chief complaint is at end of note.     Jarod is a pleasant intelligent patient who is here for evaluation.     Modified Medications    Modified Medication Previous Medication    ALBUTEROL (PROVENTIL HFA) 90 MCG/ACTUATION INHALER albuterol (PROVENTIL HFA) 90 mcg/actuation inhaler       Inhale 2 puffs into the lungs every 6 (six) hours as needed for Wheezing. Rescue    Inhale 2 puffs into the lungs every 6 (six) hours as needed for Wheezing. Rescue    FLUTICASONE PROPIONATE (FLONASE) 50 MCG/ACTUATION NASAL SPRAY fluticasone propionate (FLONASE) 50 mcg/actuation nasal spray       2 sprays (100 mcg total) by Each Nostril route once daily.    2 sprays (100 mcg total) by Each Nostril route once daily.       Data reviewed 274}  Previous medical records reviewed and summarized in plan section at end of note.      If you are due for any health screening(s) below please notify me so we can arrange them to be ordered and scheduled. Most healthy patients at your age complete them, but you are free to accept or refuse. If you can't do it, I'll definitely understand. If you can, I'd certainly appreciate it!     Tests to Keep You Healthy    Colon Cancer Screening: ORDERED  Last Blood Pressure <= 139/89 (2/21/2024): NO      The following portions of the patient's history were reviewed and updated as appropriate: allergies, current medications, past family history, past medical history, past social history, past surgical history and problem list.    He  has a past medical history of Allergic rhinitis due to allergen, Arthritis, Cardiomyopathy, Gout, Hypertension, and Left bundle branch block.  He  has a past surgical history that includes [place] tooth extraction.    He  reports that he has never smoked. He has never been exposed to  "tobacco smoke. He has never used smokeless tobacco. He reports that he does not drink alcohol and does not use drugs.  He family history includes Cancer in his sister; Gout in his father; Heart disease in his father; Hypertension in his father.    Review of patient's allergies indicates:   Allergen Reactions    Hydralazine analogues Other (See Comments)     drowsiness    Codeine      Other reaction(s): Hives    Codeine phosphate      Other reaction(s): Unknown  Other reaction(s): Unknown    Lisinopril      Dry coughing    Phenytoin sodium extended      Other reaction(s): leg cramps       Tobacco Use: Low Risk  (2/21/2024)    Patient History     Smoking Tobacco Use: Never     Smokeless Tobacco Use: Never     Passive Exposure: Never     Physical Examination  General appearance: alert, cooperative, no distress  Neck: no thyromegaly, no neck stiffness  Lungs: clear to auscultation, no wheezes, rales or rhonchi, symmetric air entry  Heart: normal rate, regular rhythm, normal S1, S2, no murmurs, rubs, clicks or gallops  Abdomen: soft, nontender, nondistended, no rigidity, rebound, or guarding.   Back: no point tenderness over spine  Extremities: peripheral pulses normal, no unilateral leg swelling or calf tenderness   Neurological:alert, oriented, normal speech, no new focal findings or movement disorder noted from baseline    BP Readings from Last 3 Encounters:   02/21/24 (!) 150/92   02/15/24 131/78   02/14/24 (!) 180/90     Wt Readings from Last 3 Encounters:   02/21/24 102 kg (224 lb 13.9 oz)   02/15/24 102.1 kg (225 lb)   02/14/24 101.6 kg (224 lb)     BP (!) 150/92 (BP Location: Right arm, Patient Position: Sitting, BP Method: Large (Manual))   Pulse 74   Temp 98.2 °F (36.8 °C) (Oral)   Resp 17   Ht 5' 9" (1.753 m)   Wt 102 kg (224 lb 13.9 oz)   SpO2 96%   BMI 33.21 kg/m²    274}  Laboratory: I have reviewed old labs below:    274}    Lab Results   Component Value Date    WBC 5.80 11/02/2023    HGB 15.7 " "11/02/2023    HCT 44.1 11/02/2023    MCV 93 11/02/2023     11/02/2023     02/14/2024    K 4.2 02/14/2024     02/14/2024    CALCIUM 9.2 02/14/2024    PHOS 4.6 (H) 09/23/2022    CO2 25 02/14/2024    GLU 90 02/14/2024    BUN 14 02/14/2024    CREATININE 1.2 02/14/2024    EGFRNORACEVR >60.0 02/14/2024    ANIONGAP 9 02/14/2024    PROT 7.8 11/02/2023    ALBUMIN 4.5 11/02/2023    BILITOT 1.0 11/02/2023    ALKPHOS 97 11/02/2023    ALT 20 11/02/2023    AST 22 11/02/2023    INR 1.1 09/21/2022    CHOL 210 (H) 02/14/2024    TRIG 125 02/14/2024    HDL 33 (L) 02/14/2024    LDLCALC 152.0 02/14/2024    TSH 4.467 11/02/2023    PSA 0.45 09/22/2011    HGBA1C 5.9 01/30/2023      Lab reviewed by me: Particular labs of significance that I will monitor, workup, or treat to improve are mentioned below in diagnostic impression remarks.    Imaging/EKG: I have reviewed the pertinent results and my findings are noted in remarks.  274}    CC:   Chief Complaint   Patient presents with    Annual Exam    Hypertension    hand swollen    Insomnia        274}    Assessment/Plan  Jarod Pierce is a 58 y.o. male who presents to clinic with:  1. Healthcare maintenance    2. Essential hypertension    3. Mixed hyperlipidemia, baseline     4. Gastroesophageal reflux disease, unspecified whether esophagitis present    5. Enlarged lymph node in neck    6. Localized enlarged lymph nodes    7. Cough, unspecified type    8. Colon cancer screening    9. Wheezing    10. Sinusitis, unspecified chronicity, unspecified location       274}  Diagnostic Impression Remarks + HPI     Documentation entered by me for this encounter may have been done in part using speech-recognition technology. Although I have made an effort to ensure accuracy, "sound like" errors may exist and should be interpreted in context.      rec colon screening will sent fit   HTN elevated above goal did not take amlodipine did take coreg and arb today. Rec to take " amlodipine rehceck   Enlarged lymph nodes-present in the neck about a year no pain with swallowing will get CT  HLD stable continue current meds monitor blood work rec mediterranean diet   Cough no fever or cp no dyspnea today will get cxr no leg swelling   Sinusitis -will send in Flonase monitor         Additional workup planned: see labs ordered below.    See below for labs and meds ordered with associated diagnosis      1. Healthcare maintenance    2. Essential hypertension    3. Mixed hyperlipidemia, baseline     4. Gastroesophageal reflux disease, unspecified whether esophagitis present    5. Enlarged lymph node in neck    6. Localized enlarged lymph nodes  - CT Soft Tissue Neck W WO Contrast; Future    7. Cough, unspecified type  - X-Ray Chest PA And Lateral; Future  - promethazine-dextromethorphan (PROMETHAZINE-DM) 6.25-15 mg/5 mL Syrp; Take 5 mLs by mouth every 6 (six) hours as needed (cough).  Dispense: 118 mL; Refill: 3    8. Colon cancer screening  - Fecal Immunochemical Test (iFOBT); Future    9. Wheezing  - albuterol (PROVENTIL HFA) 90 mcg/actuation inhaler; Inhale 2 puffs into the lungs every 6 (six) hours as needed for Wheezing. Rescue  Dispense: 1 g; Refill: 0    10. Sinusitis, unspecified chronicity, unspecified location  - fluticasone propionate (FLONASE) 50 mcg/actuation nasal spray; 2 sprays (100 mcg total) by Each Nostril route once daily.  Dispense: 16 g; Refill: 11      Vasu Rocha MD   274}    If you are due for any health screening(s) below please notify me so we can arrange them to be ordered and scheduled. Most healthy patients at your age complete them, but you are free to accept or refuse.     If you can't do it, I'll definitely understand. If you can, I'd certainly appreciate it!   Tests to Keep You Healthy    Colon Cancer Screening: ORDERED  Last Blood Pressure <= 139/89 (2/21/2024): NO

## 2024-02-23 ENCOUNTER — PATIENT MESSAGE (OUTPATIENT)
Dept: OPHTHALMOLOGY | Facility: CLINIC | Age: 59
End: 2024-02-23
Payer: MEDICAID

## 2024-02-23 ENCOUNTER — TELEPHONE (OUTPATIENT)
Dept: OPHTHALMOLOGY | Facility: CLINIC | Age: 59
End: 2024-02-23
Payer: MEDICAID

## 2024-02-23 NOTE — TELEPHONE ENCOUNTER
----- Message from Jazmine Basilio sent at 2/20/2024  8:46 AM CST -----  Contact: Patient  Type:  Patient Returning Call    Who Called:Patient     Who Left Message for Patient:Gaby    Does the patient know what this is regarding?:Appt / sooner    Would the patient rather a call back or a response via [facility]? Call    Best Call Back Number:[phone] (home)     Additional Information: Please return call

## 2024-02-29 ENCOUNTER — TELEPHONE (OUTPATIENT)
Dept: FAMILY MEDICINE | Facility: CLINIC | Age: 59
End: 2024-02-29
Payer: MEDICAID

## 2024-02-29 DIAGNOSIS — R59.0 LOCALIZED ENLARGED LYMPH NODES: Primary | ICD-10-CM

## 2024-02-29 NOTE — TELEPHONE ENCOUNTER
----- Message from Jennifer Grande sent at 2/29/2024  9:55 AM CST -----  Type:  Needs Medical Advice    Who Called: pt   Best Call Back Number: [phone] (home)     Additional Information:  Requesting call back smhe  called pt sts insurance wont pay the for test for 3/1 .. Sts t needs to label something else     please advise thank you

## 2024-03-01 ENCOUNTER — HOSPITAL ENCOUNTER (OUTPATIENT)
Dept: RADIOLOGY | Facility: HOSPITAL | Age: 59
Discharge: HOME | End: 2024-03-01
Attending: STUDENT IN AN ORGANIZED HEALTH CARE EDUCATION/TRAINING PROGRAM
Payer: MEDICAID

## 2024-03-01 ENCOUNTER — TELEPHONE (OUTPATIENT)
Dept: FAMILY MEDICINE | Facility: CLINIC | Age: 59
End: 2024-03-01
Payer: MEDICAID

## 2024-03-01 DIAGNOSIS — R59.0 LOCALIZED ENLARGED LYMPH NODES: ICD-10-CM

## 2024-03-01 PROCEDURE — 70491 CT SOFT TISSUE NECK W/DYE: CPT | Mod: TC

## 2024-03-01 PROCEDURE — 70491 CT SOFT TISSUE NECK W/DYE: CPT | Mod: 26,,, | Performed by: RADIOLOGY

## 2024-03-01 PROCEDURE — 25500020 PHARM REV CODE 255: Performed by: STUDENT IN AN ORGANIZED HEALTH CARE EDUCATION/TRAINING PROGRAM

## 2024-03-01 RX ADMIN — IOHEXOL 75 ML: 350 INJECTION, SOLUTION INTRAVENOUS at 06:03

## 2024-03-01 NOTE — TELEPHONE ENCOUNTER
Pt informed that new CT order was approved by his insurance; pt urged to keep his appointment; pt verbalized understanding

## 2024-03-22 ENCOUNTER — TELEPHONE (OUTPATIENT)
Dept: CARDIOLOGY | Facility: CLINIC | Age: 59
End: 2024-03-22
Payer: MEDICAID

## 2024-03-22 NOTE — TELEPHONE ENCOUNTER
----- Message from Major Benitez sent at 3/22/2024  8:18 AM CDT -----  Regarding: refill  Contact: patient  Type:  RX Refill Request    Who Called: patient  Refill or New Rx:refill  RX Name and Strength:nausea medication patient did not know name  How is the patient currently taking it? (ex. 1XDay):  Is this a 30 day or 90 day RX:  Preferred Pharmacy with phone number:  [facility] [address] [place] [phone] [address]  [address] [facility] [address]  Phone: [phone] Fax: [phone]  Local or Mail Order:local  Ordering Provider:Jairo  Would the patient rather a call back or a response via [facility]? refill  Best Call Back Number:[phone]  Additional Information:

## 2024-03-26 ENCOUNTER — PATIENT MESSAGE (OUTPATIENT)
Dept: FAMILY MEDICINE | Facility: CLINIC | Age: 59
End: 2024-03-26
Payer: MEDICAID

## 2024-04-01 DIAGNOSIS — I10 ESSENTIAL HYPERTENSION: Primary | ICD-10-CM

## 2024-05-01 NOTE — TELEPHONE ENCOUNTER
----- Message from Stephany Connor sent at 11/23/2020  8:07 AM CST -----  Type:  Sooner Apoointment Request    Caller is requesting a sooner appointment.  Caller declined first available appointment listed below.  Caller will not accept being placed on the waitlist and is requesting a message be sent to doctor.    Name of Caller:  Jarod  When is the first available appointment?  12/10  Symptoms:  follow up  Best Call Back Number:  [phone]  Additional Information:  He will be out of town until [place] so he would not be able to come on 12/10.  He would like to see you tomorrow.  Please call him.  Thank you!       
LVM with patient to call back and we can get him a  Earlier appointment...  
daughter- sammy

## 2024-06-28 ENCOUNTER — PATIENT MESSAGE (OUTPATIENT)
Dept: ADMINISTRATIVE | Facility: HOSPITAL | Age: 59
End: 2024-06-28
Payer: MEDICAID

## 2024-07-12 ENCOUNTER — TELEPHONE (OUTPATIENT)
Dept: RHEUMATOLOGY | Facility: CLINIC | Age: 59
End: 2024-07-12
Payer: MEDICAID

## 2024-07-12 NOTE — TELEPHONE ENCOUNTER
----- Message from Brenda Ward sent at 7/8/2024 12:44 PM CDT -----  Contact: self  Type: Sooner Appointment Request        Caller is requesting a sooner appointment. Caller declined first available appointment listed below. Caller will not accept being placed on the waitlist and is requesting a message be sent to doctor.        Name of Caller: Patient   Best Call Back Number: [phone]  Additional Information: Pt states he wants to be seen sometime in October if possible.Pt has RA. Pt hands are swelled up. Thanks

## 2024-08-14 ENCOUNTER — TELEPHONE (OUTPATIENT)
Dept: FAMILY MEDICINE | Facility: CLINIC | Age: 59
End: 2024-08-14
Payer: MEDICAID

## 2024-08-14 ENCOUNTER — OFFICE VISIT (OUTPATIENT)
Dept: FAMILY MEDICINE | Facility: CLINIC | Age: 59
End: 2024-08-14
Payer: MEDICAID

## 2024-08-14 DIAGNOSIS — I10 ESSENTIAL HYPERTENSION: ICD-10-CM

## 2024-08-14 DIAGNOSIS — J06.9 UPPER RESPIRATORY TRACT INFECTION, UNSPECIFIED TYPE: Primary | ICD-10-CM

## 2024-08-14 DIAGNOSIS — R05.2 SUBACUTE COUGH: ICD-10-CM

## 2024-08-14 DIAGNOSIS — U07.1 COVID: Primary | ICD-10-CM

## 2024-08-14 PROCEDURE — 3044F HG A1C LEVEL LT 7.0%: CPT | Mod: CPTII,95,, | Performed by: STUDENT IN AN ORGANIZED HEALTH CARE EDUCATION/TRAINING PROGRAM

## 2024-08-14 PROCEDURE — 4010F ACE/ARB THERAPY RXD/TAKEN: CPT | Mod: CPTII,95,, | Performed by: STUDENT IN AN ORGANIZED HEALTH CARE EDUCATION/TRAINING PROGRAM

## 2024-08-14 PROCEDURE — 99443 PR PHYSICIAN TELEPHONE EVALUATION 21-30 MIN: CPT | Mod: 95,,, | Performed by: STUDENT IN AN ORGANIZED HEALTH CARE EDUCATION/TRAINING PROGRAM

## 2024-08-14 RX ORDER — PROMETHAZINE HYDROCHLORIDE AND DEXTROMETHORPHAN HYDROBROMIDE 6.25; 15 MG/5ML; MG/5ML
5 SYRUP ORAL EVERY 6 HOURS PRN
Qty: 118 ML | Refills: 1 | Status: SHIPPED | OUTPATIENT
Start: 2024-08-14 | End: 2024-08-24

## 2024-08-14 RX ORDER — CETIRIZINE HYDROCHLORIDE 10 MG/1
10 TABLET ORAL DAILY
Qty: 7 TABLET | Refills: 0 | Status: SHIPPED | OUTPATIENT
Start: 2024-08-14 | End: 2024-08-21

## 2024-08-14 NOTE — TELEPHONE ENCOUNTER
----- Message from Alon Campos sent at 8/14/2024  3:06 PM CDT -----  Regarding: Return Call    Who Called:  Patient      Who Left Message for Patient:  Janna      Does the patient know what this is regarding? About   E Visit        Best Call Back Number:  [phone]         Additional Information: Patient is returning a call.  Please assist.

## 2024-08-14 NOTE — TELEPHONE ENCOUNTER
----- Message from Mariel Mitchell sent at 8/14/2024  1:57 PM CDT -----  Regarding: appointment  Contact: patient  Type:  Sooner Appointment Request    Caller is requesting a sooner appointment.  Caller declined first available appointment listed below.  Caller will not accept being placed on the waitlist and is requesting a message be sent to doctor.    Name of Caller:  patient  When is the first available appointment?    Symptoms:  check up in October  Would the patient rather a call back or a response via [facility]? call  Best Call Back Number:  [phone] (home)     Additional Information:  patient states he tested positive for Covid this week.  Please call patient to advise.  Thanks!

## 2024-08-14 NOTE — PROGRESS NOTES
Established Patient - Audio Only Telehealth Visit       274}  The patient location is: [place]   The chief complaint leading to consultation is:   Chief Complaint   Patient presents with    URI     Visit type: Virtual visit with audio only (telephone)  Total time spent with patient: 23 min        The reason for the audio only service rather than synchronous audio and video virtual visit was related to technical difficulties or patient preference/necessity.     Each patient to whom I provide medical services by telemedicine is:  (1) informed of the relationship between the physician and patient and the respective role of any other health care provider with respect to management of the patient; and (2) notified that they may decline to receive medical services by telemedicine and may withdraw from such care at any time. Patient verbally consented to receive this service via voice-only telephone call.     274}     HPI: pt reports positive covid test and cough no chest pain went to hospital and given augmentin. Nausea. Given zofran        Lab Results   Component Value Date    WBC 5.75 02/21/2024    HGB 15.2 02/21/2024    HCT 44.0 02/21/2024    MCV 94 02/21/2024     02/21/2024     02/21/2024    K 4.2 02/21/2024     02/21/2024    CALCIUM 9.3 02/21/2024    PHOS 4.6 (H) 09/23/2022    CO2 27 02/21/2024    GLU 74 02/21/2024    BUN 13 02/21/2024    CREATININE 1.3 02/21/2024    EGFRNORACEVR >60.0 02/21/2024    ANIONGAP 10 02/21/2024    PROT 7.1 02/21/2024    ALBUMIN 3.8 02/21/2024    BILITOT 0.9 02/21/2024    ALKPHOS 100 02/21/2024    ALT 16 02/21/2024    AST 19 02/21/2024    INR 1.1 09/21/2022    CHOL 210 (H) 02/14/2024    TRIG 125 02/14/2024    HDL 33 (L) 02/14/2024    LDLCALC 152.0 02/14/2024    TSH 4.467 11/02/2023    PSA 0.50 02/21/2024    HGBA1C 5.4 02/21/2024          Assessment and plan:  advised to take paxlovid and hold statin and ccb      Jarod was seen today for uri.    Diagnoses and all orders  for this visit:    COVID  -     nirmatrelvir-ritonavir 300 mg (150 mg x 2)-100 mg copackaged tablets (EUA); Take 3 tablets by mouth 2 (two) times daily for 5 days. Each dose contains 2 nirmatrelvir (pink tablets) and 1 ritonavir (white tablet). Take all 3 tablets together  -     cetirizine (ZYRTEC) 10 MG tablet; Take 1 tablet (10 mg total) by mouth once daily. for 7 days    Subacute cough  -     promethazine-dextromethorphan (PROMETHAZINE-DM) 6.25-15 mg/5 mL Syrp; Take 5 mLs by mouth every 6 (six) hours as needed (cough).    Essential hypertension       HTN hold ccb monitor    This service was not originating from a related E/M service provided within the previous 7 days nor will  to an E/M service or procedure within the next 24 hours or my soonest available appointment.  Prevailing standard of care was able to be met in this audio-only visit.

## 2024-11-04 ENCOUNTER — TELEPHONE (OUTPATIENT)
Dept: FAMILY MEDICINE | Facility: CLINIC | Age: 59
End: 2024-11-04
Payer: MEDICAID

## 2024-11-04 ENCOUNTER — TELEPHONE (OUTPATIENT)
Dept: CARDIOLOGY | Facility: CLINIC | Age: 59
End: 2024-11-04
Payer: MEDICAID

## 2024-11-04 NOTE — TELEPHONE ENCOUNTER
----- Message from Maday sent at 11/4/2024 11:04 AM CST -----  Regarding: Call back  Type:  Needs Medical Advice    Who Called: Pt    Would the patient rather a call back or a response via [facility]? Call back    Best Call Back Number: [phone]    Additional Information: Pt is requesting a call back. Thank you

## 2024-11-04 NOTE — TELEPHONE ENCOUNTER
----- Message from Maday sent at 11/4/2024 10:59 AM CST -----  Regarding: Appt  Type:  Sooner Apoointment Request    Caller is requesting a sooner appointment.  Caller declined first available appointment listed below.  Caller will not accept being placed on the waitlist and is requesting a message be sent to doctor.    Name of Caller:Pt    When is the first available appointment?n/a    Symptoms:.    Would the patient rather a call back or a response via [facility]? Call back    Best Call Back Number:[phone]    Additional Information: Pt is requesting a call back for an appt. Thank you   conducted a detailed discussion... I had a detailed discussion with the patient and/or guardian regarding the historical points, exam findings, and any diagnostic results supporting the discharge/admit diagnosis.

## 2024-11-04 NOTE — TELEPHONE ENCOUNTER
11/4/24 Sw the patient about his request to be seen for a f/u reschedule he was assigned 10:30 am  11/14/24

## 2024-11-14 ENCOUNTER — OFFICE VISIT (OUTPATIENT)
Dept: CARDIOLOGY | Facility: CLINIC | Age: 59
End: 2024-11-14
Payer: MEDICAID

## 2024-11-14 ENCOUNTER — LAB VISIT (OUTPATIENT)
Dept: LAB | Facility: HOSPITAL | Age: 59
End: 2024-11-14
Payer: MEDICAID

## 2024-11-14 VITALS
WEIGHT: 216.06 LBS | HEIGHT: 69 IN | BODY MASS INDEX: 32 KG/M2 | SYSTOLIC BLOOD PRESSURE: 132 MMHG | OXYGEN SATURATION: 94 % | DIASTOLIC BLOOD PRESSURE: 80 MMHG | HEART RATE: 78 BPM

## 2024-11-14 DIAGNOSIS — K21.9 GASTROESOPHAGEAL REFLUX DISEASE, UNSPECIFIED WHETHER ESOPHAGITIS PRESENT: ICD-10-CM

## 2024-11-14 DIAGNOSIS — E78.2 MIXED HYPERLIPIDEMIA: ICD-10-CM

## 2024-11-14 DIAGNOSIS — E66.9 OBESITY (BMI 30-39.9): ICD-10-CM

## 2024-11-14 DIAGNOSIS — Z91.89 CARDIOVASCULAR RISK FACTOR: ICD-10-CM

## 2024-11-14 DIAGNOSIS — I42.8 NONISCHEMIC CARDIOMYOPATHY: ICD-10-CM

## 2024-11-14 DIAGNOSIS — I16.0 HYPERTENSIVE URGENCY: Primary | Chronic | ICD-10-CM

## 2024-11-14 LAB
CHOLEST SERPL-MCNC: 238 MG/DL (ref 120–199)
CHOLEST/HDLC SERPL: 6.3 {RATIO} (ref 2–5)
HDLC SERPL-MCNC: 38 MG/DL (ref 40–75)
HDLC SERPL: 16 % (ref 20–50)
LDLC SERPL CALC-MCNC: 158.2 MG/DL (ref 63–159)
NONHDLC SERPL-MCNC: 200 MG/DL
TRIGL SERPL-MCNC: 209 MG/DL (ref 30–150)

## 2024-11-14 PROCEDURE — 3075F SYST BP GE 130 - 139MM HG: CPT | Mod: CPTII,,,

## 2024-11-14 PROCEDURE — 36415 COLL VENOUS BLD VENIPUNCTURE: CPT

## 2024-11-14 PROCEDURE — 3079F DIAST BP 80-89 MM HG: CPT | Mod: CPTII,,,

## 2024-11-14 PROCEDURE — 3008F BODY MASS INDEX DOCD: CPT | Mod: CPTII,,,

## 2024-11-14 PROCEDURE — 1160F RVW MEDS BY RX/DR IN RCRD: CPT | Mod: CPTII,,,

## 2024-11-14 PROCEDURE — 99999 PR PBB SHADOW E&M-EST. PATIENT-LVL III: CPT | Mod: PBBFAC,,,

## 2024-11-14 PROCEDURE — 99214 OFFICE O/P EST MOD 30 MIN: CPT | Mod: S$PBB,,,

## 2024-11-14 PROCEDURE — 3044F HG A1C LEVEL LT 7.0%: CPT | Mod: CPTII,,,

## 2024-11-14 PROCEDURE — 4010F ACE/ARB THERAPY RXD/TAKEN: CPT | Mod: CPTII,,,

## 2024-11-14 PROCEDURE — 80061 LIPID PANEL: CPT

## 2024-11-14 PROCEDURE — 1159F MED LIST DOCD IN RCRD: CPT | Mod: CPTII,,,

## 2024-11-14 PROCEDURE — 99213 OFFICE O/P EST LOW 20 MIN: CPT | Mod: PBBFAC,PN

## 2024-11-14 RX ORDER — ATORVASTATIN CALCIUM 40 MG/1
40 TABLET, FILM COATED ORAL DAILY
Qty: 90 TABLET | Refills: 3 | Status: SHIPPED | OUTPATIENT
Start: 2024-11-14

## 2024-11-14 RX ORDER — PANTOPRAZOLE SODIUM 40 MG/1
40 TABLET, DELAYED RELEASE ORAL DAILY
Qty: 90 TABLET | Refills: 3 | Status: SHIPPED | OUTPATIENT
Start: 2024-11-14 | End: 2025-11-14

## 2024-11-14 RX ORDER — ASPIRIN 81 MG/1
81 TABLET ORAL DAILY
Qty: 90 TABLET | Refills: 3 | Status: SHIPPED | OUTPATIENT
Start: 2024-11-14 | End: 2025-11-14

## 2024-11-14 RX ORDER — PANTOPRAZOLE SODIUM 20 MG/1
40 TABLET, DELAYED RELEASE ORAL DAILY
Qty: 90 TABLET | Refills: 3 | Status: SHIPPED | OUTPATIENT
Start: 2024-11-14 | End: 2024-11-14 | Stop reason: SDUPTHER

## 2024-11-14 NOTE — ASSESSMENT & PLAN NOTE
Continue GDMT as tolerated.  Continue Coreg and losartan  Continue low sodium heart healthy diet  1.5 L fluid restriction. 2g salt restriction. Daily weights. Monitor for edema

## 2024-11-14 NOTE — TELEPHONE ENCOUNTER
----- Message from Danni sent at 11/14/2024  3:33 PM CST -----  Regarding: advise  Contact: [facility]  Type: Needs Medical Advice  Who Called:  [facility]  Symptoms (please be specific):    How long has patient had these symptoms:    Pharmacy name and phone #:   [facility] - [place], [place] - [address] [address]  [address]  [facility] [address]  Phone: [phone] Fax: [phone]          Best Call Back Number:   Additional Information: pantoprazole (PROTONIX) 20 MG tablet change to 40 mg due to insurance will only pay for one tab a day

## 2024-11-14 NOTE — ASSESSMENT & PLAN NOTE
Lipid panel when fasting. Low sodium heart healthy diet  Reduce saturated fats.  Weight loss and exercise as tolerated

## 2024-11-14 NOTE — PROGRESS NOTES
Subjective:    Patient ID:  Jarod Pierce is a 59 y.o. male patient here for evaluation Hypertension and Hyperlipidemia      History of Present Illness:       Patient is here today for a check up.  Denies chest pain, shortness of breath, lightheadedness, dizziness, jaw/neck/arm pain, palpitations, orthopnea, PND, edema, or bleeding.     BP stable.        Review of patient's allergies indicates:   Allergen Reactions    Hydralazine analogues Other (See Comments)     drowsiness    Codeine      Other reaction(s): Hives    Codeine phosphate      Other reaction(s): Unknown  Other reaction(s): Unknown    Lisinopril      Dry coughing    Phenytoin sodium extended      Other reaction(s): leg cramps       Past Medical History:   Diagnosis Date    Allergic rhinitis due to allergen     Arthritis     Cardiomyopathy     Gout     Hypertension     Left bundle branch block      Past Surgical History:   Procedure Laterality Date    WISDOM TOOTH EXTRACTION       Social History     Tobacco Use    Smoking status: Never     Passive exposure: Never    Smokeless tobacco: Never   Substance Use Topics    Alcohol use: No    Drug use: No        Review of Systems:    As noted in HPI in addition      REVIEW OF SYSTEMS  CARDIOVASCULAR: No recent chest pain, palpitations, arm, neck, or jaw pain  RESPIRATORY: No recent fever, cough chills, SOB or congestion  : No blood in the urine  GI: No Nausea, vomiting, constipation, diarrhea, blood, or reflux.  MUSCULOSKELETAL: No myalgias  NEURO: No lightheadedness or dizziness  EYES: No Double vision, blurry, vision or headache              Objective        Vitals:    11/14/24 1008   BP: 132/80   Pulse: 78       LIPIDS - LAST 2   Lab Results   Component Value Date    CHOL 210 (H) 02/14/2024    CHOL 218 (H) 11/02/2023    HDL 33 (L) 02/14/2024    HDL 37 (L) 11/02/2023    LDLCALC 152.0 02/14/2024    LDLCALC 114.2 11/02/2023    TRIG 125 02/14/2024    TRIG 334 (H) 11/02/2023    CHOLHDL 15.7 (L) 02/14/2024     CHOLHDL 17.0 (L) 11/02/2023       CBC - LAST 2  Lab Results   Component Value Date    WBC 5.75 02/21/2024    WBC 5.80 11/02/2023    RBC 4.67 02/21/2024    RBC 4.73 11/02/2023    HGB 15.2 02/21/2024    HGB 15.7 11/02/2023    HCT 44.0 02/21/2024    HCT 44.1 11/02/2023    MCV 94 02/21/2024    MCV 93 11/02/2023    MCH 32.5 (H) 02/21/2024    MCH 33.2 (H) 11/02/2023    MCHC 34.5 02/21/2024    MCHC 35.6 11/02/2023    RDW 12.7 02/21/2024    RDW 12.5 11/02/2023     02/21/2024     11/02/2023    MPV 9.4 02/21/2024    MPV 9.0 (L) 11/02/2023    GRAN 3.5 02/21/2024    GRAN 60.2 02/21/2024    LYMPH 1.6 02/21/2024    LYMPH 27.1 02/21/2024    MONO 0.6 02/21/2024    MONO 9.7 02/21/2024    BASO 0.03 02/21/2024    BASO 0.04 11/02/2023    NRBC 0 02/21/2024    NRBC 0 11/02/2023       CHEMISTRY & LIVER FUNCTION - LAST 2  Lab Results   Component Value Date     02/21/2024     02/14/2024    K 4.2 02/21/2024    K 4.2 02/14/2024     02/21/2024     02/14/2024    CO2 27 02/21/2024    CO2 25 02/14/2024    ANIONGAP 10 02/21/2024    ANIONGAP 9 02/14/2024    BUN 13 02/21/2024    BUN 14 02/14/2024    CREATININE 1.3 02/21/2024    CREATININE 1.2 02/14/2024    GLU 74 02/21/2024    GLU 90 02/14/2024    CALCIUM 9.3 02/21/2024    CALCIUM 9.2 02/14/2024    MG 2.2 09/23/2022    MG 2.0 09/22/2022    ALBUMIN 3.8 02/21/2024    ALBUMIN 4.5 11/02/2023    PROT 7.1 02/21/2024    PROT 7.8 11/02/2023    ALKPHOS 100 02/21/2024    ALKPHOS 97 11/02/2023    ALT 16 02/21/2024    ALT 20 11/02/2023    AST 19 02/21/2024    AST 22 11/02/2023    BILITOT 0.9 02/21/2024    BILITOT 1.0 11/02/2023        CARDIAC PROFILE - LAST 2  Lab Results   Component Value Date    BNP 13 11/02/2023    BNP 25 09/21/2022    CPK 55 09/21/2022     05/17/2022    CPKMB 0.6 09/21/2022    CPKMB 0.8 05/17/2022    TROPONINI <0.030 09/22/2022    TROPONINI <0.030 09/21/2022        COAGULATION - LAST 2  Lab Results   Component Value Date    LABPT 13.2  09/21/2022    INR 1.1 09/21/2022    INR 1.0 01/14/2020    APTT 28.7 01/14/2020    APTT 25.5 07/21/2014       ENDOCRINE & PSA - LAST 2  Lab Results   Component Value Date    HGBA1C 5.4 02/21/2024    HGBA1C 5.9 01/30/2023    TSH 4.467 11/02/2023    TSH 3.000 01/30/2023    PSA 0.50 02/21/2024    PSA 0.45 09/22/2011        ECHOCARDIOGRAM RESULTS  Results for orders placed during the hospital encounter of 11/29/23    Echo    Interpretation Summary    Left Ventricle: The left ventricle is normal in size. Mildly increased wall thickness. There is concentric hypertrophy. Regional wall motion abnormalities present. See diagram for wall motion findings. Septal motion is consistent with bundle branch block. There is moderately reduced systolic function with a visually estimated ejection fraction of 35 - 40%. Grade II diastolic dysfunction. Elevated left ventricular filling pressure.    Right Ventricle: Normal right ventricular cavity size. Wall thickness is normal. Right ventricle wall motion  is normal. Systolic function is normal.    Left Atrium: Left atrium is moderately dilated.    Tricuspid Valve: There is mild regurgitation.    IVC/SVC: Normal venous pressure at 3 mmHg.    Overall the study quality was technically difficult. The study was difficult due to patient's poor endocardial visualization.      CURRENT/PREVIOUS VISIT EKG  Results for orders placed or performed in visit on 11/02/23   IN OFFICE EKG 12-LEAD (to [place])    Collection Time: 11/02/23  8:43 AM    Narrative    Test Reason : Z00.00,    Vent. Rate : 076 BPM     Atrial Rate : 076 BPM     P-R Int : 160 ms          QRS Dur : 158 ms      QT Int : 460 ms       P-R-T Axes : 033 103 -59 degrees     QTc Int : 517 ms    Normal sinus rhythm  Rightward axis  Left bundle branch block  Abnormal ECG  When compared with ECG of 21-SEP-2022 12:12,  Questionable change in The axis  T wave inversion more evident in Inferior leads  T wave inversion less evident in Lateral  leads  Confirmed by Juan Cr MD ([id number]) on 11/9/2023 8:32:03 PM    Referred By:             Confirmed By:Juan rC MD     No valid procedures specified.   Results for orders placed during the hospital encounter of 09/21/22    Nuclear Stress Test    Interpretation Summary    The EKG portion of this study is uninterpretable.    The patient reported no chest pain during the stress test.    There were no arrhythmias during stress.    The nuclear portion of this study will be reported separately.    No valid procedures specified.    PHYSICAL EXAM  CONSTITUTIONAL: Well built, well nourished in no apparent distress  NECK: no carotid bruit, no JVD  LUNGS: CTA  CHEST WALL: no tenderness  HEART: regular rate and rhythm, S1, S2 normal, no murmur, click, rub or gallop   ABDOMEN: soft, non-tender; bowel sounds normal; no masses,  no organomegaly  EXTREMITIES: Extremities normal, no edema, no calf tenderness noted  NEURO: AAO X 3    I HAVE REVIEWED :    The vital signs, nurses notes, and all the pertinent radiology and labs.        Current Outpatient Medications   Medication Instructions    albuterol (PROVENTIL HFA) 90 mcg/actuation inhaler 2 puffs, Inhalation, Every 6 hours PRN, Rescue    amLODIPine (NORVASC) 5 mg, Oral, Daily    aspirin (ECOTRIN) 81 mg, Oral, Daily    atorvastatin (LIPITOR) 40 mg, Oral, Daily    azelastine (ASTELIN) 274 mcg, Nasal, 2 times daily    carvediloL (COREG) 3.125 mg, Oral, 2 times daily    cetirizine (ZYRTEC) 10 mg, Oral, Daily    cyanocobalamin (VITAMIN B-12) 1,000 mcg, Oral, Daily    fluticasone propionate (FLONASE) 100 mcg, Each Nostril, Daily    gentamicin (GARAMYCIN) 0.3 % ophthalmic solution 2 drops, Left Eye, Every 4 hours    losartan (COZAAR) 25 mg, Oral, Daily    pantoprazole (PROTONIX) 40 mg, Oral, Daily          Assessment & Plan     Essential hypertension  Continue losartan 25 mg daily  Continue amlodipine 5 mg daily  Continue Coreg 3.125 mg BID  Low sodium heart healthy  diet  Recommend /80 or less.      Mixed hyperlipidemia, baseline   Lipid panel when fasting. Low sodium heart healthy diet  Reduce saturated fats.  Weight loss and exercise as tolerated    Nonischemic cardiomyopathy, , EF 30% to 35%  Continue GDMT as tolerated.  Continue Coreg and losartan  Continue low sodium heart healthy diet  1.5 L fluid restriction. 2g salt restriction. Daily weights. Monitor for edema    Cardiovascular risk factor, FCVRS 10%, 2013, ASCVD 10-year event risk 10%, 2017  Risk factor modification  Low sodium heart healthy diet  Continue current medication regimen.  Continue aspirin and statin therapy.    Obesity (BMI 30-39.9)  Weight loss and calorie restrictive diet.  Exercise as tolerated  He has lost approximately 10 lbs since I saw him last .    Gastroesophageal reflux disease  Restart protonix 20 mg daily  + reflux and cough          Follow up in about 6 months (around 5/14/2025).

## 2024-11-14 NOTE — ASSESSMENT & PLAN NOTE
Weight loss and calorie restrictive diet.  Exercise as tolerated  He has lost approximately 10 lbs since I saw him last .

## 2024-11-14 NOTE — ASSESSMENT & PLAN NOTE
Risk factor modification  Low sodium heart healthy diet  Continue current medication regimen.  Continue aspirin and statin therapy.

## 2025-01-28 ENCOUNTER — HOSPITAL ENCOUNTER (EMERGENCY)
Facility: HOSPITAL | Age: 60
Discharge: HOME | End: 2025-01-28
Attending: STUDENT IN AN ORGANIZED HEALTH CARE EDUCATION/TRAINING PROGRAM
Payer: MEDICAID

## 2025-01-28 VITALS
WEIGHT: 215 LBS | OXYGEN SATURATION: 96 % | SYSTOLIC BLOOD PRESSURE: 126 MMHG | BODY MASS INDEX: 33.74 KG/M2 | RESPIRATION RATE: 18 BRPM | TEMPERATURE: 99 F | DIASTOLIC BLOOD PRESSURE: 81 MMHG | HEART RATE: 96 BPM | HEIGHT: 67 IN

## 2025-01-28 DIAGNOSIS — B34.9 VIRAL SYNDROME: Primary | ICD-10-CM

## 2025-01-28 DIAGNOSIS — R07.9 CHEST PAIN: ICD-10-CM

## 2025-01-28 DIAGNOSIS — R05.9 COUGH: ICD-10-CM

## 2025-01-28 DIAGNOSIS — I44.7 LEFT BUNDLE BRANCH BLOCK: ICD-10-CM

## 2025-01-28 DIAGNOSIS — R07.89 CHEST HEAVINESS: ICD-10-CM

## 2025-01-28 LAB
ALBUMIN SERPL BCP-MCNC: 4.7 G/DL (ref 3.5–5.2)
ALP SERPL-CCNC: 93 U/L (ref 55–135)
ALT SERPL W/O P-5'-P-CCNC: 17 U/L (ref 10–44)
ANION GAP SERPL CALC-SCNC: 9 MMOL/L (ref 8–16)
AST SERPL-CCNC: 17 U/L (ref 10–40)
BASOPHILS # BLD AUTO: 0.03 K/UL (ref 0–0.2)
BASOPHILS NFR BLD: 0.4 % (ref 0–1.9)
BILIRUB SERPL-MCNC: 2 MG/DL (ref 0.1–1)
BNP SERPL-MCNC: 136 PG/ML (ref 0–99)
BUN SERPL-MCNC: 15 MG/DL (ref 6–20)
CALCIUM SERPL-MCNC: 9.7 MG/DL (ref 8.7–10.5)
CHLORIDE SERPL-SCNC: 100 MMOL/L (ref 95–110)
CO2 SERPL-SCNC: 26 MMOL/L (ref 23–29)
CREAT SERPL-MCNC: 1.2 MG/DL (ref 0.5–1.4)
DIFFERENTIAL METHOD BLD: ABNORMAL
EOSINOPHIL # BLD AUTO: 0 K/UL (ref 0–0.5)
EOSINOPHIL NFR BLD: 0.1 % (ref 0–8)
ERYTHROCYTE [DISTWIDTH] IN BLOOD BY AUTOMATED COUNT: 12.8 % (ref 11.5–14.5)
EST. GFR  (NO RACE VARIABLE): >60 ML/MIN/1.73 M^2
GLUCOSE SERPL-MCNC: 117 MG/DL (ref 70–110)
HCT VFR BLD AUTO: 48 % (ref 40–54)
HCV AB SERPL QL IA: NEGATIVE
HGB BLD-MCNC: 16.7 G/DL (ref 14–18)
HIV 1+2 AB+HIV1 P24 AG SERPL QL IA: NEGATIVE
IMM GRANULOCYTES # BLD AUTO: 0.02 K/UL (ref 0–0.04)
IMM GRANULOCYTES NFR BLD AUTO: 0.3 % (ref 0–0.5)
INFLUENZA A, MOLECULAR: NEGATIVE
INFLUENZA B, MOLECULAR: NEGATIVE
LYMPHOCYTES # BLD AUTO: 0.4 K/UL (ref 1–4.8)
LYMPHOCYTES NFR BLD: 6.1 % (ref 18–48)
MCH RBC QN AUTO: 31.9 PG (ref 27–31)
MCHC RBC AUTO-ENTMCNC: 34.8 G/DL (ref 32–36)
MCV RBC AUTO: 92 FL (ref 82–98)
MONOCYTES # BLD AUTO: 0.6 K/UL (ref 0.3–1)
MONOCYTES NFR BLD: 8.8 % (ref 4–15)
NEUTROPHILS # BLD AUTO: 5.8 K/UL (ref 1.8–7.7)
NEUTROPHILS NFR BLD: 84.3 % (ref 38–73)
NRBC BLD-RTO: 0 /100 WBC
PLATELET # BLD AUTO: 150 K/UL (ref 150–450)
PMV BLD AUTO: 9.3 FL (ref 9.2–12.9)
POTASSIUM SERPL-SCNC: 3.9 MMOL/L (ref 3.5–5.1)
PROT SERPL-MCNC: 8.1 G/DL (ref 6–8.4)
RBC # BLD AUTO: 5.23 M/UL (ref 4.6–6.2)
SARS-COV-2 RDRP RESP QL NAA+PROBE: NEGATIVE
SODIUM SERPL-SCNC: 135 MMOL/L (ref 136–145)
SPECIMEN SOURCE: NORMAL
TROPONIN I SERPL HS-MCNC: 21 PG/ML (ref 0–14.9)
TROPONIN I SERPL HS-MCNC: 22.7 PG/ML (ref 0–14.9)
WBC # BLD AUTO: 6.93 K/UL (ref 3.9–12.7)

## 2025-01-28 PROCEDURE — 86803 HEPATITIS C AB TEST: CPT | Performed by: EMERGENCY MEDICINE

## 2025-01-28 PROCEDURE — 87635 SARS-COV-2 COVID-19 AMP PRB: CPT | Performed by: EMERGENCY MEDICINE

## 2025-01-28 PROCEDURE — 99285 EMERGENCY DEPT VISIT HI MDM: CPT | Mod: 25

## 2025-01-28 PROCEDURE — 93005 ELECTROCARDIOGRAM TRACING: CPT | Performed by: INTERNAL MEDICINE

## 2025-01-28 PROCEDURE — 84484 ASSAY OF TROPONIN QUANT: CPT | Mod: 91 | Performed by: NURSE PRACTITIONER

## 2025-01-28 PROCEDURE — 83880 ASSAY OF NATRIURETIC PEPTIDE: CPT | Performed by: NURSE PRACTITIONER

## 2025-01-28 PROCEDURE — 87389 HIV-1 AG W/HIV-1&-2 AB AG IA: CPT | Performed by: EMERGENCY MEDICINE

## 2025-01-28 PROCEDURE — 85025 COMPLETE CBC W/AUTO DIFF WBC: CPT | Performed by: NURSE PRACTITIONER

## 2025-01-28 PROCEDURE — 80053 COMPREHEN METABOLIC PANEL: CPT | Performed by: NURSE PRACTITIONER

## 2025-01-28 PROCEDURE — 25000003 PHARM REV CODE 250: Performed by: STUDENT IN AN ORGANIZED HEALTH CARE EDUCATION/TRAINING PROGRAM

## 2025-01-28 PROCEDURE — 93010 ELECTROCARDIOGRAM REPORT: CPT | Mod: 76,,, | Performed by: INTERNAL MEDICINE

## 2025-01-28 PROCEDURE — 93010 ELECTROCARDIOGRAM REPORT: CPT | Mod: ,,, | Performed by: INTERNAL MEDICINE

## 2025-01-28 PROCEDURE — 87502 INFLUENZA DNA AMP PROBE: CPT | Performed by: NURSE PRACTITIONER

## 2025-01-28 RX ORDER — ACETAMINOPHEN 500 MG
1000 TABLET ORAL
Status: COMPLETED | OUTPATIENT
Start: 2025-01-28 | End: 2025-01-28

## 2025-01-28 RX ORDER — FLUTICASONE PROPIONATE 50 MCG
1 SPRAY, SUSPENSION (ML) NASAL 2 TIMES DAILY PRN
Qty: 9.9 ML | Refills: 0 | Status: SHIPPED | OUTPATIENT
Start: 2025-01-28

## 2025-01-28 RX ORDER — BENZONATATE 100 MG/1
100 CAPSULE ORAL ONCE
Status: DISCONTINUED | OUTPATIENT
Start: 2025-01-28 | End: 2025-01-28 | Stop reason: HOSPADM

## 2025-01-28 RX ORDER — ALBUTEROL SULFATE 90 UG/1
1-2 INHALANT RESPIRATORY (INHALATION) EVERY 6 HOURS PRN
Qty: 8 G | Refills: 0 | Status: SHIPPED | OUTPATIENT
Start: 2025-01-28 | End: 2025-02-27

## 2025-01-28 RX ORDER — BENZONATATE 100 MG/1
100 CAPSULE ORAL 3 TIMES DAILY PRN
Qty: 20 CAPSULE | Refills: 0 | Status: SHIPPED | OUTPATIENT
Start: 2025-01-28 | End: 2025-02-07

## 2025-01-28 RX ADMIN — ACETAMINOPHEN 1000 MG: 325 TABLET ORAL at 08:01

## 2025-01-28 NOTE — FIRST PROVIDER EVALUATION
Emergency Department TeleTriage Encounter Note      CHIEF COMPLAINT    Chief Complaint   Patient presents with    Fever     Cough/congestion/fever/chills since yesterday       VITAL SIGNS   Initial Vitals [01/28/25 1342]   BP Pulse Resp Temp SpO2   126/81 96 18 98.5 °F (36.9 °C) 96 %      MAP       --            ALLERGIES    Review of patient's allergies indicates:  No Known Allergies    PROVIDER TRIAGE NOTE  Cough, body aches, subjective fever, chills that began yesterday. 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 History of cardiomyopathy and reports left arm heaviness, chest tightness.     Limited physical exam via telehealth: The patient is awake, alert, answering questions appropriately and is not in respiratory distress.  As the Teletriage provider, I performed an initial assessment and ordered appropriate labs and imaging studies, if any, to facilitate the patient's care once placed in the ED. Once a room is available, care and a full evaluation will be completed by an alternate ED provider.  Any additional orders and the final disposition will be determined by that provider.  All imaging and labs will not be followed-up by the Teletriage Team, including myself.        ORDERS  Labs Reviewed   HEPATITIS C ANTIBODY   HIV 1 / 2 ANTIBODY       ED Orders (720h ago, onward)      Start Ordered     Status Ordering Provider    01/28/25 1559 01/28/25 1359  Troponin I High Sensitivity #2  Once Timed         Ordered LATRELL NICOLE    01/28/25 1359 01/28/25 1359  Vital signs  Every 15 min         Ordered LATRELL NICOLE    01/28/25 1359 01/28/25 1359  Cardiac Monitoring - Adult  Continuous        Comments: Notify Physician If:    Ordered LATRELL NICOLE    01/28/25 1359 01/28/25 1359  Pulse Oximetry Continuous  Continuous         Ordered MIGUEL ADARLATRELL    01/28/25 1359 01/28/25 1359  Diet NPO  Diet effective now         Ordered MIGUEL ADARLATRELL    01/28/25 1359 01/28/25 1359  Saline lock IV  Once          Ordered BONNIE LATRELL A.    01/28/25 1359 01/28/25 1359  EKG 12-lead  Once        Comments: Do not perform if previously done during this visit/ triage    Ordered BONNIE LATRELL A.    01/28/25 1359 01/28/25 1359  CBC auto differential  STAT         Ordered DARDAR LATRELL A.    01/28/25 1359 01/28/25 1359  Comprehensive metabolic panel  STAT         Ordered DARDALATRELL ESCOBAR.    01/28/25 1359 01/28/25 1359  Troponin I High Sensitivity #1  STAT         Ordered DARDAR LATRELL A.    01/28/25 1359 01/28/25 1359  B-Type natriuretic peptide (BNP)  STAT         Ordered MIGUEL ADALATRELL ESCOBAR    01/28/25 1356 01/28/25 1355  Influenza antigen Nasopharyngeal Swab  Once         Ordered LATRELL NICOLE    01/28/25 1356 01/28/25 1355  X-Ray Chest PA And Lateral  1 time imaging         In process LATRELL NICOLE    01/28/25 1345 01/28/25 1344  Hepatitis C Antibody  STAT         Ordered JENNIFER YUEN    01/28/25 1345 01/28/25 1344  HIV 1/2 Ag/Ab (4th Gen)  STAT         Ordered JENNIFER YUEN              Virtual Visit Note: The provider triage portion of this emergency department evaluation and documentation was performed via [employer], a HIPAA-compliant telemedicine application, in concert with a tele-presenter in the room. A face to face patient evaluation with one of my colleagues will occur once the patient is placed in an emergency department room.      DISCLAIMER: This note was prepared with [employer] voice recognition transcription software. Garbled syntax, mangled pronouns, and other bizarre constructions may be attributed to that software system.

## 2025-01-29 NOTE — ED PROVIDER NOTES
Encounter Date: 1/28/2025       History     Chief Complaint   Patient presents with    Fever     Cough/congestion/fever/chills since yesterday     HPI    Byron Painter is a 59 y.o. male with a past medical history of cardiomyopathy that presents emergency department for cough, congestion, fevers, and body aches.  Symptoms been ongoing since yesterday.  He did endorse some nausea but no vomiting.  States that he has some chest discomfort and noticed that it is worse with coughing.  No history of blood clots.  Denies inability tolerate p.o., numbness, weakness, changes in bowel habits, and changes in urinary habits.    Review of patient's allergies indicates:  No Known Allergies  No past medical history on file.  No past surgical history on file.  No family history on file.     Review of Systems   Constitutional:  Negative for fever.   HENT:  Positive for congestion. Negative for sore throat.    Respiratory:  Positive for cough and shortness of breath.    Cardiovascular:  Positive for chest pain.   Gastrointestinal:  Positive for nausea. Negative for abdominal pain, diarrhea and vomiting.   Genitourinary:  Negative for dysuria.   Musculoskeletal:  Negative for back pain.   Skin:  Negative for rash.   Neurological:  Negative for weakness.   Hematological:  Does not bruise/bleed easily.       Physical Exam     Initial Vitals [01/28/25 1342]   BP Pulse Resp Temp SpO2   126/81 96 18 98.5 °F (36.9 °C) 96 %      MAP       --         Physical Exam    Nursing note and vitals reviewed.  Constitutional: He appears well-developed and well-nourished.   HENT:   Head: Normocephalic and atraumatic.   Nasal congestion noted.   Eyes: EOM are normal. Pupils are equal, round, and reactive to light.   Neck:   Normal range of motion.  Cardiovascular:  Normal rate, regular rhythm and normal heart sounds.           Pulmonary/Chest: Breath sounds normal. No respiratory distress. He has no wheezes. He has no rhonchi. He has no rales.    Intermittently coughing on exam.   Abdominal: Abdomen is soft. He exhibits no distension. There is no abdominal tenderness. There is no rebound.   Musculoskeletal:         General: Normal range of motion.      Cervical back: Normal range of motion.     Neurological: He is alert and oriented to person, place, and time. He has normal strength. No cranial nerve deficit or sensory deficit. GCS score is 15. GCS eye subscore is 4. GCS verbal subscore is 5. GCS motor subscore is 6.   Skin: Capillary refill takes less than 2 seconds.   Psychiatric: He has a normal mood and affect.         ED Course   Procedures  Labs Reviewed   CBC W/ AUTO DIFFERENTIAL - Abnormal       Result Value    WBC 6.93      RBC 5.23      Hemoglobin 16.7      Hematocrit 48.0      MCV 92      MCH 31.9 (*)     MCHC 34.8      RDW 12.8      Platelets 150      MPV 9.3      Immature Granulocytes 0.3      Gran # (ANC) 5.8      Immature Grans (Abs) 0.02      Lymph # 0.4 (*)     Mono # 0.6      Eos # 0.0      Baso # 0.03      nRBC 0      Gran % 84.3 (*)     Lymph % 6.1 (*)     Mono % 8.8      Eosinophil % 0.1      Basophil % 0.4      Differential Method Automated      Narrative:     Release to patient->Immediate   COMPREHENSIVE METABOLIC PANEL - Abnormal    Sodium 135 (*)     Potassium 3.9      Chloride 100      CO2 26      Glucose 117 (*)     BUN 15      Creatinine 1.2      Calcium 9.7      Total Protein 8.1      Albumin 4.7      Total Bilirubin 2.0 (*)     Alkaline Phosphatase 93      AST 17      ALT 17      eGFR >60.0      Anion Gap 9      Narrative:     Release to patient->Immediate   TROPONIN I HIGH SENSITIVITY - Abnormal    Troponin I High Sensitivity 21.0 (*)     Narrative:     Release to patient->Immediate   TROPONIN I HIGH SENSITIVITY - Abnormal    Troponin I High Sensitivity 22.7 (*)    B-TYPE NATRIURETIC PEPTIDE - Abnormal     (*)     Narrative:     Release to patient->Immediate   INFLUENZA A AND B ANTIGEN    Influenza A, Molecular  Negative      Influenza B, Molecular Negative      Flu A & B Source Nasal swab      Narrative:     Specimen Source->Nasopharyngeal Swab   SARS-COV-2 RNA AMPLIFICATION, QUAL    SARS-CoV-2 RNA, Amplification, Qual Negative     HEPATITIS C ANTIBODY   HIV 1 / 2 ANTIBODY        ECG Results              EKG 12-lead (In process)        Collection Time Result Time QRS Duration [facility] QTC Calculation    01/28/25 14:53:15 01/28/25 15:07:02 156 517                     In process by Interface, Lab In [facility] (01/28/25 15:07:06)                   Narrative:    Test Reason : R07.9,    Vent. Rate :  96 BPM     Atrial Rate :  96 BPM     P-R Int : 150 ms          QRS Dur : 156 ms      QT Int : 410 ms       P-R-T Axes :  61 -13 123 degrees    QTcB Int : 517 ms    Normal sinus rhythm  Left bundle branch block  Abnormal ECG  No previous ECGs available    Referred By:            Confirmed By:                                   Imaging Results              X-Ray Chest PA And Lateral (Final result)  Result time 01/28/25 14:13:00      Final result by Elizabeth Valentine MD (01/28/25 14:13:00)                   Impression:      No acute pulmonary process      Electronically signed by: Elizabeth Valentine  Date:    01/28/2025  Time:    14:13               Narrative:    EXAMINATION:  XR CHEST PA AND LATERAL    CLINICAL HISTORY:  Cough, unspecified    FINDINGS:  PA and lateral chest without comparisons shows normal cardiomediastinal silhouette.    Lungs are clear. Pulmonary vasculature is normal. Degenerative changes of the spine                                       Medications   benzonatate capsule 100 mg (100 mg Oral Not Given 1/28/25 2045)   acetaminophen tablet 1,000 mg (1,000 mg Oral Given 1/28/25 2006)     Medical Decision Making  Byron Painter is a 59 y.o. male with a past medical history of cardiomyopathy that presents emergency department for cough, congestion, fevers, and body aches.  Symptoms been ongoing since yesterday.  Vitals stable.   Nontoxic male.  Afebrile.  Lungs are clear.  Heart sounds within normal limits.  Nasal congestion and coughing noted on exam.  Presentation most consistent with viral illness.  EKG x2 shows left bundle-branch block which does not meet STEMI activation criteria.  No EKG to compare to.  Troponin x2 he is 21 and 22.  Unlikely to be ACS.  Viral swabs are negative.  BNP only mildly elevated.  Chest x-ray is clear.  CBC and CMP are unremarkable.  Presentation most consistent with viral illness.  Doubt ACS given his constellation of lab work and presentation.  Tolerating p.o..  Given prescription for inhaler, Flonase, and Tessalon Perles.  Return precautions given.  Instructed follow up with primary care.    Risk  OTC drugs.  Prescription drug management.                                      Clinical Impression:  Final diagnoses:  [R05.9] Cough  [R07.9] Chest pain  [R07.89] Chest heaviness  [B34.9] Viral syndrome (Primary)  [I44.7] Left bundle branch block          ED Disposition Condition    Discharge Stable          ED Prescriptions       Medication Sig Dispense Start Date End Date Auth. Provider    benzonatate (TESSALON) 100 MG capsule Take 1 capsule (100 mg total) by mouth 3 (three) times daily as needed for Cough. 20 capsule 1/28/2025 2/7/2025 Josh Thornton MD    albuterol (PROVENTIL/VENTOLIN HFA) 90 mcg/actuation inhaler Inhale 1-2 puffs into the lungs every 6 (six) hours as needed for Wheezing. Rescue 8 g 1/28/2025 2/27/2025 Josh Thornton MD    fluticasone propionate (FLONASE) 50 mcg/actuation nasal spray 1 spray (50 mcg total) by Each Nostril route 2 (two) times daily as needed for Rhinitis. 9.9 mL 1/28/2025 -- Josh Thornton MD          Follow-up Information       Follow up With Specialties Details Why Contact Info Additional Information    [facility] - Emergency Dept Emergency Medicine  As needed, If symptoms worsen [address]  [facility]  [address]  [phone] 1st floor    Barrett Mendoza MD Interventional Cardiology, Cardiology Call in 2 days Follow-up on ED visit [address] [address]  [facility]  [facility] [address]  [phone]                Josh Thornton MD  01/28/25 2051

## 2025-01-29 NOTE — DISCHARGE INSTRUCTIONS
Please return to the emergency department if you have new or worsening symptoms.  Follow up with the primary care doctor in 3-5 days.

## 2025-01-31 LAB
OHS QRS DURATION: 154 MS
OHS QRS DURATION: 156 MS
OHS QTC CALCULATION: 516 MS
OHS QTC CALCULATION: 517 MS

## 2025-02-21 ENCOUNTER — PATIENT MESSAGE (OUTPATIENT)
Dept: FAMILY MEDICINE | Facility: CLINIC | Age: 60
End: 2025-02-21

## 2025-02-21 ENCOUNTER — HOSPITAL ENCOUNTER (OUTPATIENT)
Dept: RADIOLOGY | Facility: CLINIC | Age: 60
Discharge: HOME | End: 2025-02-21
Attending: STUDENT IN AN ORGANIZED HEALTH CARE EDUCATION/TRAINING PROGRAM
Payer: MEDICAID

## 2025-02-21 ENCOUNTER — RESULTS FOLLOW-UP (OUTPATIENT)
Dept: FAMILY MEDICINE | Facility: CLINIC | Age: 60
End: 2025-02-21

## 2025-02-21 ENCOUNTER — OFFICE VISIT (OUTPATIENT)
Dept: FAMILY MEDICINE | Facility: CLINIC | Age: 60
End: 2025-02-21
Payer: MEDICAID

## 2025-02-21 VITALS
RESPIRATION RATE: 15 BRPM | BODY MASS INDEX: 32.33 KG/M2 | HEART RATE: 82 BPM | TEMPERATURE: 98 F | WEIGHT: 218.25 LBS | DIASTOLIC BLOOD PRESSURE: 102 MMHG | SYSTOLIC BLOOD PRESSURE: 162 MMHG | OXYGEN SATURATION: 98 % | HEIGHT: 69 IN

## 2025-02-21 DIAGNOSIS — R05.3 CHRONIC COUGH: Primary | ICD-10-CM

## 2025-02-21 DIAGNOSIS — Z12.11 COLON CANCER SCREENING: ICD-10-CM

## 2025-02-21 DIAGNOSIS — E78.2 MIXED HYPERLIPIDEMIA: Primary | ICD-10-CM

## 2025-02-21 DIAGNOSIS — R79.9 ABNORMAL FINDING OF BLOOD CHEMISTRY, UNSPECIFIED: ICD-10-CM

## 2025-02-21 DIAGNOSIS — R22.42 FOOT MASS, LEFT: ICD-10-CM

## 2025-02-21 DIAGNOSIS — E78.2 MIXED HYPERLIPIDEMIA: ICD-10-CM

## 2025-02-21 DIAGNOSIS — M10.9 GOUT, UNSPECIFIED CAUSE, UNSPECIFIED CHRONICITY, UNSPECIFIED SITE: ICD-10-CM

## 2025-02-21 DIAGNOSIS — Z12.5 ENCOUNTER FOR PROSTATE CANCER SCREENING: ICD-10-CM

## 2025-02-21 DIAGNOSIS — N52.9 ERECTILE DYSFUNCTION, UNSPECIFIED ERECTILE DYSFUNCTION TYPE: ICD-10-CM

## 2025-02-21 DIAGNOSIS — I10 ESSENTIAL HYPERTENSION: ICD-10-CM

## 2025-02-21 DIAGNOSIS — Z00.00 ENCOUNTER FOR PREVENTIVE HEALTH EXAMINATION: ICD-10-CM

## 2025-02-21 DIAGNOSIS — R05.3 CHRONIC COUGH: ICD-10-CM

## 2025-02-21 DIAGNOSIS — I42.8 NONISCHEMIC CARDIOMYOPATHY: ICD-10-CM

## 2025-02-21 DIAGNOSIS — R05.2 SUBACUTE COUGH: ICD-10-CM

## 2025-02-21 DIAGNOSIS — J01.00 SUBACUTE MAXILLARY SINUSITIS: ICD-10-CM

## 2025-02-21 PROCEDURE — 71046 X-RAY EXAM CHEST 2 VIEWS: CPT | Mod: TC,FY,PO

## 2025-02-21 PROCEDURE — 99215 OFFICE O/P EST HI 40 MIN: CPT | Mod: PBBFAC,PO | Performed by: STUDENT IN AN ORGANIZED HEALTH CARE EDUCATION/TRAINING PROGRAM

## 2025-02-21 PROCEDURE — 73630 X-RAY EXAM OF FOOT: CPT | Mod: 26,LT,, | Performed by: RADIOLOGY

## 2025-02-21 PROCEDURE — 73630 X-RAY EXAM OF FOOT: CPT | Mod: TC,FY,PO,LT

## 2025-02-21 PROCEDURE — 71046 X-RAY EXAM CHEST 2 VIEWS: CPT | Mod: 26,,, | Performed by: RADIOLOGY

## 2025-02-21 RX ORDER — TRIAMCINOLONE ACETONIDE 40 MG/ML
40 INJECTION, SUSPENSION INTRA-ARTICULAR; INTRAMUSCULAR
Status: COMPLETED | OUTPATIENT
Start: 2025-02-21 | End: 2025-02-21

## 2025-02-21 RX ORDER — AMLODIPINE AND VALSARTAN 5; 160 MG/1; MG/1
1 TABLET ORAL DAILY
Qty: 90 TABLET | Refills: 1 | Status: SHIPPED | OUTPATIENT
Start: 2025-02-21

## 2025-02-21 RX ORDER — AMLODIPINE BESYLATE 5 MG/1
5 TABLET ORAL DAILY
Qty: 90 TABLET | Refills: 3 | Status: CANCELLED | OUTPATIENT
Start: 2025-02-21 | End: 2026-02-21

## 2025-02-21 RX ORDER — METHYLPREDNISOLONE 4 MG/1
TABLET ORAL
Qty: 21 TABLET | Refills: 0 | Status: SHIPPED | OUTPATIENT
Start: 2025-02-21

## 2025-02-21 RX ORDER — ATORVASTATIN CALCIUM 40 MG/1
40 TABLET, FILM COATED ORAL DAILY
Qty: 90 TABLET | Refills: 3 | Status: CANCELLED | OUTPATIENT
Start: 2025-02-21

## 2025-02-21 RX ORDER — SILDENAFIL 25 MG/1
25 TABLET, FILM COATED ORAL 2 TIMES DAILY PRN
Qty: 15 TABLET | Refills: 2 | Status: SHIPPED | OUTPATIENT
Start: 2025-02-21 | End: 2026-02-21

## 2025-02-21 RX ORDER — LOSARTAN POTASSIUM 25 MG/1
25 TABLET ORAL DAILY
Qty: 90 TABLET | Refills: 3 | Status: CANCELLED | OUTPATIENT
Start: 2025-02-21 | End: 2026-02-21

## 2025-02-21 RX ADMIN — TRIAMCINOLONE ACETONIDE 40 MG: 40 INJECTION, SUSPENSION INTRA-ARTICULAR; INTRAMUSCULAR at 10:02

## 2025-02-21 NOTE — PATIENT INSTRUCTIONS
Zbigniew Olivera,     If you are due for any health screening(s) below please notify me so we can arrange them to be ordered and scheduled. Most healthy patients at your age complete them, but you are free to accept or refuse.     If you can't do it, I'll definitely understand. If you can, I'd certainly appreciate it!    Tests to Keep You Healthy    Colon Cancer Screening: ORDERED  Last Blood Pressure <= 139/89 (2/21/2025): Yes      Its time for your colon cancer screening     Colorectal cancer is one of the leading causes of cancer death for men and women but it doesnt have to be. Screenings can prevent colorectal cancer or find it early enough to treat and cure the disease.     Our records indicate that you may be overdue for colon cancer screening. A colonoscopy or stool screening test can help identify patients at risk for developing colon cancer. Cancer screenings save lives, so schedule yours today to stay healthy.     A colonoscopy is the preferred test for detecting colon cancer. It is needed only once every 10 years if results are negative. While you are sedated, a flexible, lighted tube with a tiny camera is inserted into the rectum and advanced through the colon to look for cancers.     An alternative screening test that is used at home and returned to the lab may also be used. It detects hidden blood in bowel movements which could indicate cancer in the colon. If results are positive, you will need a colonoscopy to determine if the blood is a sign of cancer. This type of follow up (diagnostic) colonoscopy usually requires additional copays as required by your insurance provider.     If you recently had your colon cancer screening performed outside of [facility], please let your Health care team know so that they can update your health record. Please contact your PCP if you have any questions.    Lets manage your high blood pressure     Your blood pressure was above 140/90 today during your visit.  We recommend that you schedule a nurse visit in two weeks to check your blood pressure and discuss ways to support your health goals.     You can also manage your health and record your blood pressure from the comfort of home by keeping a daily blood pressure log. These results are shared with and reviewed by your provider. Please print this form (Daily Blood Pressure Log) to assist you in keeping track of your blood pressure at home.     Schedule your nurse visit in two weeks to learn more about how to track and manage high blood pressure.    Daily Blood Pressure Log    Name:__________________________________                  Date of Birth:_________    Average Blood Pressure:  __________      Date: Time  (a.m.) Blood  Pressure: Pulse  Rate: Time  (p.m.) Blood  Pressure : Pulse  Rate:   Sample 8:37 127/83 84

## 2025-02-21 NOTE — PROGRESS NOTES
Identified pts name and , Kenalog 40 mg administered IM, pt tolerated well. Aseptic technique maintained. Pain scale 0 out of 10 on pain scale. Pt instructed to wait in clinic for 15 minutes after injection was given.

## 2025-02-21 NOTE — PROGRESS NOTES
[facility] Note    Subjective:    The HPI and pertinent ROS is included in the Diagnostic Impression Remarks section at the end of the note. Please see below for further details. Chief complaint is at end of note.     Jarod is a pleasant intelligent patient who is here for evaluation.     Modified Medications    No medications on file       Data reviewed 274}  Previous medical records reviewed and summarized in plan section at end of note.      If you are due for any health screening(s) below please notify me so we can arrange them to be ordered and scheduled. Most healthy patients at your age complete them, but you are free to accept or refuse. If you can't do it, I'll definitely understand. If you can, I'd certainly appreciate it!     Tests to Keep You Healthy    Colon Cancer Screening: ORDERED  Last Blood Pressure <= 139/89 (2/21/2025): Yes      The following portions of the patient's history were reviewed and updated as appropriate: allergies, current medications, past family history, past medical history, past social history, past surgical history and problem list.    He  has a past medical history of Allergic rhinitis due to allergen, Arthritis, Cardiomyopathy, Gout, Hypertension, and Left bundle branch block.  He  has a past surgical history that includes [place] tooth extraction.    He  reports that he has never smoked. He has never been exposed to tobacco smoke. He has never used smokeless tobacco. He reports that he does not drink alcohol and does not use drugs.  He family history includes Cancer in his sister; Gout in his father; Heart disease in his father; Hypertension in his father.    Review of patient's allergies indicates:   Allergen Reactions    Hydralazine analogues Other (See Comments)     drowsiness    Codeine      Other reaction(s): Hives    Codeine phosphate      Other reaction(s): Unknown  Other reaction(s): Unknown    Lisinopril      Dry coughing    Phenytoin sodium extended   "    Other reaction(s): leg cramps       Tobacco Use: Low Risk  (2/21/2025)    Patient History     Smoking Tobacco Use: Never     Smokeless Tobacco Use: Never     Passive Exposure: Never     Physical Examination  Physical Exam  Vital Signs  Blood pressure is 162 systolic.     General appearance: alert, cooperative, no distress  Neck: no thyromegaly, no neck stiffness  Lungs: clear to auscultation, no wheezes, rales or rhonchi, symmetric air entry  Heart: normal rate, regular rhythm, normal S1, S2, no murmurs, rubs, clicks or gallops  Abdomen: soft, nontender, nondistended, no rigidity, rebound, or guarding.   Back: no point tenderness over spine  Extremities: peripheral pulses normal, no unilateral leg swelling or calf tenderness   Neurological:alert, oriented, normal speech, no new focal findings or movement disorder noted from baseline      BP Readings from Last 3 Encounters:   02/21/25 (!) 162/102   11/14/24 132/80   02/21/24 (!) 150/92     Wt Readings from Last 3 Encounters:   02/21/25 99 kg (218 lb 4.1 oz)   11/14/24 98 kg (216 lb 0.8 oz)   02/21/24 102 kg (224 lb 13.9 oz)     BP (!) 162/102 (BP Location: Right arm, Patient Position: Sitting)   Pulse 82   Temp 97.9 °F (36.6 °C) (Oral)   Resp 15   Ht 5' 9" (1.753 m)   Wt 99 kg (218 lb 4.1 oz)   SpO2 98%   BMI 32.23 kg/m²    274}  Laboratory: I have reviewed old labs below:    274}    Lab Results   Component Value Date    WBC 5.75 02/21/2024    HGB 15.2 02/21/2024    HCT 44.0 02/21/2024    MCV 94 02/21/2024     02/21/2024     02/21/2024    K 4.2 02/21/2024     02/21/2024    CALCIUM 9.3 02/21/2024    PHOS 4.6 (H) 09/23/2022    CO2 27 02/21/2024    GLU 74 02/21/2024    BUN 13 02/21/2024    CREATININE 1.3 02/21/2024    EGFRNORACEVR >60.0 02/21/2024    ANIONGAP 10 02/21/2024    PROT 7.1 02/21/2024    ALBUMIN 3.8 02/21/2024    BILITOT 0.9 02/21/2024    ALKPHOS 100 02/21/2024    ALT 16 02/21/2024    AST 19 02/21/2024    INR 1.1 09/21/2022    " CHOL 238 (H) 11/14/2024    TRIG 209 (H) 11/14/2024    HDL 38 (L) 11/14/2024    LDLCALC 158.2 11/14/2024    TSH 4.467 11/02/2023    PSA 0.50 02/21/2024    HGBA1C 5.4 02/21/2024      Lab reviewed by me: Particular labs of significance that I will monitor, workup, or treat to improve are mentioned below in diagnostic impression remarks.    Results         Imaging/EKG: I have reviewed the pertinent results and my findings are noted in remarks.  274}    CC:   Chief Complaint   Patient presents with    Annual Exam    Hypertension    Cough        274}    History of Present Illness  The patient is a 59-year-old male who presents for follow-up.    He has been experiencing a persistent cough since his recovery from COVID-19, characterized by the production of thick phlegm in the posterior oropharynx. He reports occasional episodes of coughing up white phlegm. He also notes the presence of yellowish exudates on his tonsils. A CT scan of his neck was performed approximately 2 years ago. He has been using a peroxide-containing toothpaste and performing frequent oral hygiene practices, including gargling, which have provided some relief. However, exposure to dust and dander exacerbates his symptoms. He reports no fever. He has been using a nasal irrigation device to remove black nasal discharge. He reports no chest pain but experienced a transient sharp pain earlier this morning. He has not had a recent consultation with a cardiologist. Exposure to dust and dander exacerbates his symptoms. He has been using a peroxide-containing toothpaste and performing frequent oral hygiene practices, including gargling, which have provided some relief. He has been using a nasal irrigation device to remove black nasal discharge. He has been experiencing a persistent cough since his recovery from COVID-19.    His blood pressure was elevated during today's visit, which he attributes to not taking his antihypertensive medication prior to the  appointment. He monitors his blood pressure at home. He is currently on amlodipine and losartan for hypertension management.    He has discontinued aspirin due to associated acid reflux. He was prescribed an alternative medication by his cardiologist, which he found intolerable and subsequently stopped. He has also discontinued his cholesterol medication due to heartburn.    He has a history of gout and has requested a prescription for indomethacin 25 mg. He has previously tried colchicine but found it ineffective. He has steroids available for use during a gout flare-up.    He has a small, non-painful mass on his foot, which he believes may be a cyst. He has not had any radiographic imaging of his foot.    He has a history of erectile dysfunction and has previously used Viagra, which he discontinued due to headaches. He is interested in trying a lower dose of Viagra.    SOCIAL HISTORY  He does not smoke.    MEDICATIONS  Current: amlodipine, losartan  Discontinued: aspirin, allopurinol       Assessment/Plan  Jarod Pierce is a 59 y.o. male who presents to clinic with:  1. Chronic cough    2. Mixed hyperlipidemia    3. Essential hypertension    4. Encounter for prostate cancer screening    5. Encounter for preventive health examination    6. Abnormal finding of blood chemistry, unspecified    7. Erectile dysfunction, unspecified erectile dysfunction type    8. Foot mass, left    9. Colon cancer screening    10. Gout, unspecified cause, unspecified chronicity, unspecified site    11. Subacute maxillary sinusitis    12. Nonischemic cardiomyopathy       274}    Assessment & Plan  1. Cough.  He reports a persistent cough with thick phlegm since recovering from COVID-19. He has been using a peroxide-containing mouthwash and gargling, which has helped somewhat. No fever, chills, night sweats, or unintentional weight loss reported. An x-ray will be ordered to rule out any infection. If the x-ray is negative, a CT scan  will be considered.    2. Hypertension.  His blood pressure was elevated at 162 mmHg today, likely due to not taking his medication before the visit. He is currently on amlodipine and losartan. A combination medication will be prescribed to simplify his regimen to one pill instead of two. He is advised to monitor his blood pressure at home.    3. Gout.  His condition is stable. He has not been taking allopurinol and reports feeling stiff. Blood work will be ordered to monitor his condition. He is advised to continue his current medications and monitor symptoms. He is also advised to use steroids instead of indomethacin for acute gout attacks due to potential cardiovascular risks.    4. Hyperlipidemia.  He reports discontinuing his cholesterol medication due to heartburn. He is advised to continue his statin therapy. Blood work will be ordered to recheck his lipid levels and monitor his condition.    5. Erectile dysfunction.  A prescription for Viagra 25 mg, to be taken twice daily as needed, will be provided. He is advised to avoid taking nitroglycerin with Viagra and to ensure his blood pressure is well-controlled.    6. Left foot pain.  He presents with a mass on his left foot, suspected to be a cyst. An x-ray will be ordered to further evaluate the mass.    Sinusitis-patient will wants to steroid injection today performed monitor     Preventive-will get blood work will get PSA and follow-up on this   Recommend colon screening will send in Cologuard     GERD-stable continue current meds no dysphagia    Nonischemic cardiomyopathy no chest pain or dyspnea no swelling rec to see cards control bp       Altogether 43 minutes of total time spent on the encounter, which includes face to face time and non-face to face time preparing to see the patient (eg, review of tests), Obtaining and/or reviewing separately obtained history, Documenting clinical information in the electronic or other health record, Independently  interpreting results (not separately reported) and communicating results to the patient/family/caregiver, or Care coordination (not separately reported). I also counseled him on common and the most usual side effect of prescribed medications. Risk and benefits of the medication were also discussed. I reviewed previous notes to better coordinate patient's care. He test results and lifestyle changes were also discussed. All questions were answered, and patient voiced understanding.       This note was generated with the assistance of ambient listening technology. Verbal consent was obtained by the patient and accompanying visitor(s) for the recording of patient appointment to facilitate this note. I attest to having reviewed and edited the generated note for accuracy, though some syntax or spelling errors may persist. Please contact the author of this note for any clarification.      1. Mixed hyperlipidemia  - Apolipoprotein B; Future    2. Chronic cough  - X-Ray Chest PA And Lateral; Future    3. Essential hypertension  - amlodipine-valsartan (EXFORGE) 5-160 mg per tablet; Take 1 tablet by mouth once daily.  Dispense: 90 tablet; Refill: 1    4. Encounter for prostate cancer screening  - PSA, Screening; Future    5. Encounter for preventive health examination    6. Abnormal finding of blood chemistry, unspecified  - Uric Acid; Future  - Comprehensive Metabolic Panel; Future  - Hemoglobin A1C; Future  - TSH; Future    7. Erectile dysfunction, unspecified erectile dysfunction type  - sildenafiL (VIAGRA) 25 MG tablet; Take 1 tablet (25 mg total) by mouth 2 (two) times daily as needed for Erectile Dysfunction.  Dispense: 15 tablet; Refill: 2    8. Foot mass, left  - X-Ray Foot Complete Left; Future    9. Colon cancer screening  - Cologuard Screening (Multitarget Stool DNA); Future  - Cologuard Screening (Multitarget Stool DNA)    10. Gout, unspecified cause, unspecified chronicity, unspecified site  - methylPREDNISolone  (MEDROL DOSEPACK) 4 mg tablet; follow package directions  Dispense: 21 tablet; Refill: 0    11. Subacute maxillary sinusitis    12. Nonischemic cardiomyopathy      Vasu Rocha MD   274}    If you are due for any health screening(s) below please notify me so we can arrange them to be ordered and scheduled. Most healthy patients at your age complete them, but you are free to accept or refuse.     If you can't do it, I'll definitely understand. If you can, I'd certainly appreciate it!   Tests to Keep You Healthy    Colon Cancer Screening: ORDERED  Last Blood Pressure <= 139/89 (2/21/2025): Yes

## 2025-02-24 ENCOUNTER — PATIENT MESSAGE (OUTPATIENT)
Dept: FAMILY MEDICINE | Facility: CLINIC | Age: 60
End: 2025-02-24
Payer: MEDICAID

## 2025-02-24 DIAGNOSIS — M10.9 GOUT, ARTHRITIS: ICD-10-CM

## 2025-02-24 DIAGNOSIS — E79.0 HYPERURICEMIA: Primary | ICD-10-CM

## 2025-02-24 RX ORDER — ALLOPURINOL 100 MG/1
100 TABLET ORAL DAILY
Qty: 90 TABLET | Refills: 1 | Status: SHIPPED | OUTPATIENT
Start: 2025-02-24

## 2025-02-26 ENCOUNTER — RESULTS FOLLOW-UP (OUTPATIENT)
Dept: FAMILY MEDICINE | Facility: CLINIC | Age: 60
End: 2025-02-26

## 2025-02-26 ENCOUNTER — HOSPITAL ENCOUNTER (OUTPATIENT)
Dept: RADIOLOGY | Facility: HOSPITAL | Age: 60
Discharge: HOME | End: 2025-02-26
Attending: STUDENT IN AN ORGANIZED HEALTH CARE EDUCATION/TRAINING PROGRAM
Payer: MEDICAID

## 2025-02-26 DIAGNOSIS — R05.2 SUBACUTE COUGH: ICD-10-CM

## 2025-02-26 PROCEDURE — 71250 CT THORAX DX C-: CPT | Mod: 26,,, | Performed by: RADIOLOGY

## 2025-02-26 PROCEDURE — 71250 CT THORAX DX C-: CPT | Mod: TC

## 2025-02-26 RX ORDER — ATORVASTATIN CALCIUM 80 MG/1
80 TABLET, FILM COATED ORAL DAILY
Qty: 90 TABLET | Refills: 3 | Status: SHIPPED | OUTPATIENT
Start: 2025-02-26 | End: 2026-02-26

## 2025-02-26 NOTE — TELEPHONE ENCOUNTER
"----- Message from Med Assistant Janna sent at 2/26/2025 10:34 AM CST -----  Informed pt message from provider. Pt verbalized understanding.     "Pt agreed to increase statin please seend new Rx To pharmacy"  ----- Message -----  From: Vasu Rocha MD  Sent: 2/26/2025  10:27 AM CST  To: [facility] Staff    Pt has high apo b recommend to increase statin   ----- Message -----  From: Robinson, [facility] Lab Interface  Sent: 2/21/2025   7:24 PM CST  To: Vasu Rocha MD    "

## 2025-02-27 ENCOUNTER — TELEPHONE (OUTPATIENT)
Dept: FAMILY MEDICINE | Facility: CLINIC | Age: 60
End: 2025-02-27
Payer: MEDICAID

## 2025-02-27 NOTE — TELEPHONE ENCOUNTER
----- Message from Susan sent at 2/27/2025 12:45 PM CST -----  Contact: self  Type: Needs Medical AdviceWho Called:  the patient Best Call Back Number: [phone] Information: pt called to get results but does not have access to the portal, can someone please call him back to read message

## 2025-02-27 NOTE — TELEPHONE ENCOUNTER
"LM pt call clinic for results.    "CT lung scan showed no findings for malignancy and patient was notified to repeat CT scan in 1 year or sooner if worrisome symptoms develop. "  "

## 2025-03-11 ENCOUNTER — PATIENT MESSAGE (OUTPATIENT)
Dept: ADMINISTRATIVE | Facility: HOSPITAL | Age: 60
End: 2025-03-11
Payer: MEDICAID

## 2025-03-13 ENCOUNTER — PATIENT MESSAGE (OUTPATIENT)
Dept: FAMILY MEDICINE | Facility: CLINIC | Age: 60
End: 2025-03-13
Payer: MEDICAID

## 2025-03-16 DIAGNOSIS — I10 HYPERTENSION, UNSPECIFIED TYPE: Primary | ICD-10-CM

## 2025-03-17 RX ORDER — LOSARTAN POTASSIUM 25 MG/1
25 TABLET ORAL
Qty: 30 TABLET | Refills: 0 | OUTPATIENT
Start: 2025-03-17

## 2025-03-17 RX ORDER — CARVEDILOL 3.12 MG/1
3.12 TABLET ORAL DAILY
Qty: 90 TABLET | Refills: 3 | Status: SHIPPED | OUTPATIENT
Start: 2025-03-17

## 2025-03-19 ENCOUNTER — TELEPHONE (OUTPATIENT)
Dept: FAMILY MEDICINE | Facility: CLINIC | Age: 60
End: 2025-03-19
Payer: MEDICAID

## 2025-03-19 ENCOUNTER — PATIENT MESSAGE (OUTPATIENT)
Dept: ADMINISTRATIVE | Facility: HOSPITAL | Age: 60
End: 2025-03-19
Payer: MEDICAID

## 2025-03-20 ENCOUNTER — TELEPHONE (OUTPATIENT)
Dept: CARDIOLOGY | Facility: CLINIC | Age: 60
End: 2025-03-20
Payer: MEDICAID

## 2025-03-20 RX ORDER — LOSARTAN POTASSIUM 25 MG/1
25 TABLET ORAL
Qty: 30 TABLET | Refills: 0 | OUTPATIENT
Start: 2025-03-20

## 2025-03-20 RX ORDER — CARVEDILOL 3.12 MG/1
3.12 TABLET ORAL 2 TIMES DAILY
Qty: 60 TABLET | Refills: 0 | OUTPATIENT
Start: 2025-03-20

## 2025-03-20 NOTE — TELEPHONE ENCOUNTER
----- Message from Asa sent at 3/20/2025  4:44 PM CDT -----  Type:  Needs Medical AdviceWho Called: ptWould the patient rather a call back or a response via [facility]? Call Shaun Call Back Number: [phone] Additional Information: pt st he meds was denied. pt requesting to talk to someone in the office to understand what's going on. Pt st he needs his med. He work out of town. please call to discuss

## 2025-03-21 NOTE — TELEPHONE ENCOUNTER
----- Message from Mari sent at 3/21/2025  8:52 AM CDT -----  Regarding: Needs Medical RX Status  Contact: patient at [phone]  Type: Needs Medical RX StatusWho Called:  patient at [phone] Information: Patient is calling back to discuss refill. Please call and advise. Thank you.

## 2025-04-15 DIAGNOSIS — R05.2 SUBACUTE COUGH: ICD-10-CM

## 2025-04-16 ENCOUNTER — TELEPHONE (OUTPATIENT)
Dept: FAMILY MEDICINE | Facility: CLINIC | Age: 60
End: 2025-04-16
Payer: MEDICAID

## 2025-04-16 RX ORDER — PROMETHAZINE HYDROCHLORIDE AND DEXTROMETHORPHAN HYDROBROMIDE 6.25; 15 MG/5ML; MG/5ML
5 SYRUP ORAL EVERY 6 HOURS PRN
Qty: 118 ML | Refills: 0 | Status: SHIPPED | OUTPATIENT
Start: 2025-04-16

## 2025-04-16 NOTE — TELEPHONE ENCOUNTER
Spoke to pt who states he is out of town for a month and not sure when he will be back. Advised pt to monitor BP at home and call with readings. Pt will also call when he is back home to scheduled nurse visit

## 2025-04-16 NOTE — TELEPHONE ENCOUNTER
----- Message from Vasu Rocha MD sent at 4/15/2025  9:16 PM CDT -----  Regarding: Last bp elevated please set up nurse bp check  Please set up a nurse bp check in clinic because they had an elevated last bp reading. Thanks! BP Readings from Last 1 Encounters:02/21/25 : (!) 162/102

## 2025-05-16 ENCOUNTER — TELEPHONE (OUTPATIENT)
Dept: FAMILY MEDICINE | Facility: CLINIC | Age: 60
End: 2025-05-16
Payer: MEDICAID

## 2025-05-19 ENCOUNTER — LAB VISIT (OUTPATIENT)
Dept: LAB | Facility: HOSPITAL | Age: 60
End: 2025-05-19
Payer: MEDICAID

## 2025-05-19 ENCOUNTER — OFFICE VISIT (OUTPATIENT)
Dept: FAMILY MEDICINE | Facility: CLINIC | Age: 60
End: 2025-05-19
Payer: MEDICAID

## 2025-05-19 VITALS
DIASTOLIC BLOOD PRESSURE: 92 MMHG | SYSTOLIC BLOOD PRESSURE: 140 MMHG | TEMPERATURE: 98 F | WEIGHT: 217.81 LBS | OXYGEN SATURATION: 95 % | HEIGHT: 69 IN | HEART RATE: 87 BPM | BODY MASS INDEX: 32.26 KG/M2

## 2025-05-19 DIAGNOSIS — R00.2 PALPITATIONS: ICD-10-CM

## 2025-05-19 DIAGNOSIS — R25.2 CRAMP OF BOTH LOWER EXTREMITIES: ICD-10-CM

## 2025-05-19 DIAGNOSIS — I42.8 NONISCHEMIC CARDIOMYOPATHY: ICD-10-CM

## 2025-05-19 DIAGNOSIS — J32.0 CHRONIC MAXILLARY SINUSITIS: ICD-10-CM

## 2025-05-19 DIAGNOSIS — R05.3 CHRONIC COUGH: ICD-10-CM

## 2025-05-19 DIAGNOSIS — I16.0 HYPERTENSIVE URGENCY: Primary | Chronic | ICD-10-CM

## 2025-05-19 LAB
ABSOLUTE EOSINOPHIL (OHS): 0.13 K/UL
ABSOLUTE MONOCYTE (OHS): 0.7 K/UL (ref 0.3–1)
ABSOLUTE NEUTROPHIL COUNT (OHS): 5.5 K/UL (ref 1.8–7.7)
ALBUMIN SERPL BCP-MCNC: 4.2 G/DL (ref 3.5–5.2)
ALP SERPL-CCNC: 98 UNIT/L (ref 40–150)
ALT SERPL W/O P-5'-P-CCNC: 21 UNIT/L (ref 10–44)
ANION GAP (OHS): 13 MMOL/L (ref 8–16)
AST SERPL-CCNC: 20 UNIT/L (ref 11–45)
BASOPHILS # BLD AUTO: 0.04 K/UL
BASOPHILS NFR BLD AUTO: 0.5 %
BILIRUB SERPL-MCNC: 1.4 MG/DL (ref 0.1–1)
BUN SERPL-MCNC: 16 MG/DL (ref 6–20)
CALCIUM SERPL-MCNC: 9.4 MG/DL (ref 8.7–10.5)
CHLORIDE SERPL-SCNC: 106 MMOL/L (ref 95–110)
CO2 SERPL-SCNC: 23 MMOL/L (ref 23–29)
CREAT SERPL-MCNC: 1.4 MG/DL (ref 0.5–1.4)
ERYTHROCYTE [DISTWIDTH] IN BLOOD BY AUTOMATED COUNT: 13.2 % (ref 11.5–14.5)
GFR SERPLBLD CREATININE-BSD FMLA CKD-EPI: 58 ML/MIN/1.73/M2
GLUCOSE SERPL-MCNC: 90 MG/DL (ref 70–110)
HCT VFR BLD AUTO: 47.8 % (ref 40–54)
HGB BLD-MCNC: 16.9 GM/DL (ref 14–18)
IMM GRANULOCYTES # BLD AUTO: 0.03 K/UL (ref 0–0.04)
IMM GRANULOCYTES NFR BLD AUTO: 0.4 % (ref 0–0.5)
LYMPHOCYTES # BLD AUTO: 1.94 K/UL (ref 1–4.8)
MAGNESIUM SERPL-MCNC: 2.2 MG/DL (ref 1.6–2.6)
MCH RBC QN AUTO: 34 PG (ref 27–31)
MCHC RBC AUTO-ENTMCNC: 35.4 G/DL (ref 32–36)
MCV RBC AUTO: 96 FL (ref 82–98)
NUCLEATED RBC (/100WBC) (OHS): 0 /100 WBC
PHOSPHATE SERPL-MCNC: 3.8 MG/DL (ref 2.7–4.5)
PLATELET # BLD AUTO: 179 K/UL (ref 150–450)
PMV BLD AUTO: 9.8 FL (ref 9.2–12.9)
POTASSIUM SERPL-SCNC: 4.2 MMOL/L (ref 3.5–5.1)
PROT SERPL-MCNC: 7.8 GM/DL (ref 6–8.4)
RBC # BLD AUTO: 4.97 M/UL (ref 4.6–6.2)
RELATIVE EOSINOPHIL (OHS): 1.6 %
RELATIVE LYMPHOCYTE (OHS): 23.3 % (ref 18–48)
RELATIVE MONOCYTE (OHS): 8.4 % (ref 4–15)
RELATIVE NEUTROPHIL (OHS): 65.8 % (ref 38–73)
SODIUM SERPL-SCNC: 142 MMOL/L (ref 136–145)
WBC # BLD AUTO: 8.34 K/UL (ref 3.9–12.7)

## 2025-05-19 PROCEDURE — 3044F HG A1C LEVEL LT 7.0%: CPT | Mod: CPTII,,,

## 2025-05-19 PROCEDURE — 36415 COLL VENOUS BLD VENIPUNCTURE: CPT | Mod: PO

## 2025-05-19 PROCEDURE — 84100 ASSAY OF PHOSPHORUS: CPT

## 2025-05-19 PROCEDURE — 3080F DIAST BP >= 90 MM HG: CPT | Mod: CPTII,,,

## 2025-05-19 PROCEDURE — 85025 COMPLETE CBC W/AUTO DIFF WBC: CPT

## 2025-05-19 PROCEDURE — 3008F BODY MASS INDEX DOCD: CPT | Mod: CPTII,,,

## 2025-05-19 PROCEDURE — G2211 COMPLEX E/M VISIT ADD ON: HCPCS | Mod: S$PBB,,,

## 2025-05-19 PROCEDURE — 99999 PR PBB SHADOW E&M-EST. PATIENT-LVL IV: CPT | Mod: PBBFAC,,,

## 2025-05-19 PROCEDURE — 99215 OFFICE O/P EST HI 40 MIN: CPT | Mod: S$PBB,,,

## 2025-05-19 PROCEDURE — 1159F MED LIST DOCD IN RCRD: CPT | Mod: CPTII,,,

## 2025-05-19 PROCEDURE — 99214 OFFICE O/P EST MOD 30 MIN: CPT | Mod: PBBFAC,PO

## 2025-05-19 PROCEDURE — 1160F RVW MEDS BY RX/DR IN RCRD: CPT | Mod: CPTII,,,

## 2025-05-19 PROCEDURE — 83735 ASSAY OF MAGNESIUM: CPT

## 2025-05-19 PROCEDURE — 3077F SYST BP >= 140 MM HG: CPT | Mod: CPTII,,,

## 2025-05-19 PROCEDURE — 80053 COMPREHEN METABOLIC PANEL: CPT

## 2025-05-19 PROCEDURE — 4010F ACE/ARB THERAPY RXD/TAKEN: CPT | Mod: CPTII,,,

## 2025-05-19 RX ORDER — FLUTICASONE PROPIONATE 50 MCG
2 SPRAY, SUSPENSION (ML) NASAL DAILY
Qty: 16 G | Refills: 11 | Status: SHIPPED | OUTPATIENT
Start: 2025-05-19

## 2025-05-19 RX ORDER — AMOXICILLIN AND CLAVULANATE POTASSIUM 875; 125 MG/1; MG/1
1 TABLET, FILM COATED ORAL 2 TIMES DAILY
Qty: 10 TABLET | Refills: 0 | Status: SHIPPED | OUTPATIENT
Start: 2025-05-19 | End: 2025-05-24

## 2025-05-19 RX ORDER — CETIRIZINE HYDROCHLORIDE 10 MG/1
10 TABLET ORAL DAILY
Qty: 7 TABLET | Refills: 0 | Status: SHIPPED | OUTPATIENT
Start: 2025-05-19 | End: 2025-05-26

## 2025-05-19 RX ORDER — AMLODIPINE AND VALSARTAN 10; 160 MG/1; MG/1
1 TABLET ORAL DAILY
Qty: 90 TABLET | Refills: 0 | Status: SHIPPED | OUTPATIENT
Start: 2025-05-19

## 2025-05-19 NOTE — PATIENT INSTRUCTIONS
Amlodipine- valsartan 10-160mg CHANGED FROM 5-160  Coreg nightly     Antibiotic twice daily x 5 days.   Flonase twice daily   Zyrtec daily

## 2025-05-19 NOTE — PROGRESS NOTES
"[facility]     Subjective:       Patient ID:  [id number]     Chief Complaint: Cough, Nasal Congestion, and Otalgia      Jarod Pierce is a 59 y.o. male with a past medical history significant for HTN, HLD, TIA, HFrEF, and gout who presents to the clinic for upper respiratory symptoms.     Mr. Pierce presents today with ear symptoms. He reports left ear feeling closed up that began after flying one month ago, initially accompanied by sinus problems and sore throat. He continues to experience nasal congestion for the past couple weeks. He reports alternating tonsil pain and nocturnal gagging sensation due to post nasal drip.He denies any fever or chills, however does endorse a chronic cough. Recent XR and CT scan were normal.     He has a history of cardiomyopathy and reports experiencing palpitations with flutter sensation. He notes left-sided discomfort when lying on that side. He discontinued Carvedilol due to significant drowsiness. He continues Exforge for blood pressure management. He denies fever, chills, or chest pain. He is unsure if the flutters are related to his heart or abdomen. He states that the feelings is located on the left side, closer to diaphragm. The flutters are usually associated with stomach sounds. He reports feeling bloated, describing the sensation as feeling "blown up" which he attributes to irregular eating habits. He denies any leg swelling, or shortness of breath, but does endorse lower extremity cramps and abdominal cramps. He does not believe he is well hydrated.         Review of Systems   Constitutional:  Negative for chills, fever and unexpected weight change.   HENT:  Positive for congestion and ear pain. Negative for sinus pressure, sinus pain and sore throat.    Respiratory:  Positive for cough. Negative for shortness of breath.    Cardiovascular:  Positive for palpitations. Negative for chest pain and leg swelling.   Gastrointestinal:  Negative for abdominal " pain, diarrhea, nausea and vomiting.        Past Medical History:   Diagnosis Date    Allergic rhinitis due to allergen     Arthritis     Cardiomyopathy     Gout     Hypertension     Left bundle branch block         Active Problem List with Overview Notes    Diagnosis Date Noted    B12 deficiency 02/09/2023    Chest pain 09/21/2022    Gastroesophageal reflux disease 01/20/2022    Chronic systolic congestive heart failure 02/17/2021    Hypocalcemia 05/18/2020    Headache 05/09/2020    History of TIA 05/08/2020    Neck pain likely due to spasm 05/08/2020    Essential hypertension 05/07/2020    Oral thrush 05/07/2020    Hypertriglyceridemia 01/31/2020    Other male erectile dysfunction 01/31/2020    Hypertensive retinopathy of left eye 12/20/2018    Uncontrolled hypertension 09/28/2018    Non compliance w medication regimen 09/28/2018    Obesity (BMI 30-39.9) 09/28/2018    Bilateral foot pain 12/11/2017    Essential hypertension 09/11/2017    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 03/15/2017    GARNETT (dyspnea on exertion) 07/14/2014    Abnormal cardiovascular stress test 07/14/2014    Allergic rhinitis 07/14/2014    Mixed hyperlipidemia, baseline  10/24/2013    Fatigue 09/13/2013    Back pain 09/13/2013    Class 1 obesity due to excess calories with serious comorbidity and body mass index (BMI) of 33.0 to 33.9 in adult 09/13/2013    Abdominal obesity 09/13/2013    LVH (left ventricular hypertrophy) 09/13/2013    Cardiovascular risk factor, FCVRS 10%, 2013, ASCVD 10-year event risk 10%, 2017 09/13/2013    Nonischemic cardiomyopathy, , EF 30% to 35% 08/28/2013    Gout, arthritis 05/15/2013    Left bundle branch block,  msec         Review of patient's allergies indicates:   Allergen Reactions    Hydralazine analogues Other (See Comments)     drowsiness    Codeine      Other reaction(s): Hives    Codeine phosphate      Other reaction(s): Unknown  Other reaction(s): Unknown    Lisinopril      Dry  coughing    Phenytoin sodium extended      Other reaction(s): leg cramps       Current Medications[1]    Lab Results   Component Value Date    WBC 5.75 02/21/2024    HGB 15.2 02/21/2024    HCT 44.0 02/21/2024     02/21/2024    CHOL 238 (H) 11/14/2024    TRIG 209 (H) 11/14/2024    HDL 38 (L) 11/14/2024    ALT 17 02/21/2025    AST 25 02/21/2025     02/21/2025    K 4.1 02/21/2025     02/21/2025    CREATININE 1.1 02/21/2025    BUN 19 02/21/2025    CO2 25 02/21/2025    TSH 2.186 02/21/2025    PSA 0.53 02/21/2025    INR 1.1 09/21/2022    HGBA1C 5.5 02/21/2025           Objective:      Physical Exam  Constitutional:       General: He is not in acute distress.     Appearance: Normal appearance. He is not toxic-appearing.   HENT:      Head: Normocephalic and atraumatic.      Right Ear: A middle ear effusion is present. Tympanic membrane is not erythematous, retracted or bulging.      Left Ear: A middle ear effusion is present. Tympanic membrane is not erythematous, retracted or bulging.      Nose:      Right Sinus: Maxillary sinus tenderness present. No frontal sinus tenderness.      Left Sinus: Maxillary sinus tenderness present. No frontal sinus tenderness.      Mouth/Throat:      Pharynx: No posterior oropharyngeal erythema or postnasal drip.   Cardiovascular:      Rate and Rhythm: Normal rate and regular rhythm.      Pulses: Normal pulses.      Heart sounds: No murmur heard.     No friction rub.   Pulmonary:      Effort: Pulmonary effort is normal. No respiratory distress.      Breath sounds: Normal breath sounds. No wheezing.   Musculoskeletal:      Cervical back: Normal range of motion.      Right lower leg: No edema.      Left lower leg: No edema.   Skin:     General: Skin is warm and dry.   Neurological:      General: No focal deficit present.      Mental Status: He is alert and oriented to person, place, and time.   Psychiatric:         Mood and Affect: Mood normal.           Assessment:       1.  Essential hypertension    2. Nonischemic cardiomyopathy, , EF 30% to 35%    3. Palpitations    4. Chronic cough    5. Chronic maxillary sinusitis    6. Cramp of both lower extremities          Plan:         Assessment & Plan    - Assessed sinus symptoms, ear pain, and fluid in ears; determined antibiotic treatment necessary.  - Evaluated cardiovascular symptoms including palpitations and elevated BP. Will obtain Cardiac monitor and increase exforge. Patient to follow up with cardiology.   - Patient to follow up in 4 weeks due to multiple complaints and time restraints.        Jarod was seen today for cough, nasal congestion and otalgia.    Diagnoses and all orders for this visit:    Essential hypertension  Comments:  Elevated. Will increase Exforge today. Recommended taking coreg nightly as prescribed.  Orders:  -     amlodipine-valsartan (EXFORGE)  mg per tablet; Take 1 tablet by mouth once daily.    Nonischemic cardiomyopathy, , EF 30% to 35%  Comments:  Followed by cardiology.  Orders:  -     Echo; Future    Palpitations  Comments:  Unclear if this is related to cardiac etiology. Will obtain cardiac montior.  Orders:  -     Cardiac Monitor - 3-15 Day Adult (Cupid Only); Future  -     Comprehensive Metabolic Panel; Future  -     CBC Auto Differential; Future    Chronic cough  Comments:  Recent XR and CT lung with no signs of infection. Unclear etiology. Discussed possible GI referral, patient may have component of acid reflux.    Chronic maxillary sinusitis  Comments:  Patient with symptoms > 10 days. Will treat with antibiotic.  Orders:  -     amoxicillin-clavulanate 875-125mg (AUGMENTIN) 875-125 mg per tablet; Take 1 tablet by mouth 2 (two) times daily. for 5 days  -     cetirizine (ZYRTEC) 10 MG tablet; Take 1 tablet (10 mg total) by mouth once daily. for 7 days  -     fluticasone propionate (FLONASE) 50 mcg/actuation nasal spray; 2 sprays (100 mcg total) by Each Nostril route once daily.    Cramp of both  lower extremities  Comments:  Discussed proper hydration with patient. Will obtain labs today.  Orders:  -     Comprehensive Metabolic Panel; Future  -     Magnesium; Future  -     Phosphorus; Future       Follow up in about 4 weeks (around 6/16/2025).    Future Appointments       Date Provider Specialty Appt Notes    6/16/2025 Alisha Contreras PA-C Family Medicine f/u one month    2/25/2026 Vasu Rocha MD Family Medicine annua;l             Tests to Keep You Healthy    Colon Cancer Screening: ORDERED  Last Blood Pressure <= 139/89 (5/19/2025): NO       I spent a total of 40 minutes on the day of the visit.This includes face to face time and non-face to face time preparing to see the patient (eg, review of tests), obtaining and/or reviewing separately obtained history, documenting clinical information in the electronic or other health record, independently interpreting results and communicating results to the patient/family/caregiver, or care coordinator.    This note was generated with the assistance of ambient listening technology. Verbal consent was obtained by the patient and accompanying visitor(s) for the recording of patient appointment to facilitate this note. I attest to having reviewed and edited the generated note for accuracy, though some syntax or spelling errors may persist. Please contact the author of this note for any clarification.      Alisha Contreras PA-C  Family Medicine Physician Assistant            [1]   Current Outpatient Medications:     albuterol (PROVENTIL HFA) 90 mcg/actuation inhaler, Inhale 2 puffs into the lungs every 6 (six) hours as needed for Wheezing. Rescue, Disp: 1 g, Rfl: 0    atorvastatin (LIPITOR) 80 MG tablet, Take 1 tablet (80 mg total) by mouth once daily., Disp: 90 tablet, Rfl: 3    carvediloL (COREG) 3.125 MG tablet, Take 1 tablet (3.125 mg total) by mouth once daily., Disp: 90 tablet, Rfl: 3    pantoprazole (PROTONIX) 40 MG tablet, Take 1 tablet  (40 mg total) by mouth once daily., Disp: 90 tablet, Rfl: 3    sildenafiL (VIAGRA) 25 MG tablet, Take 1 tablet (25 mg total) by mouth 2 (two) times daily as needed for Erectile Dysfunction., Disp: 15 tablet, Rfl: 2    allopurinoL (ZYLOPRIM) 100 MG tablet, Take 1 tablet (100 mg total) by mouth once daily. (Patient not taking: Reported on 5/19/2025), Disp: 90 tablet, Rfl: 1    amlodipine-valsartan (EXFORGE)  mg per tablet, Take 1 tablet by mouth once daily., Disp: 90 tablet, Rfl: 0    amoxicillin-clavulanate 875-125mg (AUGMENTIN) 875-125 mg per tablet, Take 1 tablet by mouth 2 (two) times daily. for 5 days, Disp: 10 tablet, Rfl: 0    aspirin (ECOTRIN) 81 MG EC tablet, Take 1 tablet (81 mg total) by mouth once daily. (Patient not taking: Reported on 5/19/2025), Disp: 90 tablet, Rfl: 3    cetirizine (ZYRTEC) 10 MG tablet, Take 1 tablet (10 mg total) by mouth once daily. for 7 days, Disp: 7 tablet, Rfl: 0    cyanocobalamin (VITAMIN B-12) 1000 MCG tablet, Take 1 tablet (1,000 mcg total) by mouth once daily. (Patient not taking: Reported on 5/19/2025), Disp: 90 tablet, Rfl: 0    fluticasone propionate (FLONASE) 50 mcg/actuation nasal spray, 2 sprays (100 mcg total) by Each Nostril route once daily., Disp: 16 g, Rfl: 11

## 2025-05-20 ENCOUNTER — RESULTS FOLLOW-UP (OUTPATIENT)
Dept: FAMILY MEDICINE | Facility: CLINIC | Age: 60
End: 2025-05-20

## 2025-05-21 ENCOUNTER — TELEPHONE (OUTPATIENT)
Dept: FAMILY MEDICINE | Facility: CLINIC | Age: 60
End: 2025-05-21
Payer: MEDICAID

## 2025-05-21 DIAGNOSIS — M10.9 GOUT, ARTHRITIS: Primary | ICD-10-CM

## 2025-05-21 NOTE — TELEPHONE ENCOUNTER
Patient was notified of results. Patient verbalized understanding.     Patient stated that his took both amoxicillin and zyrtec last night and he felt dizziness. Patient stated that he doesn't know if it was from the medication or not. Patient stated that he woke up this morning and felt dizziness also.     Patient also stated that he is having a gout flare up around his ribs. Patient does have a Rx of Indomethacin 20mg however its an old bottle. Requesting a new Rx to be sent to [facility].

## 2025-05-21 NOTE — TELEPHONE ENCOUNTER
----- Message from Alisha Contreras PA-C sent at 5/20/2025  7:56 AM CDT -----  Hi Ms. Pierce,     I reviewed your lab results. Your kidney function is slightly decreased but remains close to your baseline. I recommend increasing your water intake and maintaining good hydration. Your blood count   and electrolytes are stable and within normal limits.    Given your recent muscle cramps, I suggest increasing your fluid intake to see if that helps alleviate symptoms. In addition, you may try an over-the-counter magnesium supplement, such as magnesium   glycinate or magnesium oxide.    Alisha Contreras PA-C  ----- Message -----  From: Lab, Background User  Sent: 5/19/2025   7:10 PM CDT  To: Alisha Contreras PA-C

## 2025-05-22 NOTE — TELEPHONE ENCOUNTER
Patient states he is having a gout flare up to his wrist and toe.  States in the past he has been prescribed Indomethacin one to two tablets daily for gout flare ups.  Requesting to know if new prescription can be provided.      Patient would like to thank LEEANNA Contreras for helping him.  He states he was having a difficult day when he saw her and she helped him through it, and he is appreciative.     [facility] [address] [place] [address] [id number] Phone: [phone]   Fax: [phone]

## 2025-05-22 NOTE — TELEPHONE ENCOUNTER
A gout flare in the ribs is very uncommon, I am unsure if this is what is causing rib pain. What is he currently feeling? Pain in his chest? When does the dizziness occur? With head positions? Is it the room spinning or is it a lightheadedness?     Alisha Contreras PA-C

## 2025-05-23 ENCOUNTER — TELEPHONE (OUTPATIENT)
Dept: FAMILY MEDICINE | Facility: CLINIC | Age: 60
End: 2025-05-23
Payer: MEDICAID

## 2025-05-23 RX ORDER — METHYLPREDNISOLONE 4 MG/1
TABLET ORAL
Qty: 21 TABLET | Refills: 0 | Status: SHIPPED | OUTPATIENT
Start: 2025-05-23

## 2025-05-23 NOTE — TELEPHONE ENCOUNTER
----- Message from Iman sent at 5/23/2025  7:21 AM CDT -----  Contact: self  Type:  Needs Medical AdviceWho Called: selfSymptoms (please be specific): pt would like to be seen today if possible. Pt has gout on his wrist, knuckle, and elbow. Would the patient rather a call back or a response via [facility]? [employer] Call Back Number: [phone] (home) Additional Information: please advise and thank you.

## 2025-05-23 NOTE — TELEPHONE ENCOUNTER
Hi Mr. Pierce,     I sent in a steroid pack for your gout symptoms. Per chart review, Dr. Rocha recommends staying away from indomethacin due to cardiovascular risk factors. Additionally, your uric acid levels are elevated. After your acute flare, we may need to start allopurinol again. Please let me know if you have any further questions.     Alisha Contreras PA-C

## 2025-05-26 ENCOUNTER — TELEPHONE (OUTPATIENT)
Dept: CARDIOLOGY | Facility: CLINIC | Age: 60
End: 2025-05-26
Payer: MEDICAID

## 2025-05-26 NOTE — TELEPHONE ENCOUNTER
----- Message from Asa sent at 5/26/2025  1:46 PM CDT -----  Type:  Needs Medical AdviceWho Called: ptSymptoms (please be specific): [facility] Pharmacy name and phone #:  [id number]  [place], [place] - [address] [id number] [place] [address]Obdrh: [phone] Fax: [phone] the patient rather a call back or a response via [facility]? Call [facility] Call Back Number: [phone] Additional Information: pt st he believe his med is making him feel this way. He would like office to give him a call. He having side affects to meds he believe. St joints are swelling are joints are aching. Pt st he didn't have theses issues with other meds. please call to discuss   Unknown

## 2025-05-28 ENCOUNTER — OFFICE VISIT (OUTPATIENT)
Dept: CARDIOLOGY | Facility: CLINIC | Age: 60
End: 2025-05-28
Payer: MEDICAID

## 2025-05-28 VITALS
HEIGHT: 69 IN | HEART RATE: 95 BPM | SYSTOLIC BLOOD PRESSURE: 118 MMHG | WEIGHT: 219.63 LBS | DIASTOLIC BLOOD PRESSURE: 78 MMHG | BODY MASS INDEX: 32.53 KG/M2 | OXYGEN SATURATION: 95 %

## 2025-05-28 DIAGNOSIS — E66.9 OBESITY (BMI 30-39.9): ICD-10-CM

## 2025-05-28 DIAGNOSIS — I10 ESSENTIAL HYPERTENSION: ICD-10-CM

## 2025-05-28 DIAGNOSIS — E78.2 MIXED HYPERLIPIDEMIA: ICD-10-CM

## 2025-05-28 DIAGNOSIS — K21.9 GASTROESOPHAGEAL REFLUX DISEASE, UNSPECIFIED WHETHER ESOPHAGITIS PRESENT: ICD-10-CM

## 2025-05-28 DIAGNOSIS — R07.9 CHEST PAIN, UNSPECIFIED TYPE: ICD-10-CM

## 2025-05-28 DIAGNOSIS — I10 HYPERTENSION, UNSPECIFIED TYPE: ICD-10-CM

## 2025-05-28 DIAGNOSIS — R06.09 DOE (DYSPNEA ON EXERTION): ICD-10-CM

## 2025-05-28 DIAGNOSIS — I42.8 NONISCHEMIC CARDIOMYOPATHY: ICD-10-CM

## 2025-05-28 DIAGNOSIS — I48.0 PAROXYSMAL ATRIAL FIBRILLATION: Primary | ICD-10-CM

## 2025-05-28 PROCEDURE — 99999 PR PBB SHADOW E&M-EST. PATIENT-LVL IV: CPT | Mod: PBBFAC,,,

## 2025-05-28 PROCEDURE — 1159F MED LIST DOCD IN RCRD: CPT | Mod: CPTII,,,

## 2025-05-28 PROCEDURE — 3074F SYST BP LT 130 MM HG: CPT | Mod: CPTII,,,

## 2025-05-28 PROCEDURE — 3008F BODY MASS INDEX DOCD: CPT | Mod: CPTII,,,

## 2025-05-28 PROCEDURE — 1160F RVW MEDS BY RX/DR IN RCRD: CPT | Mod: CPTII,,,

## 2025-05-28 PROCEDURE — 99214 OFFICE O/P EST MOD 30 MIN: CPT | Mod: PBBFAC,PN

## 2025-05-28 PROCEDURE — 3078F DIAST BP <80 MM HG: CPT | Mod: CPTII,,,

## 2025-05-28 PROCEDURE — 4010F ACE/ARB THERAPY RXD/TAKEN: CPT | Mod: CPTII,,,

## 2025-05-28 PROCEDURE — 99214 OFFICE O/P EST MOD 30 MIN: CPT | Mod: S$PBB,,,

## 2025-05-28 PROCEDURE — 3044F HG A1C LEVEL LT 7.0%: CPT | Mod: CPTII,,,

## 2025-05-28 RX ORDER — METOPROLOL SUCCINATE 25 MG/1
12.5 TABLET, EXTENDED RELEASE ORAL DAILY
Qty: 45 TABLET | Refills: 3 | Status: SHIPPED | OUTPATIENT
Start: 2025-05-28 | End: 2026-05-28

## 2025-05-28 RX ORDER — LANOLIN ALCOHOL/MO/W.PET/CERES
400 CREAM (GRAM) TOPICAL DAILY
Qty: 90 TABLET | Refills: 3 | Status: SHIPPED | OUTPATIENT
Start: 2025-05-28

## 2025-05-28 NOTE — PROGRESS NOTES
Subjective:    Patient ID:  Jarod Pierce is a 59 y.o. male patient here for evaluation Hypertension and Hyperlipidemia      History of Present Illness:     Mid-sternal pain radiating to L chest  Describes it as a cramping/sharp  No associated shortness of breath  Occurs at rest  Been occurring off and on for the past month  Occurs at times with episodes of stress/anger    He does report shortness of breath when he lays on side.    Some shortness of breath with exertion  He reports heartburn as well.  He is only taking PPI PRN.          Review of patient's allergies indicates:   Allergen Reactions    Hydralazine analogues Other (See Comments)     drowsiness    Codeine      Other reaction(s): Hives    Codeine phosphate      Other reaction(s): Unknown  Other reaction(s): Unknown    Lisinopril      Dry coughing    Phenytoin sodium extended      Other reaction(s): leg cramps       Past Medical History:   Diagnosis Date    Allergic rhinitis due to allergen     Arthritis     Cardiomyopathy     Gout     Hypertension     Left bundle branch block      Past Surgical History:   Procedure Laterality Date    WISDOM TOOTH EXTRACTION       Social History[1]     Review of Systems:    As noted in HPI in addition      REVIEW OF SYSTEMS  CARDIOVASCULAR: No recent chest pain, palpitations, arm, neck, or jaw pain  RESPIRATORY: No recent fever, cough chills, SOB or congestion  : No blood in the urine  GI: No Nausea, vomiting, constipation, diarrhea, blood, or reflux.  MUSCULOSKELETAL: No myalgias  NEURO: No lightheadedness or dizziness  EYES: No Double vision, blurry, vision or headache              Objective        Vitals:    05/28/25 1356   BP: 118/78   Pulse: 95       LIPIDS - LAST 2   Lab Results   Component Value Date    CHOL 238 (H) 11/14/2024    CHOL 210 (H) 02/14/2024    HDL 38 (L) 11/14/2024    HDL 33 (L) 02/14/2024    LDLCALC 158.2 11/14/2024    LDLCALC 152.0 02/14/2024    TRIG 209 (H) 11/14/2024    TRIG 125 02/14/2024     CHOLHDL 16.0 (L) 11/14/2024    CHOLHDL 15.7 (L) 02/14/2024       CBC - LAST 2  Lab Results   Component Value Date    WBC 8.34 05/19/2025    WBC 5.75 02/21/2024    RBC 4.97 05/19/2025    RBC 4.67 02/21/2024    HGB 16.9 05/19/2025    HGB 15.2 02/21/2024    HCT 47.8 05/19/2025    HCT 44.0 02/21/2024    MCV 96 05/19/2025    MCV 94 02/21/2024    MCH 34.0 (H) 05/19/2025    MCH 32.5 (H) 02/21/2024    MCHC 35.4 05/19/2025    MCHC 34.5 02/21/2024    RDW 13.2 05/19/2025    RDW 12.7 02/21/2024     05/19/2025     02/21/2024    MPV 9.8 05/19/2025    MPV 9.4 02/21/2024    GRAN 3.5 02/21/2024    GRAN 60.2 02/21/2024    LYMPH 23.3 05/19/2025    LYMPH 1.94 05/19/2025    MONO 8.4 05/19/2025    MONO 0.70 05/19/2025    BASO 0.03 02/21/2024    BASO 0.04 11/02/2023    NRBC 0 05/19/2025    NRBC 0 02/21/2024       CHEMISTRY & LIVER FUNCTION - LAST 2  Lab Results   Component Value Date     05/19/2025     02/21/2025    K 4.2 05/19/2025    K 4.1 02/21/2025     05/19/2025     02/21/2025    CO2 23 05/19/2025    CO2 25 02/21/2025    ANIONGAP 13 05/19/2025    ANIONGAP 12 02/21/2025    BUN 16 05/19/2025    BUN 19 02/21/2025    CREATININE 1.4 05/19/2025    CREATININE 1.1 02/21/2025    GLU 90 05/19/2025    GLU 84 02/21/2025    CALCIUM 9.4 05/19/2025    CALCIUM 9.1 02/21/2025    MG 2.2 05/19/2025    MG 2.2 09/23/2022    ALBUMIN 4.2 05/19/2025    ALBUMIN 4.2 02/21/2025    PROT 7.8 05/19/2025    PROT 7.6 02/21/2025    ALKPHOS 98 05/19/2025    ALKPHOS 88 02/21/2025    ALT 21 05/19/2025    ALT 17 02/21/2025    AST 20 05/19/2025    AST 25 02/21/2025    BILITOT 1.4 (H) 05/19/2025    BILITOT 1.4 (H) 02/21/2025        CARDIAC PROFILE - LAST 2  Lab Results   Component Value Date    BNP 13 11/02/2023    BNP 25 09/21/2022    CPK 55 09/21/2022     05/17/2022    CPKMB 0.6 09/21/2022    CPKMB 0.8 05/17/2022    TROPONINI <0.030 09/22/2022    TROPONINI <0.030 09/21/2022        COAGULATION - LAST 2  Lab Results    Component Value Date    LABPT 13.2 09/21/2022    INR 1.1 09/21/2022    INR 1.0 01/14/2020    APTT 28.7 01/14/2020    APTT 25.5 07/21/2014       ENDOCRINE & PSA - LAST 2  Lab Results   Component Value Date    HGBA1C 5.5 02/21/2025    HGBA1C 5.4 02/21/2024    TSH 2.186 02/21/2025    TSH 4.467 11/02/2023    PSA 0.53 02/21/2025    PSA 0.50 02/21/2024        ECHOCARDIOGRAM RESULTS  Results for orders placed during the hospital encounter of 11/29/23    Echo    Interpretation Summary    Left Ventricle: The left ventricle is normal in size. Mildly increased wall thickness. There is concentric hypertrophy. Regional wall motion abnormalities present. See diagram for wall motion findings. Septal motion is consistent with bundle branch block. There is moderately reduced systolic function with a visually estimated ejection fraction of 35 - 40%. Grade II diastolic dysfunction. Elevated left ventricular filling pressure.    Right Ventricle: Normal right ventricular cavity size. Wall thickness is normal. Right ventricle wall motion  is normal. Systolic function is normal.    Left Atrium: Left atrium is moderately dilated.    Tricuspid Valve: There is mild regurgitation.    IVC/SVC: Normal venous pressure at 3 mmHg.    Overall the study quality was technically difficult. The study was difficult due to patient's poor endocardial visualization.      CURRENT/PREVIOUS VISIT EKG  Results for orders placed or performed in visit on 11/02/23   IN OFFICE EKG 12-LEAD (to [place])    Collection Time: 11/02/23  8:43 AM    Narrative    Test Reason : Z00.00,    Vent. Rate : 076 BPM     Atrial Rate : 076 BPM     P-R Int : 160 ms          QRS Dur : 158 ms      QT Int : 460 ms       P-R-T Axes : 033 103 -59 degrees     QTc Int : 517 ms    Normal sinus rhythm  Rightward axis  Left bundle branch block  Abnormal ECG  When compared with ECG of 21-SEP-2022 12:12,  Questionable change in The axis  T wave inversion more evident in Inferior leads  T wave  inversion less evident in Lateral leads  Confirmed by Juan Cr MD ([id number]) on 11/9/2023 8:32:03 PM    Referred By:             Confirmed By:Juan Cr MD     No valid procedures specified.   Results for orders placed during the hospital encounter of 09/21/22    Nuclear Stress Test    Interpretation Summary    The EKG portion of this study is uninterpretable.    The patient reported no chest pain during the stress test.    There were no arrhythmias during stress.    The nuclear portion of this study will be reported separately.    No valid procedures specified.    PHYSICAL EXAM  CONSTITUTIONAL: Well built, well nourished in no apparent distress  NECK: no carotid bruit, no JVD  LUNGS: CTA  CHEST WALL: no tenderness  HEART: regular rate and rhythm, S1, S2 normal, no murmur, click, rub or gallop   ABDOMEN: soft, non-tender; bowel sounds normal; no masses,  no organomegaly  EXTREMITIES: Extremities normal, no edema, no calf tenderness noted  NEURO: AAO X 3    I HAVE REVIEWED :    The vital signs, nurses notes, and all the pertinent radiology and labs.        Current Outpatient Medications   Medication Instructions    albuterol (PROVENTIL HFA) 90 mcg/actuation inhaler 2 puffs, Inhalation, Every 6 hours PRN, Rescue    allopurinoL (ZYLOPRIM) 100 mg, Oral, Daily    amlodipine-valsartan (EXFORGE)  mg per tablet 1 tablet, Oral, Daily    aspirin (ECOTRIN) 81 mg, Oral, Daily    atorvastatin (LIPITOR) 80 mg, Oral, Daily    cyanocobalamin (VITAMIN B-12) 1,000 mcg, Oral, Daily    fluticasone propionate (FLONASE) 100 mcg, Each Nostril, Daily    magnesium oxide (MAG-OX) 400 mg, Oral, Daily    methylPREDNISolone (MEDROL DOSEPACK) 4 mg tablet follow package directions    metoprolol succinate (TOPROL-XL) 12.5 mg, Oral, Daily    pantoprazole (PROTONIX) 40 mg, Oral, Daily    sildenafiL (VIAGRA) 25 mg, Oral, 2 times daily PRN          Assessment & Plan     Chest pain  Repeat stress test and echo  Restart aspirin and  atorvastatin 80 mg daily  Low sodium heart healthy diet  Strongly encouraged medication compliance    Essential hypertension  Compliance encouraged with medication-   Start Metoprolol succinate 12.5 mg daily (wasn't taking Coreg)  Amlodipine - valsartan  mg daily  Monitor BP closely at home  Keep log  Hold BP meds for SBP <100    GARNETT (dyspnea on exertion)  + SOB with exertion  Does not occur with CP  Nuclear stress test and echo  Noncompliant with diet and medications  Strongly encouraged compliance    Mixed hyperlipidemia, baseline   Lipid panel when fasting. Low sodium heart healthy diet  Reduce saturated fats.  Weight loss and exercise as tolerated    Nonischemic cardiomyopathy, , EF 30% to 35%  Continue GDMT as tolerated.  Continue amlodipine valsartan  Start metoprolol succinate 12.5 mg daily  Continue low sodium heart healthy diet  1.5 L fluid restriction. 2g salt restriction. Daily weights. Monitor for edema       Obesity (BMI 30-39.9)  Weight loss and calorie restrictive diet.  Exercise as tolerated       Gastroesophageal reflux disease  CP could be secondary to GERD  Avoid dietary triggers  He is not consistently taking PPI  Recommend compliance with PPI daily          Follow up in about 3 months (around 8/28/2025).            [1]   Social History  Tobacco Use    Smoking status: Never     Passive exposure: Never    Smokeless tobacco: Never   Substance Use Topics    Alcohol use: No    Drug use: No

## 2025-05-29 NOTE — ASSESSMENT & PLAN NOTE
Repeat stress test and echo  Restart aspirin and atorvastatin 80 mg daily  Low sodium heart healthy diet  Strongly encouraged medication compliance

## 2025-05-29 NOTE — ASSESSMENT & PLAN NOTE
+ SOB with exertion  Does not occur with CP  Nuclear stress test and echo  Noncompliant with diet and medications  Strongly encouraged compliance

## 2025-05-29 NOTE — ASSESSMENT & PLAN NOTE
Compliance encouraged with medication-   Start Metoprolol succinate 12.5 mg daily (wasn't taking Coreg)  Amlodipine - valsartan  mg daily  Monitor BP closely at home  Keep log  Hold BP meds for SBP <100

## 2025-05-29 NOTE — ASSESSMENT & PLAN NOTE
CP could be secondary to GERD  Avoid dietary triggers  He is not consistently taking PPI  Recommend compliance with PPI daily

## 2025-05-29 NOTE — ASSESSMENT & PLAN NOTE
[facility]  [address] [place] [address]  Phone: [phone]       Name: Venice Ash  : 1990     Chief Complaint:    Venice Ash is a 33 y.o. year old female who presents today for   Chief Complaint   Patient presents with    Dizziness     Was seen 5 days ago for this. States she left here and went back to ER. Dizziness has been consistent since Friday. When the spells come on she will have the shakes, not feel right and her BP will be elevated as well. She feels she is dealing with anxiety due to the fact she is not sure what is wrong        History of Present Illness:    Had another episode after I saw her last and went to ER. [place] like she was going to pass out. Her BP was up then. Her glucose was 99 when she first checked it.     Gets tension in her neck/throat when the episode happens.     BP running 110s-120s.     Gets better when she lays down.     Medications:    No outpatient medications prior to visit.     No facility-administered medications prior to visit.       Review of Systems:     Review of Systems     Physical Exam:     Vitals:  BP (!) 142/110 (Site: Left Upper Arm, Position: Sitting, Cuff Size: Small Adult)   Pulse 84   Ht 1.651 m (5' 5\")   Wt 72.6 kg (160 lb)   LMP 2024 (Exact Date)   BMI 26.63 kg/m²  Body mass index is 26.63 kg/m².    Physical Exam  Vitals and nursing note reviewed.   Constitutional:       Appearance: Normal appearance.   Cardiovascular:      Rate and Rhythm: Normal rate and regular rhythm.   Pulmonary:      Effort: Pulmonary effort is normal.   Neurological:      General: No focal deficit present.      Mental Status: She is alert.   Psychiatric:         Mood and Affect: Mood normal.         Behavior: Behavior normal.         Assessment:      Diagnosis Orders   1. Pre-syncope  External Referral To Cardiology      2. Elevated blood pressure reading  External Referral To Cardiology                Plan:     1. Pre-syncope  -      Continue GDMT as tolerated.  Continue amlodipine valsartan  Start metoprolol succinate 12.5 mg daily  Continue low sodium heart healthy diet  1.5 L fluid restriction. 2g salt restriction. Daily weights. Monitor for edema

## 2025-06-06 ENCOUNTER — TELEPHONE (OUTPATIENT)
Dept: CARDIOLOGY | Facility: HOSPITAL | Age: 60
End: 2025-06-06

## 2025-06-09 ENCOUNTER — HOSPITAL ENCOUNTER (OUTPATIENT)
Dept: RADIOLOGY | Facility: HOSPITAL | Age: 60
Discharge: HOME | End: 2025-06-09
Payer: MEDICAID

## 2025-06-09 ENCOUNTER — HOSPITAL ENCOUNTER (OUTPATIENT)
Dept: CARDIOLOGY | Facility: HOSPITAL | Age: 60
Discharge: HOME | End: 2025-06-09
Payer: MEDICAID

## 2025-06-09 VITALS — BODY MASS INDEX: 32.52 KG/M2 | HEIGHT: 69 IN | WEIGHT: 219.56 LBS

## 2025-06-09 DIAGNOSIS — R07.9 CHEST PAIN, UNSPECIFIED TYPE: ICD-10-CM

## 2025-06-09 LAB
CV PHARM DOSE: 0.4 MG
CV STRESS BASE HR: 80 BPM
DIASTOLIC BLOOD PRESSURE: 92 MMHG
EJECTION FRACTION- HIGH: 65 %
END DIASTOLIC INDEX-HIGH: 153 ML/M2
END DIASTOLIC INDEX-LOW: 93 ML/M2
END SYSTOLIC INDEX-HIGH: 71 ML/M2
END SYSTOLIC INDEX-LOW: 31 ML/M2
NUC REST DIASTOLIC VOLUME INDEX: 186
NUC REST EJECTION FRACTION: 47
NUC REST SYSTOLIC VOLUME INDEX: 88
NUC STRESS DIASTOLIC VOLUME INDEX: 211
NUC STRESS EJECTION FRACTION: 43 %
NUC STRESS SYSTOLIC VOLUME INDEX: 121
OHS CV CPX 1 MINUTE RECOVERY HEART RATE: 90 BPM
OHS CV CPX 85 PERCENT MAX PREDICTED HEART RATE MALE: 136
OHS CV CPX MAX PREDICTED HEART RATE: 160
OHS CV CPX PATIENT IS FEMALE: 0
OHS CV CPX PATIENT IS MALE: 1
OHS CV CPX PEAK DIASTOLIC BLOOD PRESSURE: 88 MMHG
OHS CV CPX PEAK HEAR RATE: 90 BPM
OHS CV CPX PEAK RATE PRESSURE PRODUCT: NORMAL
OHS CV CPX PEAK SYSTOLIC BLOOD PRESSURE: 143 MMHG
OHS CV CPX PERCENT MAX PREDICTED HEART RATE ACHIEVED: 56
OHS CV CPX RATE PRESSURE PRODUCT PRESENTING: NORMAL
OHS CV INITIAL DOSE: 12.5 MCG/KG/MIN
OHS CV PEAK DOSE: 27.1 MCG/KG/MIN
RETIRED EF AND QEF - SEE NOTES: 53 %
SYSTOLIC BLOOD PRESSURE: 150 MMHG

## 2025-06-09 PROCEDURE — A9502 TC99M TETROFOSMIN: HCPCS

## 2025-06-09 PROCEDURE — 63600175 PHARM REV CODE 636 W HCPCS

## 2025-06-09 PROCEDURE — 78452 HT MUSCLE IMAGE SPECT MULT: CPT

## 2025-06-09 PROCEDURE — 93018 CV STRESS TEST I&R ONLY: CPT | Mod: ,,, | Performed by: INTERNAL MEDICINE

## 2025-06-09 PROCEDURE — 78452 HT MUSCLE IMAGE SPECT MULT: CPT | Mod: 26,,, | Performed by: INTERNAL MEDICINE

## 2025-06-09 PROCEDURE — 93016 CV STRESS TEST SUPVJ ONLY: CPT | Mod: ,,, | Performed by: INTERNAL MEDICINE

## 2025-06-09 PROCEDURE — 93306 TTE W/DOPPLER COMPLETE: CPT | Mod: 26,,, | Performed by: INTERNAL MEDICINE

## 2025-06-09 PROCEDURE — 93306 TTE W/DOPPLER COMPLETE: CPT

## 2025-06-09 RX ORDER — REGADENOSON 0.08 MG/ML
0.4 INJECTION, SOLUTION INTRAVENOUS
Status: COMPLETED | OUTPATIENT
Start: 2025-06-09 | End: 2025-06-09

## 2025-06-09 RX ADMIN — REGADENOSON 0.4 MG: 0.08 INJECTION, SOLUTION INTRAVENOUS at 08:06

## 2025-06-09 RX ADMIN — TETROFOSMIN 27.1 MILLICURIE: 1.38 INJECTION, POWDER, LYOPHILIZED, FOR SOLUTION INTRAVENOUS at 08:06

## 2025-06-09 RX ADMIN — TETROFOSMIN 12.5 MILLICURIE: 1.38 INJECTION, POWDER, LYOPHILIZED, FOR SOLUTION INTRAVENOUS at 06:06

## 2025-06-11 ENCOUNTER — RESULTS FOLLOW-UP (OUTPATIENT)
Dept: CARDIOLOGY | Facility: CLINIC | Age: 60
End: 2025-06-11

## 2025-06-11 LAB
AORTIC ROOT ANNULUS: 3.5 CM
AORTIC VALVE CUSP SEPERATION: 2.2 CM
APICAL FOUR CHAMBER EJECTION FRACTION: 37 %
APICAL TWO CHAMBER EJECTION FRACTION: 37 %
AV INDEX (PROSTH): 0.62
AV MEAN GRADIENT: 4 MMHG
AV PEAK GRADIENT: 8 MMHG
AV VALVE AREA BY VELOCITY RATIO: 2.7 CM²
AV VALVE AREA: 2.6 CM²
AV VELOCITY RATIO: 0.64
BSA FOR ECHO PROCEDURE: 2.2 M2
CV ECHO LV RWT: 0.5 CM
DOP CALC AO PEAK VEL: 1.4 M/S
DOP CALC AO VTI: 26 CM
DOP CALC LVOT AREA: 4.2 CM2
DOP CALC LVOT DIAMETER: 2.3 CM
DOP CALC LVOT PEAK VEL: 0.9 M/S
DOP CALC LVOT STROKE VOLUME: 66.4 CM3
DOP CALC MV VTI: 23.9 CM
DOP CALCLVOT PEAK VEL VTI: 16 CM
E WAVE DECELERATION TIME: 177 MSEC
E/A RATIO: 0.64
E/E' RATIO: 12 M/S
ECHO LV POSTERIOR WALL: 1.3 CM (ref 0.6–1.1)
FRACTIONAL SHORTENING: 19.2 % (ref 28–44)
INTERVENTRICULAR SEPTUM: 1.3 CM (ref 0.6–1.1)
IVC DIAMETER: 1.5 CM
IVRT: 67 MSEC
LEFT ATRIUM AREA SYSTOLIC (APICAL 2 CHAMBER): 18 CM2
LEFT ATRIUM AREA SYSTOLIC (APICAL 4 CHAMBER): 15.2 CM2
LEFT ATRIUM SIZE: 4.2 CM
LEFT ATRIUM VOLUME INDEX MOD: 20 ML/M2
LEFT ATRIUM VOLUME MOD: 44 ML
LEFT INTERNAL DIMENSION IN SYSTOLE: 4.2 CM (ref 2.1–4)
LEFT VENTRICLE DIASTOLIC VOLUME INDEX: 60.47 ML/M2
LEFT VENTRICLE DIASTOLIC VOLUME: 130 ML
LEFT VENTRICLE END DIASTOLIC VOLUME APICAL 2 CHAMBER: 236 ML
LEFT VENTRICLE END DIASTOLIC VOLUME APICAL 4 CHAMBER: 257 ML
LEFT VENTRICLE END SYSTOLIC VOLUME APICAL 2 CHAMBER: 53.1 ML
LEFT VENTRICLE END SYSTOLIC VOLUME APICAL 4 CHAMBER: 35.3 ML
LEFT VENTRICLE MASS INDEX: 129.5 G/M2
LEFT VENTRICLE SYSTOLIC VOLUME INDEX: 36.7 ML/M2
LEFT VENTRICLE SYSTOLIC VOLUME: 79 ML
LEFT VENTRICULAR INTERNAL DIMENSION IN DIASTOLE: 5.2 CM (ref 3.5–6)
LEFT VENTRICULAR MASS: 278.4 G
LV LATERAL E/E' RATIO: 13.6 M/S
LV SEPTAL E/E' RATIO: 11.3 M/S
LVED V (TEICH): 130 ML
LVES V (TEICH): 78.6 ML
LVOT MG: 2 MMHG
LVOT MV: 0.57 CM/S
MV MEAN GRADIENT: 3 MMHG
MV PEAK A VEL: 1.07 M/S
MV PEAK E VEL: 0.68 M/S
MV PEAK GRADIENT: 6 MMHG
MV VALVE AREA BY CONTINUITY EQUATION: 2.78 CM2
OHS CV RV/LV RATIO: 0.29 CM
OHS LV EJECTION FRACTION SIMPSONS BIPLANE MOD: 35 %
PV MV: 0.74 M/S
PV PEAK GRADIENT: 5 MMHG
PV PEAK VELOCITY: 1.13 M/S
RA PRESSURE ESTIMATED: 3 MMHG
RIGHT VENTRICLE DIASTOLIC BASEL DIMENSION: 1.5 CM
RIGHT VENTRICULAR END-DIASTOLIC DIMENSION: 1.5 CM
RV TISSUE DOPPLER FREE WALL SYSTOLIC VELOCITY 1 (APICAL 4 CHAMBER VIEW): 11.2 CM/S
TDI LATERAL: 0.05 M/S
TDI SEPTAL: 0.06 M/S
TDI: 0.06 M/S
TRICUSPID ANNULAR PLANE SYSTOLIC EXCURSION: 1.2 CM
Z-SCORE OF LEFT VENTRICULAR DIMENSION IN END DIASTOLE: -2.94
Z-SCORE OF LEFT VENTRICULAR DIMENSION IN END SYSTOLE: -0.1

## 2025-06-13 ENCOUNTER — TELEPHONE (OUTPATIENT)
Dept: CARDIOLOGY | Facility: CLINIC | Age: 60
End: 2025-06-13
Payer: MEDICAID

## 2025-06-13 NOTE — TELEPHONE ENCOUNTER
Copied from CRM #[id number]. Topic: General Inquiry - Return Call  >> Jun 13, 2025  3:56 PM Joslyn wrote:  Type:  Patient Returning Call    Who Called:pt     Who Left Message for Patient:RGIO HOYOS    Does the patient know what this is regarding?:yes    Best Call Back Number:[phone]

## 2025-06-13 NOTE — TELEPHONE ENCOUNTER
----- Message from Lanie Elmore NP sent at 6/12/2025  5:32 PM CDT -----  Appt to discuss stress test and echo  ----- Message -----  From: Juan Cr MD  Sent: 6/11/2025   2:39 PM CDT  To: Lanie Elmore NP

## 2025-06-16 ENCOUNTER — PATIENT MESSAGE (OUTPATIENT)
Dept: FAMILY MEDICINE | Facility: CLINIC | Age: 60
End: 2025-06-16
Payer: MEDICAID

## 2025-06-16 ENCOUNTER — TELEPHONE (OUTPATIENT)
Dept: CARDIOLOGY | Facility: CLINIC | Age: 60
End: 2025-06-16
Payer: MEDICAID

## 2025-06-16 DIAGNOSIS — Z12.11 COLON CANCER SCREENING: Primary | ICD-10-CM

## 2025-06-16 DIAGNOSIS — R19.5 POSITIVE COLORECTAL CANCER SCREENING USING COLOGUARD TEST: ICD-10-CM

## 2025-06-16 NOTE — TELEPHONE ENCOUNTER
Called the pt back to let him know about assigned visit pt stated that he will try to make it if not will reschedule if need to

## 2025-06-17 ENCOUNTER — PATIENT MESSAGE (OUTPATIENT)
Dept: GASTROENTEROLOGY | Facility: CLINIC | Age: 60
End: 2025-06-17
Payer: MEDICAID

## 2025-06-26 DIAGNOSIS — K21.9 GASTROESOPHAGEAL REFLUX DISEASE, UNSPECIFIED WHETHER ESOPHAGITIS PRESENT: Primary | ICD-10-CM

## 2025-06-30 ENCOUNTER — TELEPHONE (OUTPATIENT)
Dept: CARDIOLOGY | Facility: CLINIC | Age: 60
End: 2025-06-30
Payer: MEDICAID

## 2025-06-30 ENCOUNTER — OFFICE VISIT (OUTPATIENT)
Dept: CARDIOLOGY | Facility: CLINIC | Age: 60
End: 2025-06-30
Payer: MEDICAID

## 2025-06-30 VITALS
WEIGHT: 223.38 LBS | SYSTOLIC BLOOD PRESSURE: 118 MMHG | DIASTOLIC BLOOD PRESSURE: 82 MMHG | HEART RATE: 76 BPM | BODY MASS INDEX: 33.08 KG/M2 | HEIGHT: 69 IN | OXYGEN SATURATION: 96 %

## 2025-06-30 DIAGNOSIS — R94.39 ABNORMAL STRESS TEST: ICD-10-CM

## 2025-06-30 DIAGNOSIS — I50.20 HFREF (HEART FAILURE WITH REDUCED EJECTION FRACTION): ICD-10-CM

## 2025-06-30 DIAGNOSIS — R06.09 DOE (DYSPNEA ON EXERTION): ICD-10-CM

## 2025-06-30 DIAGNOSIS — R07.9 CHEST PAIN, UNSPECIFIED TYPE: ICD-10-CM

## 2025-06-30 DIAGNOSIS — I16.0 HYPERTENSIVE URGENCY: Chronic | ICD-10-CM

## 2025-06-30 DIAGNOSIS — R94.39 ABNORMAL CARDIOVASCULAR STRESS TEST: ICD-10-CM

## 2025-06-30 DIAGNOSIS — R19.5 POSITIVE COLORECTAL CANCER SCREENING USING COLOGUARD TEST: ICD-10-CM

## 2025-06-30 DIAGNOSIS — I42.8 NONISCHEMIC CARDIOMYOPATHY: ICD-10-CM

## 2025-06-30 DIAGNOSIS — K21.9 GASTROESOPHAGEAL REFLUX DISEASE, UNSPECIFIED WHETHER ESOPHAGITIS PRESENT: ICD-10-CM

## 2025-06-30 DIAGNOSIS — E78.2 MIXED HYPERLIPIDEMIA: Primary | ICD-10-CM

## 2025-06-30 DIAGNOSIS — E66.9 OBESITY (BMI 30-39.9): ICD-10-CM

## 2025-06-30 PROCEDURE — 99214 OFFICE O/P EST MOD 30 MIN: CPT | Mod: S$PBB,,,

## 2025-06-30 PROCEDURE — 1160F RVW MEDS BY RX/DR IN RCRD: CPT | Mod: CPTII,,,

## 2025-06-30 PROCEDURE — 3079F DIAST BP 80-89 MM HG: CPT | Mod: CPTII,,,

## 2025-06-30 PROCEDURE — 99999 PR PBB SHADOW E&M-EST. PATIENT-LVL IV: CPT | Mod: PBBFAC,,,

## 2025-06-30 PROCEDURE — 3008F BODY MASS INDEX DOCD: CPT | Mod: CPTII,,,

## 2025-06-30 PROCEDURE — 4010F ACE/ARB THERAPY RXD/TAKEN: CPT | Mod: CPTII,,,

## 2025-06-30 PROCEDURE — 3074F SYST BP LT 130 MM HG: CPT | Mod: CPTII,,,

## 2025-06-30 PROCEDURE — 3044F HG A1C LEVEL LT 7.0%: CPT | Mod: CPTII,,,

## 2025-06-30 PROCEDURE — 1159F MED LIST DOCD IN RCRD: CPT | Mod: CPTII,,,

## 2025-06-30 PROCEDURE — 99214 OFFICE O/P EST MOD 30 MIN: CPT | Mod: PBBFAC,PN

## 2025-06-30 RX ORDER — DIPHENHYDRAMINE HCL 50 MG
50 CAPSULE ORAL
OUTPATIENT
Start: 2025-06-30

## 2025-06-30 RX ORDER — AMLODIPINE AND VALSARTAN 10; 160 MG/1; MG/1
0.5 TABLET ORAL DAILY
Qty: 45 TABLET | Refills: 3 | Status: SHIPPED | OUTPATIENT
Start: 2025-06-30

## 2025-06-30 RX ORDER — AMLODIPINE AND VALSARTAN 10; 160 MG/1; MG/1
1 TABLET ORAL DAILY
Qty: 90 TABLET | Refills: 3 | Status: SHIPPED | OUTPATIENT
Start: 2025-06-30 | End: 2025-06-30 | Stop reason: SDUPTHER

## 2025-06-30 RX ORDER — SODIUM CHLORIDE 9 MG/ML
INJECTION, SOLUTION INTRAVENOUS ONCE
OUTPATIENT
Start: 2025-06-30 | End: 2025-06-30

## 2025-06-30 NOTE — TELEPHONE ENCOUNTER
Copied from CRM #[id number]. Topic: Medications - Pharmacy  >> Jun 30, 2025  2:29 PM Mari wrote:  Type:  Pharmacy Calling to Clarify an RX    Name of Caller:  [facility] [address] [place], [place] [address] - [address]  [address] [facility]  [place] [address]  Phone: [phone] Fax: [phone]      Prescription Name:amlodipine-valsartan (EXFORGE)  mg per tablet  What do they need to clarify?:two set of directions need to know which one to go off of     Additional Information: Please call advise.Thank you.

## 2025-06-30 NOTE — TELEPHONE ENCOUNTER
Copied from CRM #[id number]. Topic: General Inquiry - Patient Advice  >> Jun 30, 2025 12:25 PM Mari wrote:  Type:  Needs Medical Advice    Who Called: patient at [phone]    Additional Information: patient just wanted to let the office know he is on his way for his 1pm appt. He will try and make it on time.

## 2025-06-30 NOTE — PROGRESS NOTES
Subjective:    Patient ID:  Jarod Pierce is a 59 y.o. male patient here for evaluation Results (Echo and Stress Results)      History of Present Illness    CHIEF COMPLAINT:  Patient presents today with dyspnea and chest discomfort    CARDIOVASCULAR:  Recent echo showed decreased EF from 35-40% to 30-35%. He experiences intermittent chest pain with associated dyspnea. Stress test revealed no reversible ischemia but showed evidence of previous cardiac scarring, potentially from an unrecognized prior cardiac event. He reports orthostatic symptoms with unsteadiness upon standing, more pronounced during summer months due to increased sweating and fluid loss. Self-measured SBP ranges between 117-128.  Does not follow a low sodium heart healthy diet    MEDICATIONS:  He is historically noncompliant with his medications  Current medications include:  - Amlodipine-valsartan (experiencing intermittent lightheadedness, currently taking half tablet at night)  - Metoprolol (morning)  - Magnesium (daily for palpitations)  - Aspirin (experiencing stomach irritation  - Not taking atorvastatin  He reports constipation with current magnesium dosing.    GASTROINTESTINAL:  He reports chronic constipation with hard stools. Stool sample was positive requiring follow-up colonoscopy. He has a long-standing history of stomach problems since childhood, with recent exacerbation from alcohol consumption 1-2 months ago.    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 He does not eat a heart healthy diet. He is on the road a lot and will eat quick/fast food meals.  Irregular sleep and eating patterns possibly contribute to his lightheadedness and general malaise.      ROS:  General: -fever, -chills, -fatigue, -weight gain, -weight loss  Cardiovascular: -chest pain, +palpitations, -lower extremity edema, +feeling of flutter in chest  Respiratory: -cough, +shortness of  breath  Gastrointestinal: -abdominal pain, -nausea, -vomiting, -diarrhea, +constipation, -blood in stool  Genitourinary: -dysuria, -hematuria, -frequency  Musculoskeletal: -joint pain, -muscle pain  Skin: -rash, -lesion  Neurological: -headache, +dizziness, -numbness, -tingling, +lightheadedness  Psychiatric: -anxiety, -depression, -sleep difficulty                    Review of patient's allergies indicates:   Allergen Reactions    Hydralazine analogues Other (See Comments)     drowsiness    Codeine      Other reaction(s): Hives    Codeine phosphate      Other reaction(s): Unknown  Other reaction(s): Unknown    Lisinopril      Dry coughing    Phenytoin sodium extended      Other reaction(s): leg cramps       Past Medical History:   Diagnosis Date    Allergic rhinitis due to allergen     Arthritis     Cardiomyopathy     Gout     Hypertension     Left bundle branch block      Past Surgical History:   Procedure Laterality Date    WISDOM TOOTH EXTRACTION       Social History[1]                Objective        Vitals:    06/30/25 1315   BP: 118/82   Pulse: 76       LIPIDS - LAST 2   Lab Results   Component Value Date    CHOL 238 (H) 11/14/2024    CHOL 210 (H) 02/14/2024    HDL 38 (L) 11/14/2024    HDL 33 (L) 02/14/2024    LDLCALC 158.2 11/14/2024    LDLCALC 152.0 02/14/2024    TRIG 209 (H) 11/14/2024    TRIG 125 02/14/2024    CHOLHDL 16.0 (L) 11/14/2024    CHOLHDL 15.7 (L) 02/14/2024       CBC - LAST 2  Lab Results   Component Value Date    WBC 8.34 05/19/2025    WBC 5.75 02/21/2024    RBC 4.97 05/19/2025    RBC 4.67 02/21/2024    HGB 16.9 05/19/2025    HGB 15.2 02/21/2024    HCT 47.8 05/19/2025    HCT 44.0 02/21/2024    MCV 96 05/19/2025    MCV 94 02/21/2024    MCH 34.0 (H) 05/19/2025    MCH 32.5 (H) 02/21/2024    MCHC 35.4 05/19/2025    MCHC 34.5 02/21/2024    RDW 13.2 05/19/2025    RDW 12.7 02/21/2024     05/19/2025     02/21/2024    MPV 9.8 05/19/2025    MPV 9.4 02/21/2024    GRAN 3.5 02/21/2024    GRAN  60.2 02/21/2024    LYMPH 23.3 05/19/2025    LYMPH 1.94 05/19/2025    MONO 8.4 05/19/2025    MONO 0.70 05/19/2025    BASO 0.03 02/21/2024    BASO 0.04 11/02/2023    NRBC 0 05/19/2025    NRBC 0 02/21/2024       CHEMISTRY & LIVER FUNCTION - LAST 2  Lab Results   Component Value Date     05/19/2025     02/21/2025    K 4.2 05/19/2025    K 4.1 02/21/2025     05/19/2025     02/21/2025    CO2 23 05/19/2025    CO2 25 02/21/2025    ANIONGAP 13 05/19/2025    ANIONGAP 12 02/21/2025    BUN 16 05/19/2025    BUN 19 02/21/2025    CREATININE 1.4 05/19/2025    CREATININE 1.1 02/21/2025    GLU 90 05/19/2025    GLU 84 02/21/2025    CALCIUM 9.4 05/19/2025    CALCIUM 9.1 02/21/2025    MG 2.2 05/19/2025    MG 2.2 09/23/2022    ALBUMIN 4.2 05/19/2025    ALBUMIN 4.2 02/21/2025    PROT 7.8 05/19/2025    PROT 7.6 02/21/2025    ALKPHOS 98 05/19/2025    ALKPHOS 88 02/21/2025    ALT 21 05/19/2025    ALT 17 02/21/2025    AST 20 05/19/2025    AST 25 02/21/2025    BILITOT 1.4 (H) 05/19/2025    BILITOT 1.4 (H) 02/21/2025        CARDIAC PROFILE - LAST 2  Lab Results   Component Value Date    BNP 13 11/02/2023    BNP 25 09/21/2022    CPK 55 09/21/2022     05/17/2022    CPKMB 0.6 09/21/2022    CPKMB 0.8 05/17/2022    TROPONINI <0.030 09/22/2022    TROPONINI <0.030 09/21/2022        COAGULATION - LAST 2  Lab Results   Component Value Date    LABPT 13.2 09/21/2022    INR 1.1 09/21/2022    INR 1.0 01/14/2020    APTT 28.7 01/14/2020    APTT 25.5 07/21/2014       ENDOCRINE & PSA - LAST 2  Lab Results   Component Value Date    HGBA1C 5.5 02/21/2025    HGBA1C 5.4 02/21/2024    TSH 2.186 02/21/2025    TSH 4.467 11/02/2023    PSA 0.53 02/21/2025    PSA 0.50 02/21/2024        ECHOCARDIOGRAM RESULTS  Results for orders placed during the hospital encounter of 06/09/25    Echo    Interpretation Summary    Left Ventricle: The left ventricle is normal in size. Normal wall thickness. Severe global hypokinesis present. Septal motion is  consistent with bundle branch block. There is moderately reduced systolic function with a visually estimated ejection fraction of 30 - 35%. There is diastolic dysfunction.    Right Ventricle: The right ventricle is normal in size Wall thickness is normal. Systolic function is normal.    IVC/SVC: Normal venous pressure at 3 mmHg.      CURRENT/PREVIOUS VISIT EKG  Results for orders placed or performed in visit on 05/28/25   IN OFFICE EKG 12-LEAD (to [place])    Collection Time: 05/28/25  2:01 PM   Result Value Ref Range    QRS Duration 160 ms    OHS QTC Calculation 487 ms    Narrative    Test Reason : I48.0,    Vent. Rate :  81 BPM     Atrial Rate :  81 BPM     P-R Int : 146 ms          QRS Dur : 160 ms      QT Int : 420 ms       P-R-T Axes : 131 208  36 degrees    QTcB Int : 487 ms     Suspect arm lead reversal, interpretation assumes no reversal  Unusual P axis, possible ectopic atrial rhythm  Right superior axis deviation  Left bundle branch block  Abnormal ECG  When compared with ECG of 02-Nov-2023 08:43,  Ectopic atrial rhythm has replaced Sinus rhythm  Questionable change in The axis  T wave inversion no longer evident in Inferior leads  T wave inversion less evident in Lateral leads  Confirmed by Juan Cr ([id number]) on 6/17/2025 9:30:58 PM    Referred By: AMANDA           Confirmed By: Juan Cr     No valid procedures specified.   Results for orders placed during the hospital encounter of 06/09/25    Nuclear Stress - Cardiology Interpreted    Interpretation Summary    Abnormal myocardial perfusion scan.  This has no evidence of any reversible ischemia seen    There are two significant perfusion abnormalities.    Perfusion Abnormality #1 - There is a moderate intensity, small to medium sized, fixed perfusion abnormality consistent with scar in the basal to distal inferior wall(s).    Perfusion Abnormality #2 - There is a fixed perfusion abnormality consistent with scar in the apical apical wall(s).     There are no other significant perfusion abnormalities.    The gated perfusion images showed an ejection fraction of 47% at rest. The gated perfusion images showed an ejection fraction of 43% post stress. Normal ejection fraction is greater than 53%.    There is moderate global hypokinesis at rest and post-stress.    LV cavity size is moderately enlarged at rest and moderately enlarged at post-stress.    The study's ECG is uninterpretable due to left bundle branch block.    The patient reported no chest pain during the stress test.    There were no arrhythmias during stress.    No valid procedures specified.    Physical Exam    General: No acute distress. Well-developed. Well-nourished.  HENT: Normocephalic. Atraumatic.   Cardiovascular: Regular rate. Regular rhythm. No murmurs. No rubs. No gallops. Normal S1, S2.  Respiratory: Normal respiratory effort. Clear to auscultation bilaterally. No rales. No rhonchi. No wheezing.  Abdomen: Soft. Non-tender. Non-distended. Normoactive bowel sounds.  Musculoskeletal: No  obvious deformity.  Extremities: No lower extremity edema.  Neurological: Alert & oriented x3. No slurred speech. Normal gait.  Psychiatric: Normal mood. Normal affect. Good insight. Good judgment.  Skin: Warm. Dry. No rash.         I HAVE REVIEWED :    The vital signs, nurses notes, and all the pertinent radiology and labs.        Current Outpatient Medications   Medication Instructions    albuterol (PROVENTIL HFA) 90 mcg/actuation inhaler 2 puffs, Inhalation, Every 6 hours PRN, Rescue    allopurinoL (ZYLOPRIM) 100 mg, Oral, Daily    amlodipine-valsartan (EXFORGE)  mg per tablet 1 tablet, Oral, Daily, Take 1/2 tablet daily    aspirin (ECOTRIN) 81 mg, Oral, Daily    atorvastatin (LIPITOR) 80 mg, Oral, Daily    cyanocobalamin (VITAMIN B-12) 1,000 mcg, Oral, Daily    fluticasone propionate (FLONASE) 100 mcg, Each Nostril, Daily    magnesium oxide (MAG-OX) 400 mg, Oral, Daily    metoprolol succinate  (TOPROL-XL) 12.5 mg, Oral, Daily    pantoprazole (PROTONIX) 40 mg, Oral, Daily    sildenafiL (VIAGRA) 25 mg, Oral, 2 times daily PRN          Assessment & Plan   Assessment & Plan    I50.20 HFrEF (heart failure with reduced EF)  I42.8 Nonischemic cardiomyopathy  E78.2 Mixed hyperlipidemia  R07.9 Chest pain, unspecified type  R06.09 GARNETT (dyspnea on exertion)  R94.39 Abnormal stress test  I16.0 Hypertensive urgency  E66.9 Obesity (BMI 30-39.9)  K21.9 Gastroesophageal reflux disease, unspecified whether esophagitis present  R19.5 Positive colorectal cancer screening using Cologuard test    PLAN SUMMARY:  - Consider referral to electrophysiologist for defibrillator placement discussion. Will consider after cardiac cath is performed  - Patient has scheduled colonoscopy on July 3.  Recommend proceeding with this prior to cardiac cath. Will also discuss with Cardiologist  - Continue Metoprolol succinate 12.5 mg daily in the morning  - Start enteric-coated aspirin 81 mg daily with food  - Decrease Amlodipine/Valsartan (10-160mg ) combination pill to half tablet at night  - Angiogram scheduled for July 15th  - Follow up after angiogram on July 15th    HFREF (HEART FAILURE WITH REDUCED EF):  - Echocardiogram showed EF dropped from 35-40% to 30-35%.  - Considered referral to electrophysiologist. Patient wishes to wait for now  - Continued Metoprolol in the morning.  - Continue amlodipine-valsartan    NONISCHEMIC CARDIOMYOPATHY:  - Echocardiogram showed EF dropped from 35-40% to 30-35%.  - Stress test revealed no reversible ischemia but indicated heart scar not seen on previous test.  - Reports anginal equivalent symptoms. Cardiac cath for further evaluation    MIXED HYPERLIPIDEMIA:  - Continue aspirin. Recommended to start statin.   - Encouraged compliance to medications and dietary restrictions    ABNORMAL STRESS TEST:  - Stress test revealed no reversible ischemia but indicated scar not seen on previous test.  - + anginal  equivalent symptoms. Angiogram recommended to better assess coronary vessels, scheduled for July 15th.    HYPERTENSIVE URGENCY:  - Decreased Amlodipine/Valsartan combination pill to half tablet at night. Monitor BP closely at home. Hold for SBP <100. Recommend keeping BP log.     OBESITY (BMI 30-39.9):  - Patient to avoid alcohol consumption, eat with medication administration, avoid [employer] or cold fish from grocery store.    GASTROESOPHAGEAL REFLUX DISEASE, UNSPECIFIED WHETHER ESOPHAGITIS PRESENT:  - Continue PPI  - Avoid dietary triggers    POSITIVE COLORECTAL CANCER SCREENING USING COLOGUARD TEST:  - Will discuss with Cardiologist- for now will proceed with colonoscopy for further evaluation of abnormal cologuard.  - Discussed with patient that if he has blood in stool, he would be at higher risk of bleeding if cardiac stent is placed since he will have to take 2 blood thinners for 6-12 months without stopping            1 month follow up      This note was generated with the assistance of ambient listening technology. Verbal consent was obtained by the patient and accompanying visitor(s) for the recording of patient appointment to facilitate this note. I attest to having reviewed and edited the generated note for accuracy, though some syntax or spelling errors may persist. Please contact the author of this note for any clarification.             [1]   Social History  Tobacco Use    Smoking status: Never     Passive exposure: Never    Smokeless tobacco: Never   Substance Use Topics    Alcohol use: No    Drug use: No

## 2025-06-30 NOTE — H&P (VIEW-ONLY)
Subjective:    Patient ID:  Jarod Pierce is a 59 y.o. male patient here for evaluation Results (Echo and Stress Results)      History of Present Illness    CHIEF COMPLAINT:  Patient presents today with dyspnea and chest discomfort    CARDIOVASCULAR:  Recent echo showed decreased EF from 35-40% to 30-35%. He experiences intermittent chest pain with associated dyspnea. Stress test revealed no reversible ischemia but showed evidence of previous cardiac scarring, potentially from an unrecognized prior cardiac event. He reports orthostatic symptoms with unsteadiness upon standing, more pronounced during summer months due to increased sweating and fluid loss. Self-measured SBP ranges between 117-128.  Does not follow a low sodium heart healthy diet    MEDICATIONS:  He is historically noncompliant with his medications  Current medications include:  - Amlodipine-valsartan (experiencing intermittent lightheadedness, currently taking half tablet at night)  - Metoprolol (morning)  - Magnesium (daily for palpitations)  - Aspirin (experiencing stomach irritation  - Not taking atorvastatin  He reports constipation with current magnesium dosing.    GASTROINTESTINAL:  He reports chronic constipation with hard stools. Stool sample was positive requiring follow-up colonoscopy. He has a long-standing history of stomach problems since childhood, with recent exacerbation from alcohol consumption 1-2 months ago.    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 He does not eat a heart healthy diet. He is on the road a lot and will eat quick/fast food meals.  Irregular sleep and eating patterns possibly contribute to his lightheadedness and general malaise.      ROS:  General: -fever, -chills, -fatigue, -weight gain, -weight loss  Cardiovascular: -chest pain, +palpitations, -lower extremity edema, +feeling of flutter in chest  Respiratory: -cough, +shortness of  breath  Gastrointestinal: -abdominal pain, -nausea, -vomiting, -diarrhea, +constipation, -blood in stool  Genitourinary: -dysuria, -hematuria, -frequency  Musculoskeletal: -joint pain, -muscle pain  Skin: -rash, -lesion  Neurological: -headache, +dizziness, -numbness, -tingling, +lightheadedness  Psychiatric: -anxiety, -depression, -sleep difficulty                    Review of patient's allergies indicates:   Allergen Reactions    Hydralazine analogues Other (See Comments)     drowsiness    Codeine      Other reaction(s): Hives    Codeine phosphate      Other reaction(s): Unknown  Other reaction(s): Unknown    Lisinopril      Dry coughing    Phenytoin sodium extended      Other reaction(s): leg cramps       Past Medical History:   Diagnosis Date    Allergic rhinitis due to allergen     Arthritis     Cardiomyopathy     Gout     Hypertension     Left bundle branch block      Past Surgical History:   Procedure Laterality Date    WISDOM TOOTH EXTRACTION       Social History[1]                Objective        Vitals:    06/30/25 1315   BP: 118/82   Pulse: 76       LIPIDS - LAST 2   Lab Results   Component Value Date    CHOL 238 (H) 11/14/2024    CHOL 210 (H) 02/14/2024    HDL 38 (L) 11/14/2024    HDL 33 (L) 02/14/2024    LDLCALC 158.2 11/14/2024    LDLCALC 152.0 02/14/2024    TRIG 209 (H) 11/14/2024    TRIG 125 02/14/2024    CHOLHDL 16.0 (L) 11/14/2024    CHOLHDL 15.7 (L) 02/14/2024       CBC - LAST 2  Lab Results   Component Value Date    WBC 8.34 05/19/2025    WBC 5.75 02/21/2024    RBC 4.97 05/19/2025    RBC 4.67 02/21/2024    HGB 16.9 05/19/2025    HGB 15.2 02/21/2024    HCT 47.8 05/19/2025    HCT 44.0 02/21/2024    MCV 96 05/19/2025    MCV 94 02/21/2024    MCH 34.0 (H) 05/19/2025    MCH 32.5 (H) 02/21/2024    MCHC 35.4 05/19/2025    MCHC 34.5 02/21/2024    RDW 13.2 05/19/2025    RDW 12.7 02/21/2024     05/19/2025     02/21/2024    MPV 9.8 05/19/2025    MPV 9.4 02/21/2024    GRAN 3.5 02/21/2024    GRAN  60.2 02/21/2024    LYMPH 23.3 05/19/2025    LYMPH 1.94 05/19/2025    MONO 8.4 05/19/2025    MONO 0.70 05/19/2025    BASO 0.03 02/21/2024    BASO 0.04 11/02/2023    NRBC 0 05/19/2025    NRBC 0 02/21/2024       CHEMISTRY & LIVER FUNCTION - LAST 2  Lab Results   Component Value Date     05/19/2025     02/21/2025    K 4.2 05/19/2025    K 4.1 02/21/2025     05/19/2025     02/21/2025    CO2 23 05/19/2025    CO2 25 02/21/2025    ANIONGAP 13 05/19/2025    ANIONGAP 12 02/21/2025    BUN 16 05/19/2025    BUN 19 02/21/2025    CREATININE 1.4 05/19/2025    CREATININE 1.1 02/21/2025    GLU 90 05/19/2025    GLU 84 02/21/2025    CALCIUM 9.4 05/19/2025    CALCIUM 9.1 02/21/2025    MG 2.2 05/19/2025    MG 2.2 09/23/2022    ALBUMIN 4.2 05/19/2025    ALBUMIN 4.2 02/21/2025    PROT 7.8 05/19/2025    PROT 7.6 02/21/2025    ALKPHOS 98 05/19/2025    ALKPHOS 88 02/21/2025    ALT 21 05/19/2025    ALT 17 02/21/2025    AST 20 05/19/2025    AST 25 02/21/2025    BILITOT 1.4 (H) 05/19/2025    BILITOT 1.4 (H) 02/21/2025        CARDIAC PROFILE - LAST 2  Lab Results   Component Value Date    BNP 13 11/02/2023    BNP 25 09/21/2022    CPK 55 09/21/2022     05/17/2022    CPKMB 0.6 09/21/2022    CPKMB 0.8 05/17/2022    TROPONINI <0.030 09/22/2022    TROPONINI <0.030 09/21/2022        COAGULATION - LAST 2  Lab Results   Component Value Date    LABPT 13.2 09/21/2022    INR 1.1 09/21/2022    INR 1.0 01/14/2020    APTT 28.7 01/14/2020    APTT 25.5 07/21/2014       ENDOCRINE & PSA - LAST 2  Lab Results   Component Value Date    HGBA1C 5.5 02/21/2025    HGBA1C 5.4 02/21/2024    TSH 2.186 02/21/2025    TSH 4.467 11/02/2023    PSA 0.53 02/21/2025    PSA 0.50 02/21/2024        ECHOCARDIOGRAM RESULTS  Results for orders placed during the hospital encounter of 06/09/25    Echo    Interpretation Summary    Left Ventricle: The left ventricle is normal in size. Normal wall thickness. Severe global hypokinesis present. Septal motion is  consistent with bundle branch block. There is moderately reduced systolic function with a visually estimated ejection fraction of 30 - 35%. There is diastolic dysfunction.    Right Ventricle: The right ventricle is normal in size Wall thickness is normal. Systolic function is normal.    IVC/SVC: Normal venous pressure at 3 mmHg.      CURRENT/PREVIOUS VISIT EKG  Results for orders placed or performed in visit on 05/28/25   IN OFFICE EKG 12-LEAD (to [facility])    Collection Time: 05/28/25  2:01 PM   Result Value Ref Range    QRS Duration 160 ms    [facility] QTC Calculation 487 ms    Narrative    Test Reason : I48.0,    Vent. Rate :  81 BPM     Atrial Rate :  81 BPM     P-R Int : 146 ms          QRS Dur : 160 ms      QT Int : 420 ms       P-R-T Axes : 131 208  36 degrees    QTcB Int : 487 ms     Suspect arm lead reversal, interpretation assumes no reversal  Unusual P axis, possible ectopic atrial rhythm  Right superior axis deviation  Left bundle branch block  Abnormal ECG  When compared with ECG of 02-Nov-2023 08:43,  Ectopic atrial rhythm has replaced Sinus rhythm  Questionable change in The axis  T wave inversion no longer evident in Inferior leads  T wave inversion less evident in Lateral leads  Confirmed by Juan Cr ([id number]) on 6/17/2025 9:30:58 PM    Referred By: AMANDA           Confirmed By: Juan Cr     No valid procedures specified.   Results for orders placed during the hospital encounter of 06/09/25    Nuclear Stress - Cardiology Interpreted    Interpretation Summary    Abnormal myocardial perfusion scan.  This has no evidence of any reversible ischemia seen    There are two significant perfusion abnormalities.    Perfusion Abnormality #1 - There is a moderate intensity, small to medium sized, fixed perfusion abnormality consistent with scar in the basal to distal inferior wall(s).    Perfusion Abnormality #2 - There is a fixed perfusion abnormality consistent with scar in the apical apical wall(s).     There are no other significant perfusion abnormalities.    The gated perfusion images showed an ejection fraction of 47% at rest. The gated perfusion images showed an ejection fraction of 43% post stress. Normal ejection fraction is greater than 53%.    There is moderate global hypokinesis at rest and post-stress.    LV cavity size is moderately enlarged at rest and moderately enlarged at post-stress.    The study's ECG is uninterpretable due to left bundle branch block.    The patient reported no chest pain during the stress test.    There were no arrhythmias during stress.    No valid procedures specified.    Physical Exam    General: No acute distress. Well-developed. Well-nourished.  HENT: Normocephalic. Atraumatic.   Cardiovascular: Regular rate. Regular rhythm. No murmurs. No rubs. No gallops. Normal S1, S2.  Respiratory: Normal respiratory effort. Clear to auscultation bilaterally. No rales. No rhonchi. No wheezing.  Abdomen: Soft. Non-tender. Non-distended. Normoactive bowel sounds.  Musculoskeletal: No  obvious deformity.  Extremities: No lower extremity edema.  Neurological: Alert & oriented x3. No slurred speech. Normal gait.  Psychiatric: Normal mood. Normal affect. Good insight. Good judgment.  Skin: Warm. Dry. No rash.         I HAVE REVIEWED :    The vital signs, nurses notes, and all the pertinent radiology and labs.        Current Outpatient Medications   Medication Instructions    albuterol (PROVENTIL HFA) 90 mcg/actuation inhaler 2 puffs, Inhalation, Every 6 hours PRN, Rescue    allopurinoL (ZYLOPRIM) 100 mg, Oral, Daily    amlodipine-valsartan (EXFORGE)  mg per tablet 1 tablet, Oral, Daily, Take 1/2 tablet daily    aspirin (ECOTRIN) 81 mg, Oral, Daily    atorvastatin (LIPITOR) 80 mg, Oral, Daily    cyanocobalamin (VITAMIN B-12) 1,000 mcg, Oral, Daily    fluticasone propionate (FLONASE) 100 mcg, Each Nostril, Daily    magnesium oxide (MAG-OX) 400 mg, Oral, Daily    metoprolol succinate  (TOPROL-XL) 12.5 mg, Oral, Daily    pantoprazole (PROTONIX) 40 mg, Oral, Daily    sildenafiL (VIAGRA) 25 mg, Oral, 2 times daily PRN          Assessment & Plan   Assessment & Plan    I50.20 HFrEF (heart failure with reduced EF)  I42.8 Nonischemic cardiomyopathy  E78.2 Mixed hyperlipidemia  R07.9 Chest pain, unspecified type  R06.09 GARNETT (dyspnea on exertion)  R94.39 Abnormal stress test  I16.0 Hypertensive urgency  E66.9 Obesity (BMI 30-39.9)  K21.9 Gastroesophageal reflux disease, unspecified whether esophagitis present  R19.5 Positive colorectal cancer screening using Cologuard test    PLAN SUMMARY:  - Consider referral to electrophysiologist for defibrillator placement discussion. Will consider after cardiac cath is performed  - Patient has scheduled colonoscopy on July 3.  Recommend proceeding with this prior to cardiac cath. Will also discuss with Cardiologist  - Continue Metoprolol succinate 12.5 mg daily in the morning  - Start enteric-coated aspirin 81 mg daily with food  - Decrease Amlodipine/Valsartan (10-160mg ) combination pill to half tablet at night  - Angiogram scheduled for July 15th  - Follow up after angiogram on July 15th    HFREF (HEART FAILURE WITH REDUCED EF):  - Echocardiogram showed EF dropped from 35-40% to 30-35%.  - Considered referral to electrophysiologist. Patient wishes to wait for now  - Continued Metoprolol in the morning.  - Continue amlodipine-valsartan    NONISCHEMIC CARDIOMYOPATHY:  - Echocardiogram showed EF dropped from 35-40% to 30-35%.  - Stress test revealed no reversible ischemia but indicated heart scar not seen on previous test.  - Reports anginal equivalent symptoms. Cardiac cath for further evaluation    MIXED HYPERLIPIDEMIA:  - Continue aspirin. Recommended to start statin.   - Encouraged compliance to medications and dietary restrictions    ABNORMAL STRESS TEST:  - Stress test revealed no reversible ischemia but indicated scar not seen on previous test.  - + anginal  equivalent symptoms. Angiogram recommended to better assess coronary vessels, scheduled for July 15th.    HYPERTENSIVE URGENCY:  - Decreased Amlodipine/Valsartan combination pill to half tablet at night. Monitor BP closely at home. Hold for SBP <100. Recommend keeping BP log.     OBESITY (BMI 30-39.9):  - Patient to avoid alcohol consumption, eat with medication administration, avoid [employer] or cold fish from grocery store.    GASTROESOPHAGEAL REFLUX DISEASE, UNSPECIFIED WHETHER ESOPHAGITIS PRESENT:  - Continue PPI  - Avoid dietary triggers    POSITIVE COLORECTAL CANCER SCREENING USING COLOGUARD TEST:  - Will discuss with Cardiologist- for now will proceed with colonoscopy for further evaluation of abnormal cologuard.  - Discussed with patient that if he has blood in stool, he would be at higher risk of bleeding if cardiac stent is placed since he will have to take 2 blood thinners for 6-12 months without stopping            1 month follow up      This note was generated with the assistance of ambient listening technology. Verbal consent was obtained by the patient and accompanying visitor(s) for the recording of patient appointment to facilitate this note. I attest to having reviewed and edited the generated note for accuracy, though some syntax or spelling errors may persist. Please contact the author of this note for any clarification.             [1]   Social History  Tobacco Use    Smoking status: Never     Passive exposure: Never    Smokeless tobacco: Never   Substance Use Topics    Alcohol use: No    Drug use: No

## 2025-07-02 ENCOUNTER — TELEPHONE (OUTPATIENT)
Dept: CARDIOLOGY | Facility: CLINIC | Age: 60
End: 2025-07-02
Payer: MEDICAID

## 2025-07-02 ENCOUNTER — TELEPHONE (OUTPATIENT)
Dept: GASTROENTEROLOGY | Facility: CLINIC | Age: 60
End: 2025-07-02
Payer: MEDICAID

## 2025-07-02 NOTE — TELEPHONE ENCOUNTER
Copied from CRM #[id number]. Topic: General Inquiry - Patient Advice  >> Jul 2, 2025  8:40 AM Asa wrote:  Type:  Needs Medical Advice    Who Called: [facility]      Would the patient rather a call back or a response via [facility]? Call back    Best Call Back Number: [phone] chris    Additional Information: chris [facility] amlodipine-valsartan (EXFORGE)  mg per tablet has 2 different instructions & she st she needs a call to clarify the correct directions. please call to discuss

## 2025-07-02 NOTE — TELEPHONE ENCOUNTER
Copied from CRM #[id number]. Topic: General Inquiry - Patient Advice  >> Jul 2, 2025  1:01 PM Maureen wrote:  Type: Needs Medical Advice  Who Called:  pt     Best Call Back Number: [phone] (home)    Additional Information: pt requesting to cancel procedure tomorrow  and also would like a call back please advise

## 2025-07-10 ENCOUNTER — TELEPHONE (OUTPATIENT)
Dept: CARDIOLOGY | Facility: CLINIC | Age: 60
End: 2025-07-10
Payer: MEDICAID

## 2025-07-10 NOTE — TELEPHONE ENCOUNTER
Copied from CRM #[id number]. Topic: General Inquiry - Patient Advice  >> Jul 10, 2025 11:38 AM Mari wrote:  Type:  Needs Medical Advice    Who Called: patient at [phone]    Additional Information: Patient has questions about appt tomorrow, patient would like to confirm appt time for 7/15 please call and advise. Thank you.

## 2025-07-11 ENCOUNTER — HOSPITAL ENCOUNTER (OUTPATIENT)
Dept: PREADMISSION TESTING | Facility: HOSPITAL | Age: 60
Discharge: HOME | End: 2025-07-11
Attending: STUDENT IN AN ORGANIZED HEALTH CARE EDUCATION/TRAINING PROGRAM
Payer: MEDICAID

## 2025-07-11 VITALS
OXYGEN SATURATION: 95 % | WEIGHT: 225 LBS | BODY MASS INDEX: 35.31 KG/M2 | HEIGHT: 67 IN | RESPIRATION RATE: 18 BRPM | DIASTOLIC BLOOD PRESSURE: 93 MMHG | TEMPERATURE: 98 F | SYSTOLIC BLOOD PRESSURE: 152 MMHG | HEART RATE: 82 BPM

## 2025-07-11 DIAGNOSIS — I50.20 HFREF (HEART FAILURE WITH REDUCED EJECTION FRACTION): ICD-10-CM

## 2025-07-11 DIAGNOSIS — R94.39 ABNORMAL STRESS TEST: ICD-10-CM

## 2025-07-11 DIAGNOSIS — R07.89 OTHER CHEST PAIN: ICD-10-CM

## 2025-07-11 DIAGNOSIS — R94.39 ABNORMAL CARDIOVASCULAR STRESS TEST: ICD-10-CM

## 2025-07-11 DIAGNOSIS — R07.9 CHEST PAIN, UNSPECIFIED TYPE: ICD-10-CM

## 2025-07-11 DIAGNOSIS — R06.09 DOE (DYSPNEA ON EXERTION): ICD-10-CM

## 2025-07-11 LAB
ABSOLUTE EOSINOPHIL (SMH): 0.1 K/UL
ABSOLUTE MONOCYTE (SMH): 0.51 K/UL (ref 0.3–1)
ABSOLUTE NEUTROPHIL COUNT (SMH): 4.3 K/UL (ref 1.8–7.7)
ANION GAP (SMH): 9 MMOL/L (ref 8–16)
APTT PPP: 26.8 SECONDS (ref 21–32)
BASOPHILS # BLD AUTO: 0.03 K/UL
BASOPHILS NFR BLD AUTO: 0.5 %
BUN SERPL-MCNC: 15 MG/DL (ref 6–20)
CALCIUM SERPL-MCNC: 9.2 MG/DL (ref 8.7–10.5)
CHLORIDE SERPL-SCNC: 103 MMOL/L (ref 95–110)
CO2 SERPL-SCNC: 26 MMOL/L (ref 23–29)
CREAT SERPL-MCNC: 1.2 MG/DL (ref 0.5–1.4)
ERYTHROCYTE [DISTWIDTH] IN BLOOD BY AUTOMATED COUNT: 12.4 % (ref 11.5–14.5)
GFR SERPLBLD CREATININE-BSD FMLA CKD-EPI: >60 ML/MIN/1.73/M2
GLUCOSE SERPL-MCNC: 105 MG/DL (ref 70–110)
HCT VFR BLD AUTO: 44.3 % (ref 40–54)
HGB BLD-MCNC: 15.4 GM/DL (ref 14–18)
IMM GRANULOCYTES # BLD AUTO: 0.03 K/UL (ref 0–0.04)
IMM GRANULOCYTES NFR BLD AUTO: 0.5 % (ref 0–0.5)
LYMPHOCYTES # BLD AUTO: 1.55 K/UL (ref 1–4.8)
MCH RBC QN AUTO: 33 PG (ref 27–31)
MCHC RBC AUTO-ENTMCNC: 34.8 G/DL (ref 32–36)
MCV RBC AUTO: 95 FL (ref 82–98)
NUCLEATED RBC (/100WBC) (SMH): 0 /100 WBC
OHS QRS DURATION: 170 MS
OHS QTC CALCULATION: 516 MS
PLATELET # BLD AUTO: 180 K/UL (ref 150–450)
PMV BLD AUTO: 9 FL (ref 9.2–12.9)
POTASSIUM SERPL-SCNC: 3.6 MMOL/L (ref 3.5–5.1)
RBC # BLD AUTO: 4.67 M/UL (ref 4.6–6.2)
RELATIVE EOSINOPHIL (SMH): 1.5 % (ref 0–8)
RELATIVE LYMPHOCYTE (SMH): 23.7 % (ref 18–48)
RELATIVE MONOCYTE (SMH): 7.8 % (ref 4–15)
RELATIVE NEUTROPHIL (SMH): 66 % (ref 38–73)
SODIUM SERPL-SCNC: 138 MMOL/L (ref 136–145)
WBC # BLD AUTO: 6.54 K/UL (ref 3.9–12.7)

## 2025-07-11 PROCEDURE — 93010 ELECTROCARDIOGRAM REPORT: CPT | Mod: ,,, | Performed by: GENERAL PRACTICE

## 2025-07-11 PROCEDURE — 85730 THROMBOPLASTIN TIME PARTIAL: CPT

## 2025-07-11 PROCEDURE — 93005 ELECTROCARDIOGRAM TRACING: CPT | Performed by: GENERAL PRACTICE

## 2025-07-11 PROCEDURE — 85025 COMPLETE CBC W/AUTO DIFF WBC: CPT

## 2025-07-11 PROCEDURE — 82310 ASSAY OF CALCIUM: CPT

## 2025-07-11 PROCEDURE — 36415 COLL VENOUS BLD VENIPUNCTURE: CPT

## 2025-07-11 NOTE — DISCHARGE INSTRUCTIONS
Your procedure is scheduled for: 7/11/25          [facility] thru [facility] at: 0840    Nothing to eat  after midnight the night before your procedure. You may have clear liquids up to 2 hours before coming in for procedure. This includes water, Gatorade, clear juice  Do not take any medications the morning of your procedure  Bring all your medications with you in the original pill bottles from pharmacy. Please bring your ASPIRIN   If you take blood thinners, ask your doctor if you should stop taking them.  Do not take metformin 24 hours prior to your procedure.  Adjust your insulin or other diabetes medications if needed.   Do your chlorhexidine wash the night before and morning of your procedure.  If you use a CPAP or BiPAP at home, please bring it with you the day of your procedure.  Make arrangements for someone you know to drive you home after your procedure. Taxi and Uber are not acceptable.  Come dressed comfortably          Any questions call the [facility] at [phone]

## 2025-07-13 LAB
OHS QRS DURATION: 170 MS
OHS QTC CALCULATION: 516 MS

## 2025-07-14 ENCOUNTER — TELEPHONE (OUTPATIENT)
Dept: FAMILY MEDICINE | Facility: CLINIC | Age: 60
End: 2025-07-14
Payer: MEDICAID

## 2025-07-14 NOTE — TELEPHONE ENCOUNTER
Copied from CRM #[id number]. Topic: General Inquiry - Patient Advice  >> Jul 14, 2025  9:07 AM Paty wrote:  Type:  Needs Medical Advice    Who Called: pt  via [facility]?  call  Best Call Back Number: [phone]    Additional Information:  pt states have gout and needs to know what to do about it.. asking for call back

## 2025-07-15 ENCOUNTER — HOSPITAL ENCOUNTER (OUTPATIENT)
Facility: HOSPITAL | Age: 60
Discharge: HOME | End: 2025-07-15
Attending: STUDENT IN AN ORGANIZED HEALTH CARE EDUCATION/TRAINING PROGRAM | Admitting: STUDENT IN AN ORGANIZED HEALTH CARE EDUCATION/TRAINING PROGRAM
Payer: MEDICAID

## 2025-07-15 VITALS
SYSTOLIC BLOOD PRESSURE: 150 MMHG | OXYGEN SATURATION: 96 % | HEIGHT: 67 IN | HEART RATE: 62 BPM | RESPIRATION RATE: 18 BRPM | DIASTOLIC BLOOD PRESSURE: 84 MMHG | BODY MASS INDEX: 35.31 KG/M2 | WEIGHT: 225 LBS

## 2025-07-15 DIAGNOSIS — I25.10 CAD (CORONARY ARTERY DISEASE): ICD-10-CM

## 2025-07-15 DIAGNOSIS — R07.9 CHEST PAIN, UNSPECIFIED TYPE: ICD-10-CM

## 2025-07-15 DIAGNOSIS — R94.39 ABNORMAL CARDIOVASCULAR STRESS TEST: ICD-10-CM

## 2025-07-15 DIAGNOSIS — R94.39 ABNORMAL STRESS TEST: ICD-10-CM

## 2025-07-15 DIAGNOSIS — I50.20 HFREF (HEART FAILURE WITH REDUCED EJECTION FRACTION): ICD-10-CM

## 2025-07-15 DIAGNOSIS — R06.09 DOE (DYSPNEA ON EXERTION): ICD-10-CM

## 2025-07-15 LAB — OHS CV CPX PATIENT HEIGHT IN: 67

## 2025-07-15 PROCEDURE — 63600175 PHARM REV CODE 636 W HCPCS: Performed by: STUDENT IN AN ORGANIZED HEALTH CARE EDUCATION/TRAINING PROGRAM

## 2025-07-15 PROCEDURE — 25000003 PHARM REV CODE 250: Performed by: STUDENT IN AN ORGANIZED HEALTH CARE EDUCATION/TRAINING PROGRAM

## 2025-07-15 PROCEDURE — 93458 L HRT ARTERY/VENTRICLE ANGIO: CPT | Performed by: STUDENT IN AN ORGANIZED HEALTH CARE EDUCATION/TRAINING PROGRAM

## 2025-07-15 PROCEDURE — 93458 L HRT ARTERY/VENTRICLE ANGIO: CPT | Mod: 26,,, | Performed by: STUDENT IN AN ORGANIZED HEALTH CARE EDUCATION/TRAINING PROGRAM

## 2025-07-15 PROCEDURE — C1769 GUIDE WIRE: HCPCS | Performed by: STUDENT IN AN ORGANIZED HEALTH CARE EDUCATION/TRAINING PROGRAM

## 2025-07-15 PROCEDURE — C1894 INTRO/SHEATH, NON-LASER: HCPCS | Performed by: STUDENT IN AN ORGANIZED HEALTH CARE EDUCATION/TRAINING PROGRAM

## 2025-07-15 PROCEDURE — 99153 MOD SED SAME PHYS/QHP EA: CPT | Performed by: STUDENT IN AN ORGANIZED HEALTH CARE EDUCATION/TRAINING PROGRAM

## 2025-07-15 PROCEDURE — 25000003 PHARM REV CODE 250

## 2025-07-15 PROCEDURE — C1887 CATHETER, GUIDING: HCPCS | Performed by: STUDENT IN AN ORGANIZED HEALTH CARE EDUCATION/TRAINING PROGRAM

## 2025-07-15 PROCEDURE — 99152 MOD SED SAME PHYS/QHP 5/>YRS: CPT | Performed by: STUDENT IN AN ORGANIZED HEALTH CARE EDUCATION/TRAINING PROGRAM

## 2025-07-15 PROCEDURE — 99152 MOD SED SAME PHYS/QHP 5/>YRS: CPT | Mod: ,,, | Performed by: STUDENT IN AN ORGANIZED HEALTH CARE EDUCATION/TRAINING PROGRAM

## 2025-07-15 RX ORDER — LIDOCAINE HYDROCHLORIDE 10 MG/ML
INJECTION, SOLUTION INFILTRATION; PERINEURAL
Status: DISCONTINUED | OUTPATIENT
Start: 2025-07-15 | End: 2025-07-15 | Stop reason: HOSPADM

## 2025-07-15 RX ORDER — SODIUM CHLORIDE 9 MG/ML
INJECTION, SOLUTION INTRAVENOUS
Status: DISCONTINUED | OUTPATIENT
Start: 2025-07-15 | End: 2025-07-15 | Stop reason: HOSPADM

## 2025-07-15 RX ORDER — NITROGLYCERIN 5 MG/ML
INJECTION, SOLUTION INTRAVENOUS
Status: DISCONTINUED | OUTPATIENT
Start: 2025-07-15 | End: 2025-07-15 | Stop reason: HOSPADM

## 2025-07-15 RX ORDER — MIDAZOLAM HYDROCHLORIDE 1 MG/ML
INJECTION INTRAMUSCULAR; INTRAVENOUS
Status: DISCONTINUED | OUTPATIENT
Start: 2025-07-15 | End: 2025-07-15 | Stop reason: HOSPADM

## 2025-07-15 RX ORDER — DIPHENHYDRAMINE HCL 25 MG
50 CAPSULE ORAL
Status: DISCONTINUED | OUTPATIENT
Start: 2025-07-15 | End: 2025-07-15 | Stop reason: HOSPADM

## 2025-07-15 RX ORDER — VERAPAMIL HYDROCHLORIDE 2.5 MG/ML
INJECTION INTRAVENOUS
Status: DISCONTINUED | OUTPATIENT
Start: 2025-07-15 | End: 2025-07-15 | Stop reason: HOSPADM

## 2025-07-15 RX ORDER — DIPHENHYDRAMINE HCL 25 MG
CAPSULE ORAL
Status: COMPLETED
Start: 2025-07-15 | End: 2025-07-15

## 2025-07-15 RX ORDER — FENTANYL CITRATE 50 UG/ML
INJECTION, SOLUTION INTRAMUSCULAR; INTRAVENOUS
Status: DISCONTINUED | OUTPATIENT
Start: 2025-07-15 | End: 2025-07-15 | Stop reason: HOSPADM

## 2025-07-15 RX ORDER — SODIUM CHLORIDE 9 MG/ML
INJECTION, SOLUTION INTRAVENOUS ONCE
Status: COMPLETED | OUTPATIENT
Start: 2025-07-15 | End: 2025-07-15

## 2025-07-15 RX ORDER — SODIUM CHLORIDE 9 MG/ML
INJECTION, SOLUTION INTRAVENOUS CONTINUOUS
Status: CANCELLED | OUTPATIENT
Start: 2025-07-15 | End: 2025-07-15

## 2025-07-15 RX ORDER — HEPARIN SODIUM 10000 [USP'U]/ML
INJECTION, SOLUTION INTRAVENOUS; SUBCUTANEOUS
Status: DISCONTINUED | OUTPATIENT
Start: 2025-07-15 | End: 2025-07-15 | Stop reason: HOSPADM

## 2025-07-15 RX ADMIN — Medication 50 MG: at 08:07

## 2025-07-15 RX ADMIN — DIPHENHYDRAMINE HYDROCHLORIDE 50 MG: 25 CAPSULE ORAL at 08:07

## 2025-07-15 RX ADMIN — SODIUM CHLORIDE: 9 INJECTION, SOLUTION INTRAVENOUS at 08:07

## 2025-07-16 ENCOUNTER — PATIENT MESSAGE (OUTPATIENT)
Dept: FAMILY MEDICINE | Facility: CLINIC | Age: 60
End: 2025-07-16
Payer: MEDICAID

## 2025-07-16 DIAGNOSIS — K21.9 GASTROESOPHAGEAL REFLUX DISEASE WITHOUT ESOPHAGITIS: Primary | ICD-10-CM

## 2025-07-16 DIAGNOSIS — R00.2 PALPITATION: ICD-10-CM

## 2025-07-17 ENCOUNTER — TELEPHONE (OUTPATIENT)
Dept: CARDIOLOGY | Facility: CLINIC | Age: 60
End: 2025-07-17
Payer: MEDICAID

## 2025-07-17 NOTE — TELEPHONE ENCOUNTER
Copied from CRM #[id number]. Topic: General Inquiry - Return Call  >> Jul 17, 2025  8:52 AM Joslyn wrote:  Type:  Patient Returning Call    Who Called:pt     Who Left Message for Patient:AALIAYH MACIAS    Does the patient know what this is regarding?:yes     Best Call Back Number:[phone]    Additional Information:

## 2025-07-18 ENCOUNTER — TELEPHONE (OUTPATIENT)
Dept: CARDIOLOGY | Facility: CLINIC | Age: 60
End: 2025-07-18
Payer: MEDICAID

## 2025-07-18 ENCOUNTER — TELEPHONE (OUTPATIENT)
Dept: FAMILY MEDICINE | Facility: CLINIC | Age: 60
End: 2025-07-18
Payer: MEDICAID

## 2025-07-18 NOTE — TELEPHONE ENCOUNTER
Copied from CRM #[id number]. Topic: General Inquiry - Return Call  >> Jul 17, 2025  4:23 PM Joslyn wrote:  Type:  Patient Returning Call    Who Called:pt    Who Left Message for Patient:Michelle Anderson,    Does the patient know what this is regarding?:yes    Best Call Back Number:[phone]    Additional Information:

## 2025-07-18 NOTE — TELEPHONE ENCOUNTER
----- Message from Vasu Rocha MD sent at 7/16/2025  6:47 AM CDT -----  Regarding: GI appt needed and bp check  Please set up GI appt that is due and nurse bp check thx

## 2025-07-21 ENCOUNTER — PATIENT MESSAGE (OUTPATIENT)
Dept: GASTROENTEROLOGY | Facility: CLINIC | Age: 60
End: 2025-07-21
Payer: MEDICAID

## 2025-07-25 ENCOUNTER — TELEPHONE (OUTPATIENT)
Dept: GASTROENTEROLOGY | Facility: CLINIC | Age: 60
End: 2025-07-25
Payer: MEDICAID

## 2025-07-25 NOTE — TELEPHONE ENCOUNTER
Called patient to reschedule colonoscopy Thursday 7/3 with . Pt requested to reschedule procedure Fri 10/10.

## 2025-08-14 ENCOUNTER — TELEPHONE (OUTPATIENT)
Dept: CARDIOLOGY | Facility: CLINIC | Age: 60
End: 2025-08-14
Payer: MEDICAID

## 2025-09-05 ENCOUNTER — TELEPHONE (OUTPATIENT)
Dept: GASTROENTEROLOGY | Facility: CLINIC | Age: 60
End: 2025-09-05
Payer: MEDICAID

## 2025-09-05 RX ORDER — PANTOPRAZOLE SODIUM 40 MG/1
40 TABLET, DELAYED RELEASE ORAL DAILY
Qty: 90 TABLET | Refills: 3 | Status: SHIPPED | OUTPATIENT
Start: 2025-09-05 | End: 2026-09-05

## (undated) DEVICE — CONTRAST VISIPAQUE 150ML

## (undated) DEVICE — KIT GLIDESHEATH SLEND 6FR 10CM

## (undated) DEVICE — CATH DXTERITY JR40 100CM 5FR

## (undated) DEVICE — HEMOSTAT VASC BAND LONG 27CM

## (undated) DEVICE — GUIDEWIRE INQWIRE SE 3MM JTIP

## (undated) DEVICE — CATH DXTERITY JL35 100CM 5FR